# Patient Record
Sex: FEMALE | Race: WHITE | NOT HISPANIC OR LATINO | Employment: OTHER | URBAN - METROPOLITAN AREA
[De-identification: names, ages, dates, MRNs, and addresses within clinical notes are randomized per-mention and may not be internally consistent; named-entity substitution may affect disease eponyms.]

---

## 2018-06-12 ENCOUNTER — HOSPITAL ENCOUNTER (EMERGENCY)
Facility: HOSPITAL | Age: 78
Discharge: HOME | End: 2018-06-12
Attending: FAMILY MEDICINE
Payer: MEDICARE

## 2018-06-12 VITALS
DIASTOLIC BLOOD PRESSURE: 74 MMHG | OXYGEN SATURATION: 91 % | SYSTOLIC BLOOD PRESSURE: 138 MMHG | RESPIRATION RATE: 20 BRPM | WEIGHT: 123 LBS | HEIGHT: 62 IN | HEART RATE: 76 BPM | TEMPERATURE: 99 F | BODY MASS INDEX: 22.63 KG/M2

## 2018-06-12 DIAGNOSIS — J44.0 COPD (CHRONIC OBSTRUCTIVE PULMONARY DISEASE) WITH ACUTE BRONCHITIS: Primary | ICD-10-CM

## 2018-06-12 DIAGNOSIS — J20.9 COPD (CHRONIC OBSTRUCTIVE PULMONARY DISEASE) WITH ACUTE BRONCHITIS: Primary | ICD-10-CM

## 2018-06-12 DIAGNOSIS — R07.9 CHEST PAIN: ICD-10-CM

## 2018-06-12 PROCEDURE — 99284 EMERGENCY DEPT VISIT MOD MDM: CPT | Mod: 25

## 2018-06-12 PROCEDURE — 94640 AIRWAY INHALATION TREATMENT: CPT

## 2018-06-12 PROCEDURE — 71046 X-RAY EXAM CHEST 2 VIEWS: CPT | Mod: TC,FY

## 2018-06-12 PROCEDURE — 94760 N-INVAS EAR/PLS OXIMETRY 1: CPT

## 2018-06-12 PROCEDURE — 25000242 PHARM REV CODE 250 ALT 637 W/ HCPCS: Performed by: FAMILY MEDICINE

## 2018-06-12 PROCEDURE — 71046 X-RAY EXAM CHEST 2 VIEWS: CPT | Mod: 26,,, | Performed by: RADIOLOGY

## 2018-06-12 RX ORDER — TIOTROPIUM BROMIDE 18 UG/1
18 CAPSULE ORAL; RESPIRATORY (INHALATION) DAILY
Qty: 1 BOX | Refills: 0 | Status: SHIPPED | OUTPATIENT
Start: 2018-06-12 | End: 2018-12-08 | Stop reason: SDUPTHER

## 2018-06-12 RX ORDER — DOXYCYCLINE 100 MG/1
100 CAPSULE ORAL 2 TIMES DAILY
Qty: 20 CAPSULE | Refills: 0 | Status: SHIPPED | OUTPATIENT
Start: 2018-06-12 | End: 2018-06-22

## 2018-06-12 RX ORDER — METHYLPREDNISOLONE 4 MG/1
TABLET ORAL
Qty: 1 PACKAGE | Refills: 0 | Status: SHIPPED | OUTPATIENT
Start: 2018-06-12 | End: 2018-07-03

## 2018-06-12 RX ORDER — ALBUTEROL SULFATE 90 UG/1
1-2 AEROSOL, METERED RESPIRATORY (INHALATION) EVERY 6 HOURS PRN
Qty: 1 INHALER | Refills: 0 | Status: SHIPPED | OUTPATIENT
Start: 2018-06-12 | End: 2018-12-08 | Stop reason: SDUPTHER

## 2018-06-12 RX ORDER — IPRATROPIUM BROMIDE AND ALBUTEROL SULFATE 2.5; .5 MG/3ML; MG/3ML
3 SOLUTION RESPIRATORY (INHALATION)
Status: COMPLETED | OUTPATIENT
Start: 2018-06-12 | End: 2018-06-12

## 2018-06-12 RX ADMIN — IPRATROPIUM BROMIDE AND ALBUTEROL SULFATE 3 ML: .5; 3 SOLUTION RESPIRATORY (INHALATION) at 10:06

## 2018-06-12 NOTE — ED PROVIDER NOTES
Encounter Date: 6/12/2018       History     Chief Complaint   Patient presents with    Nasal Congestion     Patient is a 77-year-old lady with a history of hypertension and COPD.  She presents with a complaint of nasal discharge, rhinorrhea, cough and mild shortness of breath for the past 4 days.  She denies any fever or productive cough.           Review of patient's allergies indicates:   Allergen Reactions    Codeine      History reviewed. No pertinent past medical history.  Past Surgical History:   Procedure Laterality Date    HYSTERECTOMY       History reviewed. No pertinent family history.  Social History   Substance Use Topics    Smoking status: Current Every Day Smoker     Packs/day: 1.00    Smokeless tobacco: Never Used    Alcohol use Yes     Review of Systems   Constitutional: Negative for chills, fatigue and fever.   HENT: Positive for congestion. Negative for ear pain, rhinorrhea, sinus pain, sinus pressure and sore throat.    Eyes: Negative for photophobia and redness.   Respiratory: Positive for cough and wheezing. Negative for chest tightness and shortness of breath.    Cardiovascular: Negative for chest pain, palpitations and leg swelling.   Gastrointestinal: Negative for abdominal pain, constipation, diarrhea and nausea.   Endocrine: Negative for polydipsia, polyphagia and polyuria.   Genitourinary: Negative for difficulty urinating, dysuria, flank pain, hematuria and urgency.   Musculoskeletal: Negative for arthralgias, back pain and joint swelling.   Skin: Negative for color change.   Neurological: Negative for dizziness, seizures, syncope, weakness and headaches.   Hematological: Negative for adenopathy.   Psychiatric/Behavioral: Negative for sleep disturbance and suicidal ideas.   All other systems reviewed and are negative.      Physical Exam     Initial Vitals [06/12/18 0946]   BP Pulse Resp Temp SpO2   (!) 198/95 79 18 98.9 °F (37.2 °C) (!) 93 %      MAP       --         Physical  Exam    Nursing note and vitals reviewed.  Constitutional:   Uncomfortable but in no acute distress.   Pulmonary/Chest: She has wheezes.         ED Course   Procedures  Labs Reviewed - No data to display       X-Ray Chest PA And Lateral   Final Result      Hyperexpansion of the lungs consistent with COPD.      There is no x-ray evidence of active or acute disease.         Electronically signed by: Nikita Ledesma   Date:    06/12/2018   Time:    10:53        X-Rays:   Independently Interpreted Readings:   Other Readings:  COPD, no pneumonia.                         Clinical Impression:   The primary encounter diagnosis was COPD (chronic obstructive pulmonary disease) with acute bronchitis. A diagnosis of Chest pain was also pertinent to this visit.      Disposition:   Disposition: Discharged  Condition: Stable                        Marifer Anderson MD  06/12/18 1103       Marifer Anderson MD  07/06/18 3896

## 2018-06-12 NOTE — ED TRIAGE NOTES
Pt to ed with c/o cough and congestion x2-3 wks, reports seeing urgent care this am and had O2 sat of 90% and was instructed to come to ed

## 2018-12-08 ENCOUNTER — HOSPITAL ENCOUNTER (EMERGENCY)
Facility: HOSPITAL | Age: 78
Discharge: HOME | End: 2018-12-08
Attending: FAMILY MEDICINE
Payer: MEDICARE

## 2018-12-08 VITALS
OXYGEN SATURATION: 94 % | BODY MASS INDEX: 21.53 KG/M2 | SYSTOLIC BLOOD PRESSURE: 164 MMHG | DIASTOLIC BLOOD PRESSURE: 81 MMHG | TEMPERATURE: 98 F | HEIGHT: 66 IN | HEART RATE: 66 BPM | WEIGHT: 134 LBS | RESPIRATION RATE: 20 BRPM

## 2018-12-08 DIAGNOSIS — J20.9 COPD (CHRONIC OBSTRUCTIVE PULMONARY DISEASE) WITH ACUTE BRONCHITIS: ICD-10-CM

## 2018-12-08 DIAGNOSIS — J44.1 COPD WITH ACUTE EXACERBATION: Primary | ICD-10-CM

## 2018-12-08 DIAGNOSIS — J44.0 COPD (CHRONIC OBSTRUCTIVE PULMONARY DISEASE) WITH ACUTE BRONCHITIS: ICD-10-CM

## 2018-12-08 PROCEDURE — 99283 EMERGENCY DEPT VISIT LOW MDM: CPT

## 2018-12-08 RX ORDER — ALBUTEROL SULFATE 90 UG/1
1-2 AEROSOL, METERED RESPIRATORY (INHALATION) EVERY 6 HOURS PRN
Qty: 1 INHALER | Refills: 0 | Status: SHIPPED | OUTPATIENT
Start: 2018-12-08

## 2018-12-08 RX ORDER — CEFUROXIME AXETIL 500 MG/1
500 TABLET ORAL 2 TIMES DAILY
Qty: 14 TABLET | Refills: 0 | Status: SHIPPED | OUTPATIENT
Start: 2018-12-08 | End: 2019-07-02 | Stop reason: CLARIF

## 2018-12-08 RX ORDER — TIOTROPIUM BROMIDE 18 UG/1
18 CAPSULE ORAL; RESPIRATORY (INHALATION) DAILY
Qty: 1 BOX | Refills: 0 | Status: ON HOLD | OUTPATIENT
Start: 2018-12-08 | End: 2022-11-18 | Stop reason: HOSPADM

## 2018-12-08 NOTE — ED PROVIDER NOTES
CHIEF COMPLAINT  Chief Complaint   Patient presents with    Nasal Congestion    Cough       HPI  Geraldine Cookseyis a 78 y.o. female who presents to the ED with complaints of congestion.  Patient states she has a history of COPD and has been out of her inhalers for several weeks..  States her congestion has been much worse in the past few days.  She does not think she has had any fever.    CURRENT MEDICATIONS  No current facility-administered medications on file prior to encounter.      Current Outpatient Medications on File Prior to Encounter   Medication Sig Dispense Refill    [DISCONTINUED] albuterol 90 mcg/actuation inhaler Inhale 1-2 puffs into the lungs every 6 (six) hours as needed for Wheezing. Rescue 1 Inhaler 0    [DISCONTINUED] tiotropium (SPIRIVA) 18 mcg inhalation capsule Inhale 1 capsule (18 mcg total) into the lungs once daily. Controller 1 Box 0       ALLERGIES  Review of patient's allergies indicates:   Allergen Reactions    Codeine          There is no immunization history on file for this patient.    PAST MEDICAL HISTORY  History reviewed. No pertinent past medical history.    SURGICAL HISTORY  Past Surgical History:   Procedure Laterality Date    HYSTERECTOMY         SOCIAL HISTORY  Social History     Socioeconomic History    Marital status: Single     Spouse name: Not on file    Number of children: Not on file    Years of education: Not on file    Highest education level: Not on file   Social Needs    Financial resource strain: Not on file    Food insecurity - worry: Not on file    Food insecurity - inability: Not on file    Transportation needs - medical: Not on file    Transportation needs - non-medical: Not on file   Occupational History    Not on file   Tobacco Use    Smoking status: Current Every Day Smoker     Packs/day: 1.00    Smokeless tobacco: Never Used   Substance and Sexual Activity    Alcohol use: Yes    Drug use: No    Sexual activity: No   Other Topics  "Concern    Not on file   Social History Narrative    Not on file       FAMILY HISTORY  History reviewed. No pertinent family history.    REVIEW OF SYSTEMS  Constitutional: No fever, chills, or weakness.  Eyes: No redness, pain, or discharge  HENT: No ear pain, no headache, no rhinorrhea, no throat pain  Respiratory: +cough,+ wheezing, shortness of breath  Cardiovascular: No chest pain, palpitations or edema  GI: No abdominal pain, nausea, vomiting or diarrhea  Gu: No dysuria, no hematuria, or discharge  Musculoskeletal: No pain, full range of motion. Good sensation  Skin: No rash or abrasion  Neurologic: No focal weakness or sensory changes.  All systems otherwise negative except as noted in the Review of Systems and History of Present Illness      PHYSICAL EXAM  Reviewed Triage Note  VITAL SIGNS:   Patient Vitals for the past 24 hrs:   BP Temp Temp src Pulse Resp SpO2 Height Weight   12/08/18 1549 (!) 164/81 98.1 °F (36.7 °C) Oral 66 20 (!) 94 % 5' 6" (1.676 m) 60.8 kg (134 lb)     Constitutional: Well developed, well nourished, Alert and oriented x3, No acute distress, non-toxic appearance.  HENT: Normocephalic, Atraumatic, Bilateral external ears normal, external nose negative, oropharynx moist, No oral exudates.  Eyes: PERRL, EOMI, Conjunctiva normal, No discharge.  Neck: Normal range of motion, no tenderness, supple, no carotid bruits  Respiratory: + scattered wheezes, no rales, few rhonchi.  Cardiovascular: Normal heart rate, normal rhythm, no murmurs, no rubs, no gallops.  Gi: Bowel sounds normal, soft, no tenderness, non-distended, no masses, no pulsatile masses.  Musculoskeletal: No edema, no tenderness, no cyanosis, no clubbing. Good range of motion in all major joints. No tenderness to palpation or major deformities noted.   Integument: Warm, Dry, No erythema, no rash  Neurologic: Normal motor function, normal sensory function. No focal deficits noted. Intact distal pulses  Psychiatric: Affect normal, " judgment normal, mood normal      LABS  Pertinent labs reviewed. (see chart for details)  Labs Reviewed - No data to display    RADIOLOGY  No orders to display         PROCEDURE  Procedures      ED COURSE & MEDICAL DECISION MAKING     MDM       Physical exam findings discussed with patient. No acute emergent medical condition identified at this time to warrant further testing. Will dispo home with instructions to follow up with PCP, return to the ED for worsening condition. Pt agrees with plan of care.     DISPOSITION  Patient discharged in stable condition 12/8/2018  4:22 PM      CLINICAL IMPRESSION:  The primary encounter diagnosis was COPD with acute exacerbation. A diagnosis of COPD (chronic obstructive pulmonary disease) with acute bronchitis was also pertinent to this visit.    Patient advised to follow-up with your PCP within 3 days for BP re-check if Blood Pressure was >120/80 without history of hypertension.       DAVE Busby  12/08/18 4146

## 2019-07-02 ENCOUNTER — HOSPITAL ENCOUNTER (INPATIENT)
Facility: HOSPITAL | Age: 79
LOS: 6 days | Discharge: HOME HEALTH | DRG: 481 | End: 2019-07-08
Attending: EMERGENCY MEDICINE | Admitting: HOSPITALIST
Payer: MEDICARE

## 2019-07-02 DIAGNOSIS — J44.9 CHRONIC OBSTRUCTIVE PULMONARY DISEASE, UNSPECIFIED COPD TYPE: Primary | ICD-10-CM

## 2019-07-02 DIAGNOSIS — M25.559 HIP PAIN: ICD-10-CM

## 2019-07-02 DIAGNOSIS — Z01.818 PRE-OP EVALUATION: ICD-10-CM

## 2019-07-02 DIAGNOSIS — M17.12 PRIMARY OSTEOARTHRITIS OF LEFT KNEE: ICD-10-CM

## 2019-07-02 DIAGNOSIS — S72.002A CLOSED FRACTURE OF LEFT HIP, INITIAL ENCOUNTER: ICD-10-CM

## 2019-07-02 PROBLEM — R03.0 ELEVATED BLOOD PRESSURE READING: Status: ACTIVE | Noted: 2019-07-02

## 2019-07-02 PROBLEM — Z72.0 TOBACCO ABUSE: Status: ACTIVE | Noted: 2019-07-02

## 2019-07-02 LAB
ALBUMIN SERPL BCP-MCNC: 3.9 G/DL (ref 3.5–5.2)
ALP SERPL-CCNC: 67 U/L (ref 55–135)
ALT SERPL W/O P-5'-P-CCNC: 13 U/L (ref 10–44)
ANION GAP SERPL CALC-SCNC: 10 MMOL/L (ref 8–16)
AST SERPL-CCNC: 16 U/L (ref 10–40)
BASOPHILS # BLD AUTO: 0.14 K/UL (ref 0–0.2)
BASOPHILS NFR BLD: 1.5 % (ref 0–1.9)
BILIRUB SERPL-MCNC: 0.3 MG/DL (ref 0.1–1)
BUN SERPL-MCNC: 15 MG/DL (ref 8–23)
CALCIUM SERPL-MCNC: 9.4 MG/DL (ref 8.7–10.5)
CHLORIDE SERPL-SCNC: 105 MMOL/L (ref 95–110)
CO2 SERPL-SCNC: 26 MMOL/L (ref 23–29)
CREAT SERPL-MCNC: 0.7 MG/DL (ref 0.5–1.4)
DIFFERENTIAL METHOD: ABNORMAL
EOSINOPHIL # BLD AUTO: 0.2 K/UL (ref 0–0.5)
EOSINOPHIL NFR BLD: 1.9 % (ref 0–8)
ERYTHROCYTE [DISTWIDTH] IN BLOOD BY AUTOMATED COUNT: 13.9 % (ref 11.5–14.5)
EST. GFR  (AFRICAN AMERICAN): >60 ML/MIN/1.73 M^2
EST. GFR  (NON AFRICAN AMERICAN): >60 ML/MIN/1.73 M^2
GLUCOSE SERPL-MCNC: 84 MG/DL (ref 70–110)
HCT VFR BLD AUTO: 40.8 % (ref 37–48.5)
HGB BLD-MCNC: 13 G/DL (ref 12–16)
IMM GRANULOCYTES # BLD AUTO: 0.06 K/UL (ref 0–0.04)
LYMPHOCYTES # BLD AUTO: 2.3 K/UL (ref 1–4.8)
LYMPHOCYTES NFR BLD: 24.8 % (ref 18–48)
MCH RBC QN AUTO: 29.5 PG (ref 27–31)
MCHC RBC AUTO-ENTMCNC: 31.9 G/DL (ref 32–36)
MCV RBC AUTO: 93 FL (ref 82–98)
MONOCYTES # BLD AUTO: 1 K/UL (ref 0.3–1)
MONOCYTES NFR BLD: 11.1 % (ref 4–15)
NEUTROPHILS # BLD AUTO: 5.6 K/UL (ref 1.8–7.7)
NEUTROPHILS NFR BLD: 60.1 % (ref 38–73)
NRBC BLD-RTO: 0 /100 WBC
PLATELET # BLD AUTO: 245 K/UL (ref 150–350)
PMV BLD AUTO: 10.3 FL (ref 9.2–12.9)
POTASSIUM SERPL-SCNC: 4.2 MMOL/L (ref 3.5–5.1)
PROT SERPL-MCNC: 7 G/DL (ref 6–8.4)
RBC # BLD AUTO: 4.41 M/UL (ref 4–5.4)
SODIUM SERPL-SCNC: 141 MMOL/L (ref 136–145)
WBC # BLD AUTO: 9.3 K/UL (ref 3.9–12.7)

## 2019-07-02 PROCEDURE — 80053 COMPREHEN METABOLIC PANEL: CPT

## 2019-07-02 PROCEDURE — 36415 COLL VENOUS BLD VENIPUNCTURE: CPT

## 2019-07-02 PROCEDURE — 96372 THER/PROPH/DIAG INJ SC/IM: CPT

## 2019-07-02 PROCEDURE — 63600175 PHARM REV CODE 636 W HCPCS: Performed by: HOSPITALIST

## 2019-07-02 PROCEDURE — 93005 ELECTROCARDIOGRAM TRACING: CPT

## 2019-07-02 PROCEDURE — 12000002 HC ACUTE/MED SURGE SEMI-PRIVATE ROOM

## 2019-07-02 PROCEDURE — 99285 EMERGENCY DEPT VISIT HI MDM: CPT | Mod: 25

## 2019-07-02 PROCEDURE — 25000003 PHARM REV CODE 250: Performed by: HOSPITALIST

## 2019-07-02 PROCEDURE — 27000221 HC OXYGEN, UP TO 24 HOURS

## 2019-07-02 PROCEDURE — 85025 COMPLETE CBC W/AUTO DIFF WBC: CPT

## 2019-07-02 PROCEDURE — 96374 THER/PROPH/DIAG INJ IV PUSH: CPT

## 2019-07-02 PROCEDURE — 99900035 HC TECH TIME PER 15 MIN (STAT)

## 2019-07-02 PROCEDURE — 63600175 PHARM REV CODE 636 W HCPCS: Performed by: EMERGENCY MEDICINE

## 2019-07-02 PROCEDURE — 94761 N-INVAS EAR/PLS OXIMETRY MLT: CPT

## 2019-07-02 RX ORDER — ALBUTEROL SULFATE 90 UG/1
2 AEROSOL, METERED RESPIRATORY (INHALATION) EVERY 6 HOURS PRN
Status: DISCONTINUED | OUTPATIENT
Start: 2019-07-02 | End: 2019-07-02

## 2019-07-02 RX ORDER — ENOXAPARIN SODIUM 100 MG/ML
40 INJECTION SUBCUTANEOUS EVERY 24 HOURS
Status: DISCONTINUED | OUTPATIENT
Start: 2019-07-02 | End: 2019-07-03

## 2019-07-02 RX ORDER — LANOLIN ALCOHOL/MO/W.PET/CERES
800 CREAM (GRAM) TOPICAL
Status: DISCONTINUED | OUTPATIENT
Start: 2019-07-02 | End: 2019-07-03

## 2019-07-02 RX ORDER — POLYETHYLENE GLYCOL 3350 17 G/17G
17 POWDER, FOR SOLUTION ORAL DAILY
Status: DISCONTINUED | OUTPATIENT
Start: 2019-07-03 | End: 2019-07-03

## 2019-07-02 RX ORDER — SODIUM,POTASSIUM PHOSPHATES 280-250MG
2 POWDER IN PACKET (EA) ORAL
Status: DISCONTINUED | OUTPATIENT
Start: 2019-07-02 | End: 2019-07-03

## 2019-07-02 RX ORDER — MORPHINE SULFATE 2 MG/ML
4 INJECTION, SOLUTION INTRAMUSCULAR; INTRAVENOUS EVERY 4 HOURS PRN
Status: DISCONTINUED | OUTPATIENT
Start: 2019-07-02 | End: 2019-07-03

## 2019-07-02 RX ORDER — POTASSIUM CHLORIDE 20 MEQ/15ML
60 SOLUTION ORAL
Status: DISCONTINUED | OUTPATIENT
Start: 2019-07-02 | End: 2019-07-03

## 2019-07-02 RX ORDER — HYDRALAZINE HYDROCHLORIDE 20 MG/ML
10 INJECTION INTRAMUSCULAR; INTRAVENOUS EVERY 6 HOURS PRN
Status: DISCONTINUED | OUTPATIENT
Start: 2019-07-02 | End: 2019-07-03

## 2019-07-02 RX ORDER — CYCLOBENZAPRINE HCL 10 MG
10 TABLET ORAL 3 TIMES DAILY PRN
Status: DISCONTINUED | OUTPATIENT
Start: 2019-07-02 | End: 2019-07-03

## 2019-07-02 RX ORDER — ONDANSETRON 2 MG/ML
4 INJECTION INTRAMUSCULAR; INTRAVENOUS EVERY 8 HOURS PRN
Status: DISCONTINUED | OUTPATIENT
Start: 2019-07-02 | End: 2019-07-03

## 2019-07-02 RX ORDER — MORPHINE SULFATE 8 MG/ML
8 INJECTION INTRAMUSCULAR; INTRAVENOUS; SUBCUTANEOUS
Status: DISCONTINUED | OUTPATIENT
Start: 2019-07-02 | End: 2019-07-02

## 2019-07-02 RX ORDER — IBUPROFEN 200 MG
24 TABLET ORAL
Status: DISCONTINUED | OUTPATIENT
Start: 2019-07-02 | End: 2019-07-03

## 2019-07-02 RX ORDER — POTASSIUM CHLORIDE 20 MEQ/15ML
40 SOLUTION ORAL
Status: DISCONTINUED | OUTPATIENT
Start: 2019-07-02 | End: 2019-07-03

## 2019-07-02 RX ORDER — SODIUM CHLORIDE 0.9 % (FLUSH) 0.9 %
10 SYRINGE (ML) INJECTION
Status: DISCONTINUED | OUTPATIENT
Start: 2019-07-02 | End: 2019-07-03

## 2019-07-02 RX ORDER — ALBUTEROL SULFATE 2.5 MG/.5ML
2.5 SOLUTION RESPIRATORY (INHALATION) EVERY 6 HOURS PRN
Status: DISCONTINUED | OUTPATIENT
Start: 2019-07-02 | End: 2019-07-03

## 2019-07-02 RX ORDER — ORPHENADRINE CITRATE 30 MG/ML
30 INJECTION INTRAMUSCULAR; INTRAVENOUS
Status: COMPLETED | OUTPATIENT
Start: 2019-07-02 | End: 2019-07-02

## 2019-07-02 RX ORDER — DIPHENHYDRAMINE HCL 25 MG
25 CAPSULE ORAL EVERY 6 HOURS PRN
Status: DISCONTINUED | OUTPATIENT
Start: 2019-07-02 | End: 2019-07-03

## 2019-07-02 RX ORDER — ACETAMINOPHEN 325 MG/1
650 TABLET ORAL EVERY 6 HOURS PRN
Status: DISCONTINUED | OUTPATIENT
Start: 2019-07-02 | End: 2019-07-03

## 2019-07-02 RX ORDER — GLUCAGON 1 MG
1 KIT INJECTION
Status: DISCONTINUED | OUTPATIENT
Start: 2019-07-02 | End: 2019-07-03

## 2019-07-02 RX ORDER — IBUPROFEN 200 MG
16 TABLET ORAL
Status: DISCONTINUED | OUTPATIENT
Start: 2019-07-02 | End: 2019-07-03

## 2019-07-02 RX ADMIN — MORPHINE SULFATE 8 MG: 8 INJECTION INTRAVENOUS at 01:07

## 2019-07-02 RX ADMIN — CYCLOBENZAPRINE HYDROCHLORIDE 10 MG: 10 TABLET, FILM COATED ORAL at 05:07

## 2019-07-02 RX ADMIN — ORPHENADRINE CITRATE 30 MG: 60 INJECTION INTRAMUSCULAR; INTRAVENOUS at 03:07

## 2019-07-02 RX ADMIN — ENOXAPARIN SODIUM 40 MG: 100 INJECTION SUBCUTANEOUS at 05:07

## 2019-07-02 RX ADMIN — MORPHINE SULFATE 4 MG: 2 INJECTION, SOLUTION INTRAMUSCULAR; INTRAVENOUS at 07:07

## 2019-07-02 NOTE — ED PROVIDER NOTES
Encounter Date: 7/2/2019       History     Chief Complaint   Patient presents with    Fall     left hip pain      Chief complaint:  Hip pain    HPI:  78-year-old female presents from [place] via ambulance with complaints of left pain after she suffered a mechanical fall when she tripped over her dog.  She denies any other injury. She has no headache, neck pain, chest or back pain. Past medical history is significant only for COPD.        Review of patient's allergies indicates:   Allergen Reactions    Codeine      Past Medical History:   Diagnosis Date    COPD (chronic obstructive pulmonary disease)      Past Surgical History:   Procedure Laterality Date    HYSTERECTOMY       History reviewed. No pertinent family history.  Social History     Tobacco Use    Smoking status: Current Every Day Smoker     Packs/day: 1.00    Smokeless tobacco: Never Used   Substance Use Topics    Alcohol use: Yes    Drug use: No     Review of Systems   Constitutional: Negative for activity change, appetite change, chills, fatigue and fever.   Eyes: Negative for visual disturbance.   Respiratory: Negative for apnea and shortness of breath.    Cardiovascular: Negative for chest pain and palpitations.   Gastrointestinal: Negative for abdominal distention and abdominal pain.   Genitourinary: Negative for difficulty urinating.   Musculoskeletal: Positive for arthralgias. Negative for neck pain.   Skin: Negative for pallor and rash.   Neurological: Negative for headaches.   Hematological: Does not bruise/bleed easily.   Psychiatric/Behavioral: Negative for agitation.       Physical Exam     Initial Vitals [07/02/19 1316]   BP Pulse Resp Temp SpO2   (!) 216/95 69 20 97.8 °F (36.6 °C) (!) 94 %      MAP       --         Physical Exam    Nursing note and vitals reviewed.  Constitutional: She appears well-developed and well-nourished.   HENT:   Head: Normocephalic and atraumatic.   Eyes: Conjunctivae are normal.   Neck: Normal range of  motion. Neck supple.   Cardiovascular: Normal rate, regular rhythm and normal heart sounds. Exam reveals no gallop and no friction rub.    No murmur heard.  Normal dorsalis pedal pulses bilaterally   Pulmonary/Chest: Effort normal and breath sounds normal. No respiratory distress. She has no wheezes. She has no rhonchi. She has no rales.   Abdominal: Soft. She exhibits no distension. There is no tenderness.   Musculoskeletal: She exhibits tenderness (Left hip tenderness).   Diminished movement of the left hip   Neurological: She is alert and oriented to person, place, and time. She has normal strength. No sensory deficit.   Skin: Skin is warm and dry. No erythema.   Psychiatric: She has a normal mood and affect.         ED Course   Procedures  Labs Reviewed   CBC W/ AUTO DIFFERENTIAL - Abnormal; Notable for the following components:       Result Value    Mean Corpuscular Hemoglobin Conc 31.9 (*)     Immature Grans (Abs) 0.06 (*)     All other components within normal limits   COMPREHENSIVE METABOLIC PANEL     EKG Readings: (Independently Interpreted)   Rhythm: Normal Sinus Rhythm. Heart Rate: 70. Ectopy: No Ectopy. Conduction: Normal. ST Segments: Non-Specific ST Segment Depression. T Waves: Normal. Axis: Normal. Clinical Impression: Normal Sinus Rhythm       Imaging Results          X-Ray Chest AP Portable (Final result)  Result time 07/02/19 14:32:25    Final result by Remi Sung Jr., MD (07/02/19 14:32:25)                 Impression:      No acute abnormality.      Electronically signed by: Remi Sung MD  Date:    07/02/2019  Time:    14:32             Narrative:    EXAMINATION:  XR CHEST AP PORTABLE    CLINICAL HISTORY:  Encounter for other preprocedural examination    TECHNIQUE:  Single frontal view of the chest was performed.    COMPARISON:  None    FINDINGS:  The lungs are clear, with normal appearance of pulmonary vasculature and no pleural effusion or pneumothorax.    The cardiac silhouette is  normal in size. The hilar and mediastinal contours are unremarkable.    Bones are intact.                               X-Ray Hip 2 View Left (Final result)  Result time 07/02/19 14:33:49    Final result by Remi Sung Jr., MD (07/02/19 14:33:49)                 Impression:      Nondisplaced intertrochanteric fracture of the left femur.  Diffuse osteoporosis.  Moderate osteoarthritis right hip joint, mild osteoarthritis left hip joint.      Electronically signed by: Remi Sung MD  Date:    07/02/2019  Time:    14:33             Narrative:    EXAMINATION:  XR HIP 2 VIEW LEFT    CLINICAL HISTORY:  Pain in unspecified hip    TECHNIQUE:  AP view of the pelvis and frog leg lateral view of the left hip were performed.    COMPARISON:  None    FINDINGS:  There is an intertrochanteric fracture of the left femur without significant displacement.  The femoral head remains in the acetabulum.  Mild DJD is noted at the acetabular rim.  There is diffuse osteoporosis of the pelvis.  There is moderate osteoarthritis at the right hip joint.  The sacroiliac joints are within normal limits.                                 Medical Decision Making:   Independently Interpreted Test(s):   I have ordered and independently interpreted X-rays - see summary below.       <> Summary of X-Ray Reading(s): Left hip x-rays independently interpreted by me demonstrate a nondisplaced intertrochanteric fracture.  ED Management:  78-year-old female presents with left hip pain after a mechanical fall.  X-rays reveal a nondisplaced intertrochanteric hip fracture.  Dr. finger recommended transfer to [facility] for efficiency; however, the patient's family and the patient adamantly refuse transfer.  She will be admitted to this facility for surgical repair.  Other:   I have discussed this case with another health care provider.       <> Summary of the Discussion: Discussed with Dr. Almendarez who will admit the patient.  Discussed with   susan who will repair the hip fracture.                      Clinical Impression:       ICD-10-CM ICD-9-CM   1. Closed fracture of left hip, initial encounter S72.002A 820.8   2. Hip pain M25.559 719.45   3. Pre-op evaluation Z01.818 V72.84                                George De La Paz III, MD  07/02/19 1600

## 2019-07-02 NOTE — PLAN OF CARE
07/02/19 1632   Patient Assessment/Suction   Level of Consciousness (AVPU) alert   Respiratory Effort Normal;Unlabored   Expansion/Accessory Muscles/Retractions no retractions;no use of accessory muscles;expansion symmetric   All Lung Fields Breath Sounds clear   Rhythm/Pattern, Respiratory depth regular;pattern regular;unlabored   Cough Frequency no cough   PRE-TX-O2   O2 Device (Oxygen Therapy) nasal cannula   $ Is the patient on Low Flow Oxygen? Yes   Flow (L/min) 2   SpO2 98 %   Pulse Oximetry Type Intermittent   $ Pulse Oximetry - Multiple Charge Pulse Oximetry - Multiple   Pulse 75   Resp 18   Aerosol Therapy   $ Aerosol Therapy Charges PRN treatment not required   Respiratory Treatment Status (SVN) PRN treatment not required       Aerosol treatments PRN.

## 2019-07-02 NOTE — ASSESSMENT & PLAN NOTE
Orthopedics has been consulted.  Dr. Gunn planning to take to the OR tomorrow.  Pain control with IV pain medication for now.  NPO at midnight.  PT and OT consults after surgery.

## 2019-07-02 NOTE — ASSESSMENT & PLAN NOTE
Patient's COPD is controlled currently. Continue scheduled inhalers, Supplemental oxygen as needed and monitor respiratory status closely.

## 2019-07-02 NOTE — ASSESSMENT & PLAN NOTE
Assistance with smoking cessation was offered, including:  [x]  Medications  [x]  Counseling    Patient was counseled regarding smoking for >10 minutes.  Does not want nicotine patch at this time.

## 2019-07-02 NOTE — ASSESSMENT & PLAN NOTE
Likely secondary to pain. No history of hypertension.  Will continue to monitor blood pressure.  Will utilize p.r.n. antihypertensives for systolic greater than 160.

## 2019-07-02 NOTE — ED NOTES
Upon transfer to room 423, patient is AAOx4, no cardiac or respiratory complications. No needs or questions from patient or family.

## 2019-07-02 NOTE — ED NOTES
Presents to the ER with c/o fall that occurred just prior to arrival. Patient reports that she was carrying groceries when her dog knocked her over, resulting in a fall to her left hip. Patient denies hitting head. + left pedal pulse. Mucous membranes are pink and moist. Skin is warm, dry and intact. Lungs are clear bilaterally, respirations are regular and unlabored. Denies cough, congestion, rhinorrhea or SOB. BS active x4, no tenderness with palpation, abd is soft and not distended. Denies any appetite or activity change. S1S2, capillary refill is < 2 seconds. Denies dysuria, difficulty urinating, frequency, numbness, tingling or weakness. DANNIE BELL

## 2019-07-02 NOTE — PROGRESS NOTES
Admit to room 423 from ER. Dx. Left closed hip fx..  Awake alert. NPO after midnight for surgery tomorrow.

## 2019-07-02 NOTE — H&P
[facility] Hospital Medicine  History & Physical    Patient Name: Geraldine Cooksey  MRN: [id number]  Admission Date: 7/2/2019  Attending Physician: Margarette Almendarez MD  Primary Care Provider: Primary Doctor No         Patient information was obtained from patient, relative(s), past medical records and ER records.     Subjective:     Principal Problem:Closed fracture of left hip    Chief Complaint:   Chief Complaint   Patient presents with    Fall     left hip pain         HPI: Patient is a 70-year-old female with a history of COPD, tobacco abuse who currently smokes 1 pack per day who presents to the ED after mechanical fall.  She was in her usual state of health which is independent in all ADLs and was unloading groceries today when a [place] Doty ran into the room and knocked her over.  She did not experience any loss of consciousness.  Her only complaint prior to the fall was pain in her left hip.  At this time patient denies any fever, chills, nausea, vomiting, abdominal pain, urinary symptoms, bowel complaints.    In the ED, workup was significant for elevated blood pressure as well as hip x-ray showing a nondisplaced intratrochanteric fracture of the left femur.  ED physician discussed with Orthopedics Dr. Gunn who will perform surgery tomorrow.    Past Medical History:   Diagnosis Date    COPD (chronic obstructive pulmonary disease)        Past Surgical History:   Procedure Laterality Date    HYSTERECTOMY         Review of patient's allergies indicates:   Allergen Reactions    Codeine        No current facility-administered medications on file prior to encounter.      Current Outpatient Medications on File Prior to Encounter   Medication Sig    albuterol (PROVENTIL/VENTOLIN HFA) 90 mcg/actuation inhaler Inhale 1-2 puffs into the lungs every 6 (six) hours as needed for Wheezing. Rescue    tiotropium (SPIRIVA) 18 mcg inhalation capsule Inhale 1 capsule (18 mcg total) into the lungs  once daily. Controller    [DISCONTINUED] cefUROXime (CEFTIN) 500 MG tablet Take 1 tablet (500 mg total) by mouth 2 (two) times daily.     Family History     None        Tobacco Use    Smoking status: Current Every Day Smoker     Packs/day: 1.00    Smokeless tobacco: Never Used   Substance and Sexual Activity    Alcohol use: Yes    Drug use: No    Sexual activity: Never     Review of Systems   Constitutional: Negative for chills and fever.   HENT: Negative for congestion and rhinorrhea.    Eyes: Negative for photophobia and visual disturbance.   Respiratory: Negative for cough, shortness of breath and wheezing.    Cardiovascular: Negative for chest pain, palpitations and leg swelling.   Gastrointestinal: Negative for abdominal distention, abdominal pain, nausea and vomiting.   Endocrine: Negative for cold intolerance and heat intolerance.   Genitourinary: Negative for dysuria and urgency.   Musculoskeletal: Positive for arthralgias and gait problem. Negative for neck stiffness.   Skin: Negative for rash and wound.   Neurological: Negative for dizziness and weakness.   All other systems reviewed and are negative.    Objective:     Vital Signs (Most Recent):  Temp: 97.8 °F (36.6 °C) (07/02/19 1316)  Pulse: 68 (07/02/19 1539)  Resp: 18 (07/02/19 1539)  BP: (!) 188/78 (07/02/19 1539)  SpO2: 95 % (07/02/19 1539) Vital Signs (24h Range):  Temp:  [97.8 °F (36.6 °C)] 97.8 °F (36.6 °C)  Pulse:  [68-74] 68  Resp:  [18-20] 18  SpO2:  [88 %-97 %] 95 %  BP: (178-216)/(75-95) 188/78     Weight: 60.8 kg (134 lb)  Body mass index is 21.63 kg/m².    Physical Exam   Constitutional: She is oriented to person, place, and time. She appears well-developed and well-nourished. No distress.   HENT:   Head: Normocephalic and atraumatic.   Eyes: Pupils are equal, round, and reactive to light. EOM are normal.   Cardiovascular: Normal rate, regular rhythm and intact distal pulses.   No murmur heard.  Pulmonary/Chest: Effort normal and  breath sounds normal. No respiratory distress. She has no wheezes. She has no rales.   Abdominal: Soft. Bowel sounds are normal. She exhibits no distension. There is no tenderness.   Musculoskeletal: She exhibits tenderness (Left hip). She exhibits no edema.   Decreased range of motion left leg secondary to pain   Neurological: She is alert and oriented to person, place, and time. No cranial nerve deficit.   Skin: Skin is warm and dry. No rash noted.   Psychiatric: She has a normal mood and affect. Her behavior is normal.         CRANIAL NERVES     CN III, IV, VI   Pupils are equal, round, and reactive to light.  Extraocular motions are normal.        Significant Labs: All pertinent labs within the past 24 hours have been reviewed.    Significant Imaging: I have reviewed and interpreted all pertinent imaging results/findings within the past 24 hours.    Assessment/Plan:     * Closed fracture of left hip  Orthopedics has been consulted.  Dr. Gunn planning to take to the OR tomorrow.  Pain control with IV pain medication for now.  NPO at midnight.  PT and OT consults after surgery.      Tobacco abuse  Assistance with smoking cessation was offered, including:  [x]  Medications  [x]  Counseling    Patient was counseled regarding smoking for >10 minutes.  Does not want nicotine patch at this time.          Elevated blood pressure reading  Likely secondary to pain. No history of hypertension.  Will continue to monitor blood pressure.  Will utilize p.r.n. antihypertensives for systolic greater than 160.      COPD (chronic obstructive pulmonary disease)  Patient's COPD is controlled currently. Continue scheduled inhalers, Supplemental oxygen as needed and monitor respiratory status closely.          VTE prophylaxis with Lovenox 40 daily    Margarette Almendarez MD  Department of Hospital Medicine   [facility]

## 2019-07-02 NOTE — HPI
Patient is a 70-year-old female with a history of COPD, tobacco abuse who currently smokes 1 pack per day who presents to the ED after mechanical fall.  She was in her usual state of health which is independent in all ADLs and was unloading groceries today when a [place] Doty ran into the room and knocked her over.  She did not experience any loss of consciousness.  Her only complaint prior to the fall was pain in her left hip.  At this time patient denies any fever, chills, nausea, vomiting, abdominal pain, urinary symptoms, bowel complaints.    In the ED, workup was significant for elevated blood pressure as well as hip x-ray showing a nondisplaced intratrochanteric fracture of the left femur.  ED physician discussed with Orthopedics Dr. Gunn who will perform surgery tomorrow.

## 2019-07-02 NOTE — SUBJECTIVE & OBJECTIVE
Past Medical History:   Diagnosis Date    COPD (chronic obstructive pulmonary disease)        Past Surgical History:   Procedure Laterality Date    HYSTERECTOMY         Review of patient's allergies indicates:   Allergen Reactions    Codeine        No current facility-administered medications on file prior to encounter.      Current Outpatient Medications on File Prior to Encounter   Medication Sig    albuterol (PROVENTIL/VENTOLIN HFA) 90 mcg/actuation inhaler Inhale 1-2 puffs into the lungs every 6 (six) hours as needed for Wheezing. Rescue    tiotropium (SPIRIVA) 18 mcg inhalation capsule Inhale 1 capsule (18 mcg total) into the lungs once daily. Controller    [DISCONTINUED] cefUROXime (CEFTIN) 500 MG tablet Take 1 tablet (500 mg total) by mouth 2 (two) times daily.     Family History     None        Tobacco Use    Smoking status: Current Every Day Smoker     Packs/day: 1.00    Smokeless tobacco: Never Used   Substance and Sexual Activity    Alcohol use: Yes    Drug use: No    Sexual activity: Never     Review of Systems   Constitutional: Negative for chills and fever.   HENT: Negative for congestion and rhinorrhea.    Eyes: Negative for photophobia and visual disturbance.   Respiratory: Negative for cough, shortness of breath and wheezing.    Cardiovascular: Negative for chest pain, palpitations and leg swelling.   Gastrointestinal: Negative for abdominal distention, abdominal pain, nausea and vomiting.   Endocrine: Negative for cold intolerance and heat intolerance.   Genitourinary: Negative for dysuria and urgency.   Musculoskeletal: Positive for arthralgias and gait problem. Negative for neck stiffness.   Skin: Negative for rash and wound.   Neurological: Negative for dizziness and weakness.   All other systems reviewed and are negative.    Objective:     Vital Signs (Most Recent):  Temp: 97.8 °F (36.6 °C) (07/02/19 1316)  Pulse: 68 (07/02/19 1539)  Resp: 18 (07/02/19 1539)  BP: (!) 188/78 (07/02/19  1539)  SpO2: 95 % (07/02/19 1539) Vital Signs (24h Range):  Temp:  [97.8 °F (36.6 °C)] 97.8 °F (36.6 °C)  Pulse:  [68-74] 68  Resp:  [18-20] 18  SpO2:  [88 %-97 %] 95 %  BP: (178-216)/(75-95) 188/78     Weight: 60.8 kg (134 lb)  Body mass index is 21.63 kg/m².    Physical Exam   Constitutional: She is oriented to person, place, and time. She appears well-developed and well-nourished. No distress.   HENT:   Head: Normocephalic and atraumatic.   Eyes: Pupils are equal, round, and reactive to light. EOM are normal.   Cardiovascular: Normal rate, regular rhythm and intact distal pulses.   No murmur heard.  Pulmonary/Chest: Effort normal and breath sounds normal. No respiratory distress. She has no wheezes. She has no rales.   Abdominal: Soft. Bowel sounds are normal. She exhibits no distension. There is no tenderness.   Musculoskeletal: She exhibits tenderness (Left hip). She exhibits no edema.   Decreased range of motion left leg secondary to pain   Neurological: She is alert and oriented to person, place, and time. No cranial nerve deficit.   Skin: Skin is warm and dry. No rash noted.   Psychiatric: She has a normal mood and affect. Her behavior is normal.         CRANIAL NERVES     CN III, IV, VI   Pupils are equal, round, and reactive to light.  Extraocular motions are normal.        Significant Labs: All pertinent labs within the past 24 hours have been reviewed.    Significant Imaging: I have reviewed and interpreted all pertinent imaging results/findings within the past 24 hours.

## 2019-07-03 ENCOUNTER — ANESTHESIA (OUTPATIENT)
Dept: SURGERY | Facility: HOSPITAL | Age: 79
DRG: 481 | End: 2019-07-03
Payer: MEDICARE

## 2019-07-03 ENCOUNTER — ANESTHESIA EVENT (OUTPATIENT)
Dept: SURGERY | Facility: HOSPITAL | Age: 79
DRG: 481 | End: 2019-07-03
Payer: MEDICARE

## 2019-07-03 LAB
ANION GAP SERPL CALC-SCNC: 10 MMOL/L (ref 8–16)
BASOPHILS # BLD AUTO: 0.14 K/UL (ref 0–0.2)
BASOPHILS NFR BLD: 1.5 % (ref 0–1.9)
BUN SERPL-MCNC: 12 MG/DL (ref 8–23)
CALCIUM SERPL-MCNC: 9.1 MG/DL (ref 8.7–10.5)
CHLORIDE SERPL-SCNC: 105 MMOL/L (ref 95–110)
CO2 SERPL-SCNC: 24 MMOL/L (ref 23–29)
CREAT SERPL-MCNC: 0.6 MG/DL (ref 0.5–1.4)
DIFFERENTIAL METHOD: ABNORMAL
EOSINOPHIL # BLD AUTO: 0.1 K/UL (ref 0–0.5)
EOSINOPHIL NFR BLD: 1.2 % (ref 0–8)
ERYTHROCYTE [DISTWIDTH] IN BLOOD BY AUTOMATED COUNT: 13.9 % (ref 11.5–14.5)
EST. GFR  (AFRICAN AMERICAN): >60 ML/MIN/1.73 M^2
EST. GFR  (NON AFRICAN AMERICAN): >60 ML/MIN/1.73 M^2
GLUCOSE SERPL-MCNC: 83 MG/DL (ref 70–110)
HCT VFR BLD AUTO: 42.9 % (ref 37–48.5)
HGB BLD-MCNC: 13 G/DL (ref 12–16)
IMM GRANULOCYTES # BLD AUTO: 0.02 K/UL (ref 0–0.04)
LYMPHOCYTES # BLD AUTO: 2.7 K/UL (ref 1–4.8)
LYMPHOCYTES NFR BLD: 28.6 % (ref 18–48)
MAGNESIUM SERPL-MCNC: 1.8 MG/DL (ref 1.6–2.6)
MCH RBC QN AUTO: 29.6 PG (ref 27–31)
MCHC RBC AUTO-ENTMCNC: 30.3 G/DL (ref 32–36)
MCV RBC AUTO: 98 FL (ref 82–98)
MONOCYTES # BLD AUTO: 1.1 K/UL (ref 0.3–1)
MONOCYTES NFR BLD: 11.6 % (ref 4–15)
NEUTROPHILS # BLD AUTO: 5.3 K/UL (ref 1.8–7.7)
NEUTROPHILS NFR BLD: 56.9 % (ref 38–73)
NRBC BLD-RTO: 0 /100 WBC
PHOSPHATE SERPL-MCNC: 3.9 MG/DL (ref 2.7–4.5)
PLATELET # BLD AUTO: 159 K/UL (ref 150–350)
PLATELET BLD QL SMEAR: ABNORMAL
PMV BLD AUTO: 11 FL (ref 9.2–12.9)
POTASSIUM SERPL-SCNC: 4.6 MMOL/L (ref 3.5–5.1)
RBC # BLD AUTO: 4.39 M/UL (ref 4–5.4)
SODIUM SERPL-SCNC: 139 MMOL/L (ref 136–145)
WBC # BLD AUTO: 9.39 K/UL (ref 3.9–12.7)

## 2019-07-03 PROCEDURE — 63600175 PHARM REV CODE 636 W HCPCS: Performed by: EMERGENCY MEDICINE

## 2019-07-03 PROCEDURE — S5010 5% DEXTROSE AND 0.45% SALINE: HCPCS | Performed by: ORTHOPAEDIC SURGERY

## 2019-07-03 PROCEDURE — 25000003 PHARM REV CODE 250: Performed by: ANESTHESIOLOGY

## 2019-07-03 PROCEDURE — 36415 COLL VENOUS BLD VENIPUNCTURE: CPT

## 2019-07-03 PROCEDURE — D9220A PRA ANESTHESIA: ICD-10-PCS | Mod: ANES,,, | Performed by: ANESTHESIOLOGY

## 2019-07-03 PROCEDURE — 71000033 HC RECOVERY, INTIAL HOUR: Performed by: ORTHOPAEDIC SURGERY

## 2019-07-03 PROCEDURE — 27000221 HC OXYGEN, UP TO 24 HOURS

## 2019-07-03 PROCEDURE — D9220A PRA ANESTHESIA: Mod: ANES,,, | Performed by: ANESTHESIOLOGY

## 2019-07-03 PROCEDURE — 25000003 PHARM REV CODE 250: Performed by: ORTHOPAEDIC SURGERY

## 2019-07-03 PROCEDURE — 63600175 PHARM REV CODE 636 W HCPCS: Performed by: HOSPITALIST

## 2019-07-03 PROCEDURE — D9220A PRA ANESTHESIA: ICD-10-PCS | Mod: CRNA,,, | Performed by: NURSE ANESTHETIST, CERTIFIED REGISTERED

## 2019-07-03 PROCEDURE — C1713 ANCHOR/SCREW BN/BN,TIS/BN: HCPCS | Performed by: ORTHOPAEDIC SURGERY

## 2019-07-03 PROCEDURE — 80048 BASIC METABOLIC PNL TOTAL CA: CPT

## 2019-07-03 PROCEDURE — 25000003 PHARM REV CODE 250: Performed by: NURSE ANESTHETIST, CERTIFIED REGISTERED

## 2019-07-03 PROCEDURE — D9220A PRA ANESTHESIA: Mod: CRNA,,, | Performed by: NURSE ANESTHETIST, CERTIFIED REGISTERED

## 2019-07-03 PROCEDURE — 99900035 HC TECH TIME PER 15 MIN (STAT)

## 2019-07-03 PROCEDURE — 63600175 PHARM REV CODE 636 W HCPCS: Performed by: NURSE ANESTHETIST, CERTIFIED REGISTERED

## 2019-07-03 PROCEDURE — S0028 INJECTION, FAMOTIDINE, 20 MG: HCPCS | Performed by: ANESTHESIOLOGY

## 2019-07-03 PROCEDURE — 25000003 PHARM REV CODE 250: Performed by: HOSPITALIST

## 2019-07-03 PROCEDURE — 85025 COMPLETE CBC W/AUTO DIFF WBC: CPT

## 2019-07-03 PROCEDURE — 36000710: Performed by: ORTHOPAEDIC SURGERY

## 2019-07-03 PROCEDURE — 27201423 OPTIME MED/SURG SUP & DEVICES STERILE SUPPLY: Performed by: ORTHOPAEDIC SURGERY

## 2019-07-03 PROCEDURE — 12000002 HC ACUTE/MED SURGE SEMI-PRIVATE ROOM

## 2019-07-03 PROCEDURE — 37000008 HC ANESTHESIA 1ST 15 MINUTES: Performed by: ORTHOPAEDIC SURGERY

## 2019-07-03 PROCEDURE — 63600175 PHARM REV CODE 636 W HCPCS: Performed by: ANESTHESIOLOGY

## 2019-07-03 PROCEDURE — 71000039 HC RECOVERY, EACH ADD'L HOUR: Performed by: ORTHOPAEDIC SURGERY

## 2019-07-03 PROCEDURE — 25000003 PHARM REV CODE 250: Performed by: NURSE PRACTITIONER

## 2019-07-03 PROCEDURE — 99900104 DSU ONLY-NO CHARGE-EA ADD'L HR (STAT): Performed by: ORTHOPAEDIC SURGERY

## 2019-07-03 PROCEDURE — 36000711: Performed by: ORTHOPAEDIC SURGERY

## 2019-07-03 PROCEDURE — 37000009 HC ANESTHESIA EA ADD 15 MINS: Performed by: ORTHOPAEDIC SURGERY

## 2019-07-03 PROCEDURE — 83735 ASSAY OF MAGNESIUM: CPT

## 2019-07-03 PROCEDURE — C1769 GUIDE WIRE: HCPCS | Performed by: ORTHOPAEDIC SURGERY

## 2019-07-03 PROCEDURE — 94761 N-INVAS EAR/PLS OXIMETRY MLT: CPT

## 2019-07-03 PROCEDURE — 99900103 DSU ONLY-NO CHARGE-INITIAL HR (STAT): Performed by: ORTHOPAEDIC SURGERY

## 2019-07-03 PROCEDURE — 84100 ASSAY OF PHOSPHORUS: CPT

## 2019-07-03 DEVICE — SCREW CORTEX 4.5 38M: Type: IMPLANTABLE DEVICE | Site: HIP | Status: FUNCTIONAL

## 2019-07-03 DEVICE — SCREW CORTEX 4.5 40M: Type: IMPLANTABLE DEVICE | Site: HIP | Status: FUNCTIONAL

## 2019-07-03 DEVICE — SCREW CORTEX 4.5 42M: Type: IMPLANTABLE DEVICE | Site: HIP | Status: FUNCTIONAL

## 2019-07-03 DEVICE — PLATE 135 MM 4 HOLE: Type: IMPLANTABLE DEVICE | Site: HIP | Status: FUNCTIONAL

## 2019-07-03 DEVICE — SCREW CORTEX 4.5 34M: Type: IMPLANTABLE DEVICE | Site: HIP | Status: FUNCTIONAL

## 2019-07-03 DEVICE — IMPLANTABLE DEVICE: Type: IMPLANTABLE DEVICE | Site: HIP | Status: FUNCTIONAL

## 2019-07-03 RX ORDER — FAMOTIDINE 10 MG/ML
20 INJECTION INTRAVENOUS
Status: COMPLETED | OUTPATIENT
Start: 2019-07-03 | End: 2019-07-03

## 2019-07-03 RX ORDER — ONDANSETRON 4 MG/1
8 TABLET, ORALLY DISINTEGRATING ORAL EVERY 8 HOURS PRN
Status: DISCONTINUED | OUTPATIENT
Start: 2019-07-03 | End: 2019-07-06

## 2019-07-03 RX ORDER — OXYCODONE HYDROCHLORIDE 5 MG/1
5 TABLET ORAL
Status: DISCONTINUED | OUTPATIENT
Start: 2019-07-03 | End: 2019-07-03 | Stop reason: HOSPADM

## 2019-07-03 RX ORDER — CETIRIZINE HYDROCHLORIDE 10 MG/1
10 TABLET ORAL DAILY PRN
COMMUNITY

## 2019-07-03 RX ORDER — SCOLOPAMINE TRANSDERMAL SYSTEM 1 MG/1
1 PATCH, EXTENDED RELEASE TRANSDERMAL ONCE
Status: DISCONTINUED | OUTPATIENT
Start: 2019-07-03 | End: 2019-07-06

## 2019-07-03 RX ORDER — FAMOTIDINE 20 MG/1
20 TABLET, FILM COATED ORAL 2 TIMES DAILY
Status: DISCONTINUED | OUTPATIENT
Start: 2019-07-03 | End: 2019-07-08 | Stop reason: HOSPADM

## 2019-07-03 RX ORDER — DEXTROSE MONOHYDRATE AND SODIUM CHLORIDE 5; .45 G/100ML; G/100ML
INJECTION, SOLUTION INTRAVENOUS CONTINUOUS
Status: DISCONTINUED | OUTPATIENT
Start: 2019-07-03 | End: 2019-07-04

## 2019-07-03 RX ORDER — CEFAZOLIN SODIUM 2 G/50ML
SOLUTION INTRAVENOUS
Status: DISPENSED
Start: 2019-07-03 | End: 2019-07-03

## 2019-07-03 RX ORDER — HYDROMORPHONE HYDROCHLORIDE 2 MG/ML
0.2 INJECTION, SOLUTION INTRAMUSCULAR; INTRAVENOUS; SUBCUTANEOUS EVERY 5 MIN PRN
Status: DISCONTINUED | OUTPATIENT
Start: 2019-07-03 | End: 2019-07-03 | Stop reason: HOSPADM

## 2019-07-03 RX ORDER — PROPOFOL 10 MG/ML
VIAL (ML) INTRAVENOUS
Status: DISCONTINUED | OUTPATIENT
Start: 2019-07-03 | End: 2019-07-03

## 2019-07-03 RX ORDER — SODIUM CHLORIDE 0.9 % (FLUSH) 0.9 %
10 SYRINGE (ML) INJECTION
Status: DISCONTINUED | OUTPATIENT
Start: 2019-07-03 | End: 2019-07-03 | Stop reason: HOSPADM

## 2019-07-03 RX ORDER — BISACODYL 10 MG
10 SUPPOSITORY, RECTAL RECTAL DAILY
Status: DISCONTINUED | OUTPATIENT
Start: 2019-07-03 | End: 2019-07-07

## 2019-07-03 RX ORDER — MORPHINE SULFATE 2 MG/ML
2 INJECTION, SOLUTION INTRAMUSCULAR; INTRAVENOUS EVERY 4 HOURS PRN
Status: DISCONTINUED | OUTPATIENT
Start: 2019-07-03 | End: 2019-07-04

## 2019-07-03 RX ORDER — ACETAMINOPHEN 10 MG/ML
1000 INJECTION, SOLUTION INTRAVENOUS EVERY 8 HOURS
Status: DISCONTINUED | OUTPATIENT
Start: 2019-07-03 | End: 2019-07-03 | Stop reason: HOSPADM

## 2019-07-03 RX ORDER — POLYETHYLENE GLYCOL 3350 17 G/17G
17 POWDER, FOR SOLUTION ORAL DAILY
Status: DISCONTINUED | OUTPATIENT
Start: 2019-07-03 | End: 2019-07-07

## 2019-07-03 RX ORDER — DEXAMETHASONE SODIUM PHOSPHATE 4 MG/ML
INJECTION, SOLUTION INTRA-ARTICULAR; INTRALESIONAL; INTRAMUSCULAR; INTRAVENOUS; SOFT TISSUE
Status: DISCONTINUED | OUTPATIENT
Start: 2019-07-03 | End: 2019-07-03

## 2019-07-03 RX ORDER — FENTANYL CITRATE 50 UG/ML
25 INJECTION, SOLUTION INTRAMUSCULAR; INTRAVENOUS EVERY 5 MIN PRN
Status: DISCONTINUED | OUTPATIENT
Start: 2019-07-03 | End: 2019-07-03 | Stop reason: HOSPADM

## 2019-07-03 RX ORDER — FENTANYL CITRATE 50 UG/ML
INJECTION, SOLUTION INTRAMUSCULAR; INTRAVENOUS
Status: DISCONTINUED | OUTPATIENT
Start: 2019-07-03 | End: 2019-07-03

## 2019-07-03 RX ORDER — SODIUM CHLORIDE 0.9 % (FLUSH) 0.9 %
5 SYRINGE (ML) INJECTION
Status: DISCONTINUED | OUTPATIENT
Start: 2019-07-03 | End: 2019-07-08 | Stop reason: HOSPADM

## 2019-07-03 RX ORDER — SUCCINYLCHOLINE CHLORIDE 20 MG/ML
INJECTION INTRAMUSCULAR; INTRAVENOUS
Status: DISCONTINUED | OUTPATIENT
Start: 2019-07-03 | End: 2019-07-03

## 2019-07-03 RX ORDER — IBUPROFEN 800 MG/1
800 TABLET ORAL
Status: ON HOLD | COMMUNITY
End: 2019-07-08 | Stop reason: SDUPTHER

## 2019-07-03 RX ORDER — LIDOCAINE HCL/PF 100 MG/5ML
SYRINGE (ML) INTRAVENOUS
Status: DISCONTINUED | OUTPATIENT
Start: 2019-07-03 | End: 2019-07-03

## 2019-07-03 RX ORDER — SODIUM CHLORIDE, SODIUM LACTATE, POTASSIUM CHLORIDE, CALCIUM CHLORIDE 600; 310; 30; 20 MG/100ML; MG/100ML; MG/100ML; MG/100ML
INJECTION, SOLUTION INTRAVENOUS CONTINUOUS
Status: DISCONTINUED | OUTPATIENT
Start: 2019-07-03 | End: 2019-07-03

## 2019-07-03 RX ORDER — ASPIRIN 325 MG
325 TABLET ORAL 2 TIMES DAILY
Status: DISCONTINUED | OUTPATIENT
Start: 2019-07-03 | End: 2019-07-08 | Stop reason: HOSPADM

## 2019-07-03 RX ORDER — ZOLPIDEM TARTRATE 5 MG/1
5 TABLET ORAL NIGHTLY PRN
Status: DISCONTINUED | OUTPATIENT
Start: 2019-07-03 | End: 2019-07-08 | Stop reason: HOSPADM

## 2019-07-03 RX ADMIN — MORPHINE SULFATE 4 MG: 2 INJECTION, SOLUTION INTRAMUSCULAR; INTRAVENOUS at 04:07

## 2019-07-03 RX ADMIN — PROPOFOL 50 MG: 10 INJECTION, EMULSION INTRAVENOUS at 12:07

## 2019-07-03 RX ADMIN — HYDROMORPHONE HYDROCHLORIDE 0.2 MG: 2 INJECTION, SOLUTION INTRAMUSCULAR; INTRAVENOUS; SUBCUTANEOUS at 02:07

## 2019-07-03 RX ADMIN — DIPHENHYDRAMINE HYDROCHLORIDE 25 MG: 25 CAPSULE ORAL at 04:07

## 2019-07-03 RX ADMIN — FAMOTIDINE 20 MG: 20 TABLET, FILM COATED ORAL at 08:07

## 2019-07-03 RX ADMIN — MORPHINE SULFATE 4 MG: 2 INJECTION, SOLUTION INTRAMUSCULAR; INTRAVENOUS at 12:07

## 2019-07-03 RX ADMIN — FENTANYL CITRATE 25 MCG: 50 INJECTION, SOLUTION INTRAMUSCULAR; INTRAVENOUS at 12:07

## 2019-07-03 RX ADMIN — ACETAMINOPHEN 1000 MG: 10 INJECTION, SOLUTION INTRAVENOUS at 02:07

## 2019-07-03 RX ADMIN — CYCLOBENZAPRINE HYDROCHLORIDE 10 MG: 10 TABLET, FILM COATED ORAL at 08:07

## 2019-07-03 RX ADMIN — SODIUM CHLORIDE, SODIUM LACTATE, POTASSIUM CHLORIDE, AND CALCIUM CHLORIDE: .6; .31; .03; .02 INJECTION, SOLUTION INTRAVENOUS at 10:07

## 2019-07-03 RX ADMIN — FENTANYL CITRATE 50 MCG: 50 INJECTION, SOLUTION INTRAMUSCULAR; INTRAVENOUS at 11:07

## 2019-07-03 RX ADMIN — SODIUM CHLORIDE, SODIUM LACTATE, POTASSIUM CHLORIDE, AND CALCIUM CHLORIDE: .6; .31; .03; .02 INJECTION, SOLUTION INTRAVENOUS at 11:07

## 2019-07-03 RX ADMIN — ASPIRIN 325 MG ORAL TABLET 325 MG: 325 PILL ORAL at 08:07

## 2019-07-03 RX ADMIN — LIDOCAINE HYDROCHLORIDE 100 MG: 20 INJECTION, SOLUTION INTRAVENOUS at 11:07

## 2019-07-03 RX ADMIN — DEXTROSE AND SODIUM CHLORIDE: 5; .45 INJECTION, SOLUTION INTRAVENOUS at 02:07

## 2019-07-03 RX ADMIN — ONDANSETRON 4 MG: 2 INJECTION, SOLUTION INTRAMUSCULAR; INTRAVENOUS at 11:07

## 2019-07-03 RX ADMIN — POLYETHYLENE GLYCOL 3350 17 G: 17 POWDER, FOR SOLUTION ORAL at 03:07

## 2019-07-03 RX ADMIN — CYCLOBENZAPRINE HYDROCHLORIDE 10 MG: 10 TABLET, FILM COATED ORAL at 12:07

## 2019-07-03 RX ADMIN — DEXAMETHASONE SODIUM PHOSPHATE 8 MG: 4 INJECTION, SOLUTION INTRAMUSCULAR; INTRAVENOUS at 11:07

## 2019-07-03 RX ADMIN — MORPHINE SULFATE 4 MG: 2 INJECTION, SOLUTION INTRAMUSCULAR; INTRAVENOUS at 08:07

## 2019-07-03 RX ADMIN — MORPHINE SULFATE 2 MG: 2 INJECTION, SOLUTION INTRAMUSCULAR; INTRAVENOUS at 11:07

## 2019-07-03 RX ADMIN — SUCCINYLCHOLINE CHLORIDE 100 MG: 20 INJECTION, SOLUTION INTRAMUSCULAR; INTRAVENOUS at 12:07

## 2019-07-03 RX ADMIN — Medication 20 MG: at 10:07

## 2019-07-03 RX ADMIN — CEFAZOLIN 2 G: 1 INJECTION, POWDER, FOR SOLUTION INTRAVENOUS at 11:07

## 2019-07-03 RX ADMIN — SCOPALAMINE 1 PATCH: 1 PATCH, EXTENDED RELEASE TRANSDERMAL at 10:07

## 2019-07-03 RX ADMIN — PROPOFOL 100 MG: 10 INJECTION, EMULSION INTRAVENOUS at 11:07

## 2019-07-03 NOTE — BRIEF OP NOTE
[facility]  Brief Operative Note    SUMMARY     Surgery Date: 7/3/2019     Surgeon(s) and Role:     * Juan Jose Gunn MD - Primary    Assisting Surgeon: None    Pre-op Diagnosis:  Hip fracture [S72.009A]    Post-op Diagnosis:  Post-Op Diagnosis Codes:     * Hip fracture [S72.009A]    Procedure(s) (LRB):  ORIF, HIP, USING DYNAMIC HIP SCREW (Left)    Anesthesia: General    Description of Procedure: L hip orif     Description of the findings of the procedure: dictated [id number]    Estimated Blood Loss: * No values recorded between 7/3/2019 12:20 PM and 7/3/2019 12:44 PM *         Specimens:   Specimen (12h ago, onward)    None

## 2019-07-03 NOTE — ASSESSMENT & PLAN NOTE
Orthopedics has been consulted.  Dr. Gunn performed ORIF left hip 7/2.  Pain control.  PT and OT.

## 2019-07-03 NOTE — OR NURSING
Dr Gaviria released pt from pacu Dilaudid keeping pt comfortable pain 4/10 restful 02 at 2 l nc sat 97-98 % reminded pt she is not allowed to go out and smoke ice pk to L hip area + pedal pulse  Iv f  Of d5.45 at 75  No nausea no vomit shar sips and chips  scopolamine  patch  Behind l ear and had pepcid preop  dsg to l hip dry intact dr Renteria came to see pt in recovery room tolbert emptied 125 cc jamey urine  No abx f/u ordered at this time dr finger called to see if abx  Repeat was needed  scd and pedi pulse on as ordered

## 2019-07-03 NOTE — PLAN OF CARE
07/02/19 2140   Patient Assessment/Suction   Level of Consciousness (AVPU) alert   Respiratory Effort Unlabored   Expansion/Accessory Muscles/Retractions no use of accessory muscles   PRE-TX-O2   O2 Device (Oxygen Therapy) nasal cannula   $ Is the patient on Low Flow Oxygen? Yes   Flow (L/min) 2   SpO2 97 %   Pulse Oximetry Type Intermittent   $ Pulse Oximetry - Multiple Charge Pulse Oximetry - Multiple   Pulse 66   Resp 17   Aerosol Therapy   $ Aerosol Therapy Charges PRN treatment not required   Respiratory Treatment Status (SVN) PRN treatment not required

## 2019-07-03 NOTE — CARE UPDATE
07/03/19 0859   PRE-TX-O2   O2 Device (Oxygen Therapy) nasal cannula   $ Is the patient on Low Flow Oxygen? Yes   Flow (L/min) 2   SpO2 98 %   Pulse Oximetry Type Intermittent   $ Pulse Oximetry - Multiple Charge Pulse Oximetry - Multiple   Aerosol Therapy   $ Aerosol Therapy Charges PRN treatment not required   Respiratory Treatment Status (SVN) PRN treatment not required

## 2019-07-03 NOTE — PLAN OF CARE
1009  Patient already taken to preop.       07/03/19 1039   Discharge Assessment   Assessment Type Discharge Planning Assessment

## 2019-07-03 NOTE — ANESTHESIA POSTPROCEDURE EVALUATION
Anesthesia Post Evaluation    Patient: Geraldine Cooksey    Procedure(s) Performed: Procedure(s) (LRB):  ORIF, HIP, USING DYNAMIC HIP SCREW (Left)    Final Anesthesia Type: general  Patient location during evaluation: PACU  Patient participation: Yes- Able to Participate  Level of consciousness: awake  Post-procedure vital signs: reviewed and stable  Pain management: adequate  Airway patency: patent  PONV status at discharge: No PONV  Anesthetic complications: no      Cardiovascular status: hypertensive and blood pressure returned to baseline  Respiratory status: spontaneous ventilation  Hydration status: euvolemic  Follow-up not needed.          Vitals Value Taken Time   /64 7/3/2019  1:09 PM   Temp 36.7 °C (98.1 °F) 7/3/2019  9:37 AM   Pulse 58 7/3/2019  1:10 PM   Resp 14 7/3/2019  1:10 PM   SpO2 100 % 7/3/2019  1:10 PM   Vitals shown include unvalidated device data.      No case tracking events are documented in the log.      Pain/Manolo Score: Pain Rating Prior to Med Admin: 6 (7/3/2019 11:07 AM)  Pain Rating Post Med Admin: 4 (7/3/2019 11:11 AM)

## 2019-07-03 NOTE — PLAN OF CARE
Problem: Adult Inpatient Plan of Care  Goal: Plan of Care Review  Outcome: Ongoing (interventions implemented as appropriate)  Patient and family oriented to room, call light within reach, wheels locked, bed in low position, side rails x 3, instructed to call for assistance prn. POC reviewed with patient and family, all questions answered, verbalized understanding. AAO x 4. VSS. Patient remained free of fall/injury. Comfort level established, c/o pain mildly controlled with prn medication. Patient repositioned for comfort, no new breakdown noted.

## 2019-07-03 NOTE — ANESTHESIA PREPROCEDURE EVALUATION
07/03/2019  Geraldine Cooksey is a 78 y.o., female.    Anesthesia Evaluation    I have reviewed the Patient Summary Reports.    I have reviewed the Nursing Notes.   I have reviewed the Medications.     Review of Systems  Anesthesia Hx:  PONV - preop scopolamine and pepcid   Social:  Smoker    Pulmonary:   COPD, mild No recent URI's Chronic Obstructive Pulmonary Disease (COPD):        Physical Exam  General:  Cachexia    Airway/Jaw/Neck:  Airway Findings: Mouth Opening: Normal Tongue: Normal  General Airway Assessment: Adult  Mallampati: II  Improves to II with phonation.  Jaw/Neck Findings:  Neck ROM: Normal ROM      Dental:  Dental Findings: Upper Dentures, Lower Dentures        Mental Status:  Mental Status Findings:  Cooperative, Alert and Oriented         Anesthesia Plan  Type of Anesthesia, risks & benefits discussed:  Anesthesia Type:  general  Patient's Preference:   Intra-op Monitoring Plan: standard ASA monitors  Intra-op Monitoring Plan Comments:   Post Op Pain Control Plan: multimodal analgesia and epidural analgesia  Post Op Pain Control Plan Comments:   Induction:   IV and Inhalation  Beta Blocker:  Patient is not currently on a Beta-Blocker (No further documentation required).       Informed Consent: Patient understands risks and agrees with Anesthesia plan.  Questions answered. Anesthesia consent signed with patient.  ASA Score: 2     Day of Surgery Review of History & Physical:            Ready For Surgery From Anesthesia Perspective.

## 2019-07-03 NOTE — PLAN OF CARE
Dr hernandez updated on pt pain now a 4/10 since iv tylenol and dilaudid pt sats 94-96 on 2l nc pt restful at intervals

## 2019-07-03 NOTE — CARE UPDATE
07/03/19 0951   Tobacco Cessation Intervention   Do you use any type of tobacco product? Yes   Are you interested in quitting use of tobacco products? Thinking about quitting   Are you interested in Nicotine Replacement for symptom relief? No   patient educated on smoking cessation program patient not interested at this time information left with patient about program

## 2019-07-03 NOTE — TRANSFER OF CARE
"Anesthesia Transfer of Care Note    Patient: Geraldine Cooksey    Procedure(s) Performed: Procedure(s) (LRB):  ORIF, HIP, USING DYNAMIC HIP SCREW (Left)    Patient location: PACU    Anesthesia Type: general    Transport from OR: Transported from OR on 2-3 L/min O2 by NC with adequate spontaneous ventilation    Post pain: adequate analgesia    Post assessment: no apparent anesthetic complications and tolerated procedure well    Post vital signs: stable    Level of consciousness: sedated    Nausea/Vomiting: no nausea/vomiting    Complications: none    Transfer of care protocol was followed      Last vitals:   Visit Vitals  /60 (BP Location: Right arm)   Pulse 63   Temp 36.7 °C (98.1 °F) (Skin)   Resp 18   Ht 5' 6" (1.676 m)   Wt 65.8 kg (145 lb)   SpO2 99%   Breastfeeding? No   BMI 23.40 kg/m²     "

## 2019-07-03 NOTE — OP NOTE
DATE OF PROCEDURE:  07/03/2019.    ATTENDING PHYSICIAN:  Juan Jose Gunn M.D.    FIRST ASSISTANT:  Balwinder Perea.    PREOPERATIVE DIAGNOSIS:  Left intertrochanteric hip fracture.    POSTOPERATIVE DIAGNOSIS:  Left intertrochanteric hip fracture.    PROCEDURE PERFORMED:  Open reduction and internal fixation of left   intertrochanteric hip fracture.    ESTIMATED BLOOD LOSS:  Minimal.    IV FLUID:  Crystalloid.    ANESTHESIA:  General anesthesia.    PROCEDURE IN DETAIL:  The patient was placed on the operating table in the   supine position.  The left hip was prepped and draped in the usual sterile   manner for surgery.  The C-arm was utilized and it was confirmed and in fact it   was an intertrochanteric hip fracture, nondisplaced.  An incision was made from   the greater trochanter distally and was carried down sharply through the skin.    The deep fascia was incised in line with its fibers.  The vastus lateralis was   elevated up off the femur.  A guidewire was advanced from the lateral femur up   into the femoral head.  It was noted to be central on both AP and lateral   planes.  At this point, it was measured and then overreamed.  We now placed a   lag screw up into the femoral head.  An excellent bite was obtained.  A 135 mm   4-hole plate was now tapped into position.  It was secured to the femur using 4   screws.  Outstanding bites were obtained.  C-arm confirmed that we had a perfect   reduction and that our hardware was in ideal position.  We now closed the deep   fascia with 0 Vicryl.  The subQ was closed with 2-0 Vicryl and the skin was   closed with 4-0 Monocryl.  Sterile dressings were applied and the patient was   noted to be in stable condition.      SF/IN  dd: 07/03/2019 12:41:17 (CDT)  td: 07/03/2019 12:53:09 (CDT)  Doc ID   #[id number]  Job ID #[id number]    CC:

## 2019-07-03 NOTE — PLAN OF CARE
Problem: Adult Inpatient Plan of Care  Goal: Plan of Care Review  Outcome: Ongoing (interventions implemented as appropriate)  Plan of care reviewed with patient, patient verbalized understanding. Safety maintained throughout shift.   Pt remained free of fall/trauma. Vs stable. Iv fluids infusing per orders. Scd/plexi pulse on. Ice pack to left hip. Surgical dressing CDI with gauze and tegaderm. Bess draining clear yellow urine. No pain at this time, patient resting peacefully, family at bedside. Bed low, call light in reach. Will continue to monitor patient.

## 2019-07-03 NOTE — PROGRESS NOTES
[facility] Hospital Medicine  Progress Note    Patient Name: Geraldine Cooksey  MRN: [id number]  Patient Class: IP- Inpatient   Admission Date: 7/2/2019  Length of Stay: 1 days  Attending Physician: Margarette Almendarez MD  Primary Care Provider: Primary Doctor No        Subjective:     Principal Problem:Closed fracture of left hip      HPI:  Patient is a 70-year-old female with a history of COPD, tobacco abuse who currently smokes 1 pack per day who presents to the ED after mechanical fall.  She was in her usual state of health which is independent in all ADLs and was unloading groceries today when a Bengali Doty ran into the room and knocked her over.  She did not experience any loss of consciousness.  Her only complaint prior to the fall was pain in her left hip.  At this time patient denies any fever, chills, nausea, vomiting, abdominal pain, urinary symptoms, bowel complaints.    In the ED, workup was significant for elevated blood pressure as well as hip x-ray showing a nondisplaced intratrochanteric fracture of the left femur.  ED physician discussed with Orthopedics Dr. Gunn who will perform surgery tomorrow.    Overview/Hospital Course:  No notes on file    Interval History:  Patient seen and examined postoperatively in the PACU.  Rates pain is a 4/10 right now and has some mild nausea.  Dilaudid and scopolamine ordered.    Review of Systems   Constitutional: Negative for chills and fever.   Respiratory: Negative for cough and shortness of breath.    Gastrointestinal: Positive for nausea. Negative for abdominal pain.   Musculoskeletal: Positive for arthralgias.   Neurological: Negative for dizziness.     Objective:     Vital Signs (Most Recent):  Temp: 98.2 °F (36.8 °C) (07/03/19 1407)  Pulse: 63 (07/03/19 1112)  Resp: 18 (07/03/19 0937)  BP: 134/60 (07/03/19 1112)  SpO2: 99 % (07/03/19 1112) Vital Signs (24h Range):  Temp:  [97.9 °F (36.6 °C)-99 °F (37.2 °C)] 98.2 °F (36.8 °C)  Pulse:   [63-75] 63  Resp:  [16-18] 18  SpO2:  [91 %-99 %] 99 %  BP: (134-188)/(60-80) 134/60     Weight: 65.8 kg (145 lb)  Body mass index is 23.4 kg/m².    Intake/Output Summary (Last 24 hours) at 7/3/2019 1451  Last data filed at 7/3/2019 1303  Gross per 24 hour   Intake 800 ml   Output 740 ml   Net 60 ml      Physical Exam   Constitutional: She is oriented to person, place, and time. She appears well-developed and well-nourished. No distress.   HENT:   Head: Normocephalic and atraumatic.   Eyes: Pupils are equal, round, and reactive to light. EOM are normal.   Cardiovascular: Normal rate, regular rhythm and intact distal pulses.   No murmur heard.  Pulmonary/Chest: Effort normal and breath sounds normal. No respiratory distress. She has no wheezes. She has no rales.   Abdominal: Soft. Bowel sounds are normal. She exhibits no distension. There is no tenderness.   Musculoskeletal: She exhibits tenderness (Left hip). She exhibits no edema.   Left hip bandage clean/dry/intact   Neurological: She is alert and oriented to person, place, and time. No cranial nerve deficit.   Skin: Skin is warm and dry. No rash noted.   Psychiatric: She has a normal mood and affect. Her behavior is normal.       Significant Labs: All pertinent labs within the past 24 hours have been reviewed.    Significant Imaging: I have reviewed and interpreted all pertinent imaging results/findings within the past 24 hours.      Assessment/Plan:      * Closed fracture of left hip  Orthopedics has been consulted.  Dr. Gunn performed ORIF left hip 7/2.  Pain control.  PT and OT.      Tobacco abuse  Assistance with smoking cessation was offered, including:  [x]  Medications  [x]  Counseling    Patient was counseled regarding smoking for >10 minutes.  Does not want nicotine patch at this time.          Elevated blood pressure reading  Likely secondary to pain. No history of hypertension.  Will continue to monitor blood pressure.  Will utilize p.r.n.  antihypertensives for systolic greater than 160.      COPD (chronic obstructive pulmonary disease)  Patient's COPD is controlled currently. Continue scheduled inhalers, Supplemental oxygen as needed and monitor respiratory status closely.           VTE Risk Mitigation (From admission, onward)        Ordered     enoxaparin injection 40 mg  Daily      07/02/19 1602     Place sequential compression device  Until discontinued      07/02/19 1602     IP VTE HIGH RISK PATIENT  Once      07/02/19 1602                Margarette Almendarez MD  Department of Hospital Medicine   [facility]

## 2019-07-03 NOTE — NURSING
Situation-   Who are you? Who is the patient? What is going on with the patient currently?          I am Seema Hayes calling ARAM Ureña NP from [facility] in regards to Geraldine Cooksey who is a 78 y.o. year old female admitted with Closed fracture of left hip who is c/o itching.   Background- What is the patient's significant medical history (CAD, ESRD, HIV positive, history of recent surgery/chemo.transfusion) and recent labs Has a past medical history of   Past Medical History:   Diagnosis Date    COPD (chronic obstructive pulmonary disease)    . Most recent vitals include  Temp:  [97.8 °F (36.6 °C)-98.2 °F (36.8 °C)]   Pulse:  [66-75]   Resp:  [17-20]   BP: (158-216)/(68-95)   SpO2:  [88 %-98 %]  and labs          Assessment-   Has the following issues (abnormal labs, abnormal vitals, change in status, uncontrolled symptoms, patient request) Who is having c/o itching    Recommendation- What is the change in the plan of care requested? Do you think we could order benadryl?    Plan-  What did we decide to do? This issue is considered Non- Urgent I have notified physician via  Secure Chat @ 10:08 PM    Plan was discussed and is as follows benadryl 25 mg PO Q6hr prn ordered

## 2019-07-04 LAB
BASOPHILS # BLD AUTO: ABNORMAL K/UL (ref 0–0.2)
BASOPHILS NFR BLD: 1 % (ref 0–1.9)
DIFFERENTIAL METHOD: ABNORMAL
EOSINOPHIL # BLD AUTO: ABNORMAL K/UL (ref 0–0.5)
EOSINOPHIL NFR BLD: 0 % (ref 0–8)
ERYTHROCYTE [DISTWIDTH] IN BLOOD BY AUTOMATED COUNT: 13.5 % (ref 11.5–14.5)
HCT VFR BLD AUTO: 34.2 % (ref 37–48.5)
HGB BLD-MCNC: 10.9 G/DL (ref 12–16)
IMM GRANULOCYTES # BLD AUTO: ABNORMAL K/UL (ref 0–0.04)
LYMPHOCYTES # BLD AUTO: ABNORMAL K/UL (ref 1–4.8)
LYMPHOCYTES NFR BLD: 25 % (ref 18–48)
MCH RBC QN AUTO: 30 PG (ref 27–31)
MCHC RBC AUTO-ENTMCNC: 31.9 G/DL (ref 32–36)
MCV RBC AUTO: 94 FL (ref 82–98)
MONOCYTES # BLD AUTO: ABNORMAL K/UL (ref 0.3–1)
MONOCYTES NFR BLD: 8 % (ref 4–15)
NEUTROPHILS NFR BLD: 66 % (ref 38–73)
NRBC BLD-RTO: 0 /100 WBC
PLATELET # BLD AUTO: 167 K/UL (ref 150–350)
PLATELET BLD QL SMEAR: ABNORMAL
PMV BLD AUTO: 11 FL (ref 9.2–12.9)
RBC # BLD AUTO: 3.63 M/UL (ref 4–5.4)
WBC # BLD AUTO: 11.83 K/UL (ref 3.9–12.7)

## 2019-07-04 PROCEDURE — 99900035 HC TECH TIME PER 15 MIN (STAT)

## 2019-07-04 PROCEDURE — 94761 N-INVAS EAR/PLS OXIMETRY MLT: CPT

## 2019-07-04 PROCEDURE — 12000002 HC ACUTE/MED SURGE SEMI-PRIVATE ROOM

## 2019-07-04 PROCEDURE — 94640 AIRWAY INHALATION TREATMENT: CPT

## 2019-07-04 PROCEDURE — 85027 COMPLETE CBC AUTOMATED: CPT

## 2019-07-04 PROCEDURE — G8979 MOBILITY GOAL STATUS: HCPCS | Mod: CK | Performed by: PHYSICAL THERAPIST

## 2019-07-04 PROCEDURE — G8978 MOBILITY CURRENT STATUS: HCPCS | Mod: CL | Performed by: PHYSICAL THERAPIST

## 2019-07-04 PROCEDURE — 97116 GAIT TRAINING THERAPY: CPT | Performed by: PHYSICAL THERAPIST

## 2019-07-04 PROCEDURE — 63600175 PHARM REV CODE 636 W HCPCS: Performed by: ORTHOPAEDIC SURGERY

## 2019-07-04 PROCEDURE — 25000003 PHARM REV CODE 250: Performed by: PHYSICIAN ASSISTANT

## 2019-07-04 PROCEDURE — 25000003 PHARM REV CODE 250: Performed by: ORTHOPAEDIC SURGERY

## 2019-07-04 PROCEDURE — 63600175 PHARM REV CODE 636 W HCPCS: Performed by: PHYSICIAN ASSISTANT

## 2019-07-04 PROCEDURE — 36415 COLL VENOUS BLD VENIPUNCTURE: CPT

## 2019-07-04 PROCEDURE — 27000221 HC OXYGEN, UP TO 24 HOURS

## 2019-07-04 PROCEDURE — 85007 BL SMEAR W/DIFF WBC COUNT: CPT

## 2019-07-04 PROCEDURE — 97161 PT EVAL LOW COMPLEX 20 MIN: CPT | Performed by: PHYSICAL THERAPIST

## 2019-07-04 RX ORDER — MORPHINE SULFATE 2 MG/ML
2 INJECTION, SOLUTION INTRAMUSCULAR; INTRAVENOUS EVERY 4 HOURS PRN
Status: DISCONTINUED | OUTPATIENT
Start: 2019-07-04 | End: 2019-07-06

## 2019-07-04 RX ORDER — TIOTROPIUM BROMIDE 18 UG/1
1 CAPSULE ORAL; RESPIRATORY (INHALATION) DAILY
Status: DISCONTINUED | OUTPATIENT
Start: 2019-07-04 | End: 2019-07-08 | Stop reason: HOSPADM

## 2019-07-04 RX ORDER — OXYCODONE HYDROCHLORIDE 5 MG/1
5 TABLET ORAL EVERY 4 HOURS PRN
Status: DISCONTINUED | OUTPATIENT
Start: 2019-07-04 | End: 2019-07-06

## 2019-07-04 RX ORDER — ACETAMINOPHEN 10 MG/ML
1000 INJECTION, SOLUTION INTRAVENOUS EVERY 8 HOURS
Status: COMPLETED | OUTPATIENT
Start: 2019-07-04 | End: 2019-07-05

## 2019-07-04 RX ORDER — METHOCARBAMOL 500 MG/1
500 TABLET, FILM COATED ORAL 4 TIMES DAILY
Status: DISCONTINUED | OUTPATIENT
Start: 2019-07-04 | End: 2019-07-08 | Stop reason: HOSPADM

## 2019-07-04 RX ORDER — OXYCODONE HYDROCHLORIDE 10 MG/1
10 TABLET ORAL EVERY 4 HOURS PRN
Status: DISCONTINUED | OUTPATIENT
Start: 2019-07-04 | End: 2019-07-06

## 2019-07-04 RX ADMIN — ACETAMINOPHEN 1000 MG: 10 INJECTION, SOLUTION INTRAVENOUS at 11:07

## 2019-07-04 RX ADMIN — TIOTROPIUM BROMIDE 18 MCG: 18 CAPSULE ORAL; RESPIRATORY (INHALATION) at 11:07

## 2019-07-04 RX ADMIN — OXYCODONE HYDROCHLORIDE 10 MG: 10 TABLET ORAL at 10:07

## 2019-07-04 RX ADMIN — FAMOTIDINE 20 MG: 20 TABLET, FILM COATED ORAL at 08:07

## 2019-07-04 RX ADMIN — ACETAMINOPHEN 1000 MG: 10 INJECTION, SOLUTION INTRAVENOUS at 01:07

## 2019-07-04 RX ADMIN — OXYCODONE HYDROCHLORIDE 10 MG: 10 TABLET ORAL at 08:07

## 2019-07-04 RX ADMIN — MORPHINE SULFATE 2 MG: 2 INJECTION, SOLUTION INTRAMUSCULAR; INTRAVENOUS at 01:07

## 2019-07-04 RX ADMIN — METHOCARBAMOL 500 MG: 500 TABLET, FILM COATED ORAL at 08:07

## 2019-07-04 RX ADMIN — ASPIRIN 325 MG ORAL TABLET 325 MG: 325 PILL ORAL at 08:07

## 2019-07-04 RX ADMIN — METHOCARBAMOL 500 MG: 500 TABLET, FILM COATED ORAL at 12:07

## 2019-07-04 RX ADMIN — MORPHINE SULFATE 2 MG: 2 INJECTION, SOLUTION INTRAMUSCULAR; INTRAVENOUS at 11:07

## 2019-07-04 RX ADMIN — MORPHINE SULFATE 2 MG: 2 INJECTION, SOLUTION INTRAMUSCULAR; INTRAVENOUS at 06:07

## 2019-07-04 RX ADMIN — OXYCODONE HYDROCHLORIDE 10 MG: 10 TABLET ORAL at 03:07

## 2019-07-04 RX ADMIN — METHOCARBAMOL 500 MG: 500 TABLET, FILM COATED ORAL at 05:07

## 2019-07-04 NOTE — PLAN OF CARE
Patient lives with significant other, Juanjo Lopez. Next of kin Mely Henderson- daughter- [phone]. Patient drives and does not use any assist devices. Patient has a rolling walker that she does not use and will also be borrowing  a bedside commode to use at discharge as well. Patient denies POA or living will. PCP is Roddy Dykes NP. Family asking questions about snf. CM explained that once patient works with therapy, a recommendation will be made for discharge and if patient able to go home, HH will be set up and if patient not able to function safely at home , skilled nursing will be discussed. Patient is not opposed to going to skilled but her goal is to go home with HH . CM provided list of integrated HH providers in [place]. CM will follow for discharge planning needs.      07/04/19 0915   Discharge Assessment   Assessment Type Discharge Planning Assessment   Confirmed/corrected address and phone number on facesheet? Yes   Assessment information obtained from? Patient   Communicated expected length of stay with patient/caregiver yes   Prior to hospitilization cognitive status: Alert/Oriented   Prior to hospitalization functional status: Independent   Current cognitive status: Alert/Oriented   Current Functional Status: Assistive Equipment   Lives With alone   Able to Return to Prior Arrangements other (see comments)  (DC plan unsure at this time)   Is patient able to care for self after discharge? Unable to determine at this time (comments)   Patient's perception of discharge disposition home health   Readmission Within the Last 30 Days no previous admission in last 30 days   Patient currently being followed by outpatient case management? No   Patient currently receives any other outside agency services? No   Equipment Currently Used at Home none   Do you have any problems affording any of your prescribed medications? No   Is the patient taking medications as prescribed? yes   Does the patient have transportation  home? Yes   Transportation Anticipated family or friend will provide   Does the patient receive services at the [facility]mad[facility]? No   Discharge Plan A Home Health   Discharge Plan B Home Health   DME Needed Upon Discharge  none   Patient/Family in Agreement with Plan yes

## 2019-07-04 NOTE — PLAN OF CARE
Problem: Adult Inpatient Plan of Care  Goal: Plan of Care Review  Outcome: Ongoing (interventions implemented as appropriate)  Pt has remained free from injury this shift, she has been up with PT this shift but only took steps and got back in the bed.  She is eating and drinking well.  She has been medicated for pain several times this shift.  No elevated temps this shift and lab is assessed for changes.  Dressing intact with small area of dry blood noted.  Bess to gravity pulled per MD order this shift.  She was educated on mobility and post op complications.  Encouraged to call for assistance or needs with return demonstration on use of call light.

## 2019-07-04 NOTE — PLAN OF CARE
07/04/19 1147   Patient Assessment/Suction   Level of Consciousness (AVPU) alert   All Lung Fields Breath Sounds clear   PRE-TX-O2   O2 Device (Oxygen Therapy) nasal cannula   $ Is the patient on Low Flow Oxygen? Yes   Flow (L/min) 2   Oxygen Concentration (%) 28   SpO2 95 %   Pulse Oximetry Type Intermittent   $ Pulse Oximetry - Multiple Charge Pulse Oximetry - Multiple   Pulse 69   Resp 18   Aerosol Therapy   $ Aerosol Therapy Charges PRN treatment not required   Inhaler   $ Inhaler Charges MDI (Metered Dose Inahler) Treatment;Mouth rinsed post treatment  ( pt's home meds)   Respiratory Treatment Status (Inhaler) given   Treatment Route (Inhaler) mouthpiece   Patient Position (Inhaler) HOB elevated   Post Treatment Assessment (Inhaler) breath sounds unchanged   Signs of Intolerance (Inhaler) none   Ready to Wean/Extubation Screen   FIO2<=50 (chart decimal) 0.28

## 2019-07-04 NOTE — PLAN OF CARE
Problem: Physical Therapy Goal  Goal: Physical Therapy Goal  Goals to be met by: 2019     Patient will increase functional independence with mobility by performin. Supine to sit with Contact Guard Assistance  2. Sit to stand transfer with Contact Guard Assistance  3. Gait  x 50 feet with Contact Guard Assistance using Rolling Walker.     Comments: Patient participating in PT. Patient transferred supine to sit with mod A, sit to stand with mod A and ambulated 5 feet with FWW and mod A for walker and foot placement

## 2019-07-04 NOTE — PROGRESS NOTES
"[facility]  Orthopedics  Progress Note    Patient Name: Geraldine Cooksey  MRN: [id number]  Admission Date: 7/2/2019  Hospital Length of Stay: 2 days  Attending Provider: Margarette Almendarez MD  Primary Care Provider: Primary Doctor No  Follow-up For: Procedure(s) (LRB):  ORIF, HIP, USING DYNAMIC HIP SCREW (Left)    Post-Operative Day: 1 Day Post-Op  Subjective:     Principal Problem:Closed fracture of left hip    Principal Orthopedic Problem: S/P L Hip ORIF/ [facility]    Interval History: none    Review of patient's allergies indicates:   Allergen Reactions    Codeine Nausea Only     Happened 50 years ago        Current Facility-Administered Medications   Medication    aspirin tablet 325 mg    bisacodyl suppository 10 mg    dextrose 5 % and 0.45 % NaCl infusion    famotidine tablet 20 mg    methocarbamol tablet 500 mg    morphine injection 2 mg    ondansetron disintegrating tablet 8 mg    oxyCODONE immediate release tablet 10 mg    oxyCODONE immediate release tablet 5 mg    polyethylene glycol packet 17 g    sodium chloride 0.9% flush 5 mL    tiotropium inhalation capsule 18 mcg    zolpidem tablet 5 mg     Objective:     Vital Signs (Most Recent):  Temp: 99.3 °F (37.4 °C) (07/04/19 0726)  Pulse: 63 (07/04/19 0726)  Resp: 16 (07/04/19 0726)  BP: (!) 122/58 (07/04/19 0726)  SpO2: 98 % (07/04/19 0726) Vital Signs (24h Range):  Temp:  [97.7 °F (36.5 °C)-99.3 °F (37.4 °C)] 99.3 °F (37.4 °C)  Pulse:  [57-67] 63  Resp:  [16] 16  SpO2:  [94 %-99 %] 98 %  BP: (103-153)/(55-67) 122/58     Weight: 65.8 kg (145 lb)  Height: 5' 6" (167.6 cm)  Body mass index is 23.4 kg/m².      Intake/Output Summary (Last 24 hours) at 7/4/2019 0938  Last data filed at 7/4/2019 0645  Gross per 24 hour   Intake 3382.5 ml   Output 1790 ml   Net 1592.5 ml       General    Nursing note and vitals reviewed.  Constitutional: She is oriented to person, place, and time. She appears well-developed and well-nourished. "   Pulmonary/Chest: Effort normal.   Neurological: She is alert and oriented to person, place, and time.   Psychiatric: She has a normal mood and affect. Her behavior is normal.         Left Hip Exam     Comments:  LLE DNVI. Dressing C/D/I.            Significant Labs:   CBC:   Recent Labs   Lab 07/02/19  1341 07/03/19  0424 07/04/19  0608   WBC 9.30 9.39 11.83   HGB 13.0 13.0 10.9*   HCT 40.8 42.9 34.2*    159 167     CMP:   Recent Labs   Lab 07/02/19  1341 07/03/19  0424    139   K 4.2 4.6    105   CO2 26 24   GLU 84 83   BUN 15 12   CREATININE 0.7 0.6   CALCIUM 9.4 9.1   PROT 7.0  --    ALBUMIN 3.9  --    BILITOT 0.3  --    ALKPHOS 67  --    AST 16  --    ALT 13  --    ANIONGAP 10 10   EGFRNONAA >60 >60     All pertinent labs within the past 24 hours have been reviewed.    Significant Imaging: None    Assessment/Plan:     * Closed fracture of left hip  OOB with PT and nursing LLE PWB (50%)  DME for home  Anticipate DC home 24-48h          STEVE TRIANA  Orthopedics  [facility]

## 2019-07-04 NOTE — NURSING
Pt is fearful of movement she is purposefully not moving in bed related to pain.  She was encouraged to and reassured of the benefits of movement and that PT would teach her the proper tech and staff would be there with all activity.  She has already stated she did not want to eat because she was afraid she would have to use the restroom and she did not want to use a bedpan, again I encouraged her to eat and drink like normal and we would take care of her needs as they come.  She was encouraged and educated on all complications related to surgery and her and her family verbalized understanding.

## 2019-07-04 NOTE — HPI
POD #5 S/P L hip ORIF ([facility])  - Doing well.   - Minimal L hip pain  - L knee pain resolved; still some effusion  - Mobility improved but not at an independent home level; lives at home alone.  - No confusion

## 2019-07-04 NOTE — PROGRESS NOTES
[facility] Medicine  Progress Note    Patient Name: Geraldine Cooksey  MRN: [id number]  Patient Class: IP- Inpatient   Admission Date: 7/2/2019  Length of Stay: 2 days  Attending Physician: Margarette Almendarez MD  Primary Care Provider: Primary Doctor No        Subjective:     Principal Problem:Closed fracture of left hip      HPI:  Patient is a 70-year-old female with a history of COPD, tobacco abuse who currently smokes 1 pack per day who presents to the ED after mechanical fall.  She was in her usual state of health which is independent in all ADLs and was unloading groceries today when a [place] ran into the room and knocked her over.  She did not experience any loss of consciousness.  Her only complaint prior to the fall was pain in her left hip.  At this time patient denies any fever, chills, nausea, vomiting, abdominal pain, urinary symptoms, bowel complaints.    In the ED, workup was significant for elevated blood pressure as well as hip x-ray showing a nondisplaced intratrochanteric fracture of the left femur.  ED physician discussed with Orthopedics Dr. Gunn who will perform surgery tomorrow.    Overview/Hospital Course:  No notes on file    Interval History:  Patient seen and examined.  Reports pain is decently controlled.  Will work with PT and OT today.  Per Ortho plan for discharge home in 24-48 hours.    Review of Systems   Constitutional: Negative for chills and fever.   Respiratory: Negative for cough and shortness of breath.    Gastrointestinal: Negative for abdominal pain and nausea.   Neurological: Negative for dizziness.   Psychiatric/Behavioral: Negative for agitation and confusion.     Objective:     Vital Signs (Most Recent):  Temp: 99.3 °F (37.4 °C) (07/04/19 0726)  Pulse: 63 (07/04/19 0726)  Resp: 16 (07/04/19 0726)  BP: (!) 122/58 (07/04/19 0726)  SpO2: 98 % (07/04/19 0726) Vital Signs (24h Range):  Temp:  [97.7 °F (36.5 °C)-99.3 °F (37.4 °C)] 99.3 °F  (37.4 °C)  Pulse:  [57-67] 63  Resp:  [16] 16  SpO2:  [94 %-98 %] 98 %  BP: (103-139)/(55-63) 122/58     Weight: 65.8 kg (145 lb)  Body mass index is 23.4 kg/m².    Intake/Output Summary (Last 24 hours) at 7/4/2019 1132  Last data filed at 7/4/2019 0645  Gross per 24 hour   Intake 3382.5 ml   Output 1790 ml   Net 1592.5 ml      Physical Exam   Constitutional: She is oriented to person, place, and time. She appears well-developed and well-nourished. No distress.   HENT:   Head: Normocephalic and atraumatic.   Eyes: Pupils are equal, round, and reactive to light. EOM are normal.   Cardiovascular: Normal rate, regular rhythm and intact distal pulses.   No murmur heard.  Pulmonary/Chest: Effort normal and breath sounds normal. No respiratory distress. She has no wheezes. She has no rales.   Abdominal: Soft. Bowel sounds are normal. She exhibits no distension. There is no tenderness.   Musculoskeletal: She exhibits tenderness (Left hip). She exhibits no edema.   Left hip bandage clean/dry/intact   Neurological: She is alert and oriented to person, place, and time. No cranial nerve deficit.   Skin: Skin is warm and dry. No rash noted.   Psychiatric: She has a normal mood and affect. Her behavior is normal.       Significant Labs: All pertinent labs within the past 24 hours have been reviewed.    Significant Imaging: I have reviewed and interpreted all pertinent imaging results/findings within the past 24 hours.      Assessment/Plan:      * Closed fracture of left hip  Orthopedics has been consulted.  Dr. Gunn performed ORIF left hip 7/2.  Pain control.  PT and OT.  Partial weight-bearing (50%) left lower extremity.      Tobacco abuse  Assistance with smoking cessation was offered, including:  [x]  Medications  [x]  Counseling    Patient was counseled regarding smoking for >10 minutes.  Does not want nicotine patch at this time.          Elevated blood pressure reading  Likely secondary to pain. No history of  hypertension.  Will continue to monitor blood pressure.  Will utilize p.r.n. antihypertensives for systolic greater than 160.      COPD (chronic obstructive pulmonary disease)  Patient's COPD is controlled currently. Continue scheduled inhalers, Supplemental oxygen as needed and monitor respiratory status closely.           VTE Risk Mitigation (From admission, onward)    None                Margarette Almendarez MD  Department of Hospital Medicine   [facility]

## 2019-07-04 NOTE — ASSESSMENT & PLAN NOTE
Orthopedics has been consulted.  Dr. Gunn performed ORIF left hip 7/2.  Pain control.  PT and OT.  Partial weight-bearing (50%) left lower extremity.

## 2019-07-04 NOTE — PLAN OF CARE
Problem: Adult Inpatient Plan of Care  Goal: Plan of Care Review  Outcome: Ongoing (interventions implemented as appropriate)  Plan of care reviewed with pt at beginning of shift.  Pt/family verbalized understanding. Hourly/ Q2 hourly rounds completed on this pt throughout shift.  Pain monitored and covered as needed.  Neurovascular checks wdl.  Pt able to move  L heel up and down bed this am.  Bess cath in place draining clear yellow urine without issue, repositioned as tolerated.  Safety maintained.  Patient has remained free from fall/injury, no skin breakdown noted.  Side rails up x2, bed in locked and lowest position, call light kept within reach.  Needs attended to, will continue to monitor/ update as indicated

## 2019-07-04 NOTE — PT/OT/SLP EVAL
Physical Therapy Evaluation    Patient Name:  Geraldine Cooksey   MRN:  [id number]    Recommendations:     Discharge Recommendations:  rehabilitation facility   Discharge Equipment Recommendations:     Barriers to discharge: Decreased caregiver support    Assessment:     Geraldine Cooksey is a 78 y.o. female admitted with a medical diagnosis of Closed fracture of left hip.  She presents with the following impairments/functional limitations:  weakness, impaired endurance, gait instability, impaired functional mobilty, pain ..    Rehab Prognosis: Fair; patient would benefit from acute skilled PT services to address these deficits and reach maximum level of function.    Recent Surgery: Procedure(s) (LRB):  ORIF, HIP, USING DYNAMIC HIP SCREW (Left) 1 Day Post-Op    Plan:     During this hospitalization, patient to be seen BID to address the identified rehab impairments via gait training, therapeutic activities, therapeutic exercises and progress toward the following goals:    · Plan of Care Expires:  07/29/19    Subjective     Chief Complaint: hip pain  Patient/Family Comments/goals:   Pain/Comfort:  · Pain Rating 1: 8/10  · Location - Side 1: Left  · Location - Orientation 1: generalized  · Location 1: hip  · Pain Addressed 1: Pre-medicate for activity  · Pain Rating Post-Intervention 1: 7/10    Patients cultural, spiritual, Gnosticism conflicts given the current situation:      Living Environment:    Prior to admission, patients level of function was independent.  Equipment used at home: walker, rolling, shower chair, bedside commode.  DME owned (not currently used): rolling walker, bedside commode and shower chair.  Upon discharge, patient will have assistance from family.    Objective:     Communicated with nursing prior to session.  Patient found supine with oxygen, tolbert catheter  upon PT entry to room.    General Precautions: Standard, fall   Orthopedic Precautions:LLE partial weight bearing   Braces:        Exams:  · RLE ROM: WFL  · RLE Strength: WFL  · LLE ROM: WFL except left hip  · LLE Strength: WFL except left hip    Functional Mobility:  · Bed Mobility:     · Supine to Sit: maximal assistance  · Transfers:     · Sit to Stand:  moderate assistance with rolling walker  · Gait: Pateint ambulated 5 feet with FWW      Therapeutic Activities and Exercises:    Quad sets x 10, ankle pumps x 10  bridging x 10    AM-PAC 6 CLICK MOBILITY  Total Score:12     Patient left supine with call button in reach.    GOALS:   Multidisciplinary Problems     Physical Therapy Goals        Problem: Physical Therapy Goal    Goal Priority Disciplines Outcome Goal Variances Interventions   Physical Therapy Goal     PT, PT/OT                      History:     Past Medical History:   Diagnosis Date    Bronchitis     COPD (chronic obstructive pulmonary disease)     PONV (postoperative nausea and vomiting)        Past Surgical History:   Procedure Laterality Date    COSMETIC SURGERY  2012    bilateral eye lift    HYSTERECTOMY         Time Tracking:     PT Received On: 07/04/19  PT Start Time: 0930     PT Stop Time: 1000  PT Total Time (min): 30 min     Billable Minutes: Evaluation 15 and Gait Training 15      Kyle Power, PT  07/04/2019

## 2019-07-04 NOTE — SUBJECTIVE & OBJECTIVE
Interval History:  Patient seen and examined.  Reports pain is decently controlled.  Will work with PT and OT today.  Per Ortho plan for discharge home in 24-48 hours.    Review of Systems   Constitutional: Negative for chills and fever.   Respiratory: Negative for cough and shortness of breath.    Gastrointestinal: Negative for abdominal pain and nausea.   Neurological: Negative for dizziness.   Psychiatric/Behavioral: Negative for agitation and confusion.     Objective:     Vital Signs (Most Recent):  Temp: 99.3 °F (37.4 °C) (07/04/19 0726)  Pulse: 63 (07/04/19 0726)  Resp: 16 (07/04/19 0726)  BP: (!) 122/58 (07/04/19 0726)  SpO2: 98 % (07/04/19 0726) Vital Signs (24h Range):  Temp:  [97.7 °F (36.5 °C)-99.3 °F (37.4 °C)] 99.3 °F (37.4 °C)  Pulse:  [57-67] 63  Resp:  [16] 16  SpO2:  [94 %-98 %] 98 %  BP: (103-139)/(55-63) 122/58     Weight: 65.8 kg (145 lb)  Body mass index is 23.4 kg/m².    Intake/Output Summary (Last 24 hours) at 7/4/2019 1132  Last data filed at 7/4/2019 0645  Gross per 24 hour   Intake 3382.5 ml   Output 1790 ml   Net 1592.5 ml      Physical Exam   Constitutional: She is oriented to person, place, and time. She appears well-developed and well-nourished. No distress.   HENT:   Head: Normocephalic and atraumatic.   Eyes: Pupils are equal, round, and reactive to light. EOM are normal.   Cardiovascular: Normal rate, regular rhythm and intact distal pulses.   No murmur heard.  Pulmonary/Chest: Effort normal and breath sounds normal. No respiratory distress. She has no wheezes. She has no rales.   Abdominal: Soft. Bowel sounds are normal. She exhibits no distension. There is no tenderness.   Musculoskeletal: She exhibits tenderness (Left hip). She exhibits no edema.   Left hip bandage clean/dry/intact   Neurological: She is alert and oriented to person, place, and time. No cranial nerve deficit.   Skin: Skin is warm and dry. No rash noted.   Psychiatric: She has a normal mood and affect. Her behavior  is normal.       Significant Labs: All pertinent labs within the past 24 hours have been reviewed.    Significant Imaging: I have reviewed and interpreted all pertinent imaging results/findings within the past 24 hours.

## 2019-07-04 NOTE — SUBJECTIVE & OBJECTIVE
"Principal Problem:Closed fracture of left hip    Principal Orthopedic Problem: S/P L Hip ORIF/ [facility]    Interval History: none    Review of patient's allergies indicates:   Allergen Reactions    Codeine Nausea Only     Happened 50 years ago        Current Facility-Administered Medications   Medication    aspirin tablet 325 mg    bisacodyl suppository 10 mg    dextrose 5 % and 0.45 % NaCl infusion    famotidine tablet 20 mg    methocarbamol tablet 500 mg    morphine injection 2 mg    ondansetron disintegrating tablet 8 mg    oxyCODONE immediate release tablet 10 mg    oxyCODONE immediate release tablet 5 mg    polyethylene glycol packet 17 g    sodium chloride 0.9% flush 5 mL    tiotropium inhalation capsule 18 mcg    zolpidem tablet 5 mg     Objective:     Vital Signs (Most Recent):  Temp: 99.3 °F (37.4 °C) (07/04/19 0726)  Pulse: 63 (07/04/19 0726)  Resp: 16 (07/04/19 0726)  BP: (!) 122/58 (07/04/19 0726)  SpO2: 98 % (07/04/19 0726) Vital Signs (24h Range):  Temp:  [97.7 °F (36.5 °C)-99.3 °F (37.4 °C)] 99.3 °F (37.4 °C)  Pulse:  [57-67] 63  Resp:  [16] 16  SpO2:  [94 %-99 %] 98 %  BP: (103-153)/(55-67) 122/58     Weight: 65.8 kg (145 lb)  Height: 5' 6" (167.6 cm)  Body mass index is 23.4 kg/m².      Intake/Output Summary (Last 24 hours) at 7/4/2019 0938  Last data filed at 7/4/2019 0645  Gross per 24 hour   Intake 3382.5 ml   Output 1790 ml   Net 1592.5 ml       General    Nursing note and vitals reviewed.  Constitutional: She is oriented to person, place, and time. She appears well-developed and well-nourished.   Pulmonary/Chest: Effort normal.   Neurological: She is alert and oriented to person, place, and time.   Psychiatric: She has a normal mood and affect. Her behavior is normal.         Left Hip Exam     Comments:  LLE DNVI. Dressing C/D/I.            Significant Labs:   CBC:   Recent Labs   Lab 07/02/19  1341 07/03/19  0424 07/04/19  0608   WBC 9.30 9.39 11.83   HGB 13.0 13.0 10.9*   HCT 40.8 " 42.9 34.2*    159 167     CMP:   Recent Labs   Lab 07/02/19  1341 07/03/19  0424    139   K 4.2 4.6    105   CO2 26 24   GLU 84 83   BUN 15 12   CREATININE 0.7 0.6   CALCIUM 9.4 9.1   PROT 7.0  --    ALBUMIN 3.9  --    BILITOT 0.3  --    ALKPHOS 67  --    AST 16  --    ALT 13  --    ANIONGAP 10 10   EGFRNONAA >60 >60     All pertinent labs within the past 24 hours have been reviewed.    Significant Imaging: None

## 2019-07-04 NOTE — NURSING
Pt did get up and take steps with PT and got back in to bed r/t nausea.  She was still encouraged to eat and drink like normal and to do exercises that was taught to her by PT, pt and family verbalized understanding.

## 2019-07-05 LAB
ANION GAP SERPL CALC-SCNC: 6 MMOL/L (ref 8–16)
BASOPHILS # BLD AUTO: 0.05 K/UL (ref 0–0.2)
BASOPHILS NFR BLD: 0.5 % (ref 0–1.9)
BUN SERPL-MCNC: 12 MG/DL (ref 8–23)
CALCIUM SERPL-MCNC: 8.8 MG/DL (ref 8.7–10.5)
CHLORIDE SERPL-SCNC: 106 MMOL/L (ref 95–110)
CO2 SERPL-SCNC: 24 MMOL/L (ref 23–29)
CREAT SERPL-MCNC: 0.7 MG/DL (ref 0.5–1.4)
DIFFERENTIAL METHOD: ABNORMAL
EOSINOPHIL # BLD AUTO: 0 K/UL (ref 0–0.5)
EOSINOPHIL NFR BLD: 0.2 % (ref 0–8)
ERYTHROCYTE [DISTWIDTH] IN BLOOD BY AUTOMATED COUNT: 13.7 % (ref 11.5–14.5)
EST. GFR  (AFRICAN AMERICAN): >60 ML/MIN/1.73 M^2
EST. GFR  (NON AFRICAN AMERICAN): >60 ML/MIN/1.73 M^2
GLUCOSE SERPL-MCNC: 117 MG/DL (ref 70–110)
HCT VFR BLD AUTO: 32.4 % (ref 37–48.5)
HGB BLD-MCNC: 10.2 G/DL (ref 12–16)
IMM GRANULOCYTES # BLD AUTO: 0.04 K/UL (ref 0–0.04)
LYMPHOCYTES # BLD AUTO: 2.2 K/UL (ref 1–4.8)
LYMPHOCYTES NFR BLD: 19.6 % (ref 18–48)
MCH RBC QN AUTO: 30.1 PG (ref 27–31)
MCHC RBC AUTO-ENTMCNC: 31.5 G/DL (ref 32–36)
MCV RBC AUTO: 96 FL (ref 82–98)
MONOCYTES # BLD AUTO: 1.3 K/UL (ref 0.3–1)
MONOCYTES NFR BLD: 11.9 % (ref 4–15)
NEUTROPHILS # BLD AUTO: 7.5 K/UL (ref 1.8–7.7)
NEUTROPHILS NFR BLD: 67.4 % (ref 38–73)
NRBC BLD-RTO: 0 /100 WBC
PLATELET # BLD AUTO: 181 K/UL (ref 150–350)
PMV BLD AUTO: 10.9 FL (ref 9.2–12.9)
POTASSIUM SERPL-SCNC: 5.1 MMOL/L (ref 3.5–5.1)
RBC # BLD AUTO: 3.39 M/UL (ref 4–5.4)
SODIUM SERPL-SCNC: 136 MMOL/L (ref 136–145)
WBC # BLD AUTO: 11.09 K/UL (ref 3.9–12.7)

## 2019-07-05 PROCEDURE — G8987 SELF CARE CURRENT STATUS: HCPCS | Mod: CK

## 2019-07-05 PROCEDURE — 94640 AIRWAY INHALATION TREATMENT: CPT

## 2019-07-05 PROCEDURE — 12000002 HC ACUTE/MED SURGE SEMI-PRIVATE ROOM

## 2019-07-05 PROCEDURE — 25000003 PHARM REV CODE 250: Performed by: PHYSICIAN ASSISTANT

## 2019-07-05 PROCEDURE — 97166 OT EVAL MOD COMPLEX 45 MIN: CPT

## 2019-07-05 PROCEDURE — 80048 BASIC METABOLIC PNL TOTAL CA: CPT

## 2019-07-05 PROCEDURE — 97530 THERAPEUTIC ACTIVITIES: CPT

## 2019-07-05 PROCEDURE — 97110 THERAPEUTIC EXERCISES: CPT

## 2019-07-05 PROCEDURE — 36415 COLL VENOUS BLD VENIPUNCTURE: CPT

## 2019-07-05 PROCEDURE — 97535 SELF CARE MNGMENT TRAINING: CPT

## 2019-07-05 PROCEDURE — 94761 N-INVAS EAR/PLS OXIMETRY MLT: CPT

## 2019-07-05 PROCEDURE — 85025 COMPLETE CBC W/AUTO DIFF WBC: CPT

## 2019-07-05 PROCEDURE — 63600175 PHARM REV CODE 636 W HCPCS: Performed by: PHYSICIAN ASSISTANT

## 2019-07-05 PROCEDURE — 86580 TB INTRADERMAL TEST: CPT | Performed by: HOSPITALIST

## 2019-07-05 PROCEDURE — 25000003 PHARM REV CODE 250: Performed by: ORTHOPAEDIC SURGERY

## 2019-07-05 PROCEDURE — 30200315 PPD INTRADERMAL TEST REV CODE 302: Performed by: HOSPITALIST

## 2019-07-05 PROCEDURE — G8988 SELF CARE GOAL STATUS: HCPCS | Mod: CK

## 2019-07-05 RX ADMIN — ASPIRIN 325 MG ORAL TABLET 325 MG: 325 PILL ORAL at 08:07

## 2019-07-05 RX ADMIN — METHOCARBAMOL 500 MG: 500 TABLET, FILM COATED ORAL at 08:07

## 2019-07-05 RX ADMIN — OXYCODONE HYDROCHLORIDE 10 MG: 10 TABLET ORAL at 08:07

## 2019-07-05 RX ADMIN — POLYETHYLENE GLYCOL 3350 17 G: 17 POWDER, FOR SOLUTION ORAL at 08:07

## 2019-07-05 RX ADMIN — METHOCARBAMOL 500 MG: 500 TABLET, FILM COATED ORAL at 09:07

## 2019-07-05 RX ADMIN — FAMOTIDINE 20 MG: 20 TABLET, FILM COATED ORAL at 08:07

## 2019-07-05 RX ADMIN — Medication 5 UNITS: at 03:07

## 2019-07-05 RX ADMIN — METHOCARBAMOL 500 MG: 500 TABLET, FILM COATED ORAL at 06:07

## 2019-07-05 RX ADMIN — METHOCARBAMOL 500 MG: 500 TABLET, FILM COATED ORAL at 01:07

## 2019-07-05 RX ADMIN — TIOTROPIUM BROMIDE 18 MCG: 18 CAPSULE ORAL; RESPIRATORY (INHALATION) at 07:07

## 2019-07-05 RX ADMIN — OXYCODONE HYDROCHLORIDE 10 MG: 10 TABLET ORAL at 03:07

## 2019-07-05 RX ADMIN — ACETAMINOPHEN 1000 MG: 10 INJECTION, SOLUTION INTRAVENOUS at 06:07

## 2019-07-05 NOTE — ASSESSMENT & PLAN NOTE
Orthopedics has been consulted.  Dr. Gunn performed ORIF left hip 7/2.  Pain control.  PT and OT.  Partial weight-bearing (50%) left lower extremity.  SNF consult placed.

## 2019-07-05 NOTE — PLAN OF CARE
Problem: Adult Inpatient Plan of Care  Goal: Plan of Care Review  Outcome: Ongoing (interventions implemented as appropriate)  TB test placed to right FA for possible SNF placement. Did sit in chair most of day. Intermittent complaints of pain. Well controlled with PO pain medication. Good urine output, still no BM. REfusing suppositiory. Enouraged to increase fluid intake.

## 2019-07-05 NOTE — ASSESSMENT & PLAN NOTE
WB status changed to WBAT  DME for home  May DC home with HH once she is able to get OOB with Min assist and ambulate 40ft

## 2019-07-05 NOTE — PROGRESS NOTES
"[facility]  Orthopedics  Progress Note    Patient Name: Geraldine Cooksey  MRN: [id number]  Admission Date: 7/2/2019  Hospital Length of Stay: 3 days  Attending Provider: Margarette Almendarez MD  Primary Care Provider: Primary Doctor No  Follow-up For: Procedure(s) (LRB):  ORIF, HIP, USING DYNAMIC HIP SCREW (Left)    Post-Operative Day: 2 Days Post-Op  Subjective:     Principal Problem:Closed fracture of left hip    Principal Orthopedic Problem: S/P L hip ORIF    Interval History: limited mobility    Review of patient's allergies indicates:   Allergen Reactions    Codeine Nausea Only     Happened 50 years ago        Current Facility-Administered Medications   Medication    aspirin tablet 325 mg    bisacodyl suppository 10 mg    famotidine tablet 20 mg    methocarbamol tablet 500 mg    morphine injection 2 mg    ondansetron disintegrating tablet 8 mg    oxyCODONE immediate release tablet 5 mg    oxyCODONE immediate release tablet Tab 10 mg    polyethylene glycol packet 17 g    sodium chloride 0.9% flush 5 mL    tiotropium inhalation capsule 18 mcg    zolpidem tablet 5 mg     Objective:     Vital Signs (Most Recent):  Temp: 97.6 °F (36.4 °C) (07/05/19 0401)  Pulse: 60 (07/05/19 0732)  Resp: 16 (07/05/19 0732)  BP: (!) 109/53 (07/05/19 0401)  SpO2: 96 % (07/05/19 0732) Vital Signs (24h Range):  Temp:  [96.9 °F (36.1 °C)-98.6 °F (37 °C)] 97.6 °F (36.4 °C)  Pulse:  [60-69] 60  Resp:  [16-18] 16  SpO2:  [92 %-100 %] 96 %  BP: (109-137)/(53-60) 109/53     Weight: 65.8 kg (145 lb)  Height: 5' 6" (167.6 cm)  Body mass index is 23.4 kg/m².      Intake/Output Summary (Last 24 hours) at 7/5/2019 0811  Last data filed at 7/4/2019 1800  Gross per 24 hour   Intake 580 ml   Output 1625 ml   Net -1045 ml       General    Nursing note and vitals reviewed.  Constitutional: She is oriented to person, place, and time. She appears well-developed and well-nourished.   Pulmonary/Chest: Effort normal. "   Neurological: She is alert and oriented to person, place, and time.   Psychiatric: She has a normal mood and affect. Her behavior is normal.         Left Hip Exam     Comments:  LLE DNVI. Incision looks great.            Significant Labs:   CBC:   Recent Labs   Lab 07/04/19  0608   WBC 11.83   HGB 10.9*   HCT 34.2*        CMP: No results for input(s): NA, K, CL, CO2, GLU, BUN, CREATININE, CALCIUM, PROT, ALBUMIN, BILITOT, ALKPHOS, AST, ALT, ANIONGAP, EGFRNONAA in the last 48 hours.    Invalid input(s): ESTGF[place]  All pertinent labs within the past 24 hours have been reviewed.    Significant Imaging: None    Assessment/Plan:     * Closed fracture of left hip  WB status changed to WBAT  DME for home  May DC home with HH once she is able to get OOB with Min assist and ambulate 40ft          STEVE TRIANA  Orthopedics  [facility]

## 2019-07-05 NOTE — CARE UPDATE
07/05/19 0732   Patient Assessment/Suction   Level of Consciousness (AVPU) alert   PRE-TX-O2   O2 Device (Oxygen Therapy) room air   SpO2 96 %   Pulse Oximetry Type Intermittent   $ Pulse Oximetry - Multiple Charge Pulse Oximetry - Multiple   Pulse 60   Resp 16   Inhaler   $ Inhaler Charges MDI (Metered Dose Inahler) Treatment   Respiratory Treatment Status (Inhaler) given   Treatment Route (Inhaler) mouthpiece   Patient Position (Inhaler) HOB elevated   Signs of Intolerance (Inhaler) none

## 2019-07-05 NOTE — PT/OT/SLP PROGRESS
Physical Therapy Treatment    Patient Name:  Geraldine Cooksey   MRN:  [id number]    Recommendations:     Discharge Recommendations:  rehabilitation facility   Discharge Equipment Recommendations:     Barriers to discharge: None    Assessment:     Geraldine Cooksey is a 78 y.o. female admitted with a medical diagnosis of Closed fracture of left hip.  She presents with the following impairments/functional limitations:  weakness, impaired endurance, impaired self care skills, impaired balance, gait instability, impaired functional mobilty, decreased lower extremity function, decreased ROM, orthopedic precautions, decreased safety awareness, pain .    Rehab Prognosis: Fair; patient would benefit from acute skilled PT services to address these deficits and reach maximum level of function.    Recent Surgery: Procedure(s) (LRB):  ORIF, HIP, USING DYNAMIC HIP SCREW (Left) 2 Days Post-Op    Plan:     During this hospitalization, patient to be seen BID to address the identified rehab impairments via gait training, therapeutic activities, therapeutic exercises and progress toward the following goals:    · Plan of Care Expires:  07/29/19    Subjective     Chief Complaint: Pt states she is very stiff from sitting too long in chair  Patient/Family Comments/goals: Pt would like to try and walk if she can and then to BTB  Pain/Comfort:  · Pain Rating 1: 0/10  · Pain Addressed 1: Pre-medicate for activity, Reposition, Distraction  · Pain Rating Post-Intervention 1: 0/10      Objective:     Communicated with MIKE De Luna prior to session.  Patient found up in chair with peripheral IV upon PT entry to room.     General Precautions: Standard, fall   Orthopedic Precautions:LLE partial weight bearing   Braces: N/A     Functional Mobility:  · Transfers:     · Sit to Stand:  maximal assistance and of 2 persons with rolling walker. Pt required 3 attempts.  · Chair to Bed: moderate assistance x 2 with  rolling walker  using  Stand Pivot  · Gait:  25' step to pattern very slow      AM-PAC 6 CLICK MOBILITY          Therapeutic Activities and Exercises:   patient performed sit to stand to loosen her BLE and BUE for standing with less assistance. Pt scooted along EOB for positioning closer to HOB and was able to perform SLB with RLE for R/L mid section positioning in bed.    Patient left HOB elevated with all lines intact, call button in reach and daughter present..    GOALS:   Multidisciplinary Problems     Physical Therapy Goals        Problem: Physical Therapy Goal    Goal Priority Disciplines Outcome Goal Variances Interventions   Physical Therapy Goal     PT, PT/OT Ongoing (interventions implemented as appropriate)                     Time Tracking:     PT Received On: 07/05/19  PT Start Time: 1440     PT Stop Time: 1500  PT Total Time (min): 20 min     Billable Minutes: Therapeutic Activity 20    Treatment Type: Treatment  PT/PTA: PTA     PTA Visit Number: 1     Valencia Chand, BLAIRE  07/05/2019

## 2019-07-05 NOTE — PT/OT/SLP EVAL
Occupational Therapy   Evaluation    Name: Geraldine Cooksey  MRN: [id number]  Admitting Diagnosis:  Closed fracture of left hip 2 Days Post-Op    Recommendations:     Discharge Recommendations: rehabilitation facility  Discharge Equipment Recommendations:  none  Barriers to discharge:  Decreased caregiver support    Assessment:     Geraldine Cooksey is a 78 y.o. female with a medical diagnosis of Closed fracture of left hip.  Performance deficits affecting function: weakness, impaired endurance, impaired self care skills, impaired functional mobilty, gait instability, decreased lower extremity function, orthopedic precautions, pain, impaired balance, decreased coordination, decreased ROM, decreased safety awareness. Pt required min A using RW for transfers, mod > max with LB dressing, and Supervision with feliz-hygiene after voiding on toilet. Pt required verbal cues to correct sequencing of steps with RW. Noted minimal LOB when negotiating turns with RW. Pt was able to perform hygiene tasks while standing at sink with CGA. Pt would benefit from IPR as pt is home alone while significant other is at work.      Rehab Prognosis: Good; patient would benefit from acute skilled OT services to address these deficits and reach maximum level of function.       Plan:     Patient to be seen 3 x/week to address the above listed problems via self-care/home management, therapeutic activities, therapeutic exercises  · Plan of Care Expires: 07/19/19  · Plan of Care Reviewed with: patient, daughter, family    Subjective     Chief Complaint: pain in L hip  Patient/Family Comments/goals: To be independent    Occupational Profile:  Living Environment: Pt lives at home with her significant other.   Previous level of function: Pt was independent with ADL and mobility.   Equipment Used at Home:  walker, rolling, shower chair, bedside commode  Assistance upon Discharge: Pt will have limited assistance at home. Pt is home alone during the day  until significant other comes home late in the evening.     Pain/Comfort:  · Pain Rating 1: 2/10  · Location - Side 1: Left  · Location - Orientation 1: generalized  · Location 1: hip  · Pain Addressed 1: Distraction, Reposition  · Pain Rating Post-Intervention 1: 5/10    Patients cultural, spiritual, Confucianism conflicts given the current situation:      Objective:     Communicated with: nurse Abril/Calixto prior to session.  Patient found HOB elevated with peripheral IV upon OT entry to room.    General Precautions: Standard, fall   Orthopedic Precautions:LLE partial weight bearing   Braces: N/A     Occupational Performance:    Bed Mobility:    · Patient completed Scooting/Bridging with contact guard assistance  · Patient completed Supine to Sit with minimum assistance      Functional Mobility/Transfers:  · Patient completed Sit <> Stand Transfer with minimum assistance  with  rolling walker   · Patient completed Toilet Transfer Stand Pivot technique with minimum assistance with  rolling walker  · Functional Mobility: Pt ambulated in room with CGA and RW from bed>sink>bathroom>chair. Minimal LOB when negotiating turns with RW but no significant LOB.    Activities of Daily Living:  · Grooming: contact guard assistance with oral hygiene while standing at sink.  · Lower Body Dressing: Max assist to don sock while sitting EOB and Mod A to don shorts.   · Toileting: Mod A with pulling up/down shorts; Supervision with feliz-hygiene after voiding while seated on toilet.      Cognitive/Visual Perceptual:  Cognitive/Psychosocial Skills:     -       Oriented to: x4   -       Follows Commands/attention:Follows multistep  commands  -       Communication: clear/fluent  -       Safety awareness/insight to disability: intact   -       Mood/Affect/Coping skills/emotional control: Appropriate to situation  Visual/Perceptual:      -Intact     Physical Exam:  Postural examination/scapula alignment:    -       Rounded shoulders  -        Forward head  Upper Extremity Range of Motion:     -       Right Upper Extremity: WFL  -       Left Upper Extremity: WFL  Upper Extremity Strength:    -       Right Upper Extremity: WFL  -       Left Upper Extremity: WFL   Strength:    -       Right Upper Extremity: WFL  -       Left Upper Extremity: WFL  Fine Motor Coordination:    -       Intact  Gross motor coordination:   WFL in B UE    AMPAC 6 Click ADL:  AMPAC Total Score: 16    Treatment & Education:  OT ed patient on safety with walker use for functional mobility with cues for hand placement & sequencing.   OT ed pt on use of adaptive equipment for LB dressing & safe item retrieval with reacher with demonstration provided.    Education:    Patient left up in chair with all lines intact, call button in reach, nurse notified and daughter and family present    GOALS:   Multidisciplinary Problems     Occupational Therapy Goals        Problem: Occupational Therapy Goal    Goal Priority Disciplines Outcome Interventions   Occupational Therapy Goal     OT, PT/OT Ongoing (interventions implemented as appropriate)    Description:  Goals to be met by: 7/19/2019     Patient will increase functional independence with ADLs by performing:    LE Dressing with Supervision and Assistive Devices as needed.  Grooming while standing at sink with Supervision.  Toileting from toilet with Supervision for hygiene and clothing management.   Supine to sit with Supervision.  Toilet transfer to toilet with Supervision and maintaining weight-bearing precaution(s).                      History:     Past Medical History:   Diagnosis Date    Bronchitis     COPD (chronic obstructive pulmonary disease)     PONV (postoperative nausea and vomiting)        Past Surgical History:   Procedure Laterality Date    COSMETIC SURGERY  2012    bilateral eye lift    HYSTERECTOMY         Time Tracking:     OT Date of Treatment: 07/05/19  OT Start Time: 1011  OT Stop Time: 1057  OT Total Time  (min): 46 min    Billable Minutes:Evaluation 10  Self Care/Home Management 24  Therapeutic Activity 12    Don Jerome, OT  7/5/2019

## 2019-07-05 NOTE — SUBJECTIVE & OBJECTIVE
Interval History:  Patient with limited mobility post surgery.  Very scared to move secondary to pain. PT recommending discharge to a rehab facility.  Discussed possibility skilled nursing facility placement with patient and family and they are agreeable.  Case management working on SNF placement    Review of Systems   Constitutional: Negative for chills and fever.   Respiratory: Negative for cough and shortness of breath.    Gastrointestinal: Negative for abdominal pain and nausea.   Neurological: Negative for dizziness.   Psychiatric/Behavioral: Negative for agitation and confusion.     Objective:     Vital Signs (Most Recent):  Temp: 98.6 °F (37 °C) (07/05/19 1201)  Pulse: 65 (07/05/19 1201)  Resp: 18 (07/05/19 1201)  BP: (!) 111/55 (07/05/19 1201)  SpO2: (!) 91 % (07/05/19 1201) Vital Signs (24h Range):  Temp:  [96.9 °F (36.1 °C)-98.6 °F (37 °C)] 98.6 °F (37 °C)  Pulse:  [60-71] 65  Resp:  [16-18] 18  SpO2:  [91 %-100 %] 91 %  BP: (109-124)/(53-58) 111/55     Weight: 65.8 kg (145 lb)  Body mass index is 23.4 kg/m².    Intake/Output Summary (Last 24 hours) at 7/5/2019 1240  Last data filed at 7/4/2019 1800  Gross per 24 hour   Intake 340 ml   Output 1625 ml   Net -1285 ml      Physical Exam   Constitutional: She is oriented to person, place, and time. She appears well-developed and well-nourished. No distress.   HENT:   Head: Normocephalic and atraumatic.   Cardiovascular: Normal rate, regular rhythm and intact distal pulses.   No murmur heard.  Pulmonary/Chest: Effort normal and breath sounds normal. No respiratory distress. She has no wheezes. She has no rales.   Abdominal: Soft. Bowel sounds are normal. She exhibits no distension. There is no tenderness.   Musculoskeletal: She exhibits tenderness (Left hip). She exhibits no edema.   Left hip bandage clean/dry/intact   Neurological: She is alert and oriented to person, place, and time. No cranial nerve deficit.   Skin: Skin is warm and dry. No rash noted.    Psychiatric: She has a normal mood and affect. Her behavior is normal.       Significant Labs: All pertinent labs within the past 24 hours have been reviewed.    Significant Imaging: I have reviewed and interpreted all pertinent imaging results/findings within the past 24 hours.

## 2019-07-05 NOTE — HOSPITAL COURSE
Patient was admitted to the hospital medicine service for left hip fracture after mechanical fall.  Orthopedics was consulted.  Dr. Gunn performed ORIF left hip on 07/03.  Placed on aspirin 325 mg b.i.d..  PT and OT evaluated patient and recommended placement in a rehab facility. SNF consult placed.  Patient initially had elevated blood pressures but this resolved with pain control.  Patient had issues with her left knee, and Orthopedics did bedside steroid injection in the knee which improved the patient's functional status markedly.   Patient was seen by Physical therapy the following day and deemed stable for discharge home with home health.  She was able to walk approximately 250 ft prior to discharge.

## 2019-07-05 NOTE — PLAN OF CARE
Pt's SNF referrals also sent to [facility] through [facility].  Daughter states pt's first choice is still [facility].     07/05/19 1242   Post-Acute Status   Post-Acute Authorization Placement   Post-Acute Placement Status Referrals Sent

## 2019-07-05 NOTE — PLAN OF CARE
Problem: Occupational Therapy Goal  Goal: Occupational Therapy Goal  Goals to be met by: 7/19/2019     Patient will increase functional independence with ADLs by performing:    LE Dressing with Supervision and Assistive Devices as needed.  Grooming while standing at sink with Supervision.  Toileting from toilet with Supervision for hygiene and clothing management.   Supine to sit with Supervision.  Toilet transfer to toilet with Supervision and maintaining weight-bearing precaution(s).    Outcome: Ongoing (interventions implemented as appropriate)  OT evaluation completed today. Goals & care plan established.    Don Jerome, OT  7/5/2019

## 2019-07-05 NOTE — PLAN OF CARE
Pt's daughter reports that pt's first choice for SNF is [facility] however she is also interested in [facility] in [place].      07/05/19 7844   Discharge Reassessment   Assessment Type Discharge Planning Reassessment   Discharge Plan A Skilled Nursing Facility

## 2019-07-05 NOTE — PLAN OF CARE
Problem: Adult Inpatient Plan of Care  Goal: Plan of Care Review  Outcome: Ongoing (interventions implemented as appropriate)  POC discussed with patient, verbalized understanding. Family at bedside. Patient with uneventful night, slept well between care. Patient refusing to be repositioned with pillows. Able to turn from side to side with assistance, bedpan used X 1 to void. Surgical dressing left hip dry and intact, some swelling noted. HL noted. SCD/Plexi in use. NV wnl. Call light at bedside. Medicated every 4 hours for complaints of surgical pain.

## 2019-07-05 NOTE — PLAN OF CARE
Met with pt and her daughter to discuss option of SNF placement.  Pt states that she is home alone when her  works.  Provided pt and daughter with a Senior Resource Guide with list of nursing facilities in it.  Daughter states that they would be interested in [place] and will make some calls and give me two other facilities as well.  Pt signed the disclosure form.      07/05/19 3278   Discharge Reassessment   Discharge Plan A Skilled Nursing Facility

## 2019-07-05 NOTE — PLAN OF CARE
Problem: Physical Therapy Goal  Goal: Physical Therapy Goal  Outcome: Ongoing (interventions implemented as appropriate)  Patient participated in therapeutic exercise and activities to improve functional mobility, improve ADL's and promote safe ambulation to decrease fall risk.

## 2019-07-05 NOTE — PROGRESS NOTES
[facility] Hospital Medicine  Progress Note    Patient Name: Geraldine Cooksey  MRN: [id number]  Patient Class: IP- Inpatient   Admission Date: 7/2/2019  Length of Stay: 3 days  Attending Physician: Margarette Almendarez MD  Primary Care Provider: Primary Doctor No        Subjective:     Principal Problem:Closed fracture of left hip      HPI:  Patient is a 70-year-old female with a history of COPD, tobacco abuse who currently smokes 1 pack per day who presents to the ED after mechanical fall.  She was in her usual state of health which is independent in all ADLs and was unloading groceries today when a Tajik Doty ran into the room and knocked her over.  She did not experience any loss of consciousness.  Her only complaint prior to the fall was pain in her left hip.  At this time patient denies any fever, chills, nausea, vomiting, abdominal pain, urinary symptoms, bowel complaints.    In the ED, workup was significant for elevated blood pressure as well as hip x-ray showing a nondisplaced intratrochanteric fracture of the left femur.  ED physician discussed with Orthopedics Dr. Gunn who will perform surgery tomorrow.    Overview/Hospital Course:  No notes on file    Interval History:  Patient with limited mobility post surgery.  Very scared to move secondary to pain. PT recommending discharge to a rehab facility.  Discussed possibility skilled nursing facility placement with patient and family and they are agreeable.  Case management working on SNF placement    Review of Systems   Constitutional: Negative for chills and fever.   Respiratory: Negative for cough and shortness of breath.    Gastrointestinal: Negative for abdominal pain and nausea.   Neurological: Negative for dizziness.   Psychiatric/Behavioral: Negative for agitation and confusion.     Objective:     Vital Signs (Most Recent):  Temp: 98.6 °F (37 °C) (07/05/19 1201)  Pulse: 65 (07/05/19 1201)  Resp: 18 (07/05/19 1201)  BP: (!)  111/55 (07/05/19 1201)  SpO2: (!) 91 % (07/05/19 1201) Vital Signs (24h Range):  Temp:  [96.9 °F (36.1 °C)-98.6 °F (37 °C)] 98.6 °F (37 °C)  Pulse:  [60-71] 65  Resp:  [16-18] 18  SpO2:  [91 %-100 %] 91 %  BP: (109-124)/(53-58) 111/55     Weight: 65.8 kg (145 lb)  Body mass index is 23.4 kg/m².    Intake/Output Summary (Last 24 hours) at 7/5/2019 1240  Last data filed at 7/4/2019 1800  Gross per 24 hour   Intake 340 ml   Output 1625 ml   Net -1285 ml      Physical Exam   Constitutional: She is oriented to person, place, and time. She appears well-developed and well-nourished. No distress.   HENT:   Head: Normocephalic and atraumatic.   Cardiovascular: Normal rate, regular rhythm and intact distal pulses.   No murmur heard.  Pulmonary/Chest: Effort normal and breath sounds normal. No respiratory distress. She has no wheezes. She has no rales.   Abdominal: Soft. Bowel sounds are normal. She exhibits no distension. There is no tenderness.   Musculoskeletal: She exhibits tenderness (Left hip). She exhibits no edema.   Left hip bandage clean/dry/intact   Neurological: She is alert and oriented to person, place, and time. No cranial nerve deficit.   Skin: Skin is warm and dry. No rash noted.   Psychiatric: She has a normal mood and affect. Her behavior is normal.       Significant Labs: All pertinent labs within the past 24 hours have been reviewed.    Significant Imaging: I have reviewed and interpreted all pertinent imaging results/findings within the past 24 hours.      Assessment/Plan:      * Closed fracture of left hip  Orthopedics has been consulted.  Dr. Gunn performed ORIF left hip 7/2.  Pain control.  PT and OT.  Partial weight-bearing (50%) left lower extremity.  SNF consult placed.      Tobacco abuse  Assistance with smoking cessation was offered, including:  [x]  Medications  [x]  Counseling    Patient was counseled regarding smoking for >10 minutes.  Does not want nicotine patch at this  time.          Elevated blood pressure reading  Resolved.  Was likely secondary to pain.      COPD (chronic obstructive pulmonary disease)  Patient's COPD is controlled currently. Continue scheduled inhalers, Supplemental oxygen as needed and monitor respiratory status closely.           VTE Risk Mitigation (From admission, onward)    None       On aspirin 325 mg b.i.d. per Ortho          Margarette Almendarez MD  Department of Hospital Medicine   [facility]

## 2019-07-05 NOTE — PLAN OF CARE
Faxed pt's SNF referral through [facility] to [facility].       07/05/19 1143   Post-Acute Status   Post-Acute Authorization Placement   Post-Acute Placement Status Referrals Sent

## 2019-07-05 NOTE — PT/OT/SLP PROGRESS
Physical Therapy Treatment    Patient Name:  Geraldine Cooksey   MRN:  [id number]    Recommendations:     Discharge Recommendations:  rehabilitation facility   Discharge Equipment Recommendations:     Barriers to discharge: None    Assessment:     Geraldine Cooksey is a 78 y.o. female admitted with a medical diagnosis of Closed fracture of left hip.  She presents with the following impairments/functional limitations:  weakness, impaired endurance, gait instability, impaired functional mobilty, impaired self care skills, impaired balance, decreased lower extremity function, decreased ROM, decreased safety awareness, pain, orthopedic precautions. Patient having trouble ambulating due to weak UE and generalized weakness.    Rehab Prognosis: Fair; patient would benefit from acute skilled PT services to address these deficits and reach maximum level of function.    Recent Surgery: Procedure(s) (LRB):  ORIF, HIP, USING DYNAMIC HIP SCREW (Left) 2 Days Post-Op    Plan:     During this hospitalization, patient to be seen BID to address the identified rehab impairments via gait training, therapeutic activities, therapeutic exercises and progress toward the following goals:    · Plan of Care Expires:  07/29/19    Subjective     Chief Complaint: would like to be able to walk farther  Patient/Family Comments/goals: to get stronger so I can go home  Pain/Comfort:  · Pain Rating 1: 0/10  · Pain Addressed 1: Pre-medicate for activity  · Pain Rating Post-Intervention 1: 0/10      Objective:     Communicated with MIKE De Luna prior to session.  Patient found up in chair with peripheral IV upon PT entry to room.     General Precautions: Standard, fall   Orthopedic Precautions:LLE partial weight bearing   Braces: N/A     Functional Mobility:  · Transfers:     · Sit to Stand:  moderate assistance and of 2 persons with rolling walker  · Gait: 15' PWB LLE      AM-PAC 6 CLICK MOBILITY          Therapeutic Activities and Exercises:  Sitting  exercises as follows to strengthen BLE for safe and functional mobility: 1 x 10 ea., HS, marching, LAQ, GS, QS, HS sets, partial sit to stand from chair, AP.    Patient left up in chair with all lines intact, call button in reach and RN Calixto notified..    GOALS:   Multidisciplinary Problems     Physical Therapy Goals        Problem: Physical Therapy Goal    Goal Priority Disciplines Outcome Goal Variances Interventions   Physical Therapy Goal     PT, PT/OT Ongoing (interventions implemented as appropriate)                     Time Tracking:     PT Received On: 07/05/19  PT Start Time: 1104     PT Stop Time: 1124  PT Total Time (min): 20 min     Billable Minutes: Therapeutic Exercise 20       PT/PTA: PTA     PTA Visit Number: 1     Valencia Chand, BLAIRE  07/05/2019

## 2019-07-06 LAB
ANION GAP SERPL CALC-SCNC: 8 MMOL/L (ref 8–16)
BASOPHILS # BLD AUTO: 0.08 K/UL (ref 0–0.2)
BASOPHILS NFR BLD: 0.9 % (ref 0–1.9)
BUN SERPL-MCNC: 12 MG/DL (ref 8–23)
CALCIUM SERPL-MCNC: 8.5 MG/DL (ref 8.7–10.5)
CHLORIDE SERPL-SCNC: 106 MMOL/L (ref 95–110)
CO2 SERPL-SCNC: 27 MMOL/L (ref 23–29)
CREAT SERPL-MCNC: 0.5 MG/DL (ref 0.5–1.4)
DIFFERENTIAL METHOD: ABNORMAL
EOSINOPHIL # BLD AUTO: 0.1 K/UL (ref 0–0.5)
EOSINOPHIL NFR BLD: 1.5 % (ref 0–8)
ERYTHROCYTE [DISTWIDTH] IN BLOOD BY AUTOMATED COUNT: 13.7 % (ref 11.5–14.5)
EST. GFR  (AFRICAN AMERICAN): >60 ML/MIN/1.73 M^2
EST. GFR  (NON AFRICAN AMERICAN): >60 ML/MIN/1.73 M^2
GLUCOSE SERPL-MCNC: 87 MG/DL (ref 70–110)
HCT VFR BLD AUTO: 29 % (ref 37–48.5)
HGB BLD-MCNC: 9 G/DL (ref 12–16)
IMM GRANULOCYTES # BLD AUTO: 0.03 K/UL (ref 0–0.04)
LYMPHOCYTES # BLD AUTO: 2.2 K/UL (ref 1–4.8)
LYMPHOCYTES NFR BLD: 23.8 % (ref 18–48)
MCH RBC QN AUTO: 29.3 PG (ref 27–31)
MCHC RBC AUTO-ENTMCNC: 31 G/DL (ref 32–36)
MCV RBC AUTO: 95 FL (ref 82–98)
MONOCYTES # BLD AUTO: 1.6 K/UL (ref 0.3–1)
MONOCYTES NFR BLD: 17.6 % (ref 4–15)
NEUTROPHILS # BLD AUTO: 5.1 K/UL (ref 1.8–7.7)
NEUTROPHILS NFR BLD: 55.9 % (ref 38–73)
NRBC BLD-RTO: 0 /100 WBC
PLATELET # BLD AUTO: 205 K/UL (ref 150–350)
PMV BLD AUTO: 10.7 FL (ref 9.2–12.9)
POTASSIUM SERPL-SCNC: 3.6 MMOL/L (ref 3.5–5.1)
RBC # BLD AUTO: 3.07 M/UL (ref 4–5.4)
SODIUM SERPL-SCNC: 141 MMOL/L (ref 136–145)
WBC # BLD AUTO: 9.04 K/UL (ref 3.9–12.7)

## 2019-07-06 PROCEDURE — 97530 THERAPEUTIC ACTIVITIES: CPT

## 2019-07-06 PROCEDURE — 97116 GAIT TRAINING THERAPY: CPT

## 2019-07-06 PROCEDURE — 25000003 PHARM REV CODE 250: Performed by: ORTHOPAEDIC SURGERY

## 2019-07-06 PROCEDURE — 94640 AIRWAY INHALATION TREATMENT: CPT

## 2019-07-06 PROCEDURE — 25000003 PHARM REV CODE 250: Performed by: PHYSICIAN ASSISTANT

## 2019-07-06 PROCEDURE — 12000002 HC ACUTE/MED SURGE SEMI-PRIVATE ROOM

## 2019-07-06 PROCEDURE — 36415 COLL VENOUS BLD VENIPUNCTURE: CPT

## 2019-07-06 PROCEDURE — 85025 COMPLETE CBC W/AUTO DIFF WBC: CPT

## 2019-07-06 PROCEDURE — 80048 BASIC METABOLIC PNL TOTAL CA: CPT

## 2019-07-06 PROCEDURE — 94761 N-INVAS EAR/PLS OXIMETRY MLT: CPT

## 2019-07-06 PROCEDURE — 25000003 PHARM REV CODE 250: Performed by: HOSPITALIST

## 2019-07-06 RX ORDER — OXYCODONE HYDROCHLORIDE 5 MG/1
5 TABLET ORAL EVERY 6 HOURS PRN
Status: DISCONTINUED | OUTPATIENT
Start: 2019-07-06 | End: 2019-07-06

## 2019-07-06 RX ORDER — ONDANSETRON 4 MG/1
8 TABLET, ORALLY DISINTEGRATING ORAL EVERY 6 HOURS PRN
Status: DISCONTINUED | OUTPATIENT
Start: 2019-07-06 | End: 2019-07-08 | Stop reason: HOSPADM

## 2019-07-06 RX ORDER — OXYCODONE HYDROCHLORIDE 5 MG/1
5 TABLET ORAL EVERY 8 HOURS PRN
Status: DISCONTINUED | OUTPATIENT
Start: 2019-07-06 | End: 2019-07-08 | Stop reason: HOSPADM

## 2019-07-06 RX ORDER — ACETAMINOPHEN 500 MG
1000 TABLET ORAL EVERY 6 HOURS PRN
Status: DISCONTINUED | OUTPATIENT
Start: 2019-07-06 | End: 2019-07-08 | Stop reason: HOSPADM

## 2019-07-06 RX ORDER — SYRING-NEEDL,DISP,INSUL,0.3 ML 29 G X1/2"
296 SYRINGE, EMPTY DISPOSABLE MISCELLANEOUS ONCE
Status: COMPLETED | OUTPATIENT
Start: 2019-07-06 | End: 2019-07-06

## 2019-07-06 RX ORDER — IBUPROFEN 600 MG/1
600 TABLET ORAL 4 TIMES DAILY
Status: DISCONTINUED | OUTPATIENT
Start: 2019-07-06 | End: 2019-07-08 | Stop reason: HOSPADM

## 2019-07-06 RX ADMIN — OXYCODONE HYDROCHLORIDE 5 MG: 5 TABLET ORAL at 10:07

## 2019-07-06 RX ADMIN — METHOCARBAMOL 500 MG: 500 TABLET, FILM COATED ORAL at 04:07

## 2019-07-06 RX ADMIN — OXYCODONE HYDROCHLORIDE 10 MG: 10 TABLET ORAL at 10:07

## 2019-07-06 RX ADMIN — OXYCODONE HYDROCHLORIDE 10 MG: 10 TABLET ORAL at 05:07

## 2019-07-06 RX ADMIN — METHOCARBAMOL 500 MG: 500 TABLET, FILM COATED ORAL at 09:07

## 2019-07-06 RX ADMIN — MAGNESIUM CITRATE 296 ML: 1.75 LIQUID ORAL at 10:07

## 2019-07-06 RX ADMIN — FAMOTIDINE 20 MG: 20 TABLET, FILM COATED ORAL at 09:07

## 2019-07-06 RX ADMIN — ASPIRIN 325 MG ORAL TABLET 325 MG: 325 PILL ORAL at 09:07

## 2019-07-06 RX ADMIN — IBUPROFEN 600 MG: 600 TABLET ORAL at 04:07

## 2019-07-06 RX ADMIN — TIOTROPIUM BROMIDE 18 MCG: 18 CAPSULE ORAL; RESPIRATORY (INHALATION) at 07:07

## 2019-07-06 RX ADMIN — ACETAMINOPHEN 1000 MG: 500 TABLET ORAL at 07:07

## 2019-07-06 RX ADMIN — POLYETHYLENE GLYCOL 3350 17 G: 17 POWDER, FOR SOLUTION ORAL at 09:07

## 2019-07-06 RX ADMIN — OXYCODONE HYDROCHLORIDE 10 MG: 10 TABLET ORAL at 01:07

## 2019-07-06 RX ADMIN — IBUPROFEN 600 MG: 600 TABLET ORAL at 09:07

## 2019-07-06 RX ADMIN — BISACODYL 10 MG: 10 SUPPOSITORY RECTAL at 09:07

## 2019-07-06 RX ADMIN — METHOCARBAMOL 500 MG: 500 TABLET, FILM COATED ORAL at 01:07

## 2019-07-06 NOTE — CARE UPDATE
07/06/19 0737   Patient Assessment/Suction   Level of Consciousness (AVPU) alert   PRE-TX-O2   O2 Device (Oxygen Therapy) room air   SpO2 (!) 93 %   Pulse Oximetry Type Intermittent   $ Pulse Oximetry - Multiple Charge Pulse Oximetry - Multiple   Pulse 75   Resp 16   Inhaler   $ Inhaler Charges MDI (Metered Dose Inahler) Treatment   Respiratory Treatment Status (Inhaler) given   Treatment Route (Inhaler) mouthpiece   Patient Position (Inhaler) HOB elevated   Signs of Intolerance (Inhaler) none

## 2019-07-06 NOTE — ASSESSMENT & PLAN NOTE
DME for home  May DC home with HH once she is able to get OOB with Min assist and ambulate 40ft  Tx for nausea and constipation  Monitor MS  Consider SNF referral if mobility does not improve

## 2019-07-06 NOTE — PROGRESS NOTES
"[facility]  Orthopedics  Progress Note    Patient Name: Geraldine Cooksey  MRN: [id number]  Admission Date: 7/2/2019  Hospital Length of Stay: 4 days  Attending Provider: Bimal Mcneil MD  Primary Care Provider: Primary Doctor No  Follow-up For: Procedure(s) (LRB):  ORIF, HIP, USING DYNAMIC HIP SCREW (Left)    Post-Operative Day: 3 Days Post-Op  Subjective:     Principal Problem:Closed fracture of left hip    Principal Orthopedic Problem: S/P L hip ORIF    Interval History: nausea, constipation, limited mobility, (?) confusion    Review of patient's allergies indicates:   Allergen Reactions    Codeine Nausea Only     Happened 50 years ago        Current Facility-Administered Medications   Medication    aspirin tablet 325 mg    bisacodyl suppository 10 mg    famotidine tablet 20 mg    methocarbamol tablet 500 mg    morphine injection 2 mg    ondansetron disintegrating tablet 8 mg    oxyCODONE immediate release tablet 5 mg    oxyCODONE immediate release tablet Tab 10 mg    polyethylene glycol packet 17 g    sodium chloride 0.9% flush 5 mL    tiotropium inhalation capsule 18 mcg    zolpidem tablet 5 mg     Objective:     Vital Signs (Most Recent):  Temp: 98.3 °F (36.8 °C) (07/06/19 0739)  Pulse: 66 (07/06/19 0739)  Resp: 16 (07/06/19 0739)  BP: 136/61 (07/06/19 0739)  SpO2: (!) 93 % (07/06/19 0739) Vital Signs (24h Range):  Temp:  [97.4 °F (36.3 °C)-98.7 °F (37.1 °C)] 98.3 °F (36.8 °C)  Pulse:  [65-78] 66  Resp:  [16-18] 16  SpO2:  [91 %-97 %] 93 %  BP: (111-141)/(55-67) 136/61     Weight: 65.8 kg (145 lb)  Height: 5' 6" (167.6 cm)  Body mass index is 23.4 kg/m².      Intake/Output Summary (Last 24 hours) at 7/6/2019 0858  Last data filed at 7/5/2019 1900  Gross per 24 hour   Intake 870 ml   Output --   Net 870 ml       General    Nursing note and vitals reviewed.  Constitutional: She is oriented to person, place, and time. She appears well-developed and well-nourished.   Neurological: " She is alert and oriented to person, place, and time.   Despite the fact that her family member reported that she was confused this am, in my limited interaction with her she seemed A&Ox3. Sitting on the commode with PT c/o nausea and constipation.   Psychiatric: She has a normal mood and affect. Her behavior is normal.         Left Hip Exam     Comments:  LLE DNVI. Dressing C/D/I            Significant Labs:   CBC:   Recent Labs   Lab 07/05/19  0822 07/06/19  0600   WBC 11.09 9.04   HGB 10.2* 9.0*   HCT 32.4* 29.0*    205     CMP:   Recent Labs   Lab 07/05/19  0822 07/06/19  0600    141   K 5.1 3.6    106   CO2 24 27   * 87   BUN 12 12   CREATININE 0.7 0.5   CALCIUM 8.8 8.5*   ANIONGAP 6* 8   EGFRNONAA >60 >60     All pertinent labs within the past 24 hours have been reviewed.    Significant Imaging: None    Assessment/Plan:     * Closed fracture of left hip  DME for home  May DC home with HH once she is able to get OOB with Min assist and ambulate 40ft  Tx for nausea and constipation  Monitor STEVE COBOS  Orthopedics  [facility]

## 2019-07-06 NOTE — SUBJECTIVE & OBJECTIVE
"Principal Problem:Closed fracture of left hip    Principal Orthopedic Problem: S/P L hip ORIF    Interval History: nausea, constipation, limited mobility, (?) confusion    Review of patient's allergies indicates:   Allergen Reactions    Codeine Nausea Only     Happened 50 years ago        Current Facility-Administered Medications   Medication    aspirin tablet 325 mg    bisacodyl suppository 10 mg    famotidine tablet 20 mg    methocarbamol tablet 500 mg    morphine injection 2 mg    ondansetron disintegrating tablet 8 mg    oxyCODONE immediate release tablet 5 mg    oxyCODONE immediate release tablet Tab 10 mg    polyethylene glycol packet 17 g    sodium chloride 0.9% flush 5 mL    tiotropium inhalation capsule 18 mcg    zolpidem tablet 5 mg     Objective:     Vital Signs (Most Recent):  Temp: 98.3 °F (36.8 °C) (07/06/19 0739)  Pulse: 66 (07/06/19 0739)  Resp: 16 (07/06/19 0739)  BP: 136/61 (07/06/19 0739)  SpO2: (!) 93 % (07/06/19 0739) Vital Signs (24h Range):  Temp:  [97.4 °F (36.3 °C)-98.7 °F (37.1 °C)] 98.3 °F (36.8 °C)  Pulse:  [65-78] 66  Resp:  [16-18] 16  SpO2:  [91 %-97 %] 93 %  BP: (111-141)/(55-67) 136/61     Weight: 65.8 kg (145 lb)  Height: 5' 6" (167.6 cm)  Body mass index is 23.4 kg/m².      Intake/Output Summary (Last 24 hours) at 7/6/2019 0858  Last data filed at 7/5/2019 1900  Gross per 24 hour   Intake 870 ml   Output --   Net 870 ml       General    Nursing note and vitals reviewed.  Constitutional: She is oriented to person, place, and time. She appears well-developed and well-nourished.   Neurological: She is alert and oriented to person, place, and time.   Despite the fact that her family member reported that she was confused this am, in my limited interaction with her she seemed A&Ox3. Sitting on the commode with PT c/o nausea and constipation.   Psychiatric: She has a normal mood and affect. Her behavior is normal.         Left Hip Exam     Comments:  LLE DNVI. Dressing " C/D/I            Significant Labs:   CBC:   Recent Labs   Lab 07/05/19  0822 07/06/19  0600   WBC 11.09 9.04   HGB 10.2* 9.0*   HCT 32.4* 29.0*    205     CMP:   Recent Labs   Lab 07/05/19  0822 07/06/19  0600    141   K 5.1 3.6    106   CO2 24 27   * 87   BUN 12 12   CREATININE 0.7 0.5   CALCIUM 8.8 8.5*   ANIONGAP 6* 8   EGFRNONAA >60 >60     All pertinent labs within the past 24 hours have been reviewed.    Significant Imaging: None

## 2019-07-06 NOTE — PT/OT/SLP PROGRESS
Physical Therapy Treatment    Patient Name:  Geraldine Cooksey   MRN:  [id number]    Recommendations:     Discharge Recommendations:  rehabilitation facility   Discharge Equipment Recommendations:     Barriers to discharge: None    Assessment:     Geraldine Cooksey is a 78 y.o. female admitted with a medical diagnosis of Closed fracture of left hip.  She presents with the following impairments/functional limitations:  weakness, impaired endurance, impaired self care skills, impaired functional mobilty, gait instability, impaired balance, decreased lower extremity function, pain, decreased ROM, orthopedic precautions, impaired joint extensibility. MD removed NWB status and patient has FWB status now. She had a slow start with additional pain but gait was more stable and she had more tolerance with FWB.    Rehab Prognosis: Good; patient would benefit from acute skilled PT services to address these deficits and reach maximum level of function.    Recent Surgery: Procedure(s) (LRB):  ORIF, HIP, USING DYNAMIC HIP SCREW (Left) 3 Days Post-Op    Plan:     During this hospitalization, patient to be seen BID to address the identified rehab impairments via gait training, therapeutic activities, therapeutic exercises and progress toward the following goals:    · Plan of Care Expires:  07/29/19    Subjective     Chief Complaint: pain  Patient/Family Comments/goals: to get back to normal  Pain/Comfort:  · Pain Rating 1: 8/10  · Location - Side 1: Left  · Location 1: hip  · Pain Addressed 1: Pre-medicate for activity, Reposition, Distraction(MD present (orthorpedists))  · Pain Rating Post-Intervention 1: 5/10      Objective:     Communicated with MIKE José prior to session and MD during session.  Patient found HOB elevated with peripheral IV upon PT entry to room. MD evaluated pt's left knee that has been swollen and pt had been c/o pain the last several treatments. He stated he will inject it tomorrow and it should make it easier  for her to ambulate will new WB status.    General Precautions: Standard, fall   Orthopedic Precautions:(Patient now has FWB status LLE)   Braces: N/A     Functional Mobility:  · Bed Mobility:     · Rolling Right: moderate assistance  · Scooting: moderate assistance  · Supine to Sit: moderate assistance  · Transfers:     · Sit to Stand:  moderate assistance, maximal assistance and of 2 persons with rolling walker  · Bed to Chair: moderate assistance, maximal assistance and of 2 persons with  rolling walker  using  Stand Pivot  · Toilet Transfer: moderate assistance, maximal assistance and of 2 persons with  rolling walker and grab bars  using  Stand Pivot  · Gait: 12' x 2 with RW and max-mod A for NWB on first 12' and mod A for FWB for 2nd 12'. Patient has forward flexed posture and c/o pain and inability to lift LLE up off floor for gt with RW in NWB phase but was able to slide and have minimal lift with FWB status although she still c/o pain in left hip and knee.      AM-PAC 6 CLICK MOBILITY          Therapeutic Activities and Exercises:  Patient sat on EOB for spinal orientation and proprioception before gt.  AP, LAQs x 10. Patient put up in chair after gt and toilet transfer and positioned for comfort with pillows.    Patient left up in chair with all lines intact, call button in reach and RN Joann notified..    GOALS:   Multidisciplinary Problems     Physical Therapy Goals        Problem: Physical Therapy Goal    Goal Priority Disciplines Outcome Goal Variances Interventions   Physical Therapy Goal     PT, PT/OT Ongoing (interventions implemented as appropriate)                     Time Tracking:     PT Received On: 07/06/19  PT Start Time: 0835     PT Stop Time: 0910  PT Total Time (min): 35 min     Billable Minutes: Gait Training 15 and Therapeutic Activity 20    Treatment Type: Treatment  PT/PTA: PTA     PTA Visit Number: 2     Valencia Chand, PTA  07/06/2019

## 2019-07-06 NOTE — PLAN OF CARE
Problem: Physical Therapy Goal  Goal: Physical Therapy Goal  Outcome: Ongoing (interventions implemented as appropriate)  Patient participated in gait training and activities to improve functional mobility, improve ADL's and promote safe ambulation to decrease fall risk.

## 2019-07-07 LAB — TB INDURATION 48 - 72 HR READ: 0 MM

## 2019-07-07 PROCEDURE — 25000003 PHARM REV CODE 250: Performed by: HOSPITALIST

## 2019-07-07 PROCEDURE — 97116 GAIT TRAINING THERAPY: CPT

## 2019-07-07 PROCEDURE — 25000003 PHARM REV CODE 250: Performed by: ORTHOPAEDIC SURGERY

## 2019-07-07 PROCEDURE — 12000002 HC ACUTE/MED SURGE SEMI-PRIVATE ROOM

## 2019-07-07 PROCEDURE — 94640 AIRWAY INHALATION TREATMENT: CPT

## 2019-07-07 PROCEDURE — 25000003 PHARM REV CODE 250: Performed by: PHYSICIAN ASSISTANT

## 2019-07-07 PROCEDURE — 63600175 PHARM REV CODE 636 W HCPCS: Performed by: PHYSICIAN ASSISTANT

## 2019-07-07 RX ORDER — LIDOCAINE HYDROCHLORIDE 10 MG/ML
1 INJECTION, SOLUTION EPIDURAL; INFILTRATION; INTRACAUDAL; PERINEURAL ONCE
Status: COMPLETED | OUTPATIENT
Start: 2019-07-07 | End: 2019-07-07

## 2019-07-07 RX ORDER — METHYLPREDNISOLONE ACETATE 80 MG/ML
80 INJECTION, SUSPENSION INTRA-ARTICULAR; INTRALESIONAL; INTRAMUSCULAR; SOFT TISSUE ONCE
Status: COMPLETED | OUTPATIENT
Start: 2019-07-07 | End: 2019-07-07

## 2019-07-07 RX ADMIN — TIOTROPIUM BROMIDE 18 MCG: 18 CAPSULE ORAL; RESPIRATORY (INHALATION) at 09:07

## 2019-07-07 RX ADMIN — FAMOTIDINE 20 MG: 20 TABLET, FILM COATED ORAL at 08:07

## 2019-07-07 RX ADMIN — IBUPROFEN 600 MG: 600 TABLET ORAL at 10:07

## 2019-07-07 RX ADMIN — LIDOCAINE HYDROCHLORIDE 10 MG: 10 INJECTION, SOLUTION EPIDURAL; INFILTRATION; INTRACAUDAL; PERINEURAL at 09:07

## 2019-07-07 RX ADMIN — IBUPROFEN 600 MG: 600 TABLET ORAL at 08:07

## 2019-07-07 RX ADMIN — METHYLPREDNISOLONE ACETATE 80 MG: 80 INJECTION, SUSPENSION INTRA-ARTICULAR; INTRALESIONAL; INTRAMUSCULAR; SOFT TISSUE at 09:07

## 2019-07-07 RX ADMIN — METHOCARBAMOL 500 MG: 500 TABLET, FILM COATED ORAL at 08:07

## 2019-07-07 RX ADMIN — ASPIRIN 325 MG ORAL TABLET 325 MG: 325 PILL ORAL at 08:07

## 2019-07-07 RX ADMIN — ONDANSETRON 8 MG: 4 TABLET, ORALLY DISINTEGRATING ORAL at 01:07

## 2019-07-07 RX ADMIN — ASPIRIN 325 MG ORAL TABLET 325 MG: 325 PILL ORAL at 10:07

## 2019-07-07 RX ADMIN — FAMOTIDINE 20 MG: 20 TABLET, FILM COATED ORAL at 10:07

## 2019-07-07 RX ADMIN — METHOCARBAMOL 500 MG: 500 TABLET, FILM COATED ORAL at 10:07

## 2019-07-07 NOTE — ASSESSMENT & PLAN NOTE
Orthopedics has been consulted.  Dr. Gunn performed ORIF left hip 7/2.  Pain control.  PT and OT.  Partial weight-bearing (50%) left lower extremity.  SNF consult placed.   will attempt to control pain with nonnarcotic modalities if possible.

## 2019-07-07 NOTE — ASSESSMENT & PLAN NOTE
Awaiting SNF transfer  Today she was given L knee IA injection 80mg methylprednisolone and 2cc 1% lidocaine (sterile technique).  L knee Xrays  Stable from an ortho perspective

## 2019-07-07 NOTE — SUBJECTIVE & OBJECTIVE
Interval History:  Patient seen and examined.  No acute events overnight.  Pain controlled on oral narcotics.    Review of Systems   Constitutional: Negative for fatigue.   Respiratory: Negative for cough and shortness of breath.    Cardiovascular: Negative for chest pain and leg swelling.   Gastrointestinal: Negative for abdominal pain and nausea.   Musculoskeletal: Positive for gait problem and myalgias.   Neurological: Positive for weakness.   Psychiatric/Behavioral: Negative for confusion.   All other systems reviewed and are negative.    Objective:     Vital Signs (Most Recent):  Temp: 98.8 °F (37.1 °C) (07/07/19 1514)  Pulse: 64 (07/07/19 1514)  Resp: 18 (07/07/19 1514)  BP: (!) 162/69 (07/07/19 1514)  SpO2: 100 % (07/07/19 1514) Vital Signs (24h Range):  Temp:  [97.5 °F (36.4 °C)-98.8 °F (37.1 °C)] 98.8 °F (37.1 °C)  Pulse:  [63-77] 64  Resp:  [16-20] 18  SpO2:  [94 %-100 %] 100 %  BP: (135-162)/(61-74) 162/69     Weight: 65.8 kg (145 lb)  Body mass index is 23.4 kg/m².    Intake/Output Summary (Last 24 hours) at 7/7/2019 1833  Last data filed at 7/7/2019 0600  Gross per 24 hour   Intake 240 ml   Output --   Net 240 ml      Physical Exam   Constitutional: She is oriented to person, place, and time. She appears well-developed and well-nourished.   Eyes: Pupils are equal, round, and reactive to light. EOM are normal.   Cardiovascular: Normal rate, regular rhythm and intact distal pulses.   No murmur heard.  Pulmonary/Chest: Effort normal and breath sounds normal.   Abdominal: Soft. She exhibits no distension. There is no tenderness.   Musculoskeletal: She exhibits edema.   Surgical site appears clean dry and intact   Neurological: She is alert and oriented to person, place, and time.   Skin: No rash noted. No pallor.   Nursing note and vitals reviewed.      Significant Labs: All pertinent labs within the past 24 hours have been reviewed.    Significant Imaging: I have reviewed all pertinent imaging  results/findings within the past 24 hours.

## 2019-07-07 NOTE — PROGRESS NOTES
[facility] Hospital Medicine  Progress Note    Patient Name: Geraldine Cooksey  MRN: [id number]  Patient Class: IP- Inpatient   Admission Date: 7/2/2019  Length of Stay: 5 days  Attending Physician: Bimal Mcneil MD  Primary Care Provider: Primary Doctor No        Subjective:     Principal Problem:Closed fracture of left hip      HPI:  Patient is a 70-year-old female with a history of COPD, tobacco abuse who currently smokes 1 pack per day who presents to the ED after mechanical fall.  She was in her usual state of health which is independent in all ADLs and was unloading groceries today when a [place] Doty ran into the room and knocked her over.  She did not experience any loss of consciousness.  Her only complaint prior to the fall was pain in her left hip.  At this time patient denies any fever, chills, nausea, vomiting, abdominal pain, urinary symptoms, bowel complaints.    In the ED, workup was significant for elevated blood pressure as well as hip x-ray showing a nondisplaced intratrochanteric fracture of the left femur.  ED physician discussed with Orthopedics Dr. Gunn who will perform surgery tomorrow.    Overview/Hospital Course:  Patient was admitted to the hospital medicine service for left hip fracture after mechanical fall.  Orthopedics was consulted.  Dr. Gunn performed ORIF left hip on 07/03.  Placed on aspirin 325 mg b.i.d..  PT and OT evaluated patient and recommended placement in a rehab facility..  SNF consult placed.  Patient initially had elevated blood pressures but this resolved with pain control.    Interval History:  Patient seen and examined.  No acute events overnight.  Pain controlled on oral narcotics.    Review of Systems   Constitutional: Negative for fatigue.   Respiratory: Negative for cough and shortness of breath.    Cardiovascular: Negative for chest pain and leg swelling.   Gastrointestinal: Negative for abdominal pain and nausea.   Musculoskeletal:  Positive for gait problem and myalgias.   Neurological: Positive for weakness.   Psychiatric/Behavioral: Negative for confusion.   All other systems reviewed and are negative.    Objective:     Vital Signs (Most Recent):  Temp: 98.8 °F (37.1 °C) (07/07/19 1514)  Pulse: 64 (07/07/19 1514)  Resp: 18 (07/07/19 1514)  BP: (!) 162/69 (07/07/19 1514)  SpO2: 100 % (07/07/19 1514) Vital Signs (24h Range):  Temp:  [97.5 °F (36.4 °C)-98.8 °F (37.1 °C)] 98.8 °F (37.1 °C)  Pulse:  [63-77] 64  Resp:  [16-20] 18  SpO2:  [94 %-100 %] 100 %  BP: (135-162)/(61-74) 162/69     Weight: 65.8 kg (145 lb)  Body mass index is 23.4 kg/m².    Intake/Output Summary (Last 24 hours) at 7/7/2019 1833  Last data filed at 7/7/2019 0600  Gross per 24 hour   Intake 240 ml   Output --   Net 240 ml      Physical Exam   Constitutional: She is oriented to person, place, and time. She appears well-developed and well-nourished.   Eyes: Pupils are equal, round, and reactive to light. EOM are normal.   Cardiovascular: Normal rate, regular rhythm and intact distal pulses.   No murmur heard.  Pulmonary/Chest: Effort normal and breath sounds normal.   Abdominal: Soft. She exhibits no distension. There is no tenderness.   Musculoskeletal: She exhibits edema.   Surgical site appears clean dry and intact   Neurological: She is alert and oriented to person, place, and time.   Skin: No rash noted. No pallor.   Nursing note and vitals reviewed.      Significant Labs: All pertinent labs within the past 24 hours have been reviewed.    Significant Imaging: I have reviewed all pertinent imaging results/findings within the past 24 hours.      Assessment/Plan:      * Closed fracture of left hip   Orthopedics has been consulted.  Dr. Gunn performed ORIF left hip 7/2.  Pain control.  PT and OT.  Partial weight-bearing (50%) left lower extremity.  SNF consult placed.   will attempt to control pain with nonnarcotic modalities if possible.      Primary osteoarthritis of left  knee  S/p injection of knee with Dr. Gunn 7/7. Doing better.      Tobacco abuse  Assistance with smoking cessation was offered, including:  [x]  Medications  [x]  Counseling    Patient was counseled regarding smoking for >10 minutes.  Does not want nicotine patch at this time.          Elevated blood pressure reading  Resolved.  Was likely secondary to pain. No need for acute treatment of this point in time.      COPD (chronic obstructive pulmonary disease)  Patient's COPD is controlled currently. Continue scheduled inhalers, Supplemental oxygen as needed and monitor respiratory status closely.           VTE Risk Mitigation (From admission, onward)    None        VTE- Aspirin 325 mg bid        Bimal Mcneil MD  Department of Hospital Medicine   [facility]

## 2019-07-07 NOTE — PLAN OF CARE
Problem: Physical Therapy Goal  Goal: Physical Therapy Goal  Outcome: Ongoing (interventions implemented as appropriate)  Bed mobility min a LLE.  Pt ambulated 5', sat, then 10' w/rw, cga.

## 2019-07-07 NOTE — PLAN OF CARE
07/06/19 2021   Patient Assessment/Suction   Level of Consciousness (AVPU) alert   PRE-TX-O2   O2 Device (Oxygen Therapy) room air   SpO2 96 %

## 2019-07-07 NOTE — SUBJECTIVE & OBJECTIVE
"Principal Problem:Closed fracture of left hip    Principal Orthopedic Problem: S/P L hip ORIF    Interval History: L knee pain    Review of patient's allergies indicates:   Allergen Reactions    Codeine Nausea Only     Happened 50 years ago        Current Facility-Administered Medications   Medication    acetaminophen tablet 1,000 mg    aspirin tablet 325 mg    bisacodyl suppository 10 mg    famotidine tablet 20 mg    ibuprofen tablet 600 mg    lidocaine (PF) 10 mg/ml (1%) injection 10 mg    methocarbamol tablet 500 mg    methylPREDNISolone acetate injection 80 mg    ondansetron disintegrating tablet 8 mg    oxyCODONE immediate release tablet 5 mg    polyethylene glycol packet 17 g    sodium chloride 0.9% flush 5 mL    tiotropium inhalation capsule 18 mcg    zolpidem tablet 5 mg     Objective:     Vital Signs (Most Recent):  Temp: 98.1 °F (36.7 °C) (07/07/19 0840)  Pulse: 77 (07/07/19 0840)  Resp: 20 (07/07/19 0840)  BP: (!) 149/67 (07/07/19 0840)  SpO2: 95 % (07/07/19 0840) Vital Signs (24h Range):  Temp:  [97.5 °F (36.4 °C)-98.5 °F (36.9 °C)] 98.1 °F (36.7 °C)  Pulse:  [63-79] 77  Resp:  [18-20] 20  SpO2:  [94 %-98 %] 95 %  BP: (133-162)/(61-70) 149/67     Weight: 65.8 kg (145 lb)  Height: 5' 6" (167.6 cm)  Body mass index is 23.4 kg/m².      Intake/Output Summary (Last 24 hours) at 7/7/2019 0929  Last data filed at 7/7/2019 0600  Gross per 24 hour   Intake 1080 ml   Output --   Net 1080 ml       General    Vitals reviewed.  Constitutional: She is oriented to person, place, and time. She appears well-developed and well-nourished.   Neurological: She is alert and oriented to person, place, and time.   Psychiatric: She has a normal mood and affect. Her behavior is normal.             Left Knee Exam     Comments:  Medial and Lateral JL TTP. Moderate effusion. FAROM and nml strength.Left Hip Exam     Comments:  Incision C/D/I. LLE DNVI.            Significant Labs:   CBC:   Recent Labs   Lab " 07/06/19  0600   WBC 9.04   HGB 9.0*   HCT 29.0*        CMP:   Recent Labs   Lab 07/06/19  0600      K 3.6      CO2 27   GLU 87   BUN 12   CREATININE 0.5   CALCIUM 8.5*   ANIONGAP 8   EGFRNONAA >60     All pertinent labs within the past 24 hours have been reviewed.    Significant Imaging: None

## 2019-07-07 NOTE — PT/OT/SLP PROGRESS
"Physical Therapy Treatment    Patient Name:  Geraldine Cooksey   MRN:  [id number]    Recommendations:     Discharge Recommendations:  rehabilitation facility     Assessment:     Geraldine Cooksey is a 78 y.o. female admitted with a medical diagnosis of Closed fracture of left hip.  She presents with the following impairments/functional limitations:  weakness, impaired self care skills, impaired balance, impaired joint extensibility, impaired endurance, impaired functional mobilty, pain, gait instability, orthopedic precautions .  Slow brynn with ambulation.  VC to increase knee extension with wb during ambulation.  Decreased assistance with bed mobility today.    Rehab Prognosis: Good; patient would benefit from acute skilled PT services to address these deficits and reach maximum level of function.    Recent Surgery: Procedure(s) (LRB):  ORIF, HIP, USING DYNAMIC HIP SCREW (Left) 4 Days Post-Op    Plan:     During this hospitalization, patient to be seen BID to address the identified rehab impairments via gait training, therapeutic activities, therapeutic exercises and progress toward the following goals:    · Plan of Care Expires:  07/29/19    Subjective     Chief Complaint:   Patient/Family Comments/goals: "I am about to get a cortizone injection in my knee"  Pain/Comfort:  · Pain Rating 1: (0/10 pain with supine, increased with ambulation)  · Location 1: (L hip)  · Pain Addressed 2: Nurse notified(nurse in room prior to completion of therapy, instructed pt to inform nurse of pain when she returned with meds)      Objective:     Communicated with nurse Min prior to session.  Patient found supine with   upon PT entry to room.     General Precautions: Standard, fall   Orthopedic Precautions:LLE weight bearing as tolerated   Braces:       Functional Mobility:  · Bed Mobility:     · Rolling Right: minimum assistance and w/LLE  · Transfers:     · Sit to Stand:  contact guard assistance with rolling walker  · Gait: " 5', sat to receive knee injection, than ambulated 10' w/rw, cga      AM-PAC 6 CLICK MOBILITY          Therapeutic Activities and Exercises:      Patient left up in chair with all lines intact, call button in reach and daughters present..    GOALS:   Multidisciplinary Problems     Physical Therapy Goals        Problem: Physical Therapy Goal    Goal Priority Disciplines Outcome Goal Variances Interventions   Physical Therapy Goal     PT, PT/OT Ongoing (interventions implemented as appropriate)                     Time Tracking:     PT Received On: 07/07/19  PT Start Time: 0932     PT Stop Time: 0955  PT Total Time (min): 23 min     Billable Minutes: Gait Training 19    Treatment Type: Treatment  PT/PTA: PTA     PTA Visit Number: 3     Karyn Craven, PTA  07/07/2019

## 2019-07-07 NOTE — PLAN OF CARE
Problem: Adult Inpatient Plan of Care  Goal: Plan of Care Review  Plan of care reviewed with pt, pt verbalized understanding. IV site clean, dry, intact, no redness or swelling noted. Dressing to left hip c/d/i. Neuro checks done this shift, foot pump and SCD in place. Pt ambulates with walker, 2 person assist, remains free of fall/trauma. Pain is controlled with current regimen. VSS, pt remained afebrile this shift. Safety maintained throughout shift, call light in reach, bed locked and in lowest position, side rails up x2. Will continue to monitor, observe and report any changes.

## 2019-07-07 NOTE — SUBJECTIVE & OBJECTIVE
Interval History:  Patient seen and examined.  No acute events overnight.  Pain controlled on oral narcotics.    Review of Systems   Constitutional: Negative for fatigue.   Respiratory: Negative for cough and shortness of breath.    Cardiovascular: Negative for chest pain and leg swelling.   Gastrointestinal: Negative for abdominal pain and nausea.   Musculoskeletal: Positive for gait problem and myalgias.   Neurological: Positive for weakness.   Psychiatric/Behavioral: Negative for confusion.   All other systems reviewed and are negative.    Objective:     Vital Signs (Most Recent):  Temp: 98.5 °F (36.9 °C) (07/06/19 1632)  Pulse: 79 (07/06/19 1632)  Resp: 20 (07/06/19 1632)  BP: (!) 151/65 (07/06/19 1632)  SpO2: 96 % (07/06/19 1632) Vital Signs (24h Range):  Temp:  [98 °F (36.7 °C)-98.7 °F (37.1 °C)] 98.5 °F (36.9 °C)  Pulse:  [63-79] 79  Resp:  [16-20] 20  SpO2:  [92 %-97 %] 96 %  BP: (119-151)/(59-67) 151/65     Weight: 65.8 kg (145 lb)  Body mass index is 23.4 kg/m².    Intake/Output Summary (Last 24 hours) at 7/6/2019 2031  Last data filed at 7/6/2019 1732  Gross per 24 hour   Intake 1200 ml   Output --   Net 1200 ml      Physical Exam   Constitutional: She is oriented to person, place, and time. She appears well-developed and well-nourished.   Eyes: Pupils are equal, round, and reactive to light. EOM are normal.   Cardiovascular: Normal rate, regular rhythm and intact distal pulses.   No murmur heard.  Pulmonary/Chest: Effort normal and breath sounds normal.   Abdominal: Soft. She exhibits no distension. There is no tenderness.   Musculoskeletal: She exhibits edema.   Surgical site appears clean dry and intact   Neurological: She is alert and oriented to person, place, and time.   Skin: No rash noted. No pallor.   Nursing note and vitals reviewed.      Significant Labs: All pertinent labs within the past 24 hours have been reviewed.    Significant Imaging: I have reviewed all pertinent imaging  results/findings within the past 24 hours.

## 2019-07-07 NOTE — NURSING
1950- Pt ambulated to bathroom with walker, 2 person assist. Pt was having difficulty ambulating but successfully made it to and from bathroom but appeared to be exhausted after ambulating. Offered bedside commode to decrease tiredness while ambulating. Pt agreed. Will continue to monitor.

## 2019-07-07 NOTE — PLAN OF CARE
Problem: Adult Inpatient Plan of Care  Goal: Plan of Care Review  Outcome: Ongoing (interventions implemented as appropriate)   Pt remains free form injury. Injection site CDI, no redness no swelling. Ambulated with walker. PT today. Nausea controlled with prn zofran x1 dose. Denied pain. Steroid injection to L knee today. Daughters at bedside. AAOx4. Bed locked and low. Siderails raised x2. Non-skid socks on. Safety precautions maintained.

## 2019-07-07 NOTE — PROCEDURES
"Geraldine Cooksey is a 78 y.o. female patient.    Temp: 98.1 °F (36.7 °C) (07/07/19 0840)  Pulse: 77 (07/07/19 0840)  Resp: 20 (07/07/19 0840)  BP: (!) 149/67 (07/07/19 0840)  SpO2: 95 % (07/07/19 0840)  Weight: 65.8 kg (145 lb) (07/02/19 1700)  Height: 5' 6" (167.6 cm) (07/02/19 1316)       Shital THAPA  7/7/2019  "

## 2019-07-07 NOTE — PROGRESS NOTES
"[facility]  Orthopedics  Progress Note    Patient Name: Geraldine Cooksey  MRN: [id number]  Admission Date: 7/2/2019  Hospital Length of Stay: 5 days  Attending Provider: Bimal Mcneil MD  Primary Care Provider: Primary Doctor No  Follow-up For: Procedure(s) (LRB):  ORIF, HIP, USING DYNAMIC HIP SCREW (Left)    Post-Operative Day: 4 Days Post-Op  Subjective:     Principal Problem:Closed fracture of left hip    Principal Orthopedic Problem: S/P L hip ORIF    Interval History: L knee pain    Review of patient's allergies indicates:   Allergen Reactions    Codeine Nausea Only     Happened 50 years ago        Current Facility-Administered Medications   Medication    acetaminophen tablet 1,000 mg    aspirin tablet 325 mg    bisacodyl suppository 10 mg    famotidine tablet 20 mg    ibuprofen tablet 600 mg    lidocaine (PF) 10 mg/ml (1%) injection 10 mg    methocarbamol tablet 500 mg    methylPREDNISolone acetate injection 80 mg    ondansetron disintegrating tablet 8 mg    oxyCODONE immediate release tablet 5 mg    polyethylene glycol packet 17 g    sodium chloride 0.9% flush 5 mL    tiotropium inhalation capsule 18 mcg    zolpidem tablet 5 mg     Objective:     Vital Signs (Most Recent):  Temp: 98.1 °F (36.7 °C) (07/07/19 0840)  Pulse: 77 (07/07/19 0840)  Resp: 20 (07/07/19 0840)  BP: (!) 149/67 (07/07/19 0840)  SpO2: 95 % (07/07/19 0840) Vital Signs (24h Range):  Temp:  [97.5 °F (36.4 °C)-98.5 °F (36.9 °C)] 98.1 °F (36.7 °C)  Pulse:  [63-79] 77  Resp:  [18-20] 20  SpO2:  [94 %-98 %] 95 %  BP: (133-162)/(61-70) 149/67     Weight: 65.8 kg (145 lb)  Height: 5' 6" (167.6 cm)  Body mass index is 23.4 kg/m².      Intake/Output Summary (Last 24 hours) at 7/7/2019 0929  Last data filed at 7/7/2019 0600  Gross per 24 hour   Intake 1080 ml   Output --   Net 1080 ml       General    Vitals reviewed.  Constitutional: She is oriented to person, place, and time. She appears well-developed and " well-nourished.   Neurological: She is alert and oriented to person, place, and time.   Psychiatric: She has a normal mood and affect. Her behavior is normal.             Left Knee Exam     Comments:  Medial and Lateral JL TTP. Moderate effusion. FAROM and nml strength.Left Hip Exam     Comments:  Incision C/D/I. LLE DNVI.            Significant Labs:   CBC:   Recent Labs   Lab 07/06/19  0600   WBC 9.04   HGB 9.0*   HCT 29.0*        CMP:   Recent Labs   Lab 07/06/19  0600      K 3.6      CO2 27   GLU 87   BUN 12   CREATININE 0.5   CALCIUM 8.5*   ANIONGAP 8   EGFRNONAA >60     All pertinent labs within the past 24 hours have been reviewed.    Significant Imaging: None    Assessment/Plan:     * Closed fracture of left hip  Awaiting SNF transfer  Today she was given L knee IA injection 80mg methylprednisolone and 2cc 1% lidocaine (sterile technique).  L knee Xrays  Stable from an ortho perspective          STEVE TRIANA  Orthopedics  [facility]

## 2019-07-07 NOTE — CARE UPDATE
07/07/19 0908   Patient Assessment/Suction   Level of Consciousness (AVPU) alert   PRE-TX-O2   O2 Device (Oxygen Therapy) room air   SpO2 (!) 94 %  (pt just back from ambulating)   Pulse Oximetry Type Intermittent   Pulse 71   Resp 16   Inhaler   $ Inhaler Charges MDI (Metered Dose Inahler) Treatment   Respiratory Treatment Status (Inhaler) given   Treatment Route (Inhaler) mouthpiece   Patient Position (Inhaler) sitting in chair   Signs of Intolerance (Inhaler) none

## 2019-07-07 NOTE — PROGRESS NOTES
[facility] Hospital Medicine  Progress Note    Patient Name: Geraldine Cooksey  MRN: [id number]  Patient Class: IP- Inpatient   Admission Date: 7/2/2019  Length of Stay: 4 days  Attending Physician: Bimal Mcneil MD  Primary Care Provider: Primary Doctor No        Subjective:     Principal Problem:Closed fracture of left hip      HPI:  Patient is a 70-year-old female with a history of COPD, tobacco abuse who currently smokes 1 pack per day who presents to the ED after mechanical fall.  She was in her usual state of health which is independent in all ADLs and was unloading groceries today when a Armenian Doty ran into the room and knocked her over.  She did not experience any loss of consciousness.  Her only complaint prior to the fall was pain in her left hip.  At this time patient denies any fever, chills, nausea, vomiting, abdominal pain, urinary symptoms, bowel complaints.    In the ED, workup was significant for elevated blood pressure as well as hip x-ray showing a nondisplaced intratrochanteric fracture of the left femur.  ED physician discussed with Orthopedics Dr. Gunn who will perform surgery tomorrow.    Overview/Hospital Course:  Patient was admitted to the hospital medicine service for left hip fracture after mechanical fall.  Orthopedics was consulted.  Dr. Gunn performed ORIF left hip on 07/03.  Placed on aspirin 325 mg b.i.d..  PT and OT evaluated patient and recommended placement in a rehab facility..  SNF consult placed.  Patient initially had elevated blood pressures but this resolved with pain control.    Interval History:  Patient seen and examined.  No acute events overnight.  Pain controlled on oral narcotics.    Review of Systems   Constitutional: Negative for fatigue.   Respiratory: Negative for cough and shortness of breath.    Cardiovascular: Negative for chest pain and leg swelling.   Gastrointestinal: Negative for abdominal pain and nausea.   Musculoskeletal:  Positive for gait problem and myalgias.   Neurological: Positive for weakness.   Psychiatric/Behavioral: Negative for confusion.   All other systems reviewed and are negative.    Objective:     Vital Signs (Most Recent):  Temp: 98.5 °F (36.9 °C) (07/06/19 1632)  Pulse: 79 (07/06/19 1632)  Resp: 20 (07/06/19 1632)  BP: (!) 151/65 (07/06/19 1632)  SpO2: 96 % (07/06/19 1632) Vital Signs (24h Range):  Temp:  [98 °F (36.7 °C)-98.7 °F (37.1 °C)] 98.5 °F (36.9 °C)  Pulse:  [63-79] 79  Resp:  [16-20] 20  SpO2:  [92 %-97 %] 96 %  BP: (119-151)/(59-67) 151/65     Weight: 65.8 kg (145 lb)  Body mass index is 23.4 kg/m².    Intake/Output Summary (Last 24 hours) at 7/6/2019 2031  Last data filed at 7/6/2019 1732  Gross per 24 hour   Intake 1200 ml   Output --   Net 1200 ml      Physical Exam   Constitutional: She is oriented to person, place, and time. She appears well-developed and well-nourished.   Eyes: Pupils are equal, round, and reactive to light. EOM are normal.   Cardiovascular: Normal rate, regular rhythm and intact distal pulses.   No murmur heard.  Pulmonary/Chest: Effort normal and breath sounds normal.   Abdominal: Soft. She exhibits no distension. There is no tenderness.   Musculoskeletal: She exhibits edema.   Surgical site appears clean dry and intact   Neurological: She is alert and oriented to person, place, and time.   Skin: No rash noted. No pallor.   Nursing note and vitals reviewed.      Significant Labs: All pertinent labs within the past 24 hours have been reviewed.    Significant Imaging: I have reviewed all pertinent imaging results/findings within the past 24 hours.      Assessment/Plan:      * Closed fracture of left hip   Orthopedics has been consulted.  Dr. Gunn performed ORIF left hip 7/2.  Pain control.  PT and OT.  Partial weight-bearing (50%) left lower extremity.  SNF consult placed.   will attempt to control pain with nonnarcotic modalities if possible.      Tobacco abuse  Assistance with  smoking cessation was offered, including:  [x]  Medications  [x]  Counseling    Patient was counseled regarding smoking for >10 minutes.  Does not want nicotine patch at this time.          Elevated blood pressure reading  Resolved.  Was likely secondary to pain. No need for acute treatment of this point in time.      COPD (chronic obstructive pulmonary disease)  Patient's COPD is controlled currently. Continue scheduled inhalers, Supplemental oxygen as needed and monitor respiratory status closely.           VTE Risk Mitigation (From admission, onward)    None                Bimal Mcneil MD  Department of Hospital Medicine   [facility]

## 2019-07-08 ENCOUNTER — TELEPHONE (OUTPATIENT)
Dept: ORTHOPEDICS | Facility: CLINIC | Age: 79
End: 2019-07-08

## 2019-07-08 VITALS
HEART RATE: 64 BPM | SYSTOLIC BLOOD PRESSURE: 151 MMHG | BODY MASS INDEX: 23.3 KG/M2 | OXYGEN SATURATION: 97 % | TEMPERATURE: 98 F | HEIGHT: 66 IN | WEIGHT: 145 LBS | RESPIRATION RATE: 20 BRPM | DIASTOLIC BLOOD PRESSURE: 65 MMHG

## 2019-07-08 LAB
ANION GAP SERPL CALC-SCNC: 7 MMOL/L (ref 8–16)
BASOPHILS # BLD AUTO: 0.01 K/UL (ref 0–0.2)
BASOPHILS NFR BLD: 0.1 % (ref 0–1.9)
BUN SERPL-MCNC: 11 MG/DL (ref 8–23)
CALCIUM SERPL-MCNC: 9.2 MG/DL (ref 8.7–10.5)
CHLORIDE SERPL-SCNC: 106 MMOL/L (ref 95–110)
CO2 SERPL-SCNC: 29 MMOL/L (ref 23–29)
CREAT SERPL-MCNC: 0.6 MG/DL (ref 0.5–1.4)
DIFFERENTIAL METHOD: ABNORMAL
EOSINOPHIL # BLD AUTO: 0 K/UL (ref 0–0.5)
EOSINOPHIL NFR BLD: 0 % (ref 0–8)
ERYTHROCYTE [DISTWIDTH] IN BLOOD BY AUTOMATED COUNT: 13.3 % (ref 11.5–14.5)
EST. GFR  (AFRICAN AMERICAN): >60 ML/MIN/1.73 M^2
EST. GFR  (NON AFRICAN AMERICAN): >60 ML/MIN/1.73 M^2
GLUCOSE SERPL-MCNC: 110 MG/DL (ref 70–110)
HCT VFR BLD AUTO: 31.3 % (ref 37–48.5)
HGB BLD-MCNC: 10 G/DL (ref 12–16)
IMM GRANULOCYTES # BLD AUTO: 0.04 K/UL (ref 0–0.04)
LYMPHOCYTES # BLD AUTO: 0.9 K/UL (ref 1–4.8)
LYMPHOCYTES NFR BLD: 12.9 % (ref 18–48)
MCH RBC QN AUTO: 29.7 PG (ref 27–31)
MCHC RBC AUTO-ENTMCNC: 31.9 G/DL (ref 32–36)
MCV RBC AUTO: 93 FL (ref 82–98)
MONOCYTES # BLD AUTO: 0.5 K/UL (ref 0.3–1)
MONOCYTES NFR BLD: 6.8 % (ref 4–15)
NEUTROPHILS # BLD AUTO: 5.6 K/UL (ref 1.8–7.7)
NEUTROPHILS NFR BLD: 79.6 % (ref 38–73)
NRBC BLD-RTO: 0 /100 WBC
PLATELET # BLD AUTO: 248 K/UL (ref 150–350)
PMV BLD AUTO: 10.7 FL (ref 9.2–12.9)
POTASSIUM SERPL-SCNC: 4.5 MMOL/L (ref 3.5–5.1)
RBC # BLD AUTO: 3.37 M/UL (ref 4–5.4)
SODIUM SERPL-SCNC: 142 MMOL/L (ref 136–145)
WBC # BLD AUTO: 7.05 K/UL (ref 3.9–12.7)

## 2019-07-08 PROCEDURE — 94640 AIRWAY INHALATION TREATMENT: CPT

## 2019-07-08 PROCEDURE — 80048 BASIC METABOLIC PNL TOTAL CA: CPT

## 2019-07-08 PROCEDURE — 97116 GAIT TRAINING THERAPY: CPT

## 2019-07-08 PROCEDURE — 25000003 PHARM REV CODE 250: Performed by: PHYSICIAN ASSISTANT

## 2019-07-08 PROCEDURE — 25000003 PHARM REV CODE 250: Performed by: ORTHOPAEDIC SURGERY

## 2019-07-08 PROCEDURE — 97530 THERAPEUTIC ACTIVITIES: CPT

## 2019-07-08 PROCEDURE — 85025 COMPLETE CBC W/AUTO DIFF WBC: CPT

## 2019-07-08 PROCEDURE — 25000003 PHARM REV CODE 250: Performed by: HOSPITALIST

## 2019-07-08 PROCEDURE — 36415 COLL VENOUS BLD VENIPUNCTURE: CPT

## 2019-07-08 RX ORDER — ASPIRIN 325 MG
325 TABLET ORAL 2 TIMES DAILY
Refills: 0 | Status: ON HOLD | COMMUNITY
Start: 2019-07-08 | End: 2022-11-18 | Stop reason: HOSPADM

## 2019-07-08 RX ORDER — IBUPROFEN 800 MG/1
800 TABLET ORAL EVERY 6 HOURS PRN
Qty: 30 TABLET | Refills: 0 | Status: ON HOLD | OUTPATIENT
Start: 2019-07-08 | End: 2022-11-18 | Stop reason: HOSPADM

## 2019-07-08 RX ORDER — METHOCARBAMOL 500 MG/1
500 TABLET, FILM COATED ORAL 4 TIMES DAILY
Qty: 40 TABLET | Refills: 0 | Status: SHIPPED | OUTPATIENT
Start: 2019-07-08 | End: 2019-07-18

## 2019-07-08 RX ORDER — OXYCODONE HYDROCHLORIDE 5 MG/1
5 TABLET ORAL EVERY 8 HOURS PRN
Qty: 20 TABLET | Refills: 0 | Status: SHIPPED | OUTPATIENT
Start: 2019-07-08 | End: 2019-07-15

## 2019-07-08 RX ADMIN — IBUPROFEN 600 MG: 600 TABLET ORAL at 02:07

## 2019-07-08 RX ADMIN — METHOCARBAMOL 500 MG: 500 TABLET, FILM COATED ORAL at 02:07

## 2019-07-08 RX ADMIN — FAMOTIDINE 20 MG: 20 TABLET, FILM COATED ORAL at 09:07

## 2019-07-08 RX ADMIN — ASPIRIN 325 MG ORAL TABLET 325 MG: 325 PILL ORAL at 09:07

## 2019-07-08 RX ADMIN — IBUPROFEN 600 MG: 600 TABLET ORAL at 09:07

## 2019-07-08 RX ADMIN — TIOTROPIUM BROMIDE 18 MCG: 18 CAPSULE ORAL; RESPIRATORY (INHALATION) at 08:07

## 2019-07-08 RX ADMIN — METHOCARBAMOL 500 MG: 500 TABLET, FILM COATED ORAL at 09:07

## 2019-07-08 NOTE — PLAN OF CARE
07/08/19 0857   Patient Assessment/Suction   Level of Consciousness (AVPU) alert   Respiratory Effort Normal;Unlabored   All Lung Fields Breath Sounds clear   PRE-TX-O2   O2 Device (Oxygen Therapy) room air   SpO2 96 %   Pulse 68   Resp 16   Inhaler   $ Inhaler Charges MDI (Metered Dose Inahler) Treatment;Mouth rinsed post treatment   Respiratory Treatment Status (Inhaler) given   Treatment Route (Inhaler) mouthpiece   Patient Position (Inhaler) HOB elevated   Signs of Intolerance (Inhaler) none   Breath Sounds Post-Respiratory Treatment   Throughout All Fields Post-Treatment All Fields   Throughout All Fields Post-Treatment no change   Post-treatment Heart Rate (beats/min) 68   Post-treatment Resp Rate (breaths/min) 16   Pt rec home med Spiriva MDI

## 2019-07-08 NOTE — PLAN OF CARE
Laura at [facility], [phone] verified that they were providers for [employer].  Faxed her pt's order for rolling walker and 3 in 1 commode to be delivered to pt's hospital room today.       07/08/19 1408   Discharge Reassessment   Assessment Type Discharge Planning Reassessment

## 2019-07-08 NOTE — PLAN OF CARE
07/08/19 1600   Final Note   Assessment Type Final Discharge Note   Anticipated Discharge Disposition Home-Health  ([facility])   Hospital Follow Up  Appt(s) scheduled? No

## 2019-07-08 NOTE — PLAN OF CARE
Met with pt and her two daughters to discuss pt's discharge and update them on referrals that have been sent to [facility] and [facility].  They are requesting I consult [facility] at [place].  If declined by [facility] pt would like [facility].      Returned a phone call from CC at [facility], [phone] to update her that pt wants eval from inpatient rehab.      07/08/19 0957   Discharge Reassessment   Assessment Type Discharge Planning Reassessment

## 2019-07-08 NOTE — PLAN OF CARE
Attempted to schedule pt's hospital follow up appointments.  Dr. Gunn's nurse will call pt with the appointment.  I could not find the phone number for pt's PCP Pina Dykes NP.  Pt stated that she would make the appointment herself.  Updated pt's nurse and the AVS.     2:50 p.m. - received call from Dr. Ward office.   Pt's appointment is for 7-18-19 @ 1:30 p.m.  Updated the AVS.      [facility] denied receiving pt's home health referral.  I resent it through [facility] and they still did not receive it.  Hard faxed the referral to [phone].      3:05 p.m. - Joanne at [facility], [phone] verified receiving the order.  Will call back on status of he admission.     3:30 p.m. - [facility] delivered pt's rolling walker and 3 in 1 commode to her room.        07/08/19 8913   Discharge Reassessment   Assessment Type Discharge Planning Reassessment   Discharge Plan A Home Health

## 2019-07-08 NOTE — PLAN OF CARE
Problem: Adult Inpatient Plan of Care  Goal: Plan of Care Review  Plan of care reviewed with pt, pt verbalized understanding. IV site clean, dry, intact, no redness or swelling noted. Dressing to left hip c/d/i. Neuro checks done this shift, foot pump and SCD in place. Pt ambulates to bathroom with walker, stand by assist, remains free of fall/trauma. Pt reports decreased pain in left hip and ROM improvement. VSS, remains afebrile this shift. Safety maintained throughout shift, call light in reach, bed locked and in lowest position, side rails up x2. Will continue to monitor, observe and report any changes.

## 2019-07-08 NOTE — DISCHARGE INSTRUCTIONS
Thank you for choosing [facility] for your medical care. The primary doctor who is taking care of you at the time of your discharge is Bimal Mcneil MD.     You were admitted to the hospital with Closed fracture of left hip.     Please note your discharge instructions, including diet/activity restrictions, follow-up appointments, and medication changes.  If you have any questions about your medical issues, prescriptions, or any other questions, please feel free to contact the [facility] Medicine Dept at [phone] and we will help.    If you are previously with Home health, outpatient PT/OT or under a therapy program, you are cleared to return to those programs.    Please direct all long term medication refills and follow up to your primary care provider, Primary Doctor No. Thank you again for letting us take care of your health care needs.

## 2019-07-08 NOTE — PLAN OF CARE
Pt's referral faxed to [facility] in [place] through [facility]. Updated Ileana with admissions, [phone] who stated that she would have Teja come evaluate pt.  Updated pt and family.      07/08/19 1003   Post-Acute Status   Post-Acute Authorization Placement   Post-Acute Placement Status Referrals Sent

## 2019-07-08 NOTE — TELEPHONE ENCOUNTER
----- Message from Manisha Moore sent at 7/8/2019  2:20 PM CDT -----  Contact: [facility]   Ms. Nam the  at [facility] called to schedule an appt for the above patient and I gave her July 30, Ms. Nam said that it was too far out and that typically after a surgery they want them to be seen 2 weeks after the procedure. Patient is being discharged today. Ms Nam asked if you could call the patient to make her the appt. The patient will be with her daughter.    Pt Daughter Jodi Alvarez # [phone]    PTS # [phone]  Viv # [phone]

## 2019-07-08 NOTE — PROGRESS NOTES
"[facility]  Orthopedics  Progress Note    Patient Name: Geraldine Cooksey  MRN: [id number]  Admission Date: 7/2/2019  Hospital Length of Stay: 6 days  Attending Provider: Bimal Mcneil MD  Primary Care Provider: Primary Doctor No  Follow-up For: Procedure(s) (LRB):  ORIF, HIP, USING DYNAMIC HIP SCREW (Left)    Post-Operative Day: 5 Days Post-Op  Subjective:     Principal Problem:Closed fracture of left hip    Principal Orthopedic Problem: S/P L hip ORIF    Interval History: improving    Review of patient's allergies indicates:   Allergen Reactions    Codeine Nausea Only     Happened 50 years ago        Current Facility-Administered Medications   Medication    acetaminophen tablet 1,000 mg    aspirin tablet 325 mg    famotidine tablet 20 mg    ibuprofen tablet 600 mg    methocarbamol tablet 500 mg    ondansetron disintegrating tablet 8 mg    oxyCODONE immediate release tablet 5 mg    sodium chloride 0.9% flush 5 mL    tiotropium inhalation capsule 18 mcg    zolpidem tablet 5 mg     Objective:     Vital Signs (Most Recent):  Temp: 97.7 °F (36.5 °C) (07/08/19 0259)  Pulse: 64 (07/08/19 0259)  Resp: 17 (07/08/19 0259)  BP: (!) 162/72 (07/08/19 0259)  SpO2: 95 % (07/08/19 0259) Vital Signs (24h Range):  Temp:  [97.7 °F (36.5 °C)-98.8 °F (37.1 °C)] 97.7 °F (36.5 °C)  Pulse:  [60-77] 64  Resp:  [16-20] 17  SpO2:  [94 %-100 %] 95 %  BP: (149-179)/(67-74) 162/72     Weight: 65.8 kg (145 lb)  Height: 5' 6" (167.6 cm)  Body mass index is 23.4 kg/m².      Intake/Output Summary (Last 24 hours) at 7/8/2019 0724  Last data filed at 7/8/2019 0529  Gross per 24 hour   Intake 740 ml   Output --   Net 740 ml       General    Nursing note and vitals reviewed.  Constitutional: She is oriented to person, place, and time. She appears well-developed and well-nourished.   Pulmonary/Chest: Effort normal.   Neurological: She is alert and oriented to person, place, and time.   Psychiatric: She has a normal mood " and affect. Her behavior is normal.         Left Hip Exam     Comments:  LLE DNVI. Incision looks good - C/D/I            Significant Labs:   CBC:   Recent Labs   Lab 07/08/19  0455   WBC 7.05   HGB 10.0*   HCT 31.3*        CMP:   Recent Labs   Lab 07/08/19  0455      K 4.5      CO2 29      BUN 11   CREATININE 0.6   CALCIUM 9.2   ANIONGAP 7*   EGFRNONAA >60     All pertinent labs within the past 24 hours have been reviewed.    Significant Imaging: None    Assessment/Plan:     * Closed fracture of left hip  Awaiting SNF transfer    Stable from an ortho perspective          STEVE TRIANA  Orthopedics  [facility]

## 2019-07-08 NOTE — PT/OT/SLP PROGRESS
Physical Therapy Treatment    Patient Name:  Geraldine Cooksey   MRN:  [id number]    Recommendations:     Discharge Recommendations:  home, home with home health   Discharge Equipment Recommendations: other (see comments)(rollator)   Barriers to discharge: None    Assessment:     Geraldine Cooksey is a 78 y.o. female admitted with a medical diagnosis of Closed fracture of left hip.  She presents with the following impairments/functional limitations:  weakness, impaired endurance, orthopedic precautions, decreased lower extremity function, pain. Patient doing very well since she had her left knee injected. She was able to tolerate getting up from chair with SBA and increased her tolerance to ambulating to 250' with one rest break. She required some VC for gt training due to NBOS and a step to gt but during the 250' distance pt was able to self correct with VCs.    Rehab Prognosis: Good; patient would benefit from acute skilled PT services to address these deficits and reach maximum level of function.    Recent Surgery: Procedure(s) (LRB):  ORIF, HIP, USING DYNAMIC HIP SCREW (Left) 5 Days Post-Op    Plan:     During this hospitalization, patient to be seen BID to address the identified rehab impairments via gait training, therapeutic activities, therapeutic exercises and progress toward the following goals:    · Plan of Care Expires:  07/29/19    Subjective     Chief Complaint: none  Patient/Family Comments/goals: Ready to go home  Pain/Comfort:  · Pain Rating 1: 0/10  · Pain Rating Post-Intervention 1: 0/10      Objective:     Communicated with MIKE David prior to session.  Patient found up in chair with peripheral IV upon PT entry to room.     General Precautions: Standard, fall   Orthopedic Precautions:(LLE FWB)   Braces: N/A     Functional Mobility:  · Transfers:     · Sit to Stand:  stand by assistance and contact guard assistance with rolling walker  · Gait: 250' with RW and CGS. Required VC's to widen CRIS and  lengthen steps with a recipical pattern.      AM-PAC 6 CLICK MOBILITY          Therapeutic Activities and Exercises:  partial sit to stand from arm chair, LAQ, AP, GS x 10    Patient left up in chair with call button in reach and RN Joann notified..    GOALS:   Multidisciplinary Problems     Physical Therapy Goals        Problem: Physical Therapy Goal    Goal Priority Disciplines Outcome Goal Variances Interventions   Physical Therapy Goal     PT, PT/OT Ongoing (interventions implemented as appropriate)                     Time Tracking:     PT Received On: 07/08/19  PT Start Time: 1125     PT Stop Time: 1148  PT Total Time (min): 23 min     Billable Minutes: Gait Training 15 and Therapeutic Activity 8    Treatment Type: Treatment  PT/PTA: PTA     PTA Visit Number: 4     Valencia Chand, PTA  07/08/2019

## 2019-07-08 NOTE — PLAN OF CARE
Met with pt and her daughter who verified someone came from [facility] however they denied her admission as pt was too high functioning.  Pt is wanting to discharge to her daughter Mely's home at [address] [place] [address]/phone # [phone].  Pt is refusing SNF placement. Pt denies that she has a rolling walker, BSC and SC at home as her sister, who lives in [place], was suppose to bring it to her however had not.  Pt's daughter is requesting [facility] in [place] and a rollator, BSC and SC for pt.  Pt signed the disclosure form for home health.  Informed her the SC was not covered by insurance and normally a rolling walker not rollator was what the surgeons requested.  Pt verified her PCP as Kiara Dykes NP in [facility].  Updated Dr. Mcneil on pt's request to discharge home.  Added pt's daughter as an emergency contact on the face sheet.      Spoke to Sorin at [facility], [phone] who verified that pt was too high functioning for Rehab. Updated CC at [facility], Crystal at [facility] that pt is requesting to go home with home health. Left message for Bethany at [facility] to update her.       07/08/19 1240   Discharge Reassessment   Assessment Type Discharge Planning Reassessment

## 2019-07-08 NOTE — PLAN OF CARE
Pt's home health referral faxed through [facility] to [facility] in [place], [place].  Updated Dr. Mcneil that pt is requesting a rolling walker and 3 in 1 commode.      07/08/19 9315   Post-Acute Status   Post-Acute Authorization Home Health/Hospice   Post-Acute Placement Status Referrals Sent

## 2019-07-08 NOTE — SUBJECTIVE & OBJECTIVE
"Principal Problem:Closed fracture of left hip    Principal Orthopedic Problem: S/P L hip ORIF    Interval History: improving    Review of patient's allergies indicates:   Allergen Reactions    Codeine Nausea Only     Happened 50 years ago        Current Facility-Administered Medications   Medication    acetaminophen tablet 1,000 mg    aspirin tablet 325 mg    famotidine tablet 20 mg    ibuprofen tablet 600 mg    methocarbamol tablet 500 mg    ondansetron disintegrating tablet 8 mg    oxyCODONE immediate release tablet 5 mg    sodium chloride 0.9% flush 5 mL    tiotropium inhalation capsule 18 mcg    zolpidem tablet 5 mg     Objective:     Vital Signs (Most Recent):  Temp: 97.7 °F (36.5 °C) (07/08/19 0259)  Pulse: 64 (07/08/19 0259)  Resp: 17 (07/08/19 0259)  BP: (!) 162/72 (07/08/19 0259)  SpO2: 95 % (07/08/19 0259) Vital Signs (24h Range):  Temp:  [97.7 °F (36.5 °C)-98.8 °F (37.1 °C)] 97.7 °F (36.5 °C)  Pulse:  [60-77] 64  Resp:  [16-20] 17  SpO2:  [94 %-100 %] 95 %  BP: (149-179)/(67-74) 162/72     Weight: 65.8 kg (145 lb)  Height: 5' 6" (167.6 cm)  Body mass index is 23.4 kg/m².      Intake/Output Summary (Last 24 hours) at 7/8/2019 0724  Last data filed at 7/8/2019 0529  Gross per 24 hour   Intake 740 ml   Output --   Net 740 ml       General    Nursing note and vitals reviewed.  Constitutional: She is oriented to person, place, and time. She appears well-developed and well-nourished.   Pulmonary/Chest: Effort normal.   Neurological: She is alert and oriented to person, place, and time.   Psychiatric: She has a normal mood and affect. Her behavior is normal.         Left Hip Exam     Comments:  LLE DNVI. Incision looks good - C/D/I            Significant Labs:   CBC:   Recent Labs   Lab 07/08/19  0455   WBC 7.05   HGB 10.0*   HCT 31.3*        CMP:   Recent Labs   Lab 07/08/19  0455      K 4.5      CO2 29      BUN 11   CREATININE 0.6   CALCIUM 9.2   ANIONGAP 7*   EGFRNONAA >60 "     All pertinent labs within the past 24 hours have been reviewed.    Significant Imaging: None

## 2019-07-08 NOTE — PLAN OF CARE
[place] [facility] verified that they can admit pt for services.       07/08/19 1557   Post-Acute Status   Post-Acute Authorization Placement   Post-Acute Placement Status Set-up Complete

## 2019-07-09 ENCOUNTER — PATIENT OUTREACH (OUTPATIENT)
Dept: ADMINISTRATIVE | Facility: CLINIC | Age: 79
End: 2019-07-09

## 2019-07-09 NOTE — PHYSICIAN QUERY
"PT Name: Geraldine Cooksey  MR #: [id number]    Physician Query Form - Hematology Clarification      CDS/: Yasmine Penn RN               Contact information:[email or web address]    This form is a permanent document in the medical record.      Query Date: July 9, 2019    By submitting this query, we are merely seeking further clarification of documentation. Please utilize your independent clinical judgment when addressing the question(s) below.    The Medical record contains the following:   Indicators  Supporting Clinical Findings Location in Medical Record    "Anemia" documented     x H & H = H&H= 13/40.8  H&H= 13/42.9  H&H= 10.9/34.2  H&H=10.2/32.4  H&H= 9/29  H&H=10/31.3 Labs 7/2  Labs 7/3  Labs 7/4  Labs 7/5  Labs 7/6  Labs 7/8   x BP =                     HR= /69  HR 64  /55  HR 65  /58  HR 63  /78  HR 68 Vital signs 7/7@1514  Vital signs 7/5@1201  Vital signs 7/4@0726  Vital signs 7/2@1539    "GI bleeding" documented      Acute bleeding (Non GI site)      Transfusion(s)      Treatment:     x Other:  Cachexia, BMI 23.4     Left intertrochanteric hip fracture.  PROCEDURE PERFORMED:  Open reduction and internal fixation of left intertrochanteric hip fracture.  EBL: minimal    COPD, tobacco use Anesthesia record    OP note 7/3          Discharge summary per Hospital Medicine     Provider, please specify diagnosis or diagnoses associated with above clinical findings.    [x  ] Acute blood loss anemia expected post-operatively   [  ] Acute blood loss anemia   [  ] Precipitous drop in Hematocrit     [  ] Anemia of chronic disease ( Specify chronic disease)       [  ] Other (Specify):   [  ] Clinically Undetermined       [  ] Other Hematological Diagnosis (please specify):     [  ] Clinically Undetermined       Please document in your progress notes daily for the duration of treatment, until resolved, and include in your discharge summary.                                            "

## 2019-07-09 NOTE — DISCHARGE SUMMARY
[facility] Hospital Medicine  Discharge Summary      Patient Name: Geraldine Cooksey  MRN: [id number]  Admission Date: 7/2/2019  Hospital Length of Stay: 6 days  Discharge Date and Time: 7/8/2019  5:30 PM  Attending Physician: Tiara att. providers found   Discharging Provider: Bimal Mcneil MD  Primary Care Provider: Primary Doctor Tiara      HPI:   Patient is a 70-year-old female with a history of COPD, tobacco abuse who currently smokes 1 pack per day who presents to the ED after mechanical fall.  She was in her usual state of health which is independent in all ADLs and was unloading groceries today when a [place] Doty ran into the room and knocked her over.  She did not experience any loss of consciousness.  Her only complaint prior to the fall was pain in her left hip.  At this time patient denies any fever, chills, nausea, vomiting, abdominal pain, urinary symptoms, bowel complaints.    In the ED, workup was significant for elevated blood pressure as well as hip x-ray showing a nondisplaced intratrochanteric fracture of the left femur.  ED physician discussed with Orthopedics Dr. Gunn who will perform surgery tomorrow.    Procedure(s) (LRB):  ORIF, HIP, USING DYNAMIC HIP SCREW (Left)      Hospital Course:   Patient was admitted to the hospital medicine service for left hip fracture after mechanical fall.  Orthopedics was consulted.  Dr. Gunn performed ORIF left hip on 07/03.  Placed on aspirin 325 mg b.i.d..  PT and OT evaluated patient and recommended placement in a rehab facility. SNF consult placed.  Patient initially had elevated blood pressures but this resolved with pain control.  Patient had issues with her left knee, and Orthopedics did bedside steroid injection in the knee which improved the patient's functional status markedly.   Patient was seen by Physical therapy the following day and deemed stable for discharge home with home health.  She was able to walk approximately 250 ft  "prior to discharge.     Consults:   Consults (From admission, onward)        Status Ordering Provider     Inpatient consult to Social Work  Once     Provider:  (Not yet assigned)    Completed ADRIANNE MAHMOOD     Inpatient consult to Social Work/Case Management  Once     Provider:  (Not yet assigned)    Completed KAVITHA THAPA     Inpatient consult to Social Work/Case Management  Once     Provider:  (Not yet assigned)    Completed MAX SWIFT          No new Assessment & Plan notes have been filed under this hospital service since the last note was generated.  Service: Hospital Medicine    Final Active Diagnoses:    Diagnosis Date Noted POA    PRINCIPAL PROBLEM:  Closed fracture of left hip [S72.002A] 07/02/2019 Yes    Primary osteoarthritis of left knee [M17.12]  Yes    COPD (chronic obstructive pulmonary disease) [J44.9] 07/02/2019 Yes    Elevated blood pressure reading [R03.0] 07/02/2019 Yes    Tobacco abuse [Z72.0] 07/02/2019 Yes      Problems Resolved During this Admission:       Discharged Condition: stable    Disposition: Home-Health Care [facility]    Follow Up:  Follow-up Information     [facility].    Specialty:  Home Health Services  Contact information:  [address] [address] [address]  [phone]             Juan Jose Gunn MD On 7/18/2019.    Specialties:  Orthopedic Surgery, Surgery, Sports Medicine  Why:  7-18-19 @ 1:30 P.M.   Contact information:  [address] [address]  [phone]             Pina Dykes NP  In 1 week.    Why:  Pt will call and schedule her own appointment.                Patient Instructions:      WALKER FOR HOME USE     Order Specific Question Answer Comments   Type of Walker: Adult (5'4"-6'6")    With wheels? Yes    Height: 5' 6" (1.676 m)    Weight: 65.8 kg (145 lb)    Length of need (1-99 months): 3    Does patient have medical equipment at home? walker, rolling    Does patient have medical equipment at home? " "shower chair    Does patient have medical equipment at home? bedside commode    Please check all that apply: Patient's condition impairs ambulation.    Please check all that apply: Patient is unable to safely ambulate without equipment.      3 IN 1 COMMODE FOR HOME USE     Order Specific Question Answer Comments   Type: Standard    Height: 5' 6" (1.676 m)    Weight: 65.8 kg (145 lb)    Does patient have medical equipment at home? walker, rolling    Does patient have medical equipment at home? shower chair    Does patient have medical equipment at home? bedside commode    Length of need (1-99 months): 3      Referral to Home health   Referral Priority: Routine Referral Type: Home Health Care   Referral Reason: Specialty Services Required   Requested Specialty: Home Health Services   Number of Visits Requested: 1     Diet Adult Regular     Notify your health care provider if you experience any of the following:  temperature >100.4     Notify your health care provider if you experience any of the following:  severe uncontrolled pain     Notify your health care provider if you experience any of the following:  redness, tenderness, or signs of infection (pain, swelling, redness, odor or green/yellow discharge around incision site)     No dressing needed     Activity as tolerated     Weight bearing restrictions (specify):   Order Comments: WBAT L side       Significant Diagnostic Studies:     Pending Diagnostic Studies:     None         Medications:  Reconciled Home Medications:      Medication List      START taking these medications    aspirin 325 MG tablet  Take 1 tablet (325 mg total) by mouth 2 (two) times daily. for 10 days     methocarbamol 500 MG Tab  Commonly known as:  ROBAXIN  Take 1 tablet (500 mg total) by mouth 4 (four) times daily. for 10 days     oxyCODONE 5 MG immediate release tablet  Commonly known as:  ROXICODONE  Take 1 tablet (5 mg total) by mouth every 8 (eight) hours as needed for Pain.      "   CHANGE how you take these medications    ibuprofen 800 MG tablet  Commonly known as:  ADVIL,MOTRIN  Take 1 tablet (800 mg total) by mouth every 6 (six) hours as needed for Pain.  What changed:  when to take this        CONTINUE taking these medications    albuterol 90 mcg/actuation inhaler  Commonly known as:  PROVENTIL/VENTOLIN HFA  Inhale 1-2 puffs into the lungs every 6 (six) hours as needed for Wheezing. Rescue     cetirizine 10 MG tablet  Commonly known as:  ZYRTEC  Take 10 mg by mouth daily as needed for Allergies.     tiotropium 18 mcg inhalation capsule  Commonly known as:  SPIRIVA  Inhale 1 capsule (18 mcg total) into the lungs once daily. Controller            Indwelling Lines/Drains at time of discharge:   Lines/Drains/Airways          None          Time spent on the discharge of patient: 32 minutes  Patient was seen and examined on the date of discharge and determined to be suitable for discharge.         Bimal Mcneil MD  Department of Hospital Medicine  [facility]

## 2019-07-09 NOTE — PLAN OF CARE
"Problem: Adult Inpatient Plan of Care  Goal: Plan of Care Review  Outcome: Outcome(s) achieved Date Met: 07/08/19  Pt states "understanding," to d/c instructions, follow up visits and new medication administration, IV removed, pt tolerated well, pt packed personal belongings per self, denies joint pain/discomfort prior to d/c, escorted to main lobby by staff, to home per private vehicle, safety maintain        "

## 2019-07-09 NOTE — PATIENT INSTRUCTIONS
Recognizing and Treating Wound Infection  Wounds can become infected with harmful bacteria. This prevents healing and increases the risk of scars. In some cases, the infection may spread to other parts of the body. And infection with the bacteria that cause tetanus can be fatal. Know what to watch for and get prompt treatment for infection.     Risk Factors  A wound is more likely to become infected if it:  Results from a puncture, such as from a nail or piece of glass.  Results from a human or animal bite.  Isn't cleaned or treated within 8 hours.  Occurs in your hand, foot, leg, armpit, or groin.  Contains dirt or saliva.  Heals very slowly.  Occurs in a person with diabetes, alcoholism, or a compromised immune system.    Symptoms of Infection  Call your healthcare provider at the first sign of infection, such as:  Yellow, yellow-green, or foul-smelling drainage from a wound  Increased pain, swelling, or redness in or near a wound  A change in the color or size of a wound  Red streaks in the skin around the wound  Fever    Treatment  Treatment is likely to depend on the type and extent of your infection. Your healthcare provider may prescribe oral antibiotics to help fight bacteria. He or she may also flush the wound with an antibiotic solution or apply an antibiotic ointment. Sometimes an abscess (a pocket of pus) may form. In that case, the abscess will be opened and the fluid drained. You may need hospital care if the infection is very severe.    Preventing Wound Infection  Follow these steps to help keep wounds from developing infection:  Wash the wound right away with soap and water.  Apply a small amount of antibiotic ointment. You can buy this at the drugstore without a prescription.  Cover wounds with a bandage or gauze dressing.  Keep the wound clean and dry for the first 24hrs  Change the dressing daily using sterile gloves  © 8951-1702 [employer], [address] [place] [address], [place] [address]. All  rights reserved. This information is not intended as a substitute for professional medical care. Always follow your healthcare professional's instructions.

## 2019-07-18 ENCOUNTER — OFFICE VISIT (OUTPATIENT)
Dept: ORTHOPEDICS | Facility: CLINIC | Age: 79
End: 2019-07-18
Payer: MEDICARE

## 2019-07-18 VITALS
WEIGHT: 145 LBS | BODY MASS INDEX: 23.3 KG/M2 | HEART RATE: 68 BPM | HEIGHT: 66 IN | DIASTOLIC BLOOD PRESSURE: 70 MMHG | SYSTOLIC BLOOD PRESSURE: 138 MMHG

## 2019-07-18 DIAGNOSIS — Z87.81 S/P LEFT HIP FRACTURE: Primary | ICD-10-CM

## 2019-07-18 PROCEDURE — 99024 PR POST-OP FOLLOW-UP VISIT: ICD-10-PCS | Mod: ,,, | Performed by: ORTHOPAEDIC SURGERY

## 2019-07-18 PROCEDURE — 73501 X-RAY EXAM HIP UNI 1 VIEW: CPT | Mod: ,,, | Performed by: ORTHOPAEDIC SURGERY

## 2019-07-18 PROCEDURE — 99024 POSTOP FOLLOW-UP VISIT: CPT | Mod: ,,, | Performed by: ORTHOPAEDIC SURGERY

## 2019-07-18 PROCEDURE — 73501 PR X-RAY EXAM HIP UNI 1 VIEW: ICD-10-PCS | Mod: ,,, | Performed by: ORTHOPAEDIC SURGERY

## 2019-07-18 NOTE — PROGRESS NOTES
[facility] ORTHOPEDICS POST-OP NOTE    Subjective:           Chief Complaint:   Chief Complaint   Patient presents with    Left Hip - Post-op Evaluation     Left hip ORIF 7.3.19. Suture removal. States that her hip is bothering her some today but not bad.        Past Medical History:   Diagnosis Date    Bronchitis     COPD (chronic obstructive pulmonary disease)     PONV (postoperative nausea and vomiting)        Past Surgical History:   Procedure Laterality Date    COSMETIC SURGERY  2012    bilateral eye lift    HYSTERECTOMY      ORIF, HIP, USING DYNAMIC HIP SCREW Left 7/3/2019    Performed by Juan Jose Gunn MD at [facility] OR       Current Outpatient Medications   Medication Sig    albuterol (PROVENTIL/VENTOLIN HFA) 90 mcg/actuation inhaler Inhale 1-2 puffs into the lungs every 6 (six) hours as needed for Wheezing. Rescue    aspirin 325 MG tablet Take 1 tablet (325 mg total) by mouth 2 (two) times daily. for 10 days    cetirizine (ZYRTEC) 10 MG tablet Take 10 mg by mouth daily as needed for Allergies.    ibuprofen (ADVIL,MOTRIN) 800 MG tablet Take 1 tablet (800 mg total) by mouth every 6 (six) hours as needed for Pain.    tiotropium (SPIRIVA) 18 mcg inhalation capsule Inhale 1 capsule (18 mcg total) into the lungs once daily. Controller    methocarbamol (ROBAXIN) 500 MG Tab Take 1 tablet (500 mg total) by mouth 4 (four) times daily. for 10 days     No current facility-administered medications for this visit.        Review of patient's allergies indicates:   Allergen Reactions    Codeine Nausea Only     Happened 50 years ago        History reviewed. No pertinent family history.    Social History     Socioeconomic History    Marital status: Single     Spouse name: Not on file    Number of children: Not on file    Years of education: Not on file    Highest education level: Not on file   Occupational History    Not on file   Social Needs    Financial resource strain: Not on file    Food insecurity:      Worry: Not on file     Inability: Not on file    Transportation needs:     Medical: Not on file     Non-medical: Not on file   Tobacco Use    Smoking status: Current Every Day Smoker     Packs/day: 1.00    Smokeless tobacco: Never Used   Substance and Sexual Activity    Alcohol use: Yes    Drug use: No    Sexual activity: Never   Lifestyle    Physical activity:     Days per week: Not on file     Minutes per session: Not on file    Stress: Not on file   Relationships    Social connections:     Talks on phone: Not on file     Gets together: Not on file     Attends Voodoo service: Not on file     Active member of club or organization: Not on file     Attends meetings of clubs or organizations: Not on file     Relationship status: Not on file   Other Topics Concern    Not on file   Social History Narrative    Not on file       History of present illness: Patient comes in today for the left hip. She is doing very well. Her pain is well-controlled.      Review of Systems:    Musculoskeletal:  See HPI      Objective:        Physical Examination:    Vital Signs:    Vitals:    07/18/19 1420   BP: 138/70   Pulse: 68       Body mass index is 23.4 kg/m².    This a well-developed, well nourished patient in no acute distress.  They are alert and oriented and cooperative to examination.        Wounds are clean dry and intact. Calf is soft. Neurovascularly intact at the foot.  Pertinent New Results:    XRAY Report / Interpretation:   AP of the left hip demonstrates her left trochanteric fracture to be well aligned. All hardware is in perfect position.    Assessment/Plan:      Stable following ORIF of the left hip. Continue physical therapy. Follow-up in 6 weeks. Continue aspirin.    This note was created using Dragon voice recognition software that occasionally misinterpreted phrases or words.

## 2019-11-14 ENCOUNTER — TELEPHONE (OUTPATIENT)
Dept: ORTHOPEDICS | Facility: CLINIC | Age: 79
End: 2019-11-14

## 2019-11-14 NOTE — TELEPHONE ENCOUNTER
----- Message from Lavonne Thomas sent at 11/14/2019  9:33 AM CST -----  Contact: [facility]  Dr finger pt-called looking for a pcp for the patient so she wants to know if the patient has a pcp any where in her chart pts# [phone]

## 2022-10-07 ENCOUNTER — HOSPITAL ENCOUNTER (EMERGENCY)
Facility: HOSPITAL | Age: 82
Discharge: HOME | End: 2022-10-07
Attending: EMERGENCY MEDICINE
Payer: MEDICARE

## 2022-10-07 VITALS
TEMPERATURE: 98 F | HEIGHT: 62 IN | WEIGHT: 130 LBS | RESPIRATION RATE: 20 BRPM | BODY MASS INDEX: 23.92 KG/M2 | HEART RATE: 74 BPM | DIASTOLIC BLOOD PRESSURE: 97 MMHG | SYSTOLIC BLOOD PRESSURE: 202 MMHG | OXYGEN SATURATION: 98 %

## 2022-10-07 DIAGNOSIS — J44.9 CHRONIC OBSTRUCTIVE PULMONARY DISEASE, UNSPECIFIED COPD TYPE: ICD-10-CM

## 2022-10-07 DIAGNOSIS — Z76.0 MEDICATION REFILL: ICD-10-CM

## 2022-10-07 DIAGNOSIS — M75.32 CALCIFIC TENDINITIS OF LEFT SHOULDER REGION: Primary | ICD-10-CM

## 2022-10-07 DIAGNOSIS — R06.02 SOB (SHORTNESS OF BREATH): ICD-10-CM

## 2022-10-07 LAB
ALBUMIN SERPL BCP-MCNC: 3.8 G/DL (ref 3.5–5.2)
ALP SERPL-CCNC: 84 U/L (ref 55–135)
ALT SERPL W/O P-5'-P-CCNC: 10 U/L (ref 10–44)
ANION GAP SERPL CALC-SCNC: 13 MMOL/L (ref 8–16)
AST SERPL-CCNC: 14 U/L (ref 10–40)
BASOPHILS # BLD AUTO: 0.14 K/UL (ref 0–0.2)
BASOPHILS NFR BLD: 0.9 % (ref 0–1.9)
BILIRUB SERPL-MCNC: 0.9 MG/DL (ref 0.1–1)
BNP SERPL-MCNC: 53 PG/ML (ref 0–99)
BUN SERPL-MCNC: 14 MG/DL (ref 8–23)
CALCIUM SERPL-MCNC: 9.6 MG/DL (ref 8.7–10.5)
CHLORIDE SERPL-SCNC: 102 MMOL/L (ref 95–110)
CO2 SERPL-SCNC: 22 MMOL/L (ref 23–29)
CREAT SERPL-MCNC: 0.7 MG/DL (ref 0.5–1.4)
D DIMER PPP IA.FEU-MCNC: 1.05 MG/L FEU
DIFFERENTIAL METHOD: ABNORMAL
EOSINOPHIL # BLD AUTO: 0 K/UL (ref 0–0.5)
EOSINOPHIL NFR BLD: 0.2 % (ref 0–8)
ERYTHROCYTE [DISTWIDTH] IN BLOOD BY AUTOMATED COUNT: 14.6 % (ref 11.5–14.5)
EST. GFR  (NO RACE VARIABLE): >60 ML/MIN/1.73 M^2
GLUCOSE SERPL-MCNC: 90 MG/DL (ref 70–110)
HCT VFR BLD AUTO: 44.3 % (ref 37–48.5)
HGB BLD-MCNC: 14.6 G/DL (ref 12–16)
IMM GRANULOCYTES # BLD AUTO: 0.05 K/UL (ref 0–0.04)
IMM GRANULOCYTES NFR BLD AUTO: 0.3 % (ref 0–0.5)
INR PPP: 1.1 (ref 0.8–1.2)
LYMPHOCYTES # BLD AUTO: 2 K/UL (ref 1–4.8)
LYMPHOCYTES NFR BLD: 13.4 % (ref 18–48)
MCH RBC QN AUTO: 29.5 PG (ref 27–31)
MCHC RBC AUTO-ENTMCNC: 33 G/DL (ref 32–36)
MCV RBC AUTO: 90 FL (ref 82–98)
MONOCYTES # BLD AUTO: 1.6 K/UL (ref 0.3–1)
MONOCYTES NFR BLD: 10.9 % (ref 4–15)
NEUTROPHILS # BLD AUTO: 11.1 K/UL (ref 1.8–7.7)
NEUTROPHILS NFR BLD: 74.3 % (ref 38–73)
NRBC BLD-RTO: 0 /100 WBC
PLATELET # BLD AUTO: 398 K/UL (ref 150–450)
PMV BLD AUTO: 10.4 FL (ref 9.2–12.9)
POTASSIUM SERPL-SCNC: 4 MMOL/L (ref 3.5–5.1)
PROT SERPL-MCNC: 7.6 G/DL (ref 6–8.4)
PROTHROMBIN TIME: 11 SEC (ref 9–12.5)
RBC # BLD AUTO: 4.95 M/UL (ref 4–5.4)
SODIUM SERPL-SCNC: 137 MMOL/L (ref 136–145)
TROPONIN I SERPL DL<=0.01 NG/ML-MCNC: <0.006 NG/ML (ref 0–0.03)
WBC # BLD AUTO: 15 K/UL (ref 3.9–12.7)

## 2022-10-07 PROCEDURE — 85610 PROTHROMBIN TIME: CPT | Performed by: EMERGENCY MEDICINE

## 2022-10-07 PROCEDURE — 73030 XR SHOULDER COMPLETE 2 OR MORE VIEWS LEFT: ICD-10-PCS | Mod: 26,LT,, | Performed by: RADIOLOGY

## 2022-10-07 PROCEDURE — 73030 X-RAY EXAM OF SHOULDER: CPT | Mod: TC,LT

## 2022-10-07 PROCEDURE — 71045 X-RAY EXAM CHEST 1 VIEW: CPT | Mod: TC

## 2022-10-07 PROCEDURE — 73030 X-RAY EXAM OF SHOULDER: CPT | Mod: 26,LT,, | Performed by: RADIOLOGY

## 2022-10-07 PROCEDURE — 93971 EXTREMITY STUDY: CPT | Mod: 26,LT,, | Performed by: RADIOLOGY

## 2022-10-07 PROCEDURE — 80053 COMPREHEN METABOLIC PANEL: CPT | Performed by: EMERGENCY MEDICINE

## 2022-10-07 PROCEDURE — 63600175 PHARM REV CODE 636 W HCPCS: Performed by: EMERGENCY MEDICINE

## 2022-10-07 PROCEDURE — 99285 EMERGENCY DEPT VISIT HI MDM: CPT | Mod: 25

## 2022-10-07 PROCEDURE — 96375 TX/PRO/DX INJ NEW DRUG ADDON: CPT

## 2022-10-07 PROCEDURE — 85025 COMPLETE CBC W/AUTO DIFF WBC: CPT | Performed by: EMERGENCY MEDICINE

## 2022-10-07 PROCEDURE — 93010 EKG 12-LEAD: ICD-10-PCS | Mod: ,,, | Performed by: INTERNAL MEDICINE

## 2022-10-07 PROCEDURE — 93971 EXTREMITY STUDY: CPT | Mod: TC,LT

## 2022-10-07 PROCEDURE — 93010 ELECTROCARDIOGRAM REPORT: CPT | Mod: ,,, | Performed by: INTERNAL MEDICINE

## 2022-10-07 PROCEDURE — 71045 X-RAY EXAM CHEST 1 VIEW: CPT | Mod: 26,,, | Performed by: RADIOLOGY

## 2022-10-07 PROCEDURE — 83880 ASSAY OF NATRIURETIC PEPTIDE: CPT | Performed by: EMERGENCY MEDICINE

## 2022-10-07 PROCEDURE — 93005 ELECTROCARDIOGRAM TRACING: CPT

## 2022-10-07 PROCEDURE — 84484 ASSAY OF TROPONIN QUANT: CPT | Performed by: EMERGENCY MEDICINE

## 2022-10-07 PROCEDURE — 93971 US UPPER EXTREMITY VEINS LEFT: ICD-10-PCS | Mod: 26,LT,, | Performed by: RADIOLOGY

## 2022-10-07 PROCEDURE — 71045 XR CHEST AP PORTABLE: ICD-10-PCS | Mod: 26,,, | Performed by: RADIOLOGY

## 2022-10-07 PROCEDURE — 85379 FIBRIN DEGRADATION QUANT: CPT | Performed by: EMERGENCY MEDICINE

## 2022-10-07 PROCEDURE — 96374 THER/PROPH/DIAG INJ IV PUSH: CPT

## 2022-10-07 RX ORDER — ONDANSETRON 4 MG/1
4 TABLET, ORALLY DISINTEGRATING ORAL EVERY 6 HOURS PRN
Qty: 12 TABLET | Refills: 0 | Status: SHIPPED | OUTPATIENT
Start: 2022-10-07 | End: 2023-04-11

## 2022-10-07 RX ORDER — FLUTICASONE FUROATE, UMECLIDINIUM BROMIDE AND VILANTEROL TRIFENATATE 100; 62.5; 25 UG/1; UG/1; UG/1
1 POWDER RESPIRATORY (INHALATION) DAILY
Qty: 1 EACH | Refills: 0 | Status: SHIPPED | OUTPATIENT
Start: 2022-10-07

## 2022-10-07 RX ORDER — HYDROCODONE BITARTRATE AND ACETAMINOPHEN 5; 325 MG/1; MG/1
1 TABLET ORAL EVERY 6 HOURS PRN
Qty: 8 TABLET | Refills: 0 | Status: ON HOLD | OUTPATIENT
Start: 2022-10-07 | End: 2022-11-18 | Stop reason: HOSPADM

## 2022-10-07 RX ORDER — MELOXICAM 7.5 MG/1
7.5 TABLET ORAL DAILY
Qty: 14 TABLET | Refills: 0 | Status: ON HOLD | OUTPATIENT
Start: 2022-10-07 | End: 2022-11-18 | Stop reason: HOSPADM

## 2022-10-07 RX ORDER — HYDROMORPHONE HYDROCHLORIDE 2 MG/ML
1 INJECTION, SOLUTION INTRAMUSCULAR; INTRAVENOUS; SUBCUTANEOUS
Status: COMPLETED | OUTPATIENT
Start: 2022-10-07 | End: 2022-10-07

## 2022-10-07 RX ORDER — ONDANSETRON 2 MG/ML
4 INJECTION INTRAMUSCULAR; INTRAVENOUS
Status: COMPLETED | OUTPATIENT
Start: 2022-10-07 | End: 2022-10-07

## 2022-10-07 RX ORDER — MORPHINE SULFATE 4 MG/ML
4 INJECTION, SOLUTION INTRAMUSCULAR; INTRAVENOUS
Status: COMPLETED | OUTPATIENT
Start: 2022-10-07 | End: 2022-10-07

## 2022-10-07 RX ORDER — FLUTICASONE FUROATE, UMECLIDINIUM BROMIDE AND VILANTEROL TRIFENATATE 100; 62.5; 25 UG/1; UG/1; UG/1
1 POWDER RESPIRATORY (INHALATION) DAILY
COMMUNITY
Start: 2022-06-15 | End: 2022-10-07 | Stop reason: SDUPTHER

## 2022-10-07 RX ADMIN — MORPHINE SULFATE 4 MG: 4 INJECTION, SOLUTION INTRAMUSCULAR; INTRAVENOUS at 09:10

## 2022-10-07 RX ADMIN — ONDANSETRON 4 MG: 2 INJECTION INTRAMUSCULAR; INTRAVENOUS at 09:10

## 2022-10-07 RX ADMIN — HYDROMORPHONE HYDROCHLORIDE 1 MG: 2 INJECTION INTRAMUSCULAR; INTRAVENOUS; SUBCUTANEOUS at 12:10

## 2022-10-07 NOTE — DISCHARGE INSTRUCTIONS
Take medications as prescribed.  If the Mobic starts causing stomach upset or dark or bloody stools, stop taking it.  Follow up with Dr. Cronin.  You should receive a phone call within the next several days regarding an appointment.  Return here for any worsening signs or symptoms.

## 2022-10-07 NOTE — ED PROVIDER NOTES
Encounter Date: 10/7/2022       History     Chief Complaint   Patient presents with    Shoulder Pain     L shoulder pain x2 weeks. States that her daughter tried popping her back with pt laying on floor. When the back popped, she also felt something anteriorly pop. Now she is experiencing swelling and pain from the left shoulder down into her hands.    Shortness of Breath     For several days. Hx of copd.     Patient is an 82-year-old female brought from home by family for evaluation and treatment of left shoulder pain, with swelling of the left arm.  Patient also complains of mild shortness of breath.  She has a history of COPD.  She denies any history arthritis or previous shoulder pain.  She states her symptoms shoulder pain started about 2 weeks ago with pain near the left scapula.  She states that since then the pain has spread to the entire left shoulder joint, and extends down through the left arm into the hand.  The hand and wrist her mildly edematous.  She denies fever or chills.  She has been taking a variety of over-the-counter medications without relief.    Review of patient's allergies indicates:   Allergen Reactions    Codeine Nausea Only     Happened 50 years ago      Past Medical History:   Diagnosis Date    Bronchitis     COPD (chronic obstructive pulmonary disease)     PONV (postoperative nausea and vomiting)      Past Surgical History:   Procedure Laterality Date    COSMETIC SURGERY  2012    bilateral eye lift    HYSTERECTOMY      OPEN REDUCTION AND INTERNAL FIXATION (ORIF) OF FRACTURE OF FEMUR USING DYNAMIC HIP SCREW Left 7/3/2019    Procedure: ORIF, HIP, USING DYNAMIC HIP SCREW;  Surgeon: Juan Jose Gunn MD;  Location: [facility];  Service: Orthopedics;  Laterality: Left;     History reviewed. No pertinent family history.  Social History     Tobacco Use    Smoking status: Every Day     Packs/day: 1.00     Types: Cigarettes    Smokeless tobacco: Never   Substance Use Topics    Alcohol use: Yes    Drug  use: No     Review of Systems   Constitutional: Negative.    HENT: Negative.     Eyes: Negative.    Respiratory:  Positive for shortness of breath. Negative for cough.    Cardiovascular: Negative.    Gastrointestinal: Negative.    Endocrine: Negative.    Genitourinary: Negative.    Musculoskeletal:  Positive for arthralgias (Left shoulder and left arm pain).   Neurological: Negative.    Psychiatric/Behavioral: Negative.       Physical Exam     Initial Vitals [10/07/22 0857]   BP Pulse Resp Temp SpO2   (!) 202/96 82 18 98.3 °F (36.8 °C) 95 %      MAP       --         Physical Exam    Nursing note and vitals reviewed.  Constitutional: She appears well-developed and well-nourished. She is not diaphoretic. No distress.   HENT:   Head: Normocephalic and atraumatic.   Nose: Nose normal.   Mouth/Throat: Oropharynx is clear and moist. No oropharyngeal exudate.   Eyes: Conjunctivae and EOM are normal. Pupils are equal, round, and reactive to light. No scleral icterus.   Neck: Neck supple. No JVD present.   Normal range of motion.  Cardiovascular:  Normal rate, regular rhythm, normal heart sounds and intact distal pulses.     Exam reveals no gallop.       No murmur heard.  Pulmonary/Chest: Breath sounds normal. No stridor. No respiratory distress. She has no wheezes. She has no rhonchi. She has no rales. She exhibits no tenderness.   Abdominal: Abdomen is soft. Bowel sounds are normal. She exhibits no distension. There is no abdominal tenderness.   Musculoskeletal:         General: No tenderness or edema. Normal range of motion.      Cervical back: Normal range of motion and neck supple.     Neurological: She is alert and oriented to person, place, and time. She has normal strength and normal reflexes. No cranial nerve deficit or sensory deficit. GCS score is 15. GCS eye subscore is 4. GCS verbal subscore is 5. GCS motor subscore is 6.   Skin: Skin is warm and dry. Capillary refill takes less than 2 seconds. No rash noted. No  erythema.   Psychiatric: She has a normal mood and affect. Her behavior is normal.       ED Course   Procedures  Labs Reviewed   CBC W/ AUTO DIFFERENTIAL - Abnormal; Notable for the following components:       Result Value    WBC 15.00 (*)     RDW 14.6 (*)     Gran # (ANC) 11.1 (*)     Immature Grans (Abs) 0.05 (*)     Mono # 1.6 (*)     Gran % 74.3 (*)     Lymph % 13.4 (*)     All other components within normal limits   COMPREHENSIVE METABOLIC PANEL - Abnormal; Notable for the following components:    CO2 22 (*)     All other components within normal limits   D DIMER, QUANTITATIVE - Abnormal; Notable for the following components:    D-Dimer 1.05 (*)     All other components within normal limits   TROPONIN I   B-TYPE NATRIURETIC PEPTIDE   PROTIME-INR   D DIMER, QUANTITATIVE     EKG Readings: (Independently Interpreted)   EKG personally reviewed by me shows sinus rhythm with occasional PVCs, 78 beats per minute.  Nonspecific ST changes, TN interval 112, .  No obvious ST depressions or elevations, no arrhythmia.   ECG Results              EKG 12-lead (Final result)  Result time 10/07/22 11:57:15      Final result by Interface, Lab In [facility] (10/07/22 11:57:15)                   Narrative:    Test Reason : R06.02,    Vent. Rate : 078 BPM     Atrial Rate : 078 BPM     P-R Int : 112 ms          QRS Dur : 088 ms      QT Int : 358 ms       P-R-T Axes : -25 022 004 degrees     QTc Int : 408 ms    Sinus rhythm with occasional Premature ventricular complexes  Nonspecific ST abnormality  Abnormal ECG  When compared with ECG of 02-JUL-2019 13:52,  PVC no longer noted.  T wave inversion now evident in Inferior leads  Nonspecific T wave abnormality no longer evident in Anterior leads  Confirmed by Kvng Pelletier MD ([id number]) on 10/7/2022 11:57:02 AM    Referred By:             Confirmed By:Kvng Pelletier MD                                  Imaging Results              US Upper Extremity Veins Left (Final result)  Result time 10/07/22  12:59:17      Final result by Yousuf Edmondson MD (10/07/22 12:59:17)                   Impression:      No thrombus in central veins of the left upper extremity.    Complicated left shoulder effusion.      Electronically signed by: Yousuf Edmondson  Date:    10/07/2022  Time:    12:59               Narrative:    EXAMINATION:  US UPPER EXTREMITY VEINS LEFT    CLINICAL HISTORY:  Left arm edema;    TECHNIQUE:  Duplex and color flow Doppler evaluation and dynamic compression was performed of the left upper extremity veins.    COMPARISON:  10/07/2022.    FINDINGS:  Central veins: The internal jugular, subclavian, and axillary veins are patent and free of thrombus.    Arm veins: The brachial, basilic, and cephalic veins are patent and compressible.    Contralateral subclavian/internal jugular veins: The right subclavian and internal jugular veins are patent and free of thrombus.    Other findings: Moderate complicated effusion of the left shoulder.                                       X-Ray Shoulder 2 or More Views Left (Final result)  Result time 10/07/22 09:50:14      Final result by Yanni Pringle MD (10/07/22 09:50:14)                   Impression:      No acute bony abnormality.  Degenerative changes at the AC joint with overlying soft tissue swelling.  Findings suggestive of calcific tendinitis of the rotator cuff tendons.  Follow-up MRI recommended on a nonemergent basis.      Electronically signed by: Yanni Pringle  Date:    10/07/2022  Time:    09:50               Narrative:    EXAMINATION:  XR SHOULDER COMPLETE 2 OR MORE VIEWS LEFT    CLINICAL HISTORY:  Left shoulder pain;    TECHNIQUE:  Two or three views of the left shoulder were performed.    COMPARISON:  None    FINDINGS:  Three views of the left shoulder demonstrate no fracture or dislocation at the acromioclavicular or glenohumeral joint.  There are moderate osteoarthritic changes at the AC joint, and overlying soft tissue swelling is noted.   There is amorphous calcification superior to the humeral head, suggesting calcific tendinitis of the rotator cuff tendons.                                       X-Ray Chest AP Portable (Final result)  Result time 10/07/22 09:07:14      Final result by Yanni Pringle MD (10/07/22 09:07:14)                   Impression:      Pleural thickening versus trace pleural effusions.  Otherwise, unchanged.      Electronically signed by: Yanni Pringle  Date:    10/07/2022  Time:    09:07               Narrative:    EXAMINATION:  XR CHEST AP PORTABLE    CLINICAL HISTORY:  sob;    TECHNIQUE:  Single frontal view of the chest was performed.    COMPARISON:  07/02/2019    FINDINGS:  Heart size normal.  Aorta calcified and ectatic.  Lungs clear.  Extensive artifacts from calcified costochondral cartilage.  Mild biapical pleural thickening.  Slight blunting of the lateral costophrenic sulci is more pronounced compared to the previous study, pleural thickening versus trace pleural effusions.                                    X-Rays:   Independently Interpreted Readings:   Other Readings:  X-ray left shoulder personally reviewed by me shows no evidence of fracture or dislocation.  There does appear to be calcification of the tendons.    Ultrasound of the left upper extremity, personally reviewed by me, reveals no evidence of DVT.  Medications   morphine injection 4 mg (4 mg Intravenous Given 10/7/22 0916)   ondansetron injection 4 mg (4 mg Intravenous Given 10/7/22 0916)   HYDROmorphone (PF) injection 1 mg (1 mg Intravenous Given 10/7/22 1251)     Medical Decision Making:   Differential Diagnosis:   Bursitis, gout, osteoarthritis, tendinitis, rotator cuff injury, DVT, etc.  ED Management:  White blood cell count was elevated at [address], the patient has no fever, and there is no excess warmth surrounding the joint.  Unlikely to be a septic joint.  I called Dr. Cronin and talked to him about this and he agreed that this was  very unlikely to be a septic joint.  He recommended treatment with anti-inflammatories and follow up with him in the office.  Patient has been referred to him.  We will discharge patient with prescription for Mobic, Zofran, Norco, and a refill for her Trelegy.  She states that she has run out of Trelegy and with her shoulder issue, she has not been able to see her primary care provider.                         Clinical Impression:   Final diagnoses:  [R06.02] SOB (shortness of breath)  [M75.32] Calcific tendinitis of left shoulder region (Primary)  [J44.9] Chronic obstructive pulmonary disease, unspecified COPD type  [Z76.0] Medication refill        ED Disposition Condition    Discharge Stable          ED Prescriptions       Medication Sig Dispense Start Date End Date Auth. Provider    TRELEGY ELLIPTA 100-62.5-25 mcg DsDv Take 1 puff by mouth once daily. 1 each 10/7/2022 -- Mars Vela MD    meloxicam (MOBIC) 7.5 MG tablet Take 1 tablet (7.5 mg total) by mouth once daily. 14 tablet 10/7/2022 -- Mars Vela MD    HYDROcodone-acetaminophen (NORCO) 5-325 mg per tablet Take 1 tablet by mouth every 6 (six) hours as needed for Pain. 8 tablet 10/7/2022 -- Mars Vela MD    ondansetron (ZOFRAN-ODT) 4 MG TbDL Take 1 tablet (4 mg total) by mouth every 6 (six) hours as needed (nausea). 12 tablet 10/7/2022 -- Mars Vela MD          Follow-up Information    None          Mars Vela MD  10/07/22 6441

## 2022-11-14 ENCOUNTER — HOSPITAL ENCOUNTER (INPATIENT)
Facility: HOSPITAL | Age: 82
LOS: 3 days | Discharge: HOME HEALTH | DRG: 501 | End: 2022-11-18
Attending: ORTHOPAEDIC SURGERY | Admitting: STUDENT IN AN ORGANIZED HEALTH CARE EDUCATION/TRAINING PROGRAM
Payer: MEDICARE

## 2022-11-14 ENCOUNTER — ANESTHESIA (OUTPATIENT)
Dept: SURGERY | Facility: HOSPITAL | Age: 82
DRG: 501 | End: 2022-11-14
Payer: MEDICARE

## 2022-11-14 ENCOUNTER — ANESTHESIA EVENT (OUTPATIENT)
Dept: SURGERY | Facility: HOSPITAL | Age: 82
DRG: 501 | End: 2022-11-14
Payer: MEDICARE
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
## 2022-11-14 LAB
ALBUMIN SERPL BCP-MCNC: 3.8 G/DL (ref 3.5–5.2)
ALP SERPL-CCNC: 69 U/L (ref 55–135)
ALT SERPL W/O P-5'-P-CCNC: 13 U/L (ref 10–44)
ANION GAP SERPL CALC-SCNC: 12 MMOL/L (ref 8–16)
APTT BLDCRRT: 25.7 SEC (ref 21–32)
AST SERPL-CCNC: 28 U/L (ref 10–40)
BASOPHILS # BLD AUTO: 0.16 K/UL (ref 0–0.2)
BASOPHILS NFR BLD: 0.8 % (ref 0–1.9)
BILIRUB SERPL-MCNC: 0.4 MG/DL (ref 0.1–1)
BUN SERPL-MCNC: 20 MG/DL (ref 8–23)
CALCIUM SERPL-MCNC: 9.5 MG/DL (ref 8.7–10.5)
CHLORIDE SERPL-SCNC: 104 MMOL/L (ref 95–110)
CO2 SERPL-SCNC: 24 MMOL/L (ref 23–29)
CREAT SERPL-MCNC: 0.7 MG/DL (ref 0.5–1.4)
DIFFERENTIAL METHOD: ABNORMAL
EOSINOPHIL # BLD AUTO: 0 K/UL (ref 0–0.5)
EOSINOPHIL NFR BLD: 0 % (ref 0–8)
ERYTHROCYTE [DISTWIDTH] IN BLOOD BY AUTOMATED COUNT: 14.6 % (ref 11.5–14.5)
EST. GFR  (NO RACE VARIABLE): >60 ML/MIN/1.73 M^2
GLUCOSE SERPL-MCNC: 111 MG/DL (ref 70–110)
HCT VFR BLD AUTO: 41.1 % (ref 37–48.5)
HGB BLD-MCNC: 13.4 G/DL (ref 12–16)
IMM GRANULOCYTES # BLD AUTO: 0.09 K/UL (ref 0–0.04)
IMM GRANULOCYTES NFR BLD AUTO: 0.4 % (ref 0–0.5)
INR PPP: 1 (ref 0.8–1.2)
LYMPHOCYTES # BLD AUTO: 1.4 K/UL (ref 1–4.8)
LYMPHOCYTES NFR BLD: 6.8 % (ref 18–48)
MCH RBC QN AUTO: 29.5 PG (ref 27–31)
MCHC RBC AUTO-ENTMCNC: 32.6 G/DL (ref 32–36)
MCV RBC AUTO: 90 FL (ref 82–98)
MONOCYTES # BLD AUTO: 1.9 K/UL (ref 0.3–1)
MONOCYTES NFR BLD: 9.4 % (ref 4–15)
NEUTROPHILS # BLD AUTO: 16.6 K/UL (ref 1.8–7.7)
NEUTROPHILS NFR BLD: 82.6 % (ref 38–73)
NRBC BLD-RTO: 0 /100 WBC
PLATELET # BLD AUTO: 347 K/UL (ref 150–450)
PMV BLD AUTO: 10.8 FL (ref 9.2–12.9)
POTASSIUM SERPL-SCNC: 3.8 MMOL/L (ref 3.5–5.1)
PROT SERPL-MCNC: 6.6 G/DL (ref 6–8.4)
PROTHROMBIN TIME: 10.9 SEC (ref 9–12.5)
RBC # BLD AUTO: 4.55 M/UL (ref 4–5.4)
SODIUM SERPL-SCNC: 140 MMOL/L (ref 136–145)
WBC # BLD AUTO: 20.05 K/UL (ref 3.9–12.7)

## 2022-11-14 PROCEDURE — 90714 TD VACC NO PRESV 7 YRS+ IM: CPT | Performed by: NURSE PRACTITIONER

## 2022-11-14 PROCEDURE — 73090 XR FOREARM RIGHT: ICD-10-PCS | Mod: 26,RT,, | Performed by: RADIOLOGY

## 2022-11-14 PROCEDURE — 73090 X-RAY EXAM OF FOREARM: CPT | Mod: 26,RT,, | Performed by: RADIOLOGY

## 2022-11-14 PROCEDURE — 85025 COMPLETE CBC W/AUTO DIFF WBC: CPT | Performed by: NURSE PRACTITIONER

## 2022-11-14 PROCEDURE — 90471 IMMUNIZATION ADMIN: CPT | Performed by: NURSE PRACTITIONER

## 2022-11-14 PROCEDURE — 73090 X-RAY EXAM OF FOREARM: CPT | Mod: 26,LT,, | Performed by: RADIOLOGY

## 2022-11-14 PROCEDURE — 63600175 PHARM REV CODE 636 W HCPCS: Mod: JG | Performed by: NURSE PRACTITIONER

## 2022-11-14 PROCEDURE — 73090 X-RAY EXAM OF FOREARM: CPT | Mod: TC,RT

## 2022-11-14 PROCEDURE — 96365 THER/PROPH/DIAG IV INF INIT: CPT

## 2022-11-14 PROCEDURE — 96366 THER/PROPH/DIAG IV INF ADDON: CPT

## 2022-11-14 PROCEDURE — G0378 HOSPITAL OBSERVATION PER HR: HCPCS

## 2022-11-14 PROCEDURE — 90472 IMMUNIZATION ADMIN EACH ADD: CPT | Performed by: NURSE PRACTITIONER

## 2022-11-14 PROCEDURE — 63600175 PHARM REV CODE 636 W HCPCS: Performed by: ORTHOPAEDIC SURGERY

## 2022-11-14 PROCEDURE — 80053 COMPREHEN METABOLIC PANEL: CPT | Performed by: NURSE PRACTITIONER

## 2022-11-14 PROCEDURE — 90675 RABIES VACCINE IM: CPT | Mod: JG | Performed by: NURSE PRACTITIONER

## 2022-11-14 PROCEDURE — 85730 THROMBOPLASTIN TIME PARTIAL: CPT | Performed by: NURSE PRACTITIONER

## 2022-11-14 PROCEDURE — 85610 PROTHROMBIN TIME: CPT | Performed by: NURSE PRACTITIONER

## 2022-11-14 PROCEDURE — 73090 X-RAY EXAM OF FOREARM: CPT | Mod: TC,LT

## 2022-11-14 PROCEDURE — 99285 EMERGENCY DEPT VISIT HI MDM: CPT | Mod: 25

## 2022-11-14 PROCEDURE — 63600175 PHARM REV CODE 636 W HCPCS: Performed by: NURSE PRACTITIONER

## 2022-11-14 PROCEDURE — 96375 TX/PRO/DX INJ NEW DRUG ADDON: CPT

## 2022-11-14 RX ORDER — CEFAZOLIN SODIUM 1 G/3ML
1 INJECTION, POWDER, FOR SOLUTION INTRAMUSCULAR; INTRAVENOUS
Status: DISCONTINUED | OUTPATIENT
Start: 2022-11-14 | End: 2022-11-14

## 2022-11-14 RX ORDER — MEPERIDINE HYDROCHLORIDE 50 MG/ML
25 INJECTION INTRAMUSCULAR; INTRAVENOUS; SUBCUTANEOUS
Status: COMPLETED | OUTPATIENT
Start: 2022-11-14 | End: 2022-11-14

## 2022-11-14 RX ORDER — CEFAZOLIN SODIUM 1 G/50ML
1 SOLUTION INTRAVENOUS
Status: COMPLETED | OUTPATIENT
Start: 2022-11-14 | End: 2022-11-14

## 2022-11-14 RX ORDER — SODIUM CHLORIDE, SODIUM LACTATE, POTASSIUM CHLORIDE, CALCIUM CHLORIDE 600; 310; 30; 20 MG/100ML; MG/100ML; MG/100ML; MG/100ML
INJECTION, SOLUTION INTRAVENOUS CONTINUOUS
Status: CANCELLED | OUTPATIENT
Start: 2022-11-14

## 2022-11-14 RX ORDER — FAMOTIDINE 10 MG/ML
20 INJECTION INTRAVENOUS ONCE
Status: CANCELLED | OUTPATIENT
Start: 2022-11-14 | End: 2022-11-14

## 2022-11-14 RX ORDER — LIDOCAINE HYDROCHLORIDE 10 MG/ML
1 INJECTION, SOLUTION EPIDURAL; INFILTRATION; INTRACAUDAL; PERINEURAL ONCE
Status: CANCELLED | OUTPATIENT
Start: 2022-11-14 | End: 2022-11-14

## 2022-11-14 RX ADMIN — RABIES VACCINE 2.5 UNITS: KIT at 08:11

## 2022-11-14 RX ADMIN — TETANUS AND DIPHTHERIA TOXOIDS ADSORBED 0.5 ML: 2; 2 INJECTION INTRAMUSCULAR at 06:11

## 2022-11-14 RX ADMIN — CEFAZOLIN SODIUM 1 G: 1 SOLUTION INTRAVENOUS at 08:11

## 2022-11-14 RX ADMIN — MEPERIDINE HYDROCHLORIDE 25 MG: 50 INJECTION INTRAMUSCULAR; INTRAVENOUS; SUBCUTANEOUS at 05:11
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
## 2022-11-15 LAB
ALBUMIN SERPL BCP-MCNC: 3.3 G/DL (ref 3.5–5.2)
ALP SERPL-CCNC: 70 U/L (ref 55–135)
ALT SERPL W/O P-5'-P-CCNC: 12 U/L (ref 10–44)
ANION GAP SERPL CALC-SCNC: 10 MMOL/L (ref 8–16)
AST SERPL-CCNC: 29 U/L (ref 10–40)
BASOPHILS # BLD AUTO: 0.16 K/UL (ref 0–0.2)
BASOPHILS NFR BLD: 1.1 % (ref 0–1.9)
BILIRUB SERPL-MCNC: 0.7 MG/DL (ref 0.1–1)
BUN SERPL-MCNC: 13 MG/DL (ref 8–23)
CALCIUM SERPL-MCNC: 8.6 MG/DL (ref 8.7–10.5)
CHLORIDE SERPL-SCNC: 105 MMOL/L (ref 95–110)
CO2 SERPL-SCNC: 24 MMOL/L (ref 23–29)
CREAT SERPL-MCNC: 0.6 MG/DL (ref 0.5–1.4)
DIFFERENTIAL METHOD: ABNORMAL
EOSINOPHIL # BLD AUTO: 0 K/UL (ref 0–0.5)
EOSINOPHIL NFR BLD: 0.2 % (ref 0–8)
ERYTHROCYTE [DISTWIDTH] IN BLOOD BY AUTOMATED COUNT: 14.9 % (ref 11.5–14.5)
EST. GFR  (NO RACE VARIABLE): >60 ML/MIN/1.73 M^2
GLUCOSE SERPL-MCNC: 103 MG/DL (ref 70–110)
HCT VFR BLD AUTO: 37.7 % (ref 37–48.5)
HGB BLD-MCNC: 12.2 G/DL (ref 12–16)
IMM GRANULOCYTES # BLD AUTO: 0.06 K/UL (ref 0–0.04)
IMM GRANULOCYTES NFR BLD AUTO: 0.4 % (ref 0–0.5)
LYMPHOCYTES # BLD AUTO: 2.9 K/UL (ref 1–4.8)
LYMPHOCYTES NFR BLD: 19.4 % (ref 18–48)
MAGNESIUM SERPL-MCNC: 1.7 MG/DL (ref 1.6–2.6)
MCH RBC QN AUTO: 29.5 PG (ref 27–31)
MCHC RBC AUTO-ENTMCNC: 32.4 G/DL (ref 32–36)
MCV RBC AUTO: 91 FL (ref 82–98)
MONOCYTES # BLD AUTO: 1.6 K/UL (ref 0.3–1)
MONOCYTES NFR BLD: 10.6 % (ref 4–15)
NEUTROPHILS # BLD AUTO: 10.1 K/UL (ref 1.8–7.7)
NEUTROPHILS NFR BLD: 68.3 % (ref 38–73)
NRBC BLD-RTO: 0 /100 WBC
PHOSPHATE SERPL-MCNC: 4 MG/DL (ref 2.7–4.5)
PLATELET # BLD AUTO: 306 K/UL (ref 150–450)
PMV BLD AUTO: 11.3 FL (ref 9.2–12.9)
POTASSIUM SERPL-SCNC: 3.8 MMOL/L (ref 3.5–5.1)
PROT SERPL-MCNC: 6.1 G/DL (ref 6–8.4)
RBC # BLD AUTO: 4.13 M/UL (ref 4–5.4)
SODIUM SERPL-SCNC: 139 MMOL/L (ref 136–145)
WBC # BLD AUTO: 14.85 K/UL (ref 3.9–12.7)

## 2022-11-15 PROCEDURE — 63600175 PHARM REV CODE 636 W HCPCS: Performed by: STUDENT IN AN ORGANIZED HEALTH CARE EDUCATION/TRAINING PROGRAM

## 2022-11-15 PROCEDURE — 80053 COMPREHEN METABOLIC PANEL: CPT | Performed by: HOSPITALIST

## 2022-11-15 PROCEDURE — D9220A PRA ANESTHESIA: ICD-10-PCS | Mod: ANES,,, | Performed by: ANESTHESIOLOGY

## 2022-11-15 PROCEDURE — 63600175 PHARM REV CODE 636 W HCPCS: Performed by: HOSPITALIST

## 2022-11-15 PROCEDURE — 63600175 PHARM REV CODE 636 W HCPCS: Mod: JG | Performed by: NURSE PRACTITIONER

## 2022-11-15 PROCEDURE — 27000221 HC OXYGEN, UP TO 24 HOURS

## 2022-11-15 PROCEDURE — 99223 PR INITIAL HOSPITAL CARE,LEVL III: ICD-10-PCS | Mod: 57,,, | Performed by: ORTHOPAEDIC SURGERY

## 2022-11-15 PROCEDURE — 25000003 PHARM REV CODE 250: Performed by: STUDENT IN AN ORGANIZED HEALTH CARE EDUCATION/TRAINING PROGRAM

## 2022-11-15 PROCEDURE — 25000003 PHARM REV CODE 250: Performed by: NURSE ANESTHETIST, CERTIFIED REGISTERED

## 2022-11-15 PROCEDURE — 71000033 HC RECOVERY, INTIAL HOUR: Performed by: ORTHOPAEDIC SURGERY

## 2022-11-15 PROCEDURE — 94640 AIRWAY INHALATION TREATMENT: CPT

## 2022-11-15 PROCEDURE — 12042 INTMD RPR N-HF/GENIT2.6-7.5: CPT | Mod: 59,51,, | Performed by: ORTHOPAEDIC SURGERY

## 2022-11-15 PROCEDURE — 85025 COMPLETE CBC W/AUTO DIFF WBC: CPT | Performed by: HOSPITALIST

## 2022-11-15 PROCEDURE — 25000242 PHARM REV CODE 250 ALT 637 W/ HCPCS: Performed by: HOSPITALIST

## 2022-11-15 PROCEDURE — 25000003 PHARM REV CODE 250: Performed by: HOSPITALIST

## 2022-11-15 PROCEDURE — 36000706: Performed by: ORTHOPAEDIC SURGERY

## 2022-11-15 PROCEDURE — 37000009 HC ANESTHESIA EA ADD 15 MINS: Performed by: ORTHOPAEDIC SURGERY

## 2022-11-15 PROCEDURE — 99223 1ST HOSP IP/OBS HIGH 75: CPT | Mod: 57,,, | Performed by: ORTHOPAEDIC SURGERY

## 2022-11-15 PROCEDURE — 36415 COLL VENOUS BLD VENIPUNCTURE: CPT | Performed by: HOSPITALIST

## 2022-11-15 PROCEDURE — 37000008 HC ANESTHESIA 1ST 15 MINUTES: Performed by: ORTHOPAEDIC SURGERY

## 2022-11-15 PROCEDURE — C1729 CATH, DRAINAGE: HCPCS | Performed by: ORTHOPAEDIC SURGERY

## 2022-11-15 PROCEDURE — 83735 ASSAY OF MAGNESIUM: CPT | Performed by: HOSPITALIST

## 2022-11-15 PROCEDURE — 36000707: Performed by: ORTHOPAEDIC SURGERY

## 2022-11-15 PROCEDURE — 90375 RABIES IG IM/SC: CPT | Mod: JG | Performed by: NURSE PRACTITIONER

## 2022-11-15 PROCEDURE — 63600175 PHARM REV CODE 636 W HCPCS: Performed by: NURSE ANESTHETIST, CERTIFIED REGISTERED

## 2022-11-15 PROCEDURE — 25000003 PHARM REV CODE 250: Performed by: ORTHOPAEDIC SURGERY

## 2022-11-15 PROCEDURE — 25270 PR REFOREARM TEND/MUSC,EXTEN,PRIM,EA: ICD-10-PCS | Mod: RT,,, | Performed by: ORTHOPAEDIC SURGERY

## 2022-11-15 PROCEDURE — D9220A PRA ANESTHESIA: Mod: CRNA,,, | Performed by: NURSE ANESTHETIST, CERTIFIED REGISTERED

## 2022-11-15 PROCEDURE — 25000242 PHARM REV CODE 250 ALT 637 W/ HCPCS: Performed by: STUDENT IN AN ORGANIZED HEALTH CARE EDUCATION/TRAINING PROGRAM

## 2022-11-15 PROCEDURE — 99223 1ST HOSP IP/OBS HIGH 75: CPT | Mod: AI,,, | Performed by: STUDENT IN AN ORGANIZED HEALTH CARE EDUCATION/TRAINING PROGRAM

## 2022-11-15 PROCEDURE — D9220A PRA ANESTHESIA: Mod: ANES,,, | Performed by: ANESTHESIOLOGY

## 2022-11-15 PROCEDURE — 25270 REPAIR FOREARM TENDON/MUSCLE: CPT | Mod: RT,,, | Performed by: ORTHOPAEDIC SURGERY

## 2022-11-15 PROCEDURE — D9220A PRA ANESTHESIA: ICD-10-PCS | Mod: CRNA,,, | Performed by: NURSE ANESTHETIST, CERTIFIED REGISTERED

## 2022-11-15 PROCEDURE — 11000001 HC ACUTE MED/SURG PRIVATE ROOM

## 2022-11-15 PROCEDURE — 12042 PR LAYR CLOS WND REST BODY 2.6-7.5 CM: ICD-10-PCS | Mod: 59,51,, | Performed by: ORTHOPAEDIC SURGERY

## 2022-11-15 PROCEDURE — 84100 ASSAY OF PHOSPHORUS: CPT | Performed by: HOSPITALIST

## 2022-11-15 PROCEDURE — 99223 PR INITIAL HOSPITAL CARE,LEVL III: ICD-10-PCS | Mod: AI,,, | Performed by: STUDENT IN AN ORGANIZED HEALTH CARE EDUCATION/TRAINING PROGRAM

## 2022-11-15 RX ORDER — MUPIROCIN 20 MG/G
OINTMENT TOPICAL 2 TIMES DAILY
Status: DISCONTINUED | OUTPATIENT
Start: 2022-11-15 | End: 2022-11-18 | Stop reason: HOSPADM

## 2022-11-15 RX ORDER — GLUCAGON 1 MG
1 KIT INJECTION
Status: DISCONTINUED | OUTPATIENT
Start: 2022-11-15 | End: 2022-11-18 | Stop reason: HOSPADM

## 2022-11-15 RX ORDER — DIPHENHYDRAMINE HYDROCHLORIDE 50 MG/ML
12.5 INJECTION INTRAMUSCULAR; INTRAVENOUS
Status: DISCONTINUED | OUTPATIENT
Start: 2022-11-15 | End: 2022-11-15

## 2022-11-15 RX ORDER — SODIUM CHLORIDE 0.9 % (FLUSH) 0.9 %
10 SYRINGE (ML) INJECTION
Status: DISCONTINUED | OUTPATIENT
Start: 2022-11-15 | End: 2022-11-18 | Stop reason: HOSPADM

## 2022-11-15 RX ORDER — IBUPROFEN 200 MG
16 TABLET ORAL
Status: DISCONTINUED | OUTPATIENT
Start: 2022-11-15 | End: 2022-11-18 | Stop reason: HOSPADM

## 2022-11-15 RX ORDER — CLINDAMYCIN PHOSPHATE 600 MG/50ML
600 INJECTION, SOLUTION INTRAVENOUS
Status: DISCONTINUED | OUTPATIENT
Start: 2022-11-15 | End: 2022-11-15

## 2022-11-15 RX ORDER — MORPHINE SULFATE 4 MG/ML
6 INJECTION, SOLUTION INTRAMUSCULAR; INTRAVENOUS
Status: DISCONTINUED | OUTPATIENT
Start: 2022-11-15 | End: 2022-11-15

## 2022-11-15 RX ORDER — IPRATROPIUM BROMIDE 0.5 MG/2.5ML
0.5 SOLUTION RESPIRATORY (INHALATION) EVERY 6 HOURS
Status: DISCONTINUED | OUTPATIENT
Start: 2022-11-15 | End: 2022-11-15

## 2022-11-15 RX ORDER — SODIUM CHLORIDE, SODIUM LACTATE, POTASSIUM CHLORIDE, CALCIUM CHLORIDE 600; 310; 30; 20 MG/100ML; MG/100ML; MG/100ML; MG/100ML
125 INJECTION, SOLUTION INTRAVENOUS CONTINUOUS
Status: DISCONTINUED | OUTPATIENT
Start: 2022-11-15 | End: 2022-11-15

## 2022-11-15 RX ORDER — MORPHINE SULFATE 10 MG/ML
INJECTION INTRAMUSCULAR; INTRAVENOUS; SUBCUTANEOUS
Status: DISCONTINUED | OUTPATIENT
Start: 2022-11-15 | End: 2022-11-15

## 2022-11-15 RX ORDER — ENOXAPARIN SODIUM 100 MG/ML
40 INJECTION SUBCUTANEOUS EVERY 24 HOURS
Status: DISCONTINUED | OUTPATIENT
Start: 2022-11-15 | End: 2022-11-18 | Stop reason: HOSPADM

## 2022-11-15 RX ORDER — NALOXONE HCL 0.4 MG/ML
0.02 VIAL (ML) INJECTION
Status: DISCONTINUED | OUTPATIENT
Start: 2022-11-15 | End: 2022-11-18 | Stop reason: HOSPADM

## 2022-11-15 RX ORDER — IPRATROPIUM BROMIDE 0.5 MG/2.5ML
SOLUTION RESPIRATORY (INHALATION)
Status: COMPLETED
Start: 2022-11-15 | End: 2022-11-15

## 2022-11-15 RX ORDER — IPRATROPIUM BROMIDE AND ALBUTEROL SULFATE 2.5; .5 MG/3ML; MG/3ML
3 SOLUTION RESPIRATORY (INHALATION) EVERY 4 HOURS PRN
Status: DISCONTINUED | OUTPATIENT
Start: 2022-11-15 | End: 2022-11-18 | Stop reason: HOSPADM

## 2022-11-15 RX ORDER — PROPOFOL 10 MG/ML
VIAL (ML) INTRAVENOUS
Status: DISCONTINUED | OUTPATIENT
Start: 2022-11-15 | End: 2022-11-15

## 2022-11-15 RX ORDER — HYDROCODONE BITARTRATE AND ACETAMINOPHEN 10; 325 MG/1; MG/1
1 TABLET ORAL EVERY 6 HOURS PRN
Status: DISCONTINUED | OUTPATIENT
Start: 2022-11-15 | End: 2022-11-18 | Stop reason: HOSPADM

## 2022-11-15 RX ORDER — ACETAMINOPHEN 325 MG/1
650 TABLET ORAL EVERY 4 HOURS PRN
Status: DISCONTINUED | OUTPATIENT
Start: 2022-11-15 | End: 2022-11-18 | Stop reason: HOSPADM

## 2022-11-15 RX ORDER — ALBUTEROL SULFATE 90 UG/1
2 AEROSOL, METERED RESPIRATORY (INHALATION) EVERY 6 HOURS PRN
Status: DISCONTINUED | OUTPATIENT
Start: 2022-11-15 | End: 2022-11-18 | Stop reason: HOSPADM

## 2022-11-15 RX ORDER — ONDANSETRON 2 MG/ML
4 INJECTION INTRAMUSCULAR; INTRAVENOUS DAILY PRN
Status: DISCONTINUED | OUTPATIENT
Start: 2022-11-15 | End: 2022-11-15

## 2022-11-15 RX ORDER — ONDANSETRON 2 MG/ML
4 INJECTION INTRAMUSCULAR; INTRAVENOUS EVERY 8 HOURS PRN
Status: DISCONTINUED | OUTPATIENT
Start: 2022-11-15 | End: 2022-11-18 | Stop reason: HOSPADM

## 2022-11-15 RX ORDER — MIDAZOLAM HYDROCHLORIDE 1 MG/ML
INJECTION, SOLUTION INTRAMUSCULAR; INTRAVENOUS
Status: DISCONTINUED | OUTPATIENT
Start: 2022-11-15 | End: 2022-11-15

## 2022-11-15 RX ORDER — ONDANSETRON 2 MG/ML
INJECTION INTRAMUSCULAR; INTRAVENOUS
Status: DISCONTINUED | OUTPATIENT
Start: 2022-11-15 | End: 2022-11-15

## 2022-11-15 RX ORDER — MORPHINE SULFATE 4 MG/ML
2 INJECTION, SOLUTION INTRAMUSCULAR; INTRAVENOUS
Status: DISCONTINUED | OUTPATIENT
Start: 2022-11-15 | End: 2022-11-15

## 2022-11-15 RX ORDER — LIDOCAINE HYDROCHLORIDE 20 MG/ML
INJECTION, SOLUTION EPIDURAL; INFILTRATION; INTRACAUDAL; PERINEURAL
Status: DISCONTINUED | OUTPATIENT
Start: 2022-11-15 | End: 2022-11-15

## 2022-11-15 RX ORDER — SUCCINYLCHOLINE CHLORIDE 20 MG/ML
INJECTION INTRAMUSCULAR; INTRAVENOUS
Status: DISCONTINUED | OUTPATIENT
Start: 2022-11-15 | End: 2022-11-15

## 2022-11-15 RX ORDER — ASPIRIN 325 MG
325 TABLET ORAL 2 TIMES DAILY
Status: DISCONTINUED | OUTPATIENT
Start: 2022-11-15 | End: 2022-11-15

## 2022-11-15 RX ORDER — MORPHINE SULFATE 4 MG/ML
1 INJECTION, SOLUTION INTRAMUSCULAR; INTRAVENOUS EVERY 4 HOURS PRN
Status: DISCONTINUED | OUTPATIENT
Start: 2022-11-15 | End: 2022-11-15

## 2022-11-15 RX ORDER — IBUPROFEN 200 MG
24 TABLET ORAL
Status: DISCONTINUED | OUTPATIENT
Start: 2022-11-15 | End: 2022-11-18 | Stop reason: HOSPADM

## 2022-11-15 RX ORDER — MORPHINE SULFATE 4 MG/ML
2 INJECTION, SOLUTION INTRAMUSCULAR; INTRAVENOUS EVERY 4 HOURS PRN
Status: DISCONTINUED | OUTPATIENT
Start: 2022-11-15 | End: 2022-11-18 | Stop reason: HOSPADM

## 2022-11-15 RX ORDER — IPRATROPIUM BROMIDE 0.5 MG/2.5ML
0.5 SOLUTION RESPIRATORY (INHALATION) EVERY 6 HOURS
Status: DISCONTINUED | OUTPATIENT
Start: 2022-11-15 | End: 2022-11-17

## 2022-11-15 RX ORDER — EPHEDRINE SULFATE 50 MG/ML
INJECTION, SOLUTION INTRAVENOUS
Status: DISCONTINUED | OUTPATIENT
Start: 2022-11-15 | End: 2022-11-15

## 2022-11-15 RX ORDER — MORPHINE SULFATE 4 MG/ML
2 INJECTION, SOLUTION INTRAMUSCULAR; INTRAVENOUS EVERY 5 MIN PRN
Status: DISCONTINUED | OUTPATIENT
Start: 2022-11-15 | End: 2022-11-15

## 2022-11-15 RX ORDER — AMOXICILLIN AND CLAVULANATE POTASSIUM 875; 125 MG/1; MG/1
1 TABLET, FILM COATED ORAL EVERY 12 HOURS
Status: DISCONTINUED | OUTPATIENT
Start: 2022-11-15 | End: 2022-11-18 | Stop reason: HOSPADM

## 2022-11-15 RX ORDER — FLUTICASONE FUROATE AND VILANTEROL 100; 25 UG/1; UG/1
1 POWDER RESPIRATORY (INHALATION) DAILY
Status: DISCONTINUED | OUTPATIENT
Start: 2022-11-15 | End: 2022-11-18 | Stop reason: HOSPADM

## 2022-11-15 RX ADMIN — MORPHINE SULFATE 1 MG: 4 INJECTION INTRAVENOUS at 10:11

## 2022-11-15 RX ADMIN — SODIUM CHLORIDE, POTASSIUM CHLORIDE, SODIUM LACTATE AND CALCIUM CHLORIDE: 600; 310; 30; 20 INJECTION, SOLUTION INTRAVENOUS at 01:11

## 2022-11-15 RX ADMIN — ONDANSETRON 4 MG: 2 INJECTION INTRAMUSCULAR; INTRAVENOUS at 06:11

## 2022-11-15 RX ADMIN — EPHEDRINE SULFATE 10 MG: 50 INJECTION INTRAVENOUS at 01:11

## 2022-11-15 RX ADMIN — HYDROCODONE BITARTRATE AND ACETAMINOPHEN 1 TABLET: 10; 325 TABLET ORAL at 04:11

## 2022-11-15 RX ADMIN — LIDOCAINE HYDROCHLORIDE 20 MG: 20 INJECTION, SOLUTION EPIDURAL; INFILTRATION; INTRACAUDAL; PERINEURAL at 01:11

## 2022-11-15 RX ADMIN — HYDROCODONE BITARTRATE AND ACETAMINOPHEN 1 TABLET: 10; 325 TABLET ORAL at 10:11

## 2022-11-15 RX ADMIN — RABIES IMMUNE GLOBULIN (HUMAN) 1200 UNITS: 300 INJECTION, SOLUTION INFILTRATION; INTRAMUSCULAR at 02:11

## 2022-11-15 RX ADMIN — MUPIROCIN: 20 OINTMENT TOPICAL at 09:11

## 2022-11-15 RX ADMIN — CLINDAMYCIN IN 5 PERCENT DEXTROSE 600 MG: 12 INJECTION, SOLUTION INTRAVENOUS at 04:11

## 2022-11-15 RX ADMIN — IPRATROPIUM BROMIDE AND ALBUTEROL SULFATE 3 ML: .5; 2.5 SOLUTION RESPIRATORY (INHALATION) at 06:11

## 2022-11-15 RX ADMIN — ENOXAPARIN SODIUM 40 MG: 40 INJECTION SUBCUTANEOUS at 04:11

## 2022-11-15 RX ADMIN — HYDROCODONE BITARTRATE AND ACETAMINOPHEN 1 TABLET: 10; 325 TABLET ORAL at 06:11

## 2022-11-15 RX ADMIN — AMOXICILLIN AND CLAVULANATE POTASSIUM 1 TABLET: 875; 125 TABLET, FILM COATED ORAL at 09:11

## 2022-11-15 RX ADMIN — PROPOFOL 200 MG: 10 INJECTION, EMULSION INTRAVENOUS at 01:11

## 2022-11-15 RX ADMIN — AMOXICILLIN AND CLAVULANATE POTASSIUM 1 TABLET: 875; 125 TABLET, FILM COATED ORAL at 10:11

## 2022-11-15 RX ADMIN — MORPHINE SULFATE 1 MG: 4 INJECTION INTRAVENOUS at 08:11

## 2022-11-15 RX ADMIN — MIDAZOLAM HYDROCHLORIDE 2 MG: 1 INJECTION, SOLUTION INTRAMUSCULAR; INTRAVENOUS at 01:11

## 2022-11-15 RX ADMIN — FLUTICASONE FUROATE AND VILANTEROL TRIFENATATE 1 PUFF: 100; 25 POWDER RESPIRATORY (INHALATION) at 01:11

## 2022-11-15 RX ADMIN — ONDANSETRON 4 MG: 2 INJECTION INTRAMUSCULAR; INTRAVENOUS at 01:11

## 2022-11-15 RX ADMIN — MORPHINE SULFATE 4 MG: 10 INJECTION INTRAVENOUS at 01:11

## 2022-11-15 RX ADMIN — SUCCINYLCHOLINE CHLORIDE 100 MG: 20 INJECTION, SOLUTION INTRAMUSCULAR; INTRAVENOUS at 01:11

## 2022-11-15 RX ADMIN — IPRATROPIUM BROMIDE 0.5 MG: 0.5 SOLUTION RESPIRATORY (INHALATION) at 06:11

## 2022-11-15 RX ADMIN — IPRATROPIUM BROMIDE 0.5 MG: 0.5 SOLUTION RESPIRATORY (INHALATION) at 10:11

## 2022-11-15 NOTE — ED NOTES
Pt repositioned in bed. Pt in upright position, son in law in the room at this time. Pt is attempting to feed herself. Offered to assist pt with feeding pt denies assistance at this time. Pt is instructed to pull call light button if assistance needed.

## 2022-11-15 NOTE — ED NOTES
Pt returned back from PACU.  Received report from MIKE Ray.  Pt resting comfortably in bed at this time. NAD noted. Assessment preformed at this time.  Will continue to monitor.

## 2022-11-15 NOTE — ANESTHESIA PREPROCEDURE EVALUATION
11/14/2022  Geraldine Cooksey is a 82 y.o., female.      Pre-op Assessment    I have reviewed the Patient Summary Reports.     I have reviewed the Nursing Notes. I have reviewed the NPO Status.   I have reviewed the Medications.     Review of Systems  Social:  Smoker    Hematology/Oncology:  Hematology Normal   Oncology Normal     EENT/Dental:EENT/Dental Normal   Cardiovascular:  Cardiovascular Normal     Pulmonary:   COPD    Renal/:  Renal/ Normal     Hepatic/GI:  Hepatic/GI Normal    Musculoskeletal:   Arthritis         Physical Exam    Airway:  Mallampati: II   Mouth Opening: Normal  TM Distance: Normal  Neck ROM: Normal ROM    Dental:  Dentures    Chest/Lungs:  Clear to auscultation  Increased A-P diameter  Heart:  Rate: Normal  Rhythm: Regular Rhythm        Anesthesia Plan  Type of Anesthesia, risks & benefits discussed:    Anesthesia Type: Gen ETT  Intra-op Monitoring Plan: Standard ASA Monitors  Post Op Pain Control Plan: multimodal analgesia  Induction:  IV  Airway Plan: Direct  Informed Consent: Informed consent signed with the Patient and all parties understand the risks and agree with anesthesia plan.  All questions answered.   ASA Score: 3 Emergent  Day of Surgery Review of History & Physical: H&P Update referred to the surgeon/provider.    Ready For Surgery From Anesthesia Perspective.     .       Dr. Monroe

## 2022-11-15 NOTE — HPI
83 yo w/ hx of COPD admitted following trauma for dog bite that required extensive orthopaedic intervention to bilateral upper extremities. Patient doing well postoperatively only complaining of mild pain.

## 2022-11-15 NOTE — ED PROVIDER NOTES
Encounter Date: 11/14/2022       History     Chief Complaint   Patient presents with    animal bites     Family pit bull attacked patient .he has multiple skin tears and bites to bilateral arms.      Patient is a 82-year-old female who presents to emergency room by EMS with bilateral forearm and hand bites secondary to a dog attack.  Bleeding is controlled prior to arrival by EMS.  Blood loss is unknown.  Unknown dog has had rabies vaccinations.  Unknown last Tdap.  Patient denies any fevers, chills, nausea, vomiting, diarrhea.  She denies any head neck or back trauma.  No other traumas have been identified.  Pictures have been placed on the chart.    Review of patient's allergies indicates:   Allergen Reactions    Codeine Nausea Only     Happened 50 years ago      Past Medical History:   Diagnosis Date    Bronchitis     COPD (chronic obstructive pulmonary disease)     PONV (postoperative nausea and vomiting)      Past Surgical History:   Procedure Laterality Date    COSMETIC SURGERY  2012    bilateral eye lift    HYSTERECTOMY      OPEN REDUCTION AND INTERNAL FIXATION (ORIF) OF FRACTURE OF FEMUR USING DYNAMIC HIP SCREW Left 7/3/2019    Procedure: ORIF, HIP, USING DYNAMIC HIP SCREW;  Surgeon: Juan Jose Gunn MD;  Location: [facility];  Service: Orthopedics;  Laterality: Left;     No family history on file.  Social History     Tobacco Use    Smoking status: Every Day     Packs/day: 1.00     Types: Cigarettes    Smokeless tobacco: Never   Substance Use Topics    Alcohol use: Yes    Drug use: No     Review of Systems   Constitutional: Negative.    HENT: Negative.     Eyes: Negative.    Respiratory: Negative.     Cardiovascular: Negative.    Gastrointestinal: Negative.    Endocrine: Negative.    Genitourinary: Negative.    Musculoskeletal:         Possible muscle damage secondary to lacerations to the forearms.   Skin:  Positive for wound (Multiple wounds to bilateral forearms, wrist and hands.).   Allergic/Immunologic:  Negative for food allergies.   Neurological: Negative.    Hematological: Negative.    Psychiatric/Behavioral: Negative.     All other systems reviewed and are negative.    Physical Exam     Initial Vitals [11/14/22 1625]   BP Pulse Resp Temp SpO2   (!) 205/88 80 18 98 °F (36.7 °C) 98 %      MAP       --         Physical Exam    Nursing note and vitals reviewed.  Constitutional: She appears well-developed and well-nourished. She is not diaphoretic. No distress.   HENT:   Head: Normocephalic and atraumatic.   Mouth/Throat: Oropharynx is clear and moist.   Eyes: Conjunctivae and EOM are normal. Pupils are equal, round, and reactive to light. No scleral icterus.   Neck: No thyromegaly present.   Normal range of motion.  Cardiovascular:  Normal rate, regular rhythm, normal heart sounds and intact distal pulses.           No murmur heard.  Pulmonary/Chest: Breath sounds normal. No respiratory distress.   Abdominal: Abdomen is soft. Bowel sounds are normal.   Musculoskeletal:         General: Tenderness (Bilateral forearms and hands.) present.      Cervical back: Normal range of motion.      Comments: Weakness noted right wrist extension.     Neurological: She is alert and oriented to person, place, and time. She has normal strength. GCS score is 15. GCS eye subscore is 4. GCS verbal subscore is 5. GCS motor subscore is 6.   Skin: Skin is warm and dry. Capillary refill takes 2 to 3 seconds.   Multiple lacerations and puncture wounds to both forearms, significant laceration and forearms with reduced motion to the right hand secondary to laceration to the top forearm.  Pictures have been applied to the chart.   Psychiatric: She has a normal mood and affect.       ED Course   Procedures  Labs Reviewed   COMPREHENSIVE METABOLIC PANEL   CBC W/ AUTO DIFFERENTIAL   PROTIME-INR   APTT          Imaging Results              X-Ray Forearm Left (In process)                      X-Ray Forearm Right (In process)                    X-Rays:   Independently Interpreted Readings:   Other Readings:  X-ray left and right forearms.      No obvious acute fracture dislocation noted.  Medications   rabies vaccine, PCEC injection 2.5 Units (has no administration in time range)   rabies immune globulin (PF) (HYPERRAB) injection 1,200 Units (has no administration in time range)   meperidine injection 25 mg (25 mg Intravenous Given 11/14/22 1716)   diptheria-tetanus toxoids 2-2 Lf unit/0.5 mL injection 0.5 mL (0.5 mLs Intramuscular Given 11/14/22 4656)     Medical Decision Making:   Initial Assessment:   Patient seen and examined at [facility].  Wounds have been cleaned and dressed.  X-rays have been ordered.  Detailed assessment noted above, pictures on the chart.  Differential Diagnosis:   Dog bite, laceration, puncture wounds, fractures, dislocations.  Clinical Tests:   Lab Tests: Ordered  Radiological Study: Ordered and Reviewed  ED Management:  Patient seen and examined emergency room.  Wounds were cleaned and dressed, x-rays were ordered.  Patient was given a tetanus.  Pain has been controlled with Demerol.  Orthopedic surgery has been contacted for possible debridement in the OR.    X-rays reviewed, labs ordered.    Dr. Cronin has seen the patient, agrees take patient to the OR for washout and suturing.                            Clinical Impression:   Final diagnoses:  [W54.0XXA] Dog bite        ED Disposition Condition    Observation                 Eyad Larson NP  11/14/22 7395

## 2022-11-15 NOTE — ED NOTES
Pt repositioned in bed at this time pt sitting in upright position pt eating lunch. Pt denies any needs at this time.

## 2022-11-15 NOTE — H&P
[facility] Medicine  History & Physical    Patient Name: Geraldine Cooksey  MRN: [id number]  Patient Class: IP- Inpatient  Admission Date: 11/14/2022  Attending Physician: Job Smallwood MD   Primary Care Provider: DAVE Israel         Patient information was obtained from patient and past medical records.     Subjective:     Principal Problem:Dog bite    Chief Complaint:   Chief Complaint   Patient presents with    animal bites     Family pit bull attacked patient .he has multiple skin tears and bites to bilateral arms.         HPI: 81 yo w/ hx of COPD admitted following trauma for dog bite that required extensive orthopaedic intervention to bilateral upper extremities. Patient doing well postoperatively only complaining of mild pain.       Past Medical History:   Diagnosis Date    Bronchitis     COPD (chronic obstructive pulmonary disease)     PONV (postoperative nausea and vomiting)        Past Surgical History:   Procedure Laterality Date    COSMETIC SURGERY  2012    bilateral eye lift    HYSTERECTOMY      OPEN REDUCTION AND INTERNAL FIXATION (ORIF) OF FRACTURE OF FEMUR USING DYNAMIC HIP SCREW Left 7/3/2019    Procedure: ORIF, HIP, USING DYNAMIC HIP SCREW;  Surgeon: Juan Jose Gunn MD;  Location: [facility];  Service: Orthopedics;  Laterality: Left;       Review of patient's allergies indicates:   Allergen Reactions    Codeine Nausea Only     Happened 50 years ago        No current facility-administered medications on file prior to encounter.     Current Outpatient Medications on File Prior to Encounter   Medication Sig    albuterol (PROVENTIL/VENTOLIN HFA) 90 mcg/actuation inhaler Inhale 1-2 puffs into the lungs every 6 (six) hours as needed for Wheezing. Rescue    TRELEGY ELLIPTA 100-62.5-25 mcg DsDv Take 1 puff by mouth once daily.    aspirin 325 MG tablet Take 1 tablet (325 mg total) by mouth 2 (two) times daily. for 10 days    cetirizine (ZYRTEC) 10 MG tablet Take 10  mg by mouth daily as needed for Allergies.    HYDROcodone-acetaminophen (NORCO) 5-325 mg per tablet Take 1 tablet by mouth every 6 (six) hours as needed for Pain.    ibuprofen (ADVIL,MOTRIN) 800 MG tablet Take 1 tablet (800 mg total) by mouth every 6 (six) hours as needed for Pain.    meloxicam (MOBIC) 7.5 MG tablet Take 1 tablet (7.5 mg total) by mouth once daily.    ondansetron (ZOFRAN-ODT) 4 MG TbDL Take 1 tablet (4 mg total) by mouth every 6 (six) hours as needed (nausea).    tiotropium (SPIRIVA) 18 mcg inhalation capsule Inhale 1 capsule (18 mcg total) into the lungs once daily. Controller     Family History    None       Tobacco Use    Smoking status: Every Day     Packs/day: 1.00     Types: Cigarettes    Smokeless tobacco: Never   Substance and Sexual Activity    Alcohol use: Yes    Drug use: No    Sexual activity: Never     Review of Systems   All other systems reviewed and are negative.  Objective:     Vital Signs (Most Recent):  Temp: 98.2 °F (36.8 °C) (11/15/22 0345)  Pulse: 71 (11/15/22 1010)  Resp: 18 (11/15/22 1010)  BP: (!) 109/44 (11/15/22 1102)  SpO2: (!) 92 % (11/15/22 1106)   Vital Signs (24h Range):  Temp:  [97.7 °F (36.5 °C)-98.2 °F (36.8 °C)] 98.2 °F (36.8 °C)  Pulse:  [58-80] 71  Resp:  [14-22] 18  SpO2:  [91 %-99 %] 92 %  BP: ()/(32-88) 109/44     Weight: 65 kg (143 lb 4.8 oz)  Body mass index is 23.13 kg/m².    Physical Exam  Vitals reviewed.   Constitutional:       Appearance: Normal appearance.   HENT:      Head: Normocephalic and atraumatic.   Eyes:      Extraocular Movements: Extraocular movements intact.      Pupils: Pupils are equal, round, and reactive to light.   Cardiovascular:      Rate and Rhythm: Normal rate.   Pulmonary:      Effort: Pulmonary effort is normal. No respiratory distress.   Abdominal:      Palpations: Abdomen is soft.      Tenderness: There is no abdominal tenderness.   Musculoskeletal:      Right lower leg: No edema.      Left lower leg: No edema.       Comments: Bilateral UE wrapped with surgical dressing. Clean and dry. Good capillary refill to all fingers. Good ROM. Sensation intact   Skin:     General: Skin is warm and dry.   Neurological:      General: No focal deficit present.      Mental Status: She is alert and oriented to person, place, and time.   Psychiatric:         Mood and Affect: Mood normal.         Behavior: Behavior normal.         CRANIAL NERVES     CN III, IV, VI   Pupils are equal, round, and reactive to light.     Significant Labs: All pertinent labs within the past 24 hours have been reviewed.    Significant Imaging: I have reviewed all pertinent imaging results/findings within the past 24 hours.    Assessment/Plan:     * Dog bite  S/P surgical intervention with ortho  Possible second intervention this hospital stay  Continue Dog bite prophylaxis with augmentin  PT/OT when cleared by ortho  PRN pain medications      COPD (chronic obstructive pulmonary disease)  Continue current inhalers  PRN duonebs        VTE Risk Mitigation (From admission, onward)         Ordered     enoxaparin injection 40 mg  Daily         11/15/22 0450     Place sequential compression device  Until discontinued         11/15/22 1947                   Job Smallwood MD  Department of Hospital Medicine   [facility] Emergency Dept

## 2022-11-15 NOTE — SUBJECTIVE & OBJECTIVE
Past Medical History:   Diagnosis Date    Bronchitis     COPD (chronic obstructive pulmonary disease)     PONV (postoperative nausea and vomiting)        Past Surgical History:   Procedure Laterality Date    COSMETIC SURGERY  2012    bilateral eye lift    HYSTERECTOMY      OPEN REDUCTION AND INTERNAL FIXATION (ORIF) OF FRACTURE OF FEMUR USING DYNAMIC HIP SCREW Left 7/3/2019    Procedure: ORIF, HIP, USING DYNAMIC HIP SCREW;  Surgeon: Juan Jose Gunn MD;  Location: [facility];  Service: Orthopedics;  Laterality: Left;       Review of patient's allergies indicates:   Allergen Reactions    Codeine Nausea Only     Happened 50 years ago        No current facility-administered medications on file prior to encounter.     Current Outpatient Medications on File Prior to Encounter   Medication Sig    albuterol (PROVENTIL/VENTOLIN HFA) 90 mcg/actuation inhaler Inhale 1-2 puffs into the lungs every 6 (six) hours as needed for Wheezing. Rescue    TRELEGY ELLIPTA 100-62.5-25 mcg DsDv Take 1 puff by mouth once daily.    aspirin 325 MG tablet Take 1 tablet (325 mg total) by mouth 2 (two) times daily. for 10 days    cetirizine (ZYRTEC) 10 MG tablet Take 10 mg by mouth daily as needed for Allergies.    HYDROcodone-acetaminophen (NORCO) 5-325 mg per tablet Take 1 tablet by mouth every 6 (six) hours as needed for Pain.    ibuprofen (ADVIL,MOTRIN) 800 MG tablet Take 1 tablet (800 mg total) by mouth every 6 (six) hours as needed for Pain.    meloxicam (MOBIC) 7.5 MG tablet Take 1 tablet (7.5 mg total) by mouth once daily.    ondansetron (ZOFRAN-ODT) 4 MG TbDL Take 1 tablet (4 mg total) by mouth every 6 (six) hours as needed (nausea).    tiotropium (SPIRIVA) 18 mcg inhalation capsule Inhale 1 capsule (18 mcg total) into the lungs once daily. Controller     Family History    None       Tobacco Use    Smoking status: Every Day     Packs/day: 1.00     Types: Cigarettes    Smokeless tobacco: Never   Substance and Sexual Activity    Alcohol use: Yes     Drug use: No    Sexual activity: Never     Review of Systems   All other systems reviewed and are negative.  Objective:     Vital Signs (Most Recent):  Temp: 98.2 °F (36.8 °C) (11/15/22 0345)  Pulse: 71 (11/15/22 1010)  Resp: 18 (11/15/22 1010)  BP: (!) 109/44 (11/15/22 1102)  SpO2: (!) 92 % (11/15/22 1106)   Vital Signs (24h Range):  Temp:  [97.7 °F (36.5 °C)-98.2 °F (36.8 °C)] 98.2 °F (36.8 °C)  Pulse:  [58-80] 71  Resp:  [14-22] 18  SpO2:  [91 %-99 %] 92 %  BP: ()/(32-88) 109/44     Weight: 65 kg (143 lb 4.8 oz)  Body mass index is 23.13 kg/m².    Physical Exam  Vitals reviewed.   Constitutional:       Appearance: Normal appearance.   HENT:      Head: Normocephalic and atraumatic.   Eyes:      Extraocular Movements: Extraocular movements intact.      Pupils: Pupils are equal, round, and reactive to light.   Cardiovascular:      Rate and Rhythm: Normal rate.   Pulmonary:      Effort: Pulmonary effort is normal. No respiratory distress.   Abdominal:      Palpations: Abdomen is soft.      Tenderness: There is no abdominal tenderness.   Musculoskeletal:      Right lower leg: No edema.      Left lower leg: No edema.      Comments: Bilateral UE wrapped with surgical dressing. Clean and dry. Good capillary refill to all fingers. Good ROM. Sensation intact   Skin:     General: Skin is warm and dry.   Neurological:      General: No focal deficit present.      Mental Status: She is alert and oriented to person, place, and time.   Psychiatric:         Mood and Affect: Mood normal.         Behavior: Behavior normal.         CRANIAL NERVES     CN III, IV, VI   Pupils are equal, round, and reactive to light.     Significant Labs: All pertinent labs within the past 24 hours have been reviewed.    Significant Imaging: I have reviewed all pertinent imaging results/findings within the past 24 hours.

## 2022-11-15 NOTE — PROGRESS NOTES
S:  Ms. Cooksey was seen examined.  She was resting comfortably in bed.  He stated her pain was well controlled.  She underwent an irrigation debridement with extensor repairs of both of her forearms this morning.  She was involved in a pit bull dog attack yesterday afternoon.  She denied numbness or tingling in her fingers.      O:  Vital signs stable, reviewed.  Neurovascularly intact.    Dressing is intact mild blood tinging.    Good finger motion with relatively no pain.    Good elbow motion without pain.  Laboratory:  WBC 14.85; hemoglobin 12.2; hematocrit 37.7; platelets 306.    A:  Irrigation debridement with extensor repair, both forearms.POD #1    P:  1. Elevate both upper extremities.  2. Continue with K-pad to both arms.  3. Continue with antibiotic therapy.    4. Discussed with the patient that she does have open wounds with skin loss on both of her arms and she will need to return to the operative theater for a 2nd look irrigation and debridement on Thursday 11/17/2022.

## 2022-11-15 NOTE — ED NOTES
Pt lying in bed with eyes closed at this time. Pt has bilateral arm dressings noted at this time pt appears to be in no pain resp e/u

## 2022-11-15 NOTE — ED NOTES
Pt to ed after her grandsons ines attacked her. Pt reports trying to get the dog off of her daughter when the dog began to attack her. Pt has extensive wounds to the left and right upper extremity. Exposed muscle noted to right bailee with multiple punctures throughout bilateral extremities. Multiple lacerations noted. Bleeding controlled with gauze. Wounds cleaned by tech with betadine and normal saline. Pt has equal hand  with not decreased sensations noted. Pt provided warm blankets and family at bedside. Amr reports pd was on scene.

## 2022-11-15 NOTE — ED NOTES
Nicholas (Son in law) call with any updates [phone]   Plan of care discussed. Patient is calm and cooperative. No new c/o.

## 2022-11-15 NOTE — ED NOTES
"Pt refusing rabies vaccines at this time stating," The dog didn't have rabies." Pt reports not knowing the vaccine status of animal. Instructed importance of prevention. Pt now only agrees to have vaccines if she can  be put to sleep.   "

## 2022-11-15 NOTE — CONSULTS
Subjective:      Patient ID: Geraldine Cooksey is a 82 y.o. female.    Chief Complaint: animal bites (Family pit bull attacked patient .he has multiple skin tears and bites to bilateral arms. )    Referring Provider: Aaareferral Self  No address on file    HPI:  Ms. Cooksey is an 82-year-old right-hand-dominant lady who presented to the emergency room by EMS with bilateral forearm injuries after she was attacked by a pit bull dog.  Per the patient she was trying to rescue another individual who the dog attacked and then the dog turned on her while she was doing the rescue.  She stated the dog attacked after she was picking up her relatives at a bus stop and 1 relative when into her house and when she returned outside the dog was in yd and attacked her.  She denied numbness and tingling in her fingers.  Her date of injury 2022    Past Medical History:   Diagnosis Date    Bronchitis     COPD (chronic obstructive pulmonary disease)      *  *  *  * PONV (postoperative nausea and vomiting)  Seasonal allergies  Depression  IBS  Gout      Past Surgical History:   Procedure Laterality Date    COSMETIC SURGERY BILATERAL BLEPHAROPLASTY     * APPENDECTOMY    HYSTERECTOMY      OPEN REDUCTION AND INTERNAL FIXATION (ORIF) OF FRACTURE OF FEMUR USING DYNAMIC HIP SCREW Left 7/3/2019    Procedure: ORIF, HIP, USING DYNAMIC HIP SCREW;  Surgeon: Juan Jose Gunn MD;  Location: [facility];  Service: Orthopedics;  Laterality: Left;       Review of patient's allergies indicates:   Allergen Reactions    Codeine Nausea Only     Happened 50 years ago        Social History     Occupational History    Retired    Tobacco Use    Smoking status: Every Day     Packs/day: 1.00     Types: Cigarettes    Smokeless tobacco: Never   Substance and Sexual Activity    Alcohol use: Yes    Drug use: No    Sexual activity: Never      Family history:   Father:  , unsure.    Mother: , cancer.    Brother:  3, 2 ,  unsure.    Sister:  3, 2 , unsure.    Son, 1, alive, denied medical problems.    Daughter, 3, alive, fibromyalgia, interstitial cystitis, MVP.    Previous Hospitalizations:  Childbirth, hysterectomy    ROS:   Review of Systems   Constitutional: Negative for chills and fever.   HENT:  Negative for congestion.    Eyes:  Negative for blurred vision and double vision.   Cardiovascular:  Negative for chest pain and cyanosis.   Respiratory:  Negative for cough and shortness of breath.    Endocrine: Negative for polydipsia.   Hematologic/Lymphatic: Negative for adenopathy.   Skin:  Negative for flushing, itching and skin cancer.   Musculoskeletal:  Positive for gout. Negative for muscle cramps.   Gastrointestinal:  Negative for constipation, diarrhea and heartburn.   Genitourinary:  Negative for nocturia.   Neurological:  Negative for headaches and seizures.   Psychiatric/Behavioral:  Positive for depression. Negative for substance abuse. The patient is not nervous/anxious.    Allergic/Immunologic: Positive for environmental allergies.         Objective:      Physical Exam:   General: AAOx3.  No acute distress  HEENT: Normocephalic, PEARLA EOMI.  Fair Dentition  Neck: Supple, No JVD  Chest: Symetric, equal excursion on inspiration  Abdomen: Soft NTND  Vascular:  Pulses intact and equal bilaterally.  Capillary refill less than 3 seconds and equal bilaterally  Neurologic:  Pinprick and soft touch intact and equal bilaterally.  Two point sensation intact and equal bilaterally.  Integment:  Multiple skin avulsions and lacerations bilateral forearms and wrists.  Extremity:  Forearm:  Patient/supination decreased but equal bilaterally.  Dorsiflexion/volar flexion of wrist decreased due to tenderness but equal bilaterally.  Elbow extension/flexion equal bilaterally.  Tender with motion both forearms.  Mild swelling both forearms.  Tender with palpation both forearms.  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  Laboratory:  WBC 20.05; hemoglobin 13.4; hematocrit 41.1; platelets 347; sodium 140; potassium 3.8; chloride 104; glucose 111; CO2 24; BUN 20; creatinine 0.7; PT 10.9; INR 1.0.      Assessment:       Impression:    1. Complex lacerations both forearms.    2. Dog bite both forearms.        Plan:       1.  Discussed physical examination and radiographic findings with the patient. Emmy understands that she has dog bites with complex lacerations to both of her forearms with possible tendon lacerations.  Treatment alternatives and outcomes were discussed with the patient she understands she could be treated conservatively with observation, activity modification, NSAIDs, antibiotics, splinting, or she could consider surgical intervention with irrigation debridement and exploration she stated she would like to proceed with surgery.  2. Possible complications of surgery to include bleeding, infection, scarring, nerve/blood vessel/tendon damage, need for further surgery, failed surgery, failure to improve, possible persistent laceration, possible future amputation were discussed.  The patient was permitted to ask questions and all concerns were addressed to her satisfaction.  3. Consent for exploration with irrigation debridement both forearms.    4. Sterile dressing both forearms  5. Antibiotics per hospitalist/ER doc.    6. To OR when room is available

## 2022-11-15 NOTE — H&P
Chief Complaint: animal bites (Family pit bull attacked patient .he has multiple skin tears and bites to bilateral arms. )    HPI:  Ms. Cooksey is an 82-year-old right-hand-dominant lady who presented to the emergency room by EMS with bilateral forearm injuries after she was attacked by a pit bull dog.  Per the patient she was trying to rescue another individual who the dog attacked and then the dog turned on her while she was doing the rescue.  She stated the dog attacked after she was picking up her relatives at a bus stop and 1 relative when into her house and when she returned outside the dog was in yd and attacked her.  She denied numbness and tingling in her fingers.  Her date of injury 2022    Past Medical History:   Diagnosis Date    Bronchitis     COPD (chronic obstructive pulmonary disease)      *  *  *  * PONV (postoperative nausea and vomiting)  Seasonal allergies  Depression  IBS  Gout      Past Surgical History:   Procedure Laterality Date    COSMETIC SURGERY BILATERAL BLEPHAROPLASTY     * APPENDECTOMY    HYSTERECTOMY      OPEN REDUCTION AND INTERNAL FIXATION (ORIF) OF FRACTURE OF FEMUR USING DYNAMIC HIP SCREW Left 7/3/2019    Procedure: ORIF, HIP, USING DYNAMIC HIP SCREW;  Surgeon: Juan Jose Gunn MD;  Location: [facility];  Service: Orthopedics;  Laterality: Left;       Review of patient's allergies indicates:   Allergen Reactions    Codeine Nausea Only     Happened 50 years ago        Social History     Occupational History    Retired    Tobacco Use    Smoking status: Every Day     Packs/day: 1.00     Types: Cigarettes    Smokeless tobacco: Never   Substance and Sexual Activity    Alcohol use: Yes    Drug use: No    Sexual activity: Never      Family history:   Father:  , unsure.    Mother: , cancer.    Brother:  3, 2 , unsure.    Sister:  3, 2 , unsure.    Son, 1, alive, denied medical problems.    Daughter, 3, alive, fibromyalgia, interstitial  cystitis, MVP.    Previous Hospitalizations:  Childbirth, hysterectomy    ROS:   Review of Systems   Constitutional: Negative for chills and fever.   HENT:  Negative for congestion.    Eyes:  Negative for blurred vision and double vision.   Cardiovascular:  Negative for chest pain and cyanosis.   Respiratory:  Negative for cough and shortness of breath.    Endocrine: Negative for polydipsia.   Hematologic/Lymphatic: Negative for adenopathy.   Skin:  Negative for flushing, itching and skin cancer.   Musculoskeletal:  Positive for gout. Negative for muscle cramps.   Gastrointestinal:  Negative for constipation, diarrhea and heartburn.   Genitourinary:  Negative for nocturia.   Neurological:  Negative for headaches and seizures.   Psychiatric/Behavioral:  Positive for depression. Negative for substance abuse. The patient is not nervous/anxious.    Allergic/Immunologic: Positive for environmental allergies.         Objective:      Physical Exam:   General: AAOx3.  No acute distress  HEENT: Normocephalic, PEARLA EOMI.  Fair Dentition  Neck: Supple, No JVD  Chest: Symetric, equal excursion on inspiration  Abdomen: Soft NTND  Vascular:  Pulses intact and equal bilaterally.  Capillary refill less than 3 seconds and equal bilaterally  Neurologic:  Pinprick and soft touch intact and equal bilaterally.  Two point sensation intact and equal bilaterally.  Integment:  Multiple skin avulsions and lacerations bilateral forearms and wrists.  Extremity:  Forearm:  Patient/supination decreased but equal bilaterally.  Dorsiflexion/volar flexion of wrist decreased due to tenderness but equal bilaterally.  Elbow extension/flexion equal bilaterally.  Tender with motion both forearms.  Mild swelling both forearms.  Tender with palpation both forearms.  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  Laboratory:  WBC 20.05; hemoglobin 13.4; hematocrit 41.1; platelets 347; sodium 140; potassium 3.8; chloride 104; glucose 111; CO2 24; BUN 20; creatinine 0.7; PT 10.9; INR 1.0.      Assessment:       Impression:    1. Complex lacerations both forearms.    2. Dog bite both forearms.        Plan:       1.  Discussed physical examination and radiographic findings with the patient. Emmy understands that she has dog bites with complex lacerations to both of her forearms with possible tendon lacerations.  Treatment alternatives and outcomes were discussed with the patient she understands she could be treated conservatively with observation, activity modification, NSAIDs, antibiotics, splinting, or she could consider surgical intervention with irrigation debridement and exploration she stated she would like to proceed with surgery.  2. Possible complications of surgery to include bleeding, infection, scarring, nerve/blood vessel/tendon damage, need for further surgery, failed surgery, failure to improve, possible persistent laceration, possible future amputation were discussed.  The patient was permitted to ask questions and all concerns were addressed to her satisfaction.  3. Consent for exploration with irrigation debridement both forearms.    4. Sterile dressing both forearms  5. Antibiotics per hospitalist/ER doc.    6. To OR when room is available

## 2022-11-15 NOTE — ED NOTES
Pt is awake and alert at this time pt states that her pain is an 7/10. Pt is requesting pain medication at this time. Pt states that she is having pain in her bilateral arms at this time pt denies any other symptoms at this time. Pt has 2+ cap refill noted to bilateral nail beds. Pt states that she is able to move all fingers. Pt states that she does have a cough and has been coughing up some mucous. Pt denies any SOB or other symptoms at this time.

## 2022-11-15 NOTE — PLAN OF CARE
11/15/22 1223   Discharge Assessment   Assessment Type Discharge Planning Assessment   Confirmed/corrected address, phone number and insurance Yes   Confirmed Demographics Correct on Facesheet   Source of Information patient   When was your last doctors appointment?   (Approx. 3 weeks ago)   Does patient/caregiver understand observation status Yes   Communicated PEDRO with patient/caregiver Date not available/Unable to determine   Reason For Admission S/P dog attack by Pit Bull   Lives With grandchild(cortes)   Do you expect to return to your current living situation? Yes   Do you have help at home or someone to help you manage your care at home? Yes   Who are your caregiver(s) and their phone number(s)? Adult Grandmihir Shukla and his wife Olga   Prior to hospitilization cognitive status: Alert/Oriented   Current cognitive status: Alert/Oriented   Walking or Climbing Stairs Difficulty ambulation difficulty, requires equipment   Mobility Management Has quad cane for use when needed   Dressing/Bathing Difficulty none   Home Accessibility not wheelchair accessible;stairs to enter home   Number of Stairs, Main Entrance three   Home Layout Able to live on 1st floor   Equipment Currently Used at Home none   Readmission within 30 days? No   Patient currently being followed by outpatient case management? No   Do you currently have service(s) that help you manage your care at home? No   Do you take prescription medications? Yes   Do you have prescription coverage? Yes   Coverage [employer]   Do you have any problems affording any of your prescribed medications? TBD   Is the patient taking medications as prescribed? yes   Who is going to help you get home at discharge? Daughter  Mely Henderson [phone] or isi Shukla   How do you get to doctors appointments? car, drives self   Are you on dialysis? No   Do you take coumadin? No   Discharge Plan A Home   Discharge Plan B Home with family   DME Needed Upon Discharge  none    Discharge Plan discussed with: Patient   Discharge Barriers Identified None   Physical Activity   On average, how many days per week do you engage in moderate to strenuous exercise (like a brisk walk)? 3 days   On average, how many minutes do you engage in exercise at this level? 20 min   Financial Resource Strain   How hard is it for you to pay for the very basics like food, housing, medical care, and heating? Somewhat   Housing Stability   In the last 12 months, was there a time when you were not able to pay the mortgage or rent on time? N   In the last 12 months, how many places have you lived? 1   In the last 12 months, was there a time when you did not have a steady place to sleep or slept in a shelter (including now)? N   Transportation Needs   In the past 12 months, has lack of transportation kept you from medical appointments or from getting medications? no   In the past 12 months, has lack of transportation kept you from meetings, work, or from getting things needed for daily living? No   Food Insecurity   Within the past 12 months, you worried that your food would run out before you got the money to buy more. Never true   Within the past 12 months, the food you bought just didn't last and you didn't have money to get more. Never true   Stress   Do you feel stress - tense, restless, nervous, or anxious, or unable to sleep at night because your mind is troubled all the time - these days? To some exte   Social Connections   In a typical week, how many times do you talk on the phone with family, friends, or neighbors? More than 3   How often do you get together with friends or relatives? Once   How often do you attend Hinduism or Taoist services? Never   Do you belong to any clubs or organizations such as Hinduism groups, unions, fraternal or athletic groups, or school groups? No   How often do you attend meetings of the clubs or organizations you belong to? Never   Are you , , ,  , never , or living with a partner?    Alcohol Use   Q1: How often do you have a drink containing alcohol? Monthly or l   Q2: How many drinks containing alcohol do you have on a typical day when you are drinking? 1 or 2   Q3: How often do you have six or more drinks on one occasion? Never   Relationship/Environment   Name(s) of Who Lives With Patient Grandson Vazquez Martin,and Spouse Olga but has other family close as well for assistance and support     Assessment completed with very pleasant patient. Patient lives at home with her adult grandson Vazquez Martin and his family. Patient denies having use of home health services. She states she has a quad cane, BSCC, nebulizer and rolling walker at home for use when needed. No other needs voiced at this time. Case Management will continue to follow for further needs.

## 2022-11-15 NOTE — ASSESSMENT & PLAN NOTE
S/P surgical intervention with ortho  Possible second intervention this hospital stay  Continue Dog bite prophylaxis with augmentin  PT/OT when cleared by ortho  PRN pain medications

## 2022-11-15 NOTE — PLAN OF CARE
11/15/22 1223   Medicare Message   Important Message from Medicare regarding Discharge Appeal Rights Given to patient/caregiver;Explained to patient/caregiver;Signed/date by patient/caregiver   Date IMM was signed 11/15/22   Time IMM was signed 1220   FLORES Message   Medicare Outpatient and Observation Notification regarding financial responsibility Given to patient/caregiver;Explained to patient/caregiver;Signed/date by patient/caregiver   Date FLORES was signed 11/15/22   Time FLORES was signed 1220

## 2022-11-15 NOTE — ANESTHESIA POSTPROCEDURE EVALUATION
Anesthesia Post Evaluation    Patient: Geraldine Cooksey    Procedure(s) Performed: Procedure(s) (LRB):  IRRIGATION AND DEBRIDEMENT, UPPER EXTREMITY (Bilateral)    Final Anesthesia Type: general      Patient location during evaluation: PACU  Patient participation: Yes- Able to Participate  Level of consciousness: awake and alert  Post-procedure vital signs: reviewed and stable  Pain management: adequate  Airway patency: patent    PONV status at discharge: No PONV  Anesthetic complications: no      Cardiovascular status: blood pressure returned to baseline  Respiratory status: unassisted  Hydration status: euvolemic  Follow-up not needed.          Vitals Value Taken Time   BP 85/32 11/15/22 1203   Temp 36.8 °C (98.2 °F) 11/15/22 0345   Pulse 71 11/15/22 1010   Resp 18 11/15/22 1010   SpO2 91 % 11/15/22 1250   Vitals shown include unvalidated device data.      Event Time   Out of Recovery 11/15/2022 03:33:54         Pain/Manolo Score: Pain Rating Prior to Med Admin: 4 (11/15/2022 10:04 AM)  Pain Rating Post Med Admin: 2 (11/15/2022  8:10 AM)  Manolo Score: 10 (11/15/2022  3:37 AM)

## 2022-11-15 NOTE — OP NOTE
[facility]  Orthopedic Surgery    11/15/2022    Geraldine Cooksey  [id number]      PREOPERATIVE DIAGNOSIS: Dog bite [W54.0XXA]    POSTOPERATIVE DIAGNOSIS:    1. Extensor carpi radialis longus musculotendinous laceration, right forearm.  2. Extensor carpi radialis brevis musculotendinous laceration, right forearm.  3. First interspace lumbrical muscle laceration, right hand.  4. Complex 5 cm long x 3cm wide x 1cm deep stellate laceration dorsal 1st interspace, right hand.    5. Complex 6 cm long x 3 cm wide x 2cm de stellate laceration dorsum right forearm with skin loss (Will require wound VAC dressing for closure at 2nd look irrigation debridement on 11/17/2022).    6. Extensor carpi radialis longus musculotendinous laceration, left forearm.  7. Complex stellate 7 cm long x 4 cm wide x 2cm  deep laceration dorsum left forearm with extensive skin loss (will require wound VAC dressing at 2nd look irrigation debridement for closure on 11/17/2022).  8. Complex 4 cm long x 2cm wide x 1 cm deep laceration volar, left forearm (possibly requiring wound VAC closure at 2nd look irrigation debridement on 11/17/2022).  9. Dog bite right forearm  10. Dog bite left forearm    PROCEDURE:  (Please bill arm procedures with a 59 modifier as the procedures were performed on 2 separate arms)  1. Repair extensor carpi radialis longus, right forearm.  2. Repair extensor carpi radialis brevis right forearm.    3. Irrigation and debridement into muscle and tendon right forearm.  4. Irrigation debridement into muscle and tendon right hand.    5. Closure complex laceration, right hand.  6. Partial closure complex laceration right forearm.  7. Repair extensor carpi radialis longus, left forearm.    8. Irrigation debridement into muscle and fascia, left forearm.    9. Partial closure forearm lacerations, left forearm  10. Volar short-arm plaster splint, both forearms     SURGEON: Leighton Cronin D.O.    ASSISTANT: Orton Grinnell,  CFA.    ANESTHESIA:  General.    BLOOD LOSS:  Less than 50 cc    TOURNIQUET:  1. Right arm 44 minutes  2. Left arm 35 minutes     DRAINS:   1. Penrose right hand  2. Penrose right forearm  3. Penrose left forearm     PATHOLOGY:  Debrided necrotic skin and muscle    COMPLICATION:  None.    INDICATIONS FOR PROCEDURE:   Ms. Cooksey is an 82-year-old right-hand-dominant lady who presented to the emergency room by EMS with bilateral forearm injuries after she was attacked by a pit bull dog.  Per the patient she was trying to rescue another individual who the dog attacked and then the dog turned on her while she was doing the rescue.  She stated the dog attacked after she was picking up her relatives at a bus stop and 1 relative when into her house and when she returned outside the dog was in yd and attacked her.  She denied numbness and tingling in her fingers.  Her date of injury 11/14/2022   She elected to proceed with surgery after complications to include bleeding, infection, scarring, nerve/blood vessel/tendon damage, need for further surgery, failed surgery, failure to improve, stiffness, skin slough, and possible amputation were discussed.  She signed a consent.    PROCEDURE IN DETAIL:   The patient was brought to the operating room and transferred the operating bed where all bony prominences were well padded.  General anesthesia was then administered by the Anesthesiology Department.  After general anesthesia was administered a tourniquet was applied to the upper part of both the patient's upper extremities.  Both arms were then prepped with Betadine solution and draped in the normal sterile fashion.       After prepping draping attention was placed on the patient's right forearm/hand.  Her arm was elevated, exsanguinated, tourniquet was inflated.  The laceration sites were inspected and necrotic skin was removed sharply with #15 blade.  The lacerations were then opened sharply with a #15 blade.  Forearm incision  was inspected and there was lacerations of the musculotendinous junction of the extensor carpi radialis longus and brevis tendons.  There was also necrotic muscle the necrotic muscle was debrided with tenotomy scissors and rongeur to clean bleeding muscle.  There was further inspection of the laceration no other muscle or tendon involvement was noted.  The right hand was inspected and there was laceration into the 1st interspace lumbrical tendons these were irreparable.  The other tendons were inspected and no laceration a tendons were noted.  The hand and forearm were then extensively irrigated.  After an extensive irrigation the extensor carpi radialis and longus tendons were repaired.  Penrose drains were then contoured and placed in the forearm and hand lacerations.  The tourniquet was released and full hemostasis was ensured.  The skin was reapproximated to itself as closely as possible loosely with nylon suture there was skin loss areas which were packed with moistened gauze.  The laceration sites were then dressed with Adaptic, sterile gauze, sterile cast padding followed by a volar short-arm plaster splint and an Ace wrap.        Attention was then placed on the left upper extremity where necrotic skin was removed sharply with #15 blade the lacerations were then opened with a #15 blade and dissection was taken into the forearm musculature the extensor carpi radialis longus was found to be lacerated.  Necrotic muscle was debrided along with necrotic tendon with tenotomy scissors and rongeur.  Once clean viable tissue was obtained the laceration site was extensively irrigated.  After extensive irrigation the laceration to the extensor carpi radialis was repaired with Vicryl suture.  A Penrose drain was then contoured and placed in the laceration site, the tourniquet was released and full hemostasis was ensured remnant skin was then reapproximated to itself there was areas of skin loss.  A volar laceration site  was also inspected and then closed loosely with nylon suture.  The left forearm laceration sites were then dressed with Adaptic, sterile gauze, sterile cast padding followed by a volar plaster splint and Ace wraps.        The patient was then awakened by anesthesia and transferred from the operating room to the recovery room in stable condition.  She tolerated the procedures well without complication.

## 2022-11-15 NOTE — ANESTHESIA PROCEDURE NOTES
Intubation    Date/Time: 11/15/2022 1:14 AM  Performed by: Hamilton Fowler CRNA  Authorized by: Kvng Alexis MD     Intubation:     Induction:  Rapid sequence induction    Intubated:  Postinduction    Mask Ventilation:  Easy mask    Attempts:  1    Attempted By:  CRNA    Method of Intubation:  Direct    Blade:  Chloé 3    Laryngeal View Grade: Grade I - full view of cords      Difficult Airway Encountered?: No      Complications:  None    Airway Device:  Oral endotracheal tube    Airway Device Size:  7.0    Style/Cuff Inflation:  Cuffed    Tube secured:  22    Secured at:  The teeth    Placement Verified By:  Capnometry    Complicating Factors:  None    Findings Post-Intubation:  BS equal bilateral

## 2022-11-16 DIAGNOSIS — T81.33XA DISRUPTION OF TRAUMATIC INJURY WOUND REPAIR, INITIAL ENCOUNTER: ICD-10-CM
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
## 2022-11-16 LAB
ANION GAP SERPL CALC-SCNC: 9 MMOL/L (ref 8–16)
BASOPHILS # BLD AUTO: 0.14 K/UL (ref 0–0.2)
BASOPHILS NFR BLD: 1.2 % (ref 0–1.9)
BUN SERPL-MCNC: 9 MG/DL (ref 8–23)
CALCIUM SERPL-MCNC: 8.5 MG/DL (ref 8.7–10.5)
CHLORIDE SERPL-SCNC: 102 MMOL/L (ref 95–110)
CO2 SERPL-SCNC: 24 MMOL/L (ref 23–29)
CREAT SERPL-MCNC: 0.6 MG/DL (ref 0.5–1.4)
DIFFERENTIAL METHOD: ABNORMAL
EOSINOPHIL # BLD AUTO: 0 K/UL (ref 0–0.5)
EOSINOPHIL NFR BLD: 0.3 % (ref 0–8)
ERYTHROCYTE [DISTWIDTH] IN BLOOD BY AUTOMATED COUNT: 15 % (ref 11.5–14.5)
EST. GFR  (NO RACE VARIABLE): >60 ML/MIN/1.73 M^2
GLUCOSE SERPL-MCNC: 87 MG/DL (ref 70–110)
HCT VFR BLD AUTO: 36.8 % (ref 37–48.5)
HGB BLD-MCNC: 11.9 G/DL (ref 12–16)
IMM GRANULOCYTES # BLD AUTO: 0.04 K/UL (ref 0–0.04)
IMM GRANULOCYTES NFR BLD AUTO: 0.3 % (ref 0–0.5)
LYMPHOCYTES # BLD AUTO: 1.8 K/UL (ref 1–4.8)
LYMPHOCYTES NFR BLD: 15.1 % (ref 18–48)
MAGNESIUM SERPL-MCNC: 1.8 MG/DL (ref 1.6–2.6)
MCH RBC QN AUTO: 29.8 PG (ref 27–31)
MCHC RBC AUTO-ENTMCNC: 32.3 G/DL (ref 32–36)
MCV RBC AUTO: 92 FL (ref 82–98)
MONOCYTES # BLD AUTO: 2.2 K/UL (ref 0.3–1)
MONOCYTES NFR BLD: 18.3 % (ref 4–15)
NEUTROPHILS # BLD AUTO: 7.7 K/UL (ref 1.8–7.7)
NEUTROPHILS NFR BLD: 64.8 % (ref 38–73)
NRBC BLD-RTO: 0 /100 WBC
PLATELET # BLD AUTO: 283 K/UL (ref 150–450)
PMV BLD AUTO: 11.6 FL (ref 9.2–12.9)
POTASSIUM SERPL-SCNC: 3.9 MMOL/L (ref 3.5–5.1)
RBC # BLD AUTO: 3.99 M/UL (ref 4–5.4)
SODIUM SERPL-SCNC: 135 MMOL/L (ref 136–145)
WBC # BLD AUTO: 11.95 K/UL (ref 3.9–12.7)

## 2022-11-16 PROCEDURE — 25000003 PHARM REV CODE 250: Performed by: STUDENT IN AN ORGANIZED HEALTH CARE EDUCATION/TRAINING PROGRAM

## 2022-11-16 PROCEDURE — 36415 COLL VENOUS BLD VENIPUNCTURE: CPT | Performed by: STUDENT IN AN ORGANIZED HEALTH CARE EDUCATION/TRAINING PROGRAM

## 2022-11-16 PROCEDURE — 11000001 HC ACUTE MED/SURG PRIVATE ROOM

## 2022-11-16 PROCEDURE — 25000242 PHARM REV CODE 250 ALT 637 W/ HCPCS: Performed by: STUDENT IN AN ORGANIZED HEALTH CARE EDUCATION/TRAINING PROGRAM

## 2022-11-16 PROCEDURE — 85025 COMPLETE CBC W/AUTO DIFF WBC: CPT | Performed by: STUDENT IN AN ORGANIZED HEALTH CARE EDUCATION/TRAINING PROGRAM

## 2022-11-16 PROCEDURE — 63600175 PHARM REV CODE 636 W HCPCS: Performed by: HOSPITALIST

## 2022-11-16 PROCEDURE — 80048 BASIC METABOLIC PNL TOTAL CA: CPT | Performed by: STUDENT IN AN ORGANIZED HEALTH CARE EDUCATION/TRAINING PROGRAM

## 2022-11-16 PROCEDURE — 25000003 PHARM REV CODE 250: Performed by: HOSPITALIST

## 2022-11-16 PROCEDURE — 99233 SBSQ HOSP IP/OBS HIGH 50: CPT | Mod: ,,, | Performed by: FAMILY MEDICINE

## 2022-11-16 PROCEDURE — 83735 ASSAY OF MAGNESIUM: CPT | Performed by: STUDENT IN AN ORGANIZED HEALTH CARE EDUCATION/TRAINING PROGRAM

## 2022-11-16 PROCEDURE — 94640 AIRWAY INHALATION TREATMENT: CPT

## 2022-11-16 PROCEDURE — 99900035 HC TECH TIME PER 15 MIN (STAT)

## 2022-11-16 PROCEDURE — 99233 PR SUBSEQUENT HOSPITAL CARE,LEVL III: ICD-10-PCS | Mod: ,,, | Performed by: FAMILY MEDICINE

## 2022-11-16 RX ADMIN — IPRATROPIUM BROMIDE 0.5 MG: 0.5 SOLUTION RESPIRATORY (INHALATION) at 08:11

## 2022-11-16 RX ADMIN — IPRATROPIUM BROMIDE 0.5 MG: 0.5 SOLUTION RESPIRATORY (INHALATION) at 02:11

## 2022-11-16 RX ADMIN — ENOXAPARIN SODIUM 40 MG: 40 INJECTION SUBCUTANEOUS at 04:11

## 2022-11-16 RX ADMIN — MUPIROCIN: 20 OINTMENT TOPICAL at 08:11

## 2022-11-16 RX ADMIN — HYDROCODONE BITARTRATE AND ACETAMINOPHEN 1 TABLET: 10; 325 TABLET ORAL at 11:11

## 2022-11-16 RX ADMIN — MORPHINE SULFATE 2 MG: 4 INJECTION INTRAVENOUS at 08:11

## 2022-11-16 RX ADMIN — MORPHINE SULFATE 2 MG: 4 INJECTION INTRAVENOUS at 03:11

## 2022-11-16 RX ADMIN — FLUTICASONE FUROATE AND VILANTEROL TRIFENATATE 1 PUFF: 100; 25 POWDER RESPIRATORY (INHALATION) at 08:11

## 2022-11-16 RX ADMIN — MORPHINE SULFATE 2 MG: 4 INJECTION INTRAVENOUS at 12:11

## 2022-11-16 RX ADMIN — AMOXICILLIN AND CLAVULANATE POTASSIUM 1 TABLET: 875; 125 TABLET, FILM COATED ORAL at 08:11

## 2022-11-16 NOTE — ASSESSMENT & PLAN NOTE
S/P surgical intervention with ortho  Return to OR on 11/17  Will require wound vac due to extensive nature of injury and skin loss  nursing/case management working to acquire wound vac  Patient educated on high protein to promote wound healing. Arginade added BID.  Continue treatment with augmentin  PT/OT when cleared by ortho  PRN pain medications

## 2022-11-16 NOTE — PROGRESS NOTES
S:  Ms. Cooksey was seen examined.  She was resting comfortably in bed.  Her pain is well controlled.  She underwent an irrigation debridement with extensor repairs of both of her forearms this ulnar around 11/14/2022   She was involved in a pit bull dog attack on 11/14/2022.  She denied numbness or tingling in her fingers.      O:  Vital signs stable, reviewed.  Neurovascularly intact.    Dressing is intact mild blood tinging.    Good finger motion with relatively no pain.    Good elbow motion without pain.  Laboratory:  WBC 11.95; hemoglobin 11.9; hematocrit 36.8; platelets 283.    A:  Irrigation debridement with extensor repair, both forearms.POD #2    P:  1. Elevate both upper extremities.  2. Continue with K-pad to both arms set at 101° F.  3. Continue with antibiotic therapy.    4. Discussed with the patient that she needs to return to the operative theater for a 2nd look irrigation debridement and possible wound VAC placement tomorrow.    5. Consent for 2nd look irrigation debridement with possible wound VAC placement, both upper extremities.    6. NPO after midnight tonight.  7. Possible complications of surgery to include bleeding, infection, scarring, nerve/blood vessel/tendon damage, need for further surgery, failed surgery, failure to improve, possible skin slough, and possible future amputation were discussed with the patient the patient was permitted to ask questions and all concerns were addressed to her satisfaction.

## 2022-11-16 NOTE — ASSESSMENT & PLAN NOTE
S/P surgical intervention with ortho  Return to OR scheduled for 11/17  Will require wound vac due to extensive nature of injury and skin loss  nursing/case management working to acquire wound vac   Continue treatment with augmentin  PT/OT when cleared by ortho  PRN pain medications

## 2022-11-16 NOTE — SUBJECTIVE & OBJECTIVE
Interval History: no acute events, still in pain    Review of Systems   Constitutional:  Positive for fatigue. Negative for fever.   HENT: Negative.     Eyes: Negative.    Respiratory: Negative.     Cardiovascular: Negative.    Gastrointestinal: Negative.    Endocrine: Negative.    Genitourinary: Negative.    Musculoskeletal:         Pain bilateral upper extremities and face   Skin:  Positive for wound.   Allergic/Immunologic: Negative.    Neurological: Negative.  Negative for numbness.   Hematological: Negative.    Psychiatric/Behavioral: Negative.     Objective:     Vital Signs (Most Recent):  Temp: 97 °F (36.1 °C) (11/16/22 0729)  Pulse: 83 (11/16/22 0815)  Resp: 17 (11/16/22 1150)  BP: 135/61 (11/16/22 0729)  SpO2: (!) 94 % (11/16/22 0815)   Vital Signs (24h Range):  Temp:  [96.8 °F (36 °C)-98.2 °F (36.8 °C)] 97 °F (36.1 °C)  Pulse:  [60-83] 83  Resp:  [16-22] 17  SpO2:  [92 %-96 %] 94 %  BP: (106-135)/(44-63) 135/61     Weight: 65.3 kg (143 lb 15.4 oz)  Body mass index is 23.24 kg/m².    Intake/Output Summary (Last 24 hours) at 11/16/2022 1313  Last data filed at 11/16/2022 0449  Gross per 24 hour   Intake 480 ml   Output --   Net 480 ml      Physical Exam  Vitals reviewed.   Constitutional:       General: She is not in acute distress.     Appearance: She is not toxic-appearing or diaphoretic.   HENT:      Head: Normocephalic.      Comments: Injuries/wounds to the face including left cheek with sutures     Right Ear: External ear normal.      Left Ear: External ear normal.      Nose: Nose normal.      Mouth/Throat:      Mouth: Mucous membranes are moist.   Eyes:      Conjunctiva/sclera: Conjunctivae normal.   Cardiovascular:      Rate and Rhythm: Normal rate and regular rhythm.      Pulses: Normal pulses.      Heart sounds: No murmur heard.    No gallop.   Pulmonary:      Effort: Pulmonary effort is normal.   Abdominal:      General: There is no distension.   Musculoskeletal:      Comments: Bilateral upper  extremities wrapped in surgical dressing, left upper extremity with drainage on the bandages    Skin:     Findings: Bruising present.   Neurological:      General: No focal deficit present.      Mental Status: She is alert.   Psychiatric:         Mood and Affect: Mood normal.       Significant Labs: All pertinent labs within the past 24 hours have been reviewed.  CBC:   Recent Labs   Lab 11/14/22  2023 11/15/22  0559 11/16/22  0613   WBC 20.05* 14.85* 11.95   HGB 13.4 12.2 11.9*   HCT 41.1 37.7 36.8*    306 283     CMP:   Recent Labs   Lab 11/14/22  2023 11/15/22  0559 11/16/22  0613    139 135*   K 3.8 3.8 3.9    105 102   CO2 24 24 24   * 103 87   BUN 20 13 9   CREATININE 0.7 0.6 0.6   CALCIUM 9.5 8.6* 8.5*   PROT 6.6 6.1  --    ALBUMIN 3.8 3.3*  --    BILITOT 0.4 0.7  --    ALKPHOS 69 70  --    AST 28 29  --    ALT 13 12  --    ANIONGAP 12 10 9       Significant Imaging: I have reviewed all pertinent imaging results/findings within the past 24 hours.  X-Ray Forearm Left   Final Result   Abnormal      1. Extensive soft tissue laceration soft tissue swelling of the bilateral forearm secondary to reported dog bite.   2. Questionable small punctate radiopaque foreign bodies of the left forearm.   This report was flagged in Epic as abnormal.         Electronically signed by: Yousuf Edmondson   Date:    11/15/2022   Time:    07:47      X-Ray Forearm Right   Final Result   Abnormal      1. Extensive soft tissue laceration soft tissue swelling of the bilateral forearm secondary to reported dog bite.   2. Questionable small punctate radiopaque foreign bodies of the left forearm.   This report was flagged in Epic as abnormal.         Electronically signed by: Yousuf Edmondson   Date:    11/15/2022   Time:    07:47

## 2022-11-16 NOTE — PLAN OF CARE
11/16/22 1415   Post-Acute Status   Post-Acute Authorization HME   E Status (!) Pending Delivery   Discharge Delays None known at this time   Discharge Plan   Discharge Plan A Home Health   Discharge Plan B Home Health   Wound vac ordered from [facility]. Per Dai with [facility] unsure what time wound vac will be delivered tomorrow. Surgery is scheduled for afternoon. Will need home health if wound vac is placed. Will continue to follow.

## 2022-11-16 NOTE — PROGRESS NOTES
[facility] Medicine  Progress Note    Patient Name: Geraldine Cooksey  MRN: [id number]  Patient Class: IP- Inpatient   Admission Date: 11/14/2022  Length of Stay: 1 days  Attending Physician: Job Smallwood MD  Primary Care Provider: DAVE Israel        Subjective:     Principal Problem:Dog bite        HPI:  81 yo w/ hx of COPD admitted following trauma for dog bite that required extensive orthopaedic intervention to bilateral upper extremities. Patient doing well postoperatively only complaining of mild pain.       Overview/Hospital Course:  No notes on file    Interval History: no acute events, still in pain    Review of Systems   Constitutional:  Positive for fatigue. Negative for fever.   HENT: Negative.     Eyes: Negative.    Respiratory: Negative.     Cardiovascular: Negative.    Gastrointestinal: Negative.    Endocrine: Negative.    Genitourinary: Negative.    Musculoskeletal:         Pain bilateral upper extremities and face   Skin:  Positive for wound.   Allergic/Immunologic: Negative.    Neurological: Negative.  Negative for numbness.   Hematological: Negative.    Psychiatric/Behavioral: Negative.     Objective:     Vital Signs (Most Recent):  Temp: 97 °F (36.1 °C) (11/16/22 0729)  Pulse: 83 (11/16/22 0815)  Resp: 17 (11/16/22 1150)  BP: 135/61 (11/16/22 0729)  SpO2: (!) 94 % (11/16/22 0815)   Vital Signs (24h Range):  Temp:  [96.8 °F (36 °C)-98.2 °F (36.8 °C)] 97 °F (36.1 °C)  Pulse:  [60-83] 83  Resp:  [16-22] 17  SpO2:  [92 %-96 %] 94 %  BP: (106-135)/(44-63) 135/61     Weight: 65.3 kg (143 lb 15.4 oz)  Body mass index is 23.24 kg/m².    Intake/Output Summary (Last 24 hours) at 11/16/2022 1313  Last data filed at 11/16/2022 0449  Gross per 24 hour   Intake 480 ml   Output --   Net 480 ml      Physical Exam  Vitals reviewed.   Constitutional:       General: She is not in acute distress.     Appearance: She is not toxic-appearing or diaphoretic.   HENT:      Head:  Normocephalic.      Comments: Injuries/wounds      Right Ear: External ear normal.      Left Ear: External ear normal.      Nose: Nose normal.      Mouth/Throat:      Mouth: Mucous membranes are moist.   Eyes:      Conjunctiva/sclera: Conjunctivae normal.   Cardiovascular:      Rate and Rhythm: Normal rate and regular rhythm.      Pulses: Normal pulses.      Heart sounds: No murmur heard.    No gallop.   Pulmonary:      Effort: Pulmonary effort is normal.   Abdominal:      General: There is no distension.   Musculoskeletal:      Comments: Bilateral upper extremities wrapped in surgical dressing, c/d/i  Skin:     Findings: Bruising present.   Neurological:      General: No focal deficit present.      Mental Status: She is alert.   Psychiatric:         Mood and Affect: Mood normal.       Significant Labs: All pertinent labs within the past 24 hours have been reviewed.  CBC:   Recent Labs   Lab 11/14/22  2023 11/15/22  0559 11/16/22  0613   WBC 20.05* 14.85* 11.95   HGB 13.4 12.2 11.9*   HCT 41.1 37.7 36.8*    306 283     CMP:   Recent Labs   Lab 11/14/22  2023 11/15/22  0559 11/16/22  0613    139 135*   K 3.8 3.8 3.9    105 102   CO2 24 24 24   * 103 87   BUN 20 13 9   CREATININE 0.7 0.6 0.6   CALCIUM 9.5 8.6* 8.5*   PROT 6.6 6.1  --    ALBUMIN 3.8 3.3*  --    BILITOT 0.4 0.7  --    ALKPHOS 69 70  --    AST 28 29  --    ALT 13 12  --    ANIONGAP 12 10 9       Significant Imaging: I have reviewed all pertinent imaging results/findings within the past 24 hours.  X-Ray Forearm Left   Final Result   Abnormal      1. Extensive soft tissue laceration soft tissue swelling of the bilateral forearm secondary to reported dog bite.   2. Questionable small punctate radiopaque foreign bodies of the left forearm.   This report was flagged in Epic as abnormal.         Electronically signed by: Yousuf Edmondson   Date:    11/15/2022   Time:    07:47      X-Ray Forearm Right   Final Result   Abnormal      1.  Extensive soft tissue laceration soft tissue swelling of the bilateral forearm secondary to reported dog bite.   2. Questionable small punctate radiopaque foreign bodies of the left forearm.   This report was flagged in Epic as abnormal.         Electronically signed by: Yousuf Edmondson   Date:    11/15/2022   Time:    07:47              Assessment/Plan:      * Dog bite  S/P surgical intervention with ortho  Return to OR scheduled for 11/17  Will require wound vac due to extensive nature of injury and skin loss  nursing/case management working to acquire wound vac  Patient educated on high protein to promote wound healing. Arginaid added BID.  Continue treatment with augmentin  PT/OT when cleared by ortho  PRN pain medications      COPD (chronic obstructive pulmonary disease)  Continue current inhalers  PRN duonebs      VTE Risk Mitigation (From admission, onward)           Ordered     enoxaparin injection 40 mg  Daily         11/15/22 0450     Place sequential compression device  Until discontinued         11/15/22 0245                    Discharge Planning   PEDRO:      Code Status: Full Code   Is the patient medically ready for discharge?:     Reason for patient still in hospital (select all that apply): Treatment  Discharge Plan A: Home                  Flor Aguayo MD  Department of Hospital Medicine   [facility]

## 2022-11-16 NOTE — PLAN OF CARE
11/16/22 1401   Medicare Message   Important Message from Medicare regarding Discharge Appeal Rights Given to patient/caregiver;Explained to patient/caregiver;Signed/date by patient/caregiver   Date IMM was signed 11/16/22   Time IMM was signed 1353

## 2022-11-17 ENCOUNTER — ANESTHESIA EVENT (OUTPATIENT)
Dept: SURGERY | Facility: HOSPITAL | Age: 82
DRG: 501 | End: 2022-11-17
Payer: MEDICARE

## 2022-11-17 ENCOUNTER — ANESTHESIA (OUTPATIENT)
Dept: SURGERY | Facility: HOSPITAL | Age: 82
DRG: 501 | End: 2022-11-17
Payer: MEDICARE

## 2022-11-17 LAB
ANION GAP SERPL CALC-SCNC: 13 MMOL/L (ref 8–16)
BASOPHILS # BLD AUTO: 0.13 K/UL (ref 0–0.2)
BASOPHILS NFR BLD: 0.9 % (ref 0–1.9)
BUN SERPL-MCNC: 7 MG/DL (ref 8–23)
CALCIUM SERPL-MCNC: 8.9 MG/DL (ref 8.7–10.5)
CHLORIDE SERPL-SCNC: 99 MMOL/L (ref 95–110)
CO2 SERPL-SCNC: 22 MMOL/L (ref 23–29)
CREAT SERPL-MCNC: 0.5 MG/DL (ref 0.5–1.4)
DIFFERENTIAL METHOD: ABNORMAL
EOSINOPHIL # BLD AUTO: 0 K/UL (ref 0–0.5)
EOSINOPHIL NFR BLD: 0.1 % (ref 0–8)
ERYTHROCYTE [DISTWIDTH] IN BLOOD BY AUTOMATED COUNT: 14.6 % (ref 11.5–14.5)
EST. GFR  (NO RACE VARIABLE): >60 ML/MIN/1.73 M^2
GLUCOSE SERPL-MCNC: 79 MG/DL (ref 70–110)
HCT VFR BLD AUTO: 37.3 % (ref 37–48.5)
HGB BLD-MCNC: 12.1 G/DL (ref 12–16)
IMM GRANULOCYTES # BLD AUTO: 0.09 K/UL (ref 0–0.04)
IMM GRANULOCYTES NFR BLD AUTO: 0.6 % (ref 0–0.5)
LYMPHOCYTES # BLD AUTO: 1.3 K/UL (ref 1–4.8)
LYMPHOCYTES NFR BLD: 9.2 % (ref 18–48)
MAGNESIUM SERPL-MCNC: 1.7 MG/DL (ref 1.6–2.6)
MCH RBC QN AUTO: 29.7 PG (ref 27–31)
MCHC RBC AUTO-ENTMCNC: 32.4 G/DL (ref 32–36)
MCV RBC AUTO: 92 FL (ref 82–98)
MONOCYTES # BLD AUTO: 2.2 K/UL (ref 0.3–1)
MONOCYTES NFR BLD: 15.3 % (ref 4–15)
NEUTROPHILS # BLD AUTO: 10.4 K/UL (ref 1.8–7.7)
NEUTROPHILS NFR BLD: 73.9 % (ref 38–73)
NRBC BLD-RTO: 0 /100 WBC
PLATELET # BLD AUTO: 279 K/UL (ref 150–450)
PMV BLD AUTO: 11.6 FL (ref 9.2–12.9)
POTASSIUM SERPL-SCNC: 3.6 MMOL/L (ref 3.5–5.1)
RBC # BLD AUTO: 4.07 M/UL (ref 4–5.4)
SODIUM SERPL-SCNC: 134 MMOL/L (ref 136–145)
WBC # BLD AUTO: 14.09 K/UL (ref 3.9–12.7)

## 2022-11-17 PROCEDURE — 94761 N-INVAS EAR/PLS OXIMETRY MLT: CPT

## 2022-11-17 PROCEDURE — D9220A PRA ANESTHESIA: Mod: CRNA,,, | Performed by: NURSE ANESTHETIST, CERTIFIED REGISTERED

## 2022-11-17 PROCEDURE — 63600175 PHARM REV CODE 636 W HCPCS: Performed by: HOSPITALIST

## 2022-11-17 PROCEDURE — 25000003 PHARM REV CODE 250: Performed by: HOSPITALIST

## 2022-11-17 PROCEDURE — 83735 ASSAY OF MAGNESIUM: CPT | Performed by: STUDENT IN AN ORGANIZED HEALTH CARE EDUCATION/TRAINING PROGRAM

## 2022-11-17 PROCEDURE — 25000242 PHARM REV CODE 250 ALT 637 W/ HCPCS: Performed by: STUDENT IN AN ORGANIZED HEALTH CARE EDUCATION/TRAINING PROGRAM

## 2022-11-17 PROCEDURE — 80048 BASIC METABOLIC PNL TOTAL CA: CPT | Performed by: STUDENT IN AN ORGANIZED HEALTH CARE EDUCATION/TRAINING PROGRAM

## 2022-11-17 PROCEDURE — 63600175 PHARM REV CODE 636 W HCPCS: Performed by: NURSE ANESTHETIST, CERTIFIED REGISTERED

## 2022-11-17 PROCEDURE — 13121 CMPLX RPR S/A/L 2.6-7.5 CM: CPT | Mod: 58,,, | Performed by: ORTHOPAEDIC SURGERY

## 2022-11-17 PROCEDURE — D9220A PRA ANESTHESIA: ICD-10-PCS | Mod: CRNA,,, | Performed by: NURSE ANESTHETIST, CERTIFIED REGISTERED

## 2022-11-17 PROCEDURE — 71000033 HC RECOVERY, INTIAL HOUR: Performed by: ORTHOPAEDIC SURGERY

## 2022-11-17 PROCEDURE — 94640 AIRWAY INHALATION TREATMENT: CPT

## 2022-11-17 PROCEDURE — 36000706: Performed by: ORTHOPAEDIC SURGERY

## 2022-11-17 PROCEDURE — D9220A PRA ANESTHESIA: Mod: ANES,,, | Performed by: ANESTHESIOLOGY

## 2022-11-17 PROCEDURE — 25000003 PHARM REV CODE 250: Performed by: STUDENT IN AN ORGANIZED HEALTH CARE EDUCATION/TRAINING PROGRAM

## 2022-11-17 PROCEDURE — 85025 COMPLETE CBC W/AUTO DIFF WBC: CPT | Performed by: STUDENT IN AN ORGANIZED HEALTH CARE EDUCATION/TRAINING PROGRAM

## 2022-11-17 PROCEDURE — 37000009 HC ANESTHESIA EA ADD 15 MINS: Performed by: ORTHOPAEDIC SURGERY

## 2022-11-17 PROCEDURE — 36000707: Performed by: ORTHOPAEDIC SURGERY

## 2022-11-17 PROCEDURE — 25000003 PHARM REV CODE 250: Performed by: NURSE ANESTHETIST, CERTIFIED REGISTERED

## 2022-11-17 PROCEDURE — 99233 SBSQ HOSP IP/OBS HIGH 50: CPT | Mod: ,,, | Performed by: FAMILY MEDICINE

## 2022-11-17 PROCEDURE — 36415 COLL VENOUS BLD VENIPUNCTURE: CPT | Performed by: STUDENT IN AN ORGANIZED HEALTH CARE EDUCATION/TRAINING PROGRAM

## 2022-11-17 PROCEDURE — 37000008 HC ANESTHESIA 1ST 15 MINUTES: Performed by: ORTHOPAEDIC SURGERY

## 2022-11-17 PROCEDURE — 11000001 HC ACUTE MED/SURG PRIVATE ROOM

## 2022-11-17 PROCEDURE — 99233 PR SUBSEQUENT HOSPITAL CARE,LEVL III: ICD-10-PCS | Mod: ,,, | Performed by: FAMILY MEDICINE

## 2022-11-17 PROCEDURE — 13121 PR RECMPL WND SCALP,EXTR 2.6-7.5 CM: ICD-10-PCS | Mod: 58,,, | Performed by: ORTHOPAEDIC SURGERY

## 2022-11-17 PROCEDURE — D9220A PRA ANESTHESIA: ICD-10-PCS | Mod: ANES,,, | Performed by: ANESTHESIOLOGY

## 2022-11-17 RX ORDER — LIDOCAINE HYDROCHLORIDE 20 MG/ML
INJECTION, SOLUTION EPIDURAL; INFILTRATION; INTRACAUDAL; PERINEURAL
Status: DISCONTINUED | OUTPATIENT
Start: 2022-11-17 | End: 2022-11-17

## 2022-11-17 RX ORDER — LIDOCAINE HYDROCHLORIDE 10 MG/ML
1 INJECTION, SOLUTION EPIDURAL; INFILTRATION; INTRACAUDAL; PERINEURAL ONCE
Status: CANCELLED | OUTPATIENT
Start: 2022-11-17 | End: 2022-11-17

## 2022-11-17 RX ORDER — MORPHINE SULFATE 4 MG/ML
2 INJECTION, SOLUTION INTRAMUSCULAR; INTRAVENOUS EVERY 5 MIN PRN
Status: DISCONTINUED | OUTPATIENT
Start: 2022-11-17 | End: 2022-11-18 | Stop reason: HOSPADM

## 2022-11-17 RX ORDER — FAMOTIDINE 10 MG/ML
20 INJECTION INTRAVENOUS ONCE
Status: CANCELLED | OUTPATIENT
Start: 2022-11-17 | End: 2022-11-17

## 2022-11-17 RX ORDER — PHENYLEPHRINE HYDROCHLORIDE 10 MG/ML
INJECTION INTRAVENOUS
Status: DISCONTINUED | OUTPATIENT
Start: 2022-11-17 | End: 2022-11-17

## 2022-11-17 RX ORDER — DIPHENHYDRAMINE HYDROCHLORIDE 50 MG/ML
12.5 INJECTION INTRAMUSCULAR; INTRAVENOUS
Status: DISCONTINUED | OUTPATIENT
Start: 2022-11-17 | End: 2022-11-18 | Stop reason: HOSPADM

## 2022-11-17 RX ORDER — SODIUM CHLORIDE, SODIUM LACTATE, POTASSIUM CHLORIDE, CALCIUM CHLORIDE 600; 310; 30; 20 MG/100ML; MG/100ML; MG/100ML; MG/100ML
125 INJECTION, SOLUTION INTRAVENOUS CONTINUOUS
Status: DISCONTINUED | OUTPATIENT
Start: 2022-11-17 | End: 2022-11-18 | Stop reason: HOSPADM

## 2022-11-17 RX ORDER — PROPOFOL 10 MG/ML
VIAL (ML) INTRAVENOUS
Status: DISCONTINUED | OUTPATIENT
Start: 2022-11-17 | End: 2022-11-17

## 2022-11-17 RX ORDER — ONDANSETRON 2 MG/ML
4 INJECTION INTRAMUSCULAR; INTRAVENOUS DAILY PRN
Status: DISCONTINUED | OUTPATIENT
Start: 2022-11-17 | End: 2022-11-18 | Stop reason: HOSPADM

## 2022-11-17 RX ORDER — MIDAZOLAM HYDROCHLORIDE 1 MG/ML
INJECTION INTRAMUSCULAR; INTRAVENOUS
Status: DISCONTINUED | OUTPATIENT
Start: 2022-11-17 | End: 2022-11-17

## 2022-11-17 RX ORDER — SODIUM CHLORIDE, SODIUM LACTATE, POTASSIUM CHLORIDE, CALCIUM CHLORIDE 600; 310; 30; 20 MG/100ML; MG/100ML; MG/100ML; MG/100ML
INJECTION, SOLUTION INTRAVENOUS CONTINUOUS
Status: CANCELLED | OUTPATIENT
Start: 2022-11-17

## 2022-11-17 RX ORDER — FENTANYL CITRATE 50 UG/ML
INJECTION, SOLUTION INTRAMUSCULAR; INTRAVENOUS
Status: DISCONTINUED | OUTPATIENT
Start: 2022-11-17 | End: 2022-11-17

## 2022-11-17 RX ORDER — SODIUM CHLORIDE, SODIUM LACTATE, POTASSIUM CHLORIDE, CALCIUM CHLORIDE 600; 310; 30; 20 MG/100ML; MG/100ML; MG/100ML; MG/100ML
INJECTION, SOLUTION INTRAVENOUS CONTINUOUS PRN
Status: DISCONTINUED | OUTPATIENT
Start: 2022-11-17 | End: 2022-11-17

## 2022-11-17 RX ORDER — DEXAMETHASONE SODIUM PHOSPHATE 4 MG/ML
INJECTION, SOLUTION INTRA-ARTICULAR; INTRALESIONAL; INTRAMUSCULAR; INTRAVENOUS; SOFT TISSUE
Status: DISCONTINUED | OUTPATIENT
Start: 2022-11-17 | End: 2022-11-17

## 2022-11-17 RX ADMIN — ONDANSETRON 4 MG: 2 INJECTION INTRAMUSCULAR; INTRAVENOUS at 01:11

## 2022-11-17 RX ADMIN — AMOXICILLIN AND CLAVULANATE POTASSIUM 1 TABLET: 875; 125 TABLET, FILM COATED ORAL at 08:11

## 2022-11-17 RX ADMIN — ENOXAPARIN SODIUM 40 MG: 40 INJECTION SUBCUTANEOUS at 09:11

## 2022-11-17 RX ADMIN — MORPHINE SULFATE 2 MG: 4 INJECTION INTRAVENOUS at 01:11

## 2022-11-17 RX ADMIN — DEXAMETHASONE SODIUM PHOSPHATE 4 MG: 4 INJECTION, SOLUTION INTRAMUSCULAR; INTRAVENOUS at 01:11

## 2022-11-17 RX ADMIN — MORPHINE SULFATE 2 MG: 4 INJECTION INTRAVENOUS at 09:11

## 2022-11-17 RX ADMIN — LIDOCAINE HYDROCHLORIDE 40 MG: 20 INJECTION, SOLUTION EPIDURAL; INFILTRATION; INTRACAUDAL; PERINEURAL at 01:11

## 2022-11-17 RX ADMIN — IPRATROPIUM BROMIDE 0.5 MG: 0.5 SOLUTION RESPIRATORY (INHALATION) at 06:11

## 2022-11-17 RX ADMIN — PHENYLEPHRINE HYDROCHLORIDE 100 MCG: 10 INJECTION INTRAVENOUS at 01:11

## 2022-11-17 RX ADMIN — FLUTICASONE FUROATE AND VILANTEROL TRIFENATATE 1 PUFF: 100; 25 POWDER RESPIRATORY (INHALATION) at 06:11

## 2022-11-17 RX ADMIN — MUPIROCIN: 20 OINTMENT TOPICAL at 09:11

## 2022-11-17 RX ADMIN — PROPOFOL 200 MG: 10 INJECTION, EMULSION INTRAVENOUS at 01:11

## 2022-11-17 RX ADMIN — FENTANYL CITRATE 100 MCG: 50 INJECTION, SOLUTION INTRAMUSCULAR; INTRAVENOUS at 01:11

## 2022-11-17 RX ADMIN — PHENYLEPHRINE HYDROCHLORIDE 100 MCG: 10 INJECTION INTRAVENOUS at 02:11

## 2022-11-17 RX ADMIN — AMOXICILLIN AND CLAVULANATE POTASSIUM 1 TABLET: 875; 125 TABLET, FILM COATED ORAL at 09:11

## 2022-11-17 RX ADMIN — SODIUM CHLORIDE, POTASSIUM CHLORIDE, SODIUM LACTATE AND CALCIUM CHLORIDE: 600; 310; 30; 20 INJECTION, SOLUTION INTRAVENOUS at 01:11

## 2022-11-17 RX ADMIN — HYDROCODONE BITARTRATE AND ACETAMINOPHEN 1 TABLET: 10; 325 TABLET ORAL at 04:11

## 2022-11-17 RX ADMIN — MIDAZOLAM HYDROCHLORIDE 5 MG: 1 INJECTION, SOLUTION INTRAMUSCULAR; INTRAVENOUS at 01:11

## 2022-11-17 NOTE — PLAN OF CARE
Patient in no apparent distress. Sat's  92-94 % on room air. Aerosol treatments now PRN. BBS clear. Breo given daily. Will continue to monitor.

## 2022-11-17 NOTE — INTERVAL H&P NOTE
The patient has been examined and the H&P has been reviewed:    I concur with the findings and changes have been noted since the H&P was written:  Irrigation and debridement with tendon repair both forearm    Surgery risks, benefits and alternative options discussed and understood by patient/family.          Active Hospital Problems    Diagnosis  POA    *Dog bite [W54.0XXA]  Yes    COPD (chronic obstructive pulmonary disease) [J44.9]  Yes      Resolved Hospital Problems   No resolved problems to display.

## 2022-11-17 NOTE — NURSING
Returned back from surgery, NADN. Resp even and unlabored on room air. Tolerating clear liquids, AAOX4. Dressings to bilateral upper extremities CDI. Good cap refill, nail beds pink. Daughter at bedside. Cb in reach, bed in lowest locked position, side rails elevated x2.

## 2022-11-17 NOTE — PLAN OF CARE
Transferred to room 125 per hospital bed. Resting in bed eyes open. Resp even and unlabored on room air. No distress noted. Post-op dsg clean, dry and intact. 20G right foot saline lock site remains without noted problems to site. Upper and lower dentures in mouth per pt. SR up x2. Bed in lowest position with brakes on. Door open. Call light placed within reach. Daughter at pts bedside.

## 2022-11-17 NOTE — ANESTHESIA PREPROCEDURE EVALUATION
11/17/2022  Geraldine Cooksey is a 82 y.o., female.      Pre-op Assessment    I have reviewed the Patient Summary Reports.     I have reviewed the Nursing Notes. I have reviewed the NPO Status.   I have reviewed the Medications.     Review of Systems  Social:  Smoker    Hematology/Oncology:  Hematology Normal   Oncology Normal     EENT/Dental:EENT/Dental Normal   Cardiovascular:  Cardiovascular Normal     Pulmonary:   COPD    Renal/:  Renal/ Normal     Hepatic/GI:  Hepatic/GI Normal    Musculoskeletal:   Arthritis     Neurological:  Neurology Normal    Endocrine:  Endocrine Normal        Physical Exam    Airway:  Mallampati: II   Mouth Opening: Normal  TM Distance: Normal  Neck ROM: Normal ROM    Dental:  Dentures    Chest/Lungs:  Clear to auscultation    Heart:  Rate: Normal  Rhythm: Regular Rhythm        Anesthesia Plan  Type of Anesthesia, risks & benefits discussed:    Anesthesia Type: Gen ETT  Post Op Pain Control Plan: multimodal analgesia  Induction:  IV  Airway Plan: Direct  Informed Consent: Informed consent signed with the Patient and all parties understand the risks and agree with anesthesia plan.  All questions answered.   ASA Score: 3  Day of Surgery Review of History & Physical: H&P Update referred to the surgeon/provider.    Ready For Surgery From Anesthesia Perspective.     .

## 2022-11-17 NOTE — SUBJECTIVE & OBJECTIVE
Interval History: having some slight worsening of pain today    Review of Systems   Constitutional:  Positive for fatigue. Negative for fever.   HENT: Negative.     Eyes: Negative.    Respiratory: Negative.     Cardiovascular: Negative.    Gastrointestinal: Negative.    Endocrine: Negative.    Genitourinary: Negative.    Musculoskeletal:         Pain bilateral upper extremities   Skin:  Positive for wound.   Allergic/Immunologic: Negative.    Neurological: Negative.  Negative for numbness.   Hematological: Negative.    Psychiatric/Behavioral: Negative.     Objective:     Vital Signs (Most Recent):  Temp: 98.7 °F (37.1 °C) (11/17/22 1058)  Pulse: 73 (11/17/22 1058)  Resp: 15 (11/17/22 1058)  BP: 130/71 (11/17/22 1058)  SpO2: 95 % (11/17/22 1058) Vital Signs (24h Range):  Temp:  [97.4 °F (36.3 °C)-100.3 °F (37.9 °C)] 98.7 °F (37.1 °C)  Pulse:  [68-79] 73  Resp:  [15-20] 15  SpO2:  [91 %-95 %] 95 %  BP: (119-157)/(56-85) 130/71     Weight: 65.3 kg (143 lb 15.4 oz)  Body mass index is 23.24 kg/m².    Intake/Output Summary (Last 24 hours) at 11/17/2022 1347  Last data filed at 11/17/2022 0509  Gross per 24 hour   Intake 240 ml   Output --   Net 240 ml      Physical Exam  Vitals reviewed.   Constitutional:       General: She is not in acute distress.     Appearance: She is not toxic-appearing or diaphoretic.   HENT:      Head: Normocephalic and atraumatic.      Right Ear: External ear normal.      Left Ear: External ear normal.      Nose: Nose normal.      Mouth/Throat:      Mouth: Mucous membranes are moist.   Eyes:      Conjunctiva/sclera: Conjunctivae normal.   Cardiovascular:      Rate and Rhythm: Normal rate and regular rhythm.      Pulses: Normal pulses.      Heart sounds: No murmur heard.    No gallop.   Pulmonary:      Effort: Pulmonary effort is normal.   Abdominal:      General: There is no distension.   Musculoskeletal:      Comments: Bilateral upper extremities wrapped in surgical dressing/casting   Skin:      Findings: Bruising present.   Neurological:      General: No focal deficit present.      Mental Status: She is alert.   Psychiatric:         Mood and Affect: Mood normal.       Significant Labs: All pertinent labs within the past 24 hours have been reviewed.  CBC:   Recent Labs   Lab 11/16/22 0613 11/17/22 0719   WBC 11.95 14.09*   HGB 11.9* 12.1   HCT 36.8* 37.3    279     CMP:   Recent Labs   Lab 11/16/22 0613 11/17/22 0719   * 134*   K 3.9 3.6    99   CO2 24 22*   GLU 87 79   BUN 9 7*   CREATININE 0.6 0.5   CALCIUM 8.5* 8.9   ANIONGAP 9 13       Significant Imaging: I have reviewed all pertinent imaging results/findings within the past 24 hours.  X-Ray Forearm Left   Final Result   Abnormal      1. Extensive soft tissue laceration soft tissue swelling of the bilateral forearm secondary to reported dog bite.   2. Questionable small punctate radiopaque foreign bodies of the left forearm.   This report was flagged in Epic as abnormal.         Electronically signed by: Yousuf Edmondson   Date:    11/15/2022   Time:    07:47      X-Ray Forearm Right   Final Result   Abnormal      1. Extensive soft tissue laceration soft tissue swelling of the bilateral forearm secondary to reported dog bite.   2. Questionable small punctate radiopaque foreign bodies of the left forearm.   This report was flagged in Epic as abnormal.         Electronically signed by: Yousuf Edmondson   Date:    11/15/2022   Time:    07:47

## 2022-11-17 NOTE — TRANSFER OF CARE
"Anesthesia Transfer of Care Note    Patient: Geraldine Cooksey    Procedure(s) Performed: Procedure(s) (LRB):  IRRIGATION AND DEBRIDEMENT, UPPER EXTREMITY (Bilateral)    Patient location: PACU    Anesthesia Type: general    Transport from OR: Transported from OR on room air with adequate spontaneous ventilation    Post pain: adequate analgesia    Post assessment: no apparent anesthetic complications    Post vital signs: stable    Level of consciousness: sedated and responds to stimulation    Nausea/Vomiting: no nausea/vomiting    Complications: none    Transfer of care protocol was followed      Last vitals:   Visit Vitals  /71 (BP Location: Right leg, Patient Position: Lying)   Pulse 73   Temp 36.4 °C (97.5 °F)   Resp 15   Ht 5' 6" (1.676 m)   Wt 65.3 kg (143 lb 15.4 oz)   SpO2 95%   Breastfeeding No   BMI 23.24 kg/m²     "

## 2022-11-17 NOTE — OP NOTE
[facility]  Orthopedic Surgery    11/17/2022    Geraldine Cooksey  [id number]      PREOPERATIVE DIAGNOSIS: Dog bite, initial encounter [W54.0XXA]    POSTOPERATIVE DIAGNOSIS:    1. Dog bite with dorsal skin loss, right forearm.    2. Dog bite with dorsal skin loss, left forearm.    PROCEDURE:  1. Second look irrigation and debridement right forearm.  2. Partial closure dorsal laceration right forearm with residual 3 cm circumference with depth to muscle open wound.    3. Second look irrigation debridement left forearm.  4. Partial closure dorsal laceration left forearm with residual 3 cm circumference with depth to muscle open wound.      SURGEON: Leighton Cronin D.O.    ASSISTANT: Orton Grinnell, CFA    ANESTHESIA:  General    BLOOD LOSS:  Less than 10 cc.    TOURNIQUET:  Non applicable.    DRAINS:  Moistened gauze packing.    PATHOLOGY:  Debrided necrotic skin.    COMPLICATION:  None.    INDICATIONS FOR PROCEDURE:   Ms. Cooksey is an 82-year-old right-hand-dominant lady who presented to the emergency room by EMS with bilateral forearm injuries after she was attacked by a pit bull dog on 11/14/2022.  She injured both forearms while she was trying to rescue another individual who the dog attacked and then the dog turned on her while she was doing the rescue.  She was taken to the operating room on the date of injury where irrigation debridement was completed on her forearms.  She needed a 2nd look irrigation debridement.   She elected to proceed with surgery after complications to include bleeding, infection, scarring, nerve/blood vessel/tendon damage, need for further surgery, failed surgery, failure to improve, stiffness, skin slough, and possible amputation were discussed.  She signed a consent.    PROCEDURE IN DETAIL:   The patient was brought to the operating room was transferred to the operating bed where all bony prominences were well padded.  General anesthesia was then administered by the  Anesthesiology Department.  After general anesthesia was administered a tourniquet was applied to both of her upper extremities.  Both arms were then prepped with Betadine solution and draped in the normal sterile fashion.       The right upper extremity was inspected and there was necrotic skin this was debrided sharply with a #15 blade and rongeur.  Once clean viable tissue was obtained the patient's arm was extensively irrigated.  Some of the residual open skin was then reapproximated to itself with nylon suture.  A residual open skin loss area was present on the dorsal aspect of the patient's right forearm of approximately 3 cm in circumference with a depth into muscle.  Her right arm was then dressed with a moistened 4x4s placed within the residual open wound followed by Xeroform gauze more 4x4s ABD and sterile cast padding.       Attention was then placed on the left upper extremity where it was inspected and there was necrotic skin this was debrided sharply with a #15 blade and rongeur.  Once clean viable tissue was obtained the patient's arm was extensively irrigated.  Some of the residual open skin was then reapproximated to itself with nylon suture.  A residual open skin loss area was present on the dorsal aspect of the patient's left forearm of approximately 3 cm in circumference with a depth into muscle.  Her left arm was then dressed with a moistened 4x4s placed within the residual open wound followed by Xeroform gauze more 4x4s ABD and sterile cast padding.        Further cast padding was placed on both forearms and then short-arm volar plaster splints were placed with Ace wrap.  She was then awakened by anesthesia and transferred from the operating room to the recovery room in stable condition.  She tolerated the procedures well without complication.

## 2022-11-17 NOTE — ANESTHESIA POSTPROCEDURE EVALUATION
Anesthesia Post Evaluation    Patient: Geraldine Cooksey    Procedure(s) Performed: Procedure(s) (LRB):  IRRIGATION AND DEBRIDEMENT, UPPER EXTREMITY (Bilateral)    Final Anesthesia Type: general      Patient location during evaluation: PACU  Patient participation: Yes- Able to Participate  Level of consciousness: awake and alert  Post-procedure vital signs: reviewed and stable  Pain management: adequate  Airway patency: patent    PONV status at discharge: No PONV  Anesthetic complications: no      Cardiovascular status: blood pressure returned to baseline  Respiratory status: unassisted  Hydration status: euvolemic  Follow-up not needed.          Vitals Value Taken Time   /60 11/17/22 1517   Temp 36.4 °C (97.5 °F) 11/17/22 1440   Pulse 64 11/17/22 1519   Resp 8 11/17/22 1519   SpO2 98 % 11/17/22 1519   Vitals shown include unvalidated device data.      No case tracking events are documented in the log.      Pain/Manolo Score: Pain Rating Prior to Med Admin: 8 (11/17/2022  9:07 AM)  Pain Rating Post Med Admin: 3 (11/17/2022  5:08 AM)  Manolo Score: 9 (11/17/2022  3:10 PM)

## 2022-11-17 NOTE — PROGRESS NOTES
[facility] Medicine  Progress Note    Patient Name: Geraldine Cooksey  MRN: [id number]  Patient Class: IP- Inpatient   Admission Date: 11/14/2022  Length of Stay: 2 days  Attending Physician: Job Smallwood MD  Primary Care Provider: DAVE Israel        Subjective:     Principal Problem:Dog bite        HPI:  83 yo w/ hx of COPD admitted following trauma for dog bite that required extensive orthopaedic intervention to bilateral upper extremities. Patient doing well postoperatively only complaining of mild pain.       Overview/Hospital Course:  No notes on file    Interval History: having some slight worsening of pain today    Review of Systems   Constitutional:  Positive for fatigue. Negative for fever.   HENT: Negative.     Eyes: Negative.    Respiratory: Negative.     Cardiovascular: Negative.    Gastrointestinal: Negative.    Endocrine: Negative.    Genitourinary: Negative.    Musculoskeletal:         Pain bilateral upper extremities   Skin:  Positive for wound.   Allergic/Immunologic: Negative.    Neurological: Negative.  Negative for numbness.   Hematological: Negative.    Psychiatric/Behavioral: Negative.     Objective:     Vital Signs (Most Recent):  Temp: 98.7 °F (37.1 °C) (11/17/22 1058)  Pulse: 73 (11/17/22 1058)  Resp: 15 (11/17/22 1058)  BP: 130/71 (11/17/22 1058)  SpO2: 95 % (11/17/22 1058) Vital Signs (24h Range):  Temp:  [97.4 °F (36.3 °C)-100.3 °F (37.9 °C)] 98.7 °F (37.1 °C)  Pulse:  [68-79] 73  Resp:  [15-20] 15  SpO2:  [91 %-95 %] 95 %  BP: (119-157)/(56-85) 130/71     Weight: 65.3 kg (143 lb 15.4 oz)  Body mass index is 23.24 kg/m².    Intake/Output Summary (Last 24 hours) at 11/17/2022 1347  Last data filed at 11/17/2022 0509  Gross per 24 hour   Intake 240 ml   Output --   Net 240 ml      Physical Exam  Vitals reviewed.   Constitutional:       General: She is not in acute distress.     Appearance: She is not toxic-appearing or diaphoretic.   HENT:      Head:  Normocephalic and atraumatic.      Right Ear: External ear normal.      Left Ear: External ear normal.      Nose: Nose normal.      Mouth/Throat:      Mouth: Mucous membranes are moist.   Eyes:      Conjunctiva/sclera: Conjunctivae normal.   Cardiovascular:      Rate and Rhythm: Normal rate and regular rhythm.      Pulses: Normal pulses.      Heart sounds: No murmur heard.    No gallop.   Pulmonary:      Effort: Pulmonary effort is normal.   Abdominal:      General: There is no distension.   Musculoskeletal:      Comments: Bilateral upper extremities wrapped in surgical dressing/casting   Skin:     Findings: Bruising present.   Neurological:      General: No focal deficit present.      Mental Status: She is alert.   Psychiatric:         Mood and Affect: Mood normal.       Significant Labs: All pertinent labs within the past 24 hours have been reviewed.  CBC:   Recent Labs   Lab 11/16/22 0613 11/17/22 0719   WBC 11.95 14.09*   HGB 11.9* 12.1   HCT 36.8* 37.3    279     CMP:   Recent Labs   Lab 11/16/22 0613 11/17/22 0719   * 134*   K 3.9 3.6    99   CO2 24 22*   GLU 87 79   BUN 9 7*   CREATININE 0.6 0.5   CALCIUM 8.5* 8.9   ANIONGAP 9 13       Significant Imaging: I have reviewed all pertinent imaging results/findings within the past 24 hours.  X-Ray Forearm Left   Final Result   Abnormal      1. Extensive soft tissue laceration soft tissue swelling of the bilateral forearm secondary to reported dog bite.   2. Questionable small punctate radiopaque foreign bodies of the left forearm.   This report was flagged in Epic as abnormal.         Electronically signed by: Yousuf Edmondson   Date:    11/15/2022   Time:    07:47      X-Ray Forearm Right   Final Result   Abnormal      1. Extensive soft tissue laceration soft tissue swelling of the bilateral forearm secondary to reported dog bite.   2. Questionable small punctate radiopaque foreign bodies of the left forearm.   This report was flagged in Epic  as abnormal.         Electronically signed by: Yousuf Edmondson   Date:    11/15/2022   Time:    07:47              Assessment/Plan:      * Dog bite  S/P surgical intervention with ortho  Return to OR on 11/17  Will require wound vac due to extensive nature of injury and skin loss  nursing/case management working to acquire wound vac  Patient educated on high protein to promote wound healing. Arginade added BID.  Continue treatment with augmentin  PT/OT when cleared by ortho  PRN pain medications      COPD (chronic obstructive pulmonary disease)  Continue current inhalers  PRN duonebs        VTE Risk Mitigation (From admission, onward)         Ordered     enoxaparin injection 40 mg  Daily         11/15/22 0450     Place sequential compression device  Until discontinued         11/15/22 0245                Discharge Planning   PEDRO:      Code Status: Full Code   Is the patient medically ready for discharge?:     Reason for patient still in hospital (select all that apply): Treatment  Discharge Plan A: Home Health   Discharge Delays: None known at this time              Flor Aguayo MD  Department of Hospital Medicine   [facility]

## 2022-11-18 VITALS
OXYGEN SATURATION: 90 % | BODY MASS INDEX: 23.13 KG/M2 | HEIGHT: 66 IN | RESPIRATION RATE: 18 BRPM | HEART RATE: 66 BPM | SYSTOLIC BLOOD PRESSURE: 114 MMHG | TEMPERATURE: 98 F | WEIGHT: 143.94 LBS | DIASTOLIC BLOOD PRESSURE: 86 MMHG

## 2022-11-18 LAB
ANION GAP SERPL CALC-SCNC: 11 MMOL/L (ref 8–16)
BASOPHILS # BLD AUTO: 0.06 K/UL (ref 0–0.2)
BASOPHILS NFR BLD: 0.4 % (ref 0–1.9)
BUN SERPL-MCNC: 11 MG/DL (ref 8–23)
CALCIUM SERPL-MCNC: 8.9 MG/DL (ref 8.7–10.5)
CHLORIDE SERPL-SCNC: 103 MMOL/L (ref 95–110)
CO2 SERPL-SCNC: 24 MMOL/L (ref 23–29)
CREAT SERPL-MCNC: 0.5 MG/DL (ref 0.5–1.4)
DIFFERENTIAL METHOD: ABNORMAL
EOSINOPHIL # BLD AUTO: 0 K/UL (ref 0–0.5)
EOSINOPHIL NFR BLD: 0.1 % (ref 0–8)
ERYTHROCYTE [DISTWIDTH] IN BLOOD BY AUTOMATED COUNT: 14.6 % (ref 11.5–14.5)
EST. GFR  (NO RACE VARIABLE): >60 ML/MIN/1.73 M^2
GLUCOSE SERPL-MCNC: 105 MG/DL (ref 70–110)
HCT VFR BLD AUTO: 36.2 % (ref 37–48.5)
HGB BLD-MCNC: 11.8 G/DL (ref 12–16)
IMM GRANULOCYTES # BLD AUTO: 0.07 K/UL (ref 0–0.04)
IMM GRANULOCYTES NFR BLD AUTO: 0.5 % (ref 0–0.5)
LYMPHOCYTES # BLD AUTO: 1.5 K/UL (ref 1–4.8)
LYMPHOCYTES NFR BLD: 10.4 % (ref 18–48)
MAGNESIUM SERPL-MCNC: 1.7 MG/DL (ref 1.6–2.6)
MCH RBC QN AUTO: 29.6 PG (ref 27–31)
MCHC RBC AUTO-ENTMCNC: 32.6 G/DL (ref 32–36)
MCV RBC AUTO: 91 FL (ref 82–98)
MONOCYTES # BLD AUTO: 2 K/UL (ref 0.3–1)
MONOCYTES NFR BLD: 13.9 % (ref 4–15)
NEUTROPHILS # BLD AUTO: 10.6 K/UL (ref 1.8–7.7)
NEUTROPHILS NFR BLD: 74.7 % (ref 38–73)
NRBC BLD-RTO: 0 /100 WBC
PLATELET # BLD AUTO: 322 K/UL (ref 150–450)
PMV BLD AUTO: 11.7 FL (ref 9.2–12.9)
POTASSIUM SERPL-SCNC: 4.1 MMOL/L (ref 3.5–5.1)
RBC # BLD AUTO: 3.99 M/UL (ref 4–5.4)
SODIUM SERPL-SCNC: 138 MMOL/L (ref 136–145)
WBC # BLD AUTO: 14.15 K/UL (ref 3.9–12.7)

## 2022-11-18 PROCEDURE — 97530 THERAPEUTIC ACTIVITIES: CPT

## 2022-11-18 PROCEDURE — 99900035 HC TECH TIME PER 15 MIN (STAT)

## 2022-11-18 PROCEDURE — 97535 SELF CARE MNGMENT TRAINING: CPT

## 2022-11-18 PROCEDURE — 99239 PR HOSPITAL DISCHARGE DAY,>30 MIN: ICD-10-PCS | Mod: ,,, | Performed by: FAMILY MEDICINE

## 2022-11-18 PROCEDURE — 63600175 PHARM REV CODE 636 W HCPCS: Performed by: ANESTHESIOLOGY

## 2022-11-18 PROCEDURE — 97165 OT EVAL LOW COMPLEX 30 MIN: CPT

## 2022-11-18 PROCEDURE — 80048 BASIC METABOLIC PNL TOTAL CA: CPT | Performed by: STUDENT IN AN ORGANIZED HEALTH CARE EDUCATION/TRAINING PROGRAM

## 2022-11-18 PROCEDURE — 1111F DSCHRG MED/CURRENT MED MERGE: CPT | Mod: CPTII,,, | Performed by: FAMILY MEDICINE

## 2022-11-18 PROCEDURE — 25000003 PHARM REV CODE 250: Performed by: STUDENT IN AN ORGANIZED HEALTH CARE EDUCATION/TRAINING PROGRAM

## 2022-11-18 PROCEDURE — 94761 N-INVAS EAR/PLS OXIMETRY MLT: CPT

## 2022-11-18 PROCEDURE — 99239 HOSP IP/OBS DSCHRG MGMT >30: CPT | Mod: ,,, | Performed by: FAMILY MEDICINE

## 2022-11-18 PROCEDURE — 1111F PR DISCHARGE MEDS RECONCILED W/ CURRENT OUTPATIENT MED LIST: ICD-10-PCS | Mod: CPTII,,, | Performed by: FAMILY MEDICINE

## 2022-11-18 PROCEDURE — 85025 COMPLETE CBC W/AUTO DIFF WBC: CPT | Performed by: STUDENT IN AN ORGANIZED HEALTH CARE EDUCATION/TRAINING PROGRAM

## 2022-11-18 PROCEDURE — 94640 AIRWAY INHALATION TREATMENT: CPT

## 2022-11-18 PROCEDURE — 36415 COLL VENOUS BLD VENIPUNCTURE: CPT | Performed by: STUDENT IN AN ORGANIZED HEALTH CARE EDUCATION/TRAINING PROGRAM

## 2022-11-18 PROCEDURE — 83735 ASSAY OF MAGNESIUM: CPT | Performed by: STUDENT IN AN ORGANIZED HEALTH CARE EDUCATION/TRAINING PROGRAM

## 2022-11-18 RX ORDER — AMOXICILLIN AND CLAVULANATE POTASSIUM 875; 125 MG/1; MG/1
1 TABLET, FILM COATED ORAL EVERY 12 HOURS
Qty: 20 TABLET | Refills: 0 | OUTPATIENT
Start: 2022-11-18 | End: 2023-04-11

## 2022-11-18 RX ORDER — HYDROCODONE BITARTRATE AND ACETAMINOPHEN 10; 325 MG/1; MG/1
1 TABLET ORAL EVERY 6 HOURS PRN
Qty: 20 TABLET | Refills: 0 | OUTPATIENT
Start: 2022-11-18 | End: 2023-04-11

## 2022-11-18 RX ORDER — HYDROCODONE BITARTRATE AND ACETAMINOPHEN 10; 325 MG/1; MG/1
1 TABLET ORAL EVERY 6 HOURS PRN
Qty: 20 TABLET | Refills: 0 | Status: SHIPPED | OUTPATIENT
Start: 2022-11-18 | End: 2022-11-18 | Stop reason: SDUPTHER

## 2022-11-18 RX ORDER — AMOXICILLIN AND CLAVULANATE POTASSIUM 875; 125 MG/1; MG/1
1 TABLET, FILM COATED ORAL EVERY 12 HOURS
Qty: 20 TABLET | Refills: 0 | Status: SHIPPED | OUTPATIENT
Start: 2022-11-18 | End: 2022-11-18 | Stop reason: SDUPTHER

## 2022-11-18 RX ADMIN — SODIUM CHLORIDE, SODIUM LACTATE, POTASSIUM CHLORIDE, AND CALCIUM CHLORIDE 125 ML/HR: .6; .31; .03; .02 INJECTION, SOLUTION INTRAVENOUS at 01:11

## 2022-11-18 RX ADMIN — AMOXICILLIN AND CLAVULANATE POTASSIUM 1 TABLET: 875; 125 TABLET, FILM COATED ORAL at 10:11

## 2022-11-18 RX ADMIN — FLUTICASONE FUROATE AND VILANTEROL TRIFENATATE 1 PUFF: 100; 25 POWDER RESPIRATORY (INHALATION) at 07:11

## 2022-11-18 NOTE — PLAN OF CARE
Patient in no apparent distress. Sat's  94 % on room air. PRN Duoneb not needed. Breo given daily . Will continue to monitor.

## 2022-11-18 NOTE — NURSING
D/C instructions provided and explained to pt and pt's daughter, understanding of D/C instructions verbalized. IV removed, catheter intact. Tolerated well. VSS. Pt denies complaints.

## 2022-11-18 NOTE — PLAN OF CARE
Problem: Adult Inpatient Plan of Care  Goal: Plan of Care Review  Outcome: Ongoing, Progressing  Goal: Patient-Specific Goal (Individualized)  Outcome: Ongoing, Progressing  Goal: Absence of Hospital-Acquired Illness or Injury  Outcome: Ongoing, Progressing  Goal: Optimal Comfort and Wellbeing  Outcome: Ongoing, Progressing  Goal: Readiness for Transition of Care  Outcome: Ongoing, Progressing     Problem: Impaired Wound Healing  Goal: Optimal Wound Healing  Outcome: Ongoing, Progressing     Problem: Infection  Goal: Absence of Infection Signs and Symptoms  Outcome: Ongoing, Progressing     Problem: Infection  Goal: Absence of Infection Signs and Symptoms  Outcome: Ongoing, Progressing     Problem: Fall Injury Risk  Goal: Absence of Fall and Fall-Related Injury  Outcome: Ongoing, Progressing   POC reviewed at bedside. Questions and concerns addressed. VSS. Placed bed in low and locked position. Call light within reach. Side rails up x2. Instructed to call for any needs. Verbalized understanding of all instructions. Frequent rounds.

## 2022-11-18 NOTE — PT/OT/SLP EVAL
Occupational Therapy   Evaluation    Name: Geraldine Cooksey  MRN: [id number]  Admitting Diagnosis:  Dog bite  Recent Surgery: Procedure(s) (LRB):  IRRIGATION AND DEBRIDEMENT, UPPER EXTREMITY (Bilateral) 1 Day Post-Op    Recommendations:     Discharge Recommendations:  Home with home health  Discharge Equipment Recommendations:   None   Barriers to discharge:   Limited hand function    Assessment:   83 yo w/ hx of COPD admitted following trauma for dog bite that required extensive orthopaedic intervention to bilateral upper extremities. Patient doing well postoperatively only complaining of mild pain.        Rehab Prognosis: Good      Plan:     Patient to be seen   to address the above listed problems via    Plan of Care Expires:  When patient is discharged from hospital.  Patient will be treated 3-5x per week.  Plan of Care Reviewed with:  Patient     Subjective       Occupational Profile:  Living Environment: Patient lives in a mobile home with three steps to enter.  Patient's grandchildren live with her.    Previous level of function: Independent   Equipment Used at Home:   None   Assistance upon Discharge: Family    Pain/Comfort:   5/10 BUE       Objective:     Communicated with: OT spoke with patient's nurse prior to entering patient's room for evaluation.    General Precautions: Standard,     Orthopedic Precautions: WBAT through elbow on BUE  Braces:    Respiratory Status: Room air    Occupational Performance:    Bed Mobility:    Patient requires SBA with bed mobility.      Functional Mobility/Transfers:  Patient requires SBA with functional transfer and when ambulating 15 feet.    Activities of Daily Living:  Patient requires César with ADLs due to limited hand function.    Cognitive/Visual Perceptual:  Patient is alert and oriented x4.    Physical Exam:  -Patient's BUE AROM is WNL at shoulder and elbow.  -Patient has decreased BUE functional  strength.  -Patient's BUE sensation is intact.        Treatment &  Education:  Patient tolerated OT evaluation well on this date.  Patient is modified independent with bed mobility.  Patient requires SBA with functional transfers.  Patient has limited BUE functional  strength.        GOALS:   Patient will be modified independent with UE dressing.  Patient will require SBA with LE dressing.  Patient will be modified independent with grooming.       History:     Past Medical History:   Diagnosis Date    Bronchitis     COPD (chronic obstructive pulmonary disease)     PONV (postoperative nausea and vomiting)          Past Surgical History:   Procedure Laterality Date    COSMETIC SURGERY  2012    bilateral eye lift    HYSTERECTOMY      IRRIGATION AND DEBRIDEMENT OF UPPER EXTREMITY Bilateral 11/15/2022    Procedure: IRRIGATION AND DEBRIDEMENT, UPPER EXTREMITY;  Surgeon: Leighton Cronin DO;  Location: [facility] OR;  Service: Orthopedics;  Laterality: Bilateral;    OPEN REDUCTION AND INTERNAL FIXATION (ORIF) OF FRACTURE OF FEMUR USING DYNAMIC HIP SCREW Left 7/3/2019    Procedure: ORIF, HIP, USING DYNAMIC HIP SCREW;  Surgeon: Juan Jose Gunn MD;  Location: [facility] OR;  Service: Orthopedics;  Laterality: Left;       Time Tracking:     OT Date of Treatment:  11/18/2022  OT Start Time:  9:50  OT Stop Time:  10:15  OT Total Time (min):  25 minutes     Praful Larson OTR/L    11/18/2022

## 2022-11-18 NOTE — PLAN OF CARE
11/18/22 1149   Final Note   Assessment Type Final Discharge Note   Anticipated Discharge Disposition Home-Health   What phone number can be called within the next 1-3 days to see how you are doing after discharge? [phone]     Patient to be discharged to home today. F/U appointments given to patient with PCP PELON Dykes and someone from Dr Cronin's clinic will call the patient with her f/u appointment. Information given to patient in written and verbal format and patient verbalized understanding.  Home Health arranged with [facility]. No other needs voiced at this time.

## 2022-11-18 NOTE — NURSING
Patient's home medications have been reviewed by the pharmacy team.     Past Medical History:   Diagnosis Date   • Schizophrenia ([facility])    • TBI (traumatic brain injury) ([facility])        Patient's Medications    No medications on file        Patient denies taking any prescription or OTC medications  No recommendations at this time.    Amy Reich, PharmD          Transported off unit via wheelchair.

## 2022-11-18 NOTE — PLAN OF CARE
11/17/22 1000   Discharge Reassessment   Assessment Type Discharge Planning Reassessment   Did the patient's condition or plan change since previous assessment? No   Discharge Plan discussed with: Patient   Communicated PEDRO with patient/caregiver Yes   Discharge Plan A Home Health   Discharge Plan B Home with family   DME Needed Upon Discharge  none   Discharge Barriers Identified None   Why the patient remains in the hospital Requires continued medical care   Post-Acute Status   Post-Acute Authorization Home Health   Home Health Status Pending Clinical Review   Discharge Delays None known at this time   Patient resting in bed waiting to go to surgery. Wound vac not here yet. Patient would like to use [facility] for home health when discharged. Referral sent to them. Denies any other needs at this time.

## 2022-11-18 NOTE — PLAN OF CARE
11/17/22 1000   Medicare Message   Important Message from Medicare regarding Discharge Appeal Rights Given to patient/caregiver;Explained to patient/caregiver;Signed/date by patient/caregiver   Date IMM was signed 11/17/22   Time IMM was signed 1000

## 2022-11-21 ENCOUNTER — TELEPHONE (OUTPATIENT)
Dept: ORTHOPEDICS | Facility: CLINIC | Age: 82
End: 2022-11-21
Payer: MEDICARE

## 2022-11-21 NOTE — TELEPHONE ENCOUNTER
Returned call. Patient stated [facility] was on their way to the her home to assess her. I stated I spoke with Dr. Cronin and he stated that home health is to change the patient's dressing. Patient stated that if [facility] does not change the dressing, she would like a referral to another home health company. I stated understanding. Patient stated she would call back after [facility] came to her home today. I stated understanding.    ----- Message from Marlena Griffin sent at 11/21/2022 11:18 AM CST -----  Contact: Pt  Type: Needs Medical Advice    Who Called:Pt Mae   Best Call Back Number:[phone]    Additional Information Requesting a call back regarding Granddaughter was calling to speak with nurse regarding pt referral to home health pt wanted to see about switching to a different home health company due to issues they have had in the past with current company please all when available Thank you  Please Advise-Thank you

## 2022-11-22 NOTE — PHYSICIAN QUERY
"PT Name: Geraldine Cooksey  MR #: [id number]    DOCUMENTATION CLARIFICATION     CDS/: Will Lim. SHARDA, RN, CDS              Contact information:[email or web address]   This form is a permanent document in the medical record.    Query Date: November 22, 2022  By submitting this query, we are merely seeking further clarification of documentation. Please utilize your independent clinical judgment when addressing the question(s) below.    The Medical Record contains the following:   Indicator Supporting Clinical Findings Location in Medical Record   X Documentation of "Debridement" Post operative Diagnosis:    1. Dog bite with dorsal skin loss, right forearm.    2. Dog bite with dorsal skin loss, left forearm.     Procedure:  1. Second look irrigation and debridement right forearm.  2. Partial closure dorsal laceration right forearm with residual 3 cm circumference with depth to muscle open wound.    3. Second look irrigation debridement left forearm.  4. Partial closure dorsal laceration left forearm with residual 3 cm circumference with depth to muscle open wound.         The right upper extremity was inspected and there was necrotic skin this was debrided sharply with a #15 blade and rongeur.  Once clean viable tissue was obtained the patient's arm was extensively irrigated.  Some of the residual open skin was then reapproximated to itself with nylon suture     Attention was then placed on the left upper extremity where it was inspected and there was necrotic skin this was debrided sharply with a #15 blade and rongeur.  Once clean viable tissue was obtained the patient's arm was extensively irrigated.  Some of the residual open skin was then reapproximated to itself with nylon suture            Op note, Dr. Cronin, 11/17    Documentation of "I&D"      Other       Excisional debridement is the surgical removal or cutting away of such tissue, necrosis, or slough and is classified to the root operation " ""Excision." Use of a sharp instrument does not always indicate that an excisional debridement was performed. Minor removal of loose fragments with scissors or using a sharp instrument to scrape away tissue is not an excisional debridement. Excisional debridement involves the use of a scalpel to remove devitalized tissue.  Nonexcisional debridement is the nonoperative brushing, irrigating, scrubbing, or washing of devitalized tissue, necrosis, slough, or foreign material. Most nonexcisional debridement procedures are classified to the root operation "Extraction" (pulling or stripping out or off all or a portion of a body part by the use of force).     Provider, please provide further clarification on the procedure performed on Right Upper Extremity:    [   ] Excisional Debridement of skin   [   ] Excisional Debridement of subcutaneous tissue/fascia   [  x ] Excisional Debridement of muscle   [   ] Excisional Debridement of tendon   [   ] Excisional Debridement of bone        [   ] Non-excisional Debridement of skin   [   ] Non-excisional Debridement of subcutaneous tissue/fascia   [   ] Non-excisional Debridement of muscle   [   ] Non-excisional Debridement of tendon   [   ] Non-excisional Debridement of bone     [   ] Other Procedure (please specify): _____________   [  ] Clinically Undetermined     Provider, please provide further clarification on the procedure performed on Left Upper Extremity:    [   ] Excisional Debridement of skin   [   ] Excisional Debridement of subcutaneous tissue/fascia   [  x ] Excisional Debridement of muscle   [   ] Excisional Debridement of tendon   [   ] Excisional Debridement of bone        [   ] Non-excisional Debridement of skin   [   ] Non-excisional Debridement of subcutaneous tissue/fascia   [   ] Non-excisional Debridement of muscle   [   ] Non-excisional Debridement of tendon   [   ] Non-excisional Debridement of bone     [   ] Other Procedure (please specify): _____________ "   [  ] Clinically Undetermined           Reference:    ICD-10-CM/PCS Coding Clinic Third Quarter ICD-10, Effective with discharges: October 7, 2015 [facility] § Excisional and nonexcisional debridement (2015).    Form No. 18428

## 2022-11-29 ENCOUNTER — OFFICE VISIT (OUTPATIENT)
Dept: ORTHOPEDICS | Facility: CLINIC | Age: 82
End: 2022-11-29
Payer: MEDICARE

## 2022-11-29 VITALS — BODY MASS INDEX: 23.13 KG/M2 | HEIGHT: 66 IN | RESPIRATION RATE: 19 BRPM | WEIGHT: 143.94 LBS

## 2022-11-29 DIAGNOSIS — Z48.02 VISIT FOR SUTURE REMOVAL: Primary | ICD-10-CM
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
## 2022-11-29 PROCEDURE — 1159F PR MEDICATION LIST DOCUMENTED IN MEDICAL RECORD: ICD-10-PCS | Mod: CPTII,S$GLB,, | Performed by: ORTHOPAEDIC SURGERY

## 2022-11-29 PROCEDURE — 99024 POSTOP FOLLOW-UP VISIT: CPT | Mod: S$GLB,,, | Performed by: ORTHOPAEDIC SURGERY

## 2022-11-29 PROCEDURE — 1125F PR PAIN SEVERITY QUANTIFIED, PAIN PRESENT: ICD-10-PCS | Mod: CPTII,S$GLB,, | Performed by: ORTHOPAEDIC SURGERY

## 2022-11-29 PROCEDURE — 1125F AMNT PAIN NOTED PAIN PRSNT: CPT | Mod: CPTII,S$GLB,, | Performed by: ORTHOPAEDIC SURGERY

## 2022-11-29 PROCEDURE — 99024 PR POST-OP FOLLOW-UP VISIT: ICD-10-PCS | Mod: S$GLB,,, | Performed by: ORTHOPAEDIC SURGERY

## 2022-11-29 PROCEDURE — 1100F PTFALLS ASSESS-DOCD GE2>/YR: CPT | Mod: CPTII,S$GLB,, | Performed by: ORTHOPAEDIC SURGERY

## 2022-11-29 PROCEDURE — 99999 PR PBB SHADOW E&M-EST. PATIENT-LVL III: ICD-10-PCS | Mod: PBBFAC,,, | Performed by: ORTHOPAEDIC SURGERY

## 2022-11-29 PROCEDURE — 1100F PR PT FALLS ASSESS DOC 2+ FALLS/FALL W/INJURY/YR: ICD-10-PCS | Mod: CPTII,S$GLB,, | Performed by: ORTHOPAEDIC SURGERY

## 2022-11-29 PROCEDURE — 3288F FALL RISK ASSESSMENT DOCD: CPT | Mod: CPTII,S$GLB,, | Performed by: ORTHOPAEDIC SURGERY

## 2022-11-29 PROCEDURE — 99024 SUTURE REMOVAL: ICD-10-PCS | Mod: S$GLB,,, | Performed by: ORTHOPAEDIC SURGERY

## 2022-11-29 PROCEDURE — 99999 PR PBB SHADOW E&M-EST. PATIENT-LVL III: CPT | Mod: PBBFAC,,, | Performed by: ORTHOPAEDIC SURGERY

## 2022-11-29 PROCEDURE — 1159F MED LIST DOCD IN RCRD: CPT | Mod: CPTII,S$GLB,, | Performed by: ORTHOPAEDIC SURGERY

## 2022-11-29 PROCEDURE — 3288F PR FALLS RISK ASSESSMENT DOCUMENTED: ICD-10-PCS | Mod: CPTII,S$GLB,, | Performed by: ORTHOPAEDIC SURGERY

## 2022-11-29 NOTE — PROGRESS NOTES
Subjective:      Patient ID: Geraldine Cooksey is a 82 y.o. female.    Chief Complaint: Post-op Evaluation of the Right Forearm and Post-op Evaluation of the Left Forearm      HPI:  Ms. Cooksey returns today for her 1st postop outpatient visit on irrigation debridement with tendon repair of both of her forearms.  She originally injured her forearms on 11/14/2022 after she was attacked by a pit bull dog.  She was taken to surgery on the date of injury where an irrigation debridement of her forearms was completed.  She has 2nd look irrigation debridement on 11/17/2022.  She states she is doing well.  She is having home health do dressing changes on her forearms.    ROS:  No new diagnosis/surgery/prescriptions since last being seen in the hospital on 11/17/2022.  Constitutional: Negative for chills and fever.   HENT:  Negative for congestion.    Eyes:  Negative for blurred vision and double vision.   Cardiovascular:  Negative for chest pain and cyanosis.   Respiratory:  Negative for cough and shortness of breath.    Endocrine: Negative for polydipsia.   Hematologic/Lymphatic: Negative for adenopathy.   Skin:  Negative for flushing, itching and skin cancer.   Musculoskeletal:  Positive for gout. Negative for muscle cramps.   Gastrointestinal:  Negative for constipation, diarrhea and heartburn.   Genitourinary:  Negative for nocturia.   Neurological:  Negative for headaches and seizures.   Psychiatric/Behavioral:  Positive for depression. Negative for substance abuse. The patient is not nervous/anxious.    Allergic/Immunologic: Positive for environmental allergies.       Objective:      Physical Exam:   General: AAOx3.  No acute distress  Vascular:  Pulses intact and equal bilaterally.  Capillary refill less than 3 seconds and equal bilaterally  Neurologic:  Pinprick and soft touch intact and equal bilaterally  Integment:  Wounds well approximated with sutures in place open wounds on dorsal aspect both forearms with  Xeroform gauze.  Extremity:  Forearm:  Multiple laceration sites.  Pronation/supination equal bilaterally.  Nontender with motion.  Relatively nontender with palpation.  No swelling.  No erythema.  Passive motion of the wrists with no pain..  Compartments soft.  Radiography:  No new x-rays done today.      Assessment:       Impression:     1. Dog bite, subsequent encounter          Plan:       1.  Discussed physical examination with the patient. Emmy understands that she appears to be doing well and is healing her wounds.  2. Remove sutures.    3. Place a wet-to-dry dressing in open wound areas.    4. Discussed with the patient that she should start doing wet-to-dry dressings on a daily basis she was shown how to do wet-to-dry dressings.    5. She understands she should cleanse her open wounds with warm soapy water on a daily basis and then re-dress with wet-to-dry dressings.    6. Any pain can be treated with over-the-counter medications dosed per box instructions.    7. At this point she does not appear to have infection and she can discontinue all antibiotics.    8. Removable off-the-shelf long cock-up splint right wrist , 1 was fitted to the patient she understands she must wear it at all times and only remove it for hygiene.    9.  Follow-up in 1 week for recheck of the wound

## 2022-12-04 NOTE — DISCHARGE SUMMARY
[facility] Hospital Medicine  Discharge Summary      Patient Name: Geraldine Cooksey  MRN: [id number]  Patient Class: IP- Inpatient  Admission Date: 11/14/2022  Hospital Length of Stay: 3 days  Discharge Date and Time: 11/18/2022  3:20 PM  Attending Physician: Flor Aguayo MD  Discharging Provider: Flor Aguayo MD  Primary Care Provider: DAVE Israel      HPI:   81 yo w/ hx of COPD admitted following trauma for dog bite that required extensive orthopaedic intervention to bilateral upper extremities. Patient doing well postoperatively only complaining of mild pain.       Procedure(s) (LRB):  IRRIGATION AND DEBRIDEMENT, UPPER EXTREMITY (Bilateral)        Goals of Care Treatment Preferences:  Code Status: Full Code      Consults:     Hospital Course:   Patient admitted and treated for dog bite. Required multiple trips to the OR for I&D. Started on augmentin for prophylaxis. Worked with PT/OT. Ultimately did not require wound vac and was able to be discharged home with wound care instructions and Home Health, PCP and Ortho follow up. Instructed on optimizing diet and protein levels to promote wound healing.    * Dog bite  S/P surgical intervention with ortho  Return to OR on 11/17  Will require wound vac due to extensive nature of injury and skin loss  nursing/case management working to acquire wound vac  Patient educated on high protein to promote wound healing. Arginade added BID.  Continue treatment with augmentin  PT/OT when cleared by ortho  PRN pain medications      COPD (chronic obstructive pulmonary disease)  Continue current inhalers  PRN duonebs            Final Active Diagnoses:    Diagnosis Date Noted POA    PRINCIPAL PROBLEM:  Dog bite [W54.0XXA] 11/15/2022 Yes    COPD (chronic obstructive pulmonary disease) [J44.9] 07/02/2019 Yes      Problems Resolved During this Admission:       Discharged Condition: good    Disposition: Home-Health Care Svc    Follow  Up:   Follow-up Information       DAVE Israel Follow up on 11/28/2022.    Specialty: Internal Medicine  Why: 10am for hospital follow-up appointment  Contact information:  [address] [facility] [place] [address] [phone]               Leighton Cronin,  Follow up.    Specialty: Orthopedic Surgery  Why: Office will call patient with an appointment  Contact information:  [address]  [place] [address]  [phone]                           Patient Instructions:      Ambulatory referral/consult to Home Health   Standing Status: Future   Referral Priority: Routine Referral Type: Home Health   Referral Reason: Specialty Services Required   Requested Specialty: Home Health Services   Number of Visits Requested: 1     Diet Adult Regular     Other restrictions (specify):   Order Comments: Orthopedic Instructions:  1. Elevate both upper extremities.  2. Continue with K-pad to both arms set at 101° F.  3. Continue with antibiotic therapy.     Notify your health care provider if you experience any of the following:  temperature >100.4     Notify your health care provider if you experience any of the following:  severe uncontrolled pain       Significant Diagnostic Studies:   Results for orders placed or performed during the hospital encounter of 11/14/22   Comprehensive metabolic panel   Result Value Ref Range    Sodium 140 136 - 145 mmol/L    Potassium 3.8 3.5 - 5.1 mmol/L    Chloride 104 95 - 110 mmol/L    CO2 24 23 - 29 mmol/L    Glucose 111 (H) 70 - 110 mg/dL    BUN 20 8 - 23 mg/dL    Creatinine 0.7 0.5 - 1.4 mg/dL    Calcium 9.5 8.7 - 10.5 mg/dL    Total Protein 6.6 6.0 - 8.4 g/dL    Albumin 3.8 3.5 - 5.2 g/dL    Total Bilirubin 0.4 0.1 - 1.0 mg/dL    Alkaline Phosphatase 69 55 - 135 U/L    AST 28 10 - 40 U/L    ALT 13 10 - 44 U/L    Anion Gap 12 8 - 16 mmol/L    eGFR >60.0 >60 mL/min/1.73 m^2   CBC auto differential   Result Value Ref Range    WBC 20.05 (H) 3.90 - 12.70 K/uL     RBC 4.55 4.00 - 5.40 M/uL    Hemoglobin 13.4 12.0 - 16.0 g/dL    Hematocrit 41.1 37.0 - 48.5 %    MCV 90 82 - 98 fL    MCH 29.5 27.0 - 31.0 pg    MCHC 32.6 32.0 - 36.0 g/dL    RDW 14.6 (H) 11.5 - 14.5 %    Platelets 347 150 - 450 K/uL    MPV 10.8 9.2 - 12.9 fL    Immature Granulocytes 0.4 0.0 - 0.5 %    Gran # (ANC) 16.6 (H) 1.8 - 7.7 K/uL    Immature Grans (Abs) 0.09 (H) 0.00 - 0.04 K/uL    Lymph # 1.4 1.0 - 4.8 K/uL    Mono # 1.9 (H) 0.3 - 1.0 K/uL    Eos # 0.0 0.0 - 0.5 K/uL    Baso # 0.16 0.00 - 0.20 K/uL    nRBC 0 0 /100 WBC    Gran % 82.6 (H) 38.0 - 73.0 %    Lymph % 6.8 (L) 18.0 - 48.0 %    Mono % 9.4 4.0 - 15.0 %    Eosinophil % 0.0 0.0 - 8.0 %    Basophil % 0.8 0.0 - 1.9 %    Differential Method Automated    Protime-INR   Result Value Ref Range    Prothrombin Time 10.9 9.0 - 12.5 sec    INR 1.0 0.8 - 1.2   APTT   Result Value Ref Range    aPTT 25.7 21.0 - 32.0 sec   CBC with Automated Differential   Result Value Ref Range    WBC 14.85 (H) 3.90 - 12.70 K/uL    RBC 4.13 4.00 - 5.40 M/uL    Hemoglobin 12.2 12.0 - 16.0 g/dL    Hematocrit 37.7 37.0 - 48.5 %    MCV 91 82 - 98 fL    MCH 29.5 27.0 - 31.0 pg    MCHC 32.4 32.0 - 36.0 g/dL    RDW 14.9 (H) 11.5 - 14.5 %    Platelets 306 150 - 450 K/uL    MPV 11.3 9.2 - 12.9 fL    Immature Granulocytes 0.4 0.0 - 0.5 %    Gran # (ANC) 10.1 (H) 1.8 - 7.7 K/uL    Immature Grans (Abs) 0.06 (H) 0.00 - 0.04 K/uL    Lymph # 2.9 1.0 - 4.8 K/uL    Mono # 1.6 (H) 0.3 - 1.0 K/uL    Eos # 0.0 0.0 - 0.5 K/uL    Baso # 0.16 0.00 - 0.20 K/uL    nRBC 0 0 /100 WBC    Gran % 68.3 38.0 - 73.0 %    Lymph % 19.4 18.0 - 48.0 %    Mono % 10.6 4.0 - 15.0 %    Eosinophil % 0.2 0.0 - 8.0 %    Basophil % 1.1 0.0 - 1.9 %    Differential Method Automated    Comprehensive Metabolic Panel (CMP)   Result Value Ref Range    Sodium 139 136 - 145 mmol/L    Potassium 3.8 3.5 - 5.1 mmol/L    Chloride 105 95 - 110 mmol/L    CO2 24 23 - 29 mmol/L    Glucose 103 70 - 110 mg/dL    BUN 13 8 - 23 mg/dL     Creatinine 0.6 0.5 - 1.4 mg/dL    Calcium 8.6 (L) 8.7 - 10.5 mg/dL    Total Protein 6.1 6.0 - 8.4 g/dL    Albumin 3.3 (L) 3.5 - 5.2 g/dL    Total Bilirubin 0.7 0.1 - 1.0 mg/dL    Alkaline Phosphatase 70 55 - 135 U/L    AST 29 10 - 40 U/L    ALT 12 10 - 44 U/L    Anion Gap 10 8 - 16 mmol/L    eGFR >60.0 >60 mL/min/1.73 m^2   Magnesium   Result Value Ref Range    Magnesium 1.7 1.6 - 2.6 mg/dL   Phosphorus   Result Value Ref Range    Phosphorus 4.0 2.7 - 4.5 mg/dL   CBC Auto Differential   Result Value Ref Range    WBC 11.95 3.90 - 12.70 K/uL    RBC 3.99 (L) 4.00 - 5.40 M/uL    Hemoglobin 11.9 (L) 12.0 - 16.0 g/dL    Hematocrit 36.8 (L) 37.0 - 48.5 %    MCV 92 82 - 98 fL    MCH 29.8 27.0 - 31.0 pg    MCHC 32.3 32.0 - 36.0 g/dL    RDW 15.0 (H) 11.5 - 14.5 %    Platelets 283 150 - 450 K/uL    MPV 11.6 9.2 - 12.9 fL    Immature Granulocytes 0.3 0.0 - 0.5 %    Gran # (ANC) 7.7 1.8 - 7.7 K/uL    Immature Grans (Abs) 0.04 0.00 - 0.04 K/uL    Lymph # 1.8 1.0 - 4.8 K/uL    Mono # 2.2 (H) 0.3 - 1.0 K/uL    Eos # 0.0 0.0 - 0.5 K/uL    Baso # 0.14 0.00 - 0.20 K/uL    nRBC 0 0 /100 WBC    Gran % 64.8 38.0 - 73.0 %    Lymph % 15.1 (L) 18.0 - 48.0 %    Mono % 18.3 (H) 4.0 - 15.0 %    Eosinophil % 0.3 0.0 - 8.0 %    Basophil % 1.2 0.0 - 1.9 %    Differential Method Automated    Basic Metabolic Panel   Result Value Ref Range    Sodium 135 (L) 136 - 145 mmol/L    Potassium 3.9 3.5 - 5.1 mmol/L    Chloride 102 95 - 110 mmol/L    CO2 24 23 - 29 mmol/L    Glucose 87 70 - 110 mg/dL    BUN 9 8 - 23 mg/dL    Creatinine 0.6 0.5 - 1.4 mg/dL    Calcium 8.5 (L) 8.7 - 10.5 mg/dL    Anion Gap 9 8 - 16 mmol/L    eGFR >60.0 >60 mL/min/1.73 m^2   Magnesium   Result Value Ref Range    Magnesium 1.8 1.6 - 2.6 mg/dL   CBC Auto Differential   Result Value Ref Range    WBC 14.09 (H) 3.90 - 12.70 K/uL    RBC 4.07 4.00 - 5.40 M/uL    Hemoglobin 12.1 12.0 - 16.0 g/dL    Hematocrit 37.3 37.0 - 48.5 %    MCV 92 82 - 98 fL    MCH 29.7 27.0 - 31.0 pg    MCHC  32.4 32.0 - 36.0 g/dL    RDW 14.6 (H) 11.5 - 14.5 %    Platelets 279 150 - 450 K/uL    MPV 11.6 9.2 - 12.9 fL    Immature Granulocytes 0.6 (H) 0.0 - 0.5 %    Gran # (ANC) 10.4 (H) 1.8 - 7.7 K/uL    Immature Grans (Abs) 0.09 (H) 0.00 - 0.04 K/uL    Lymph # 1.3 1.0 - 4.8 K/uL    Mono # 2.2 (H) 0.3 - 1.0 K/uL    Eos # 0.0 0.0 - 0.5 K/uL    Baso # 0.13 0.00 - 0.20 K/uL    nRBC 0 0 /100 WBC    Gran % 73.9 (H) 38.0 - 73.0 %    Lymph % 9.2 (L) 18.0 - 48.0 %    Mono % 15.3 (H) 4.0 - 15.0 %    Eosinophil % 0.1 0.0 - 8.0 %    Basophil % 0.9 0.0 - 1.9 %    Differential Method Automated    Basic Metabolic Panel   Result Value Ref Range    Sodium 134 (L) 136 - 145 mmol/L    Potassium 3.6 3.5 - 5.1 mmol/L    Chloride 99 95 - 110 mmol/L    CO2 22 (L) 23 - 29 mmol/L    Glucose 79 70 - 110 mg/dL    BUN 7 (L) 8 - 23 mg/dL    Creatinine 0.5 0.5 - 1.4 mg/dL    Calcium 8.9 8.7 - 10.5 mg/dL    Anion Gap 13 8 - 16 mmol/L    eGFR >60.0 >60 mL/min/1.73 m^2   Magnesium   Result Value Ref Range    Magnesium 1.7 1.6 - 2.6 mg/dL   CBC Auto Differential   Result Value Ref Range    WBC 14.15 (H) 3.90 - 12.70 K/uL    RBC 3.99 (L) 4.00 - 5.40 M/uL    Hemoglobin 11.8 (L) 12.0 - 16.0 g/dL    Hematocrit 36.2 (L) 37.0 - 48.5 %    MCV 91 82 - 98 fL    MCH 29.6 27.0 - 31.0 pg    MCHC 32.6 32.0 - 36.0 g/dL    RDW 14.6 (H) 11.5 - 14.5 %    Platelets 322 150 - 450 K/uL    MPV 11.7 9.2 - 12.9 fL    Immature Granulocytes 0.5 0.0 - 0.5 %    Gran # (ANC) 10.6 (H) 1.8 - 7.7 K/uL    Immature Grans (Abs) 0.07 (H) 0.00 - 0.04 K/uL    Lymph # 1.5 1.0 - 4.8 K/uL    Mono # 2.0 (H) 0.3 - 1.0 K/uL    Eos # 0.0 0.0 - 0.5 K/uL    Baso # 0.06 0.00 - 0.20 K/uL    nRBC 0 0 /100 WBC    Gran % 74.7 (H) 38.0 - 73.0 %    Lymph % 10.4 (L) 18.0 - 48.0 %    Mono % 13.9 4.0 - 15.0 %    Eosinophil % 0.1 0.0 - 8.0 %    Basophil % 0.4 0.0 - 1.9 %    Differential Method Automated    Basic Metabolic Panel   Result Value Ref Range    Sodium 138 136 - 145 mmol/L    Potassium 4.1 3.5 - 5.1  mmol/L    Chloride 103 95 - 110 mmol/L    CO2 24 23 - 29 mmol/L    Glucose 105 70 - 110 mg/dL    BUN 11 8 - 23 mg/dL    Creatinine 0.5 0.5 - 1.4 mg/dL    Calcium 8.9 8.7 - 10.5 mg/dL    Anion Gap 11 8 - 16 mmol/L    eGFR >60.0 >60 mL/min/1.73 m^2   Magnesium   Result Value Ref Range    Magnesium 1.7 1.6 - 2.6 mg/dL     X-Ray Forearm Left   Final Result   Abnormal      1. Extensive soft tissue laceration soft tissue swelling of the bilateral forearm secondary to reported dog bite.   2. Questionable small punctate radiopaque foreign bodies of the left forearm.   This report was flagged in Epic as abnormal.         Electronically signed by: Yousuf Edmondson   Date:    11/15/2022   Time:    07:47      X-Ray Forearm Right   Final Result   Abnormal      1. Extensive soft tissue laceration soft tissue swelling of the bilateral forearm secondary to reported dog bite.   2. Questionable small punctate radiopaque foreign bodies of the left forearm.   This report was flagged in Epic as abnormal.         Electronically signed by: Yousuf Edmondson   Date:    11/15/2022   Time:    07:47            Pending Diagnostic Studies:       None           Medications:  Reconciled Home Medications:      Medication List        START taking these medications      amoxicillin-clavulanate 875-125mg 875-125 mg per tablet  Commonly known as: AUGMENTIN  Take 1 tablet by mouth every 12 (twelve) hours.     HYDROcodone-acetaminophen  mg per tablet  Commonly known as: NORCO  Take 1 tablet by mouth every 6 (six) hours as needed for Pain.  Replaces: HYDROcodone-acetaminophen 5-325 mg per tablet            CONTINUE taking these medications      albuterol 90 mcg/actuation inhaler  Commonly known as: PROVENTIL/VENTOLIN HFA  Inhale 1-2 puffs into the lungs every 6 (six) hours as needed for Wheezing. Rescue     cetirizine 10 MG tablet  Commonly known as: ZYRTEC  Take 10 mg by mouth daily as needed for Allergies.     ondansetron 4 MG Tbdl  Commonly known as:  ZOFRAN-ODT  Take 1 tablet (4 mg total) by mouth every 6 (six) hours as needed (nausea).     TRELEGY ELLIPTA 100-62.5-25 mcg Dsdv  Generic drug: fluticasone-umeclidin-vilanter  Take 1 puff by mouth once daily.            STOP taking these medications      aspirin 325 MG tablet     HYDROcodone-acetaminophen 5-325 mg per tablet  Commonly known as: NORCO  Replaced by: HYDROcodone-acetaminophen  mg per tablet     ibuprofen 800 MG tablet  Commonly known as: ADVIL,MOTRIN     meloxicam 7.5 MG tablet  Commonly known as: MOBIC     tiotropium 18 mcg inhalation capsule  Commonly known as: SPIRIVA              Indwelling Lines/Drains at time of discharge:   Lines/Drains/Airways       None                   Time spent on the discharge of patient: 65 minutes         Flor Aguayo MD  Department of Hospital Medicine  [facility]

## 2022-12-09 ENCOUNTER — OFFICE VISIT (OUTPATIENT)
Dept: ORTHOPEDICS | Facility: CLINIC | Age: 82
End: 2022-12-09
Payer: MEDICARE

## 2022-12-09 VITALS — WEIGHT: 143.94 LBS | BODY MASS INDEX: 23.13 KG/M2 | HEIGHT: 66 IN | RESPIRATION RATE: 16 BRPM
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
## 2022-12-09 DIAGNOSIS — T81.33XA DISRUPTION OF TRAUMATIC INJURY WOUND REPAIR, INITIAL ENCOUNTER: ICD-10-CM

## 2022-12-09 PROCEDURE — 1125F AMNT PAIN NOTED PAIN PRSNT: CPT | Mod: CPTII,S$GLB,, | Performed by: ORTHOPAEDIC SURGERY

## 2022-12-09 PROCEDURE — 1159F MED LIST DOCD IN RCRD: CPT | Mod: CPTII,S$GLB,, | Performed by: ORTHOPAEDIC SURGERY

## 2022-12-09 PROCEDURE — 99999 PR PBB SHADOW E&M-EST. PATIENT-LVL II: CPT | Mod: PBBFAC,,, | Performed by: ORTHOPAEDIC SURGERY

## 2022-12-09 PROCEDURE — 99024 PR POST-OP FOLLOW-UP VISIT: ICD-10-PCS | Mod: S$GLB,,, | Performed by: ORTHOPAEDIC SURGERY

## 2022-12-09 PROCEDURE — 1159F PR MEDICATION LIST DOCUMENTED IN MEDICAL RECORD: ICD-10-PCS | Mod: CPTII,S$GLB,, | Performed by: ORTHOPAEDIC SURGERY

## 2022-12-09 PROCEDURE — 3288F PR FALLS RISK ASSESSMENT DOCUMENTED: ICD-10-PCS | Mod: CPTII,S$GLB,, | Performed by: ORTHOPAEDIC SURGERY

## 2022-12-09 PROCEDURE — 99999 PR PBB SHADOW E&M-EST. PATIENT-LVL II: ICD-10-PCS | Mod: PBBFAC,,, | Performed by: ORTHOPAEDIC SURGERY

## 2022-12-09 PROCEDURE — 1125F PR PAIN SEVERITY QUANTIFIED, PAIN PRESENT: ICD-10-PCS | Mod: CPTII,S$GLB,, | Performed by: ORTHOPAEDIC SURGERY

## 2022-12-09 PROCEDURE — 3288F FALL RISK ASSESSMENT DOCD: CPT | Mod: CPTII,S$GLB,, | Performed by: ORTHOPAEDIC SURGERY

## 2022-12-09 PROCEDURE — 1101F PR PT FALLS ASSESS DOC 0-1 FALLS W/OUT INJ PAST YR: ICD-10-PCS | Mod: CPTII,S$GLB,, | Performed by: ORTHOPAEDIC SURGERY

## 2022-12-09 PROCEDURE — 1101F PT FALLS ASSESS-DOCD LE1/YR: CPT | Mod: CPTII,S$GLB,, | Performed by: ORTHOPAEDIC SURGERY

## 2022-12-09 PROCEDURE — 99024 POSTOP FOLLOW-UP VISIT: CPT | Mod: S$GLB,,, | Performed by: ORTHOPAEDIC SURGERY

## 2022-12-09 NOTE — PROGRESS NOTES
Patient ID: Geraldine Cooksey is a 82 y.o. female.     Chief Complaint: Post-op Evaluation of the Right Forearm and Post-op Evaluation of the Left Forearm        HPI:  Ms. Cooksey returned today for re-evaluation of both of her forearms after irrigation debridement with tendon repair of both of her forearms.  She originally injured her forearms on 11/14/2022 after she was attacked by a pit bull dog.  She was taken to surgery on the date of injury where an irrigation debridement of her forearms was completed.  She had a 2nd look irrigation debridement on 11/17/2022.  She states she is doing well.  She is having home health do dressing changes on her forearms.  She feels her lacerations have near completely healed.  She will be going out of the area in the near future for eye surgery and would like a prescription to transfer her physical therapy care to her new location until she can return back to [place]     ROS:  No new diagnosis/surgery/prescriptions since last being seen in the hospital on 11/29/2022.  Constitutional: Negative for chills and fever.   HENT:  Negative for congestion.    Eyes:  Negative for blurred vision and double vision.   Cardiovascular:  Negative for chest pain and cyanosis.   Respiratory:  Negative for cough and shortness of breath.    Endocrine: Negative for polydipsia.   Hematologic/Lymphatic: Negative for adenopathy.   Skin:  Negative for flushing, itching and skin cancer.   Musculoskeletal:  Positive for gout. Negative for muscle cramps.   Gastrointestinal:  Negative for constipation, diarrhea and heartburn.   Genitourinary:  Negative for nocturia.   Neurological:  Negative for headaches and seizures.   Psychiatric/Behavioral:  Positive for depression. Negative for substance abuse. The patient is not nervous/anxious.    Allergic/Immunologic: Positive for environmental allergies.       Objective:   Physical Exam:   General: AAOx3.  No acute distress  Vascular:  Pulses intact and  equal bilaterally.  Capillary refill less than 3 seconds and equal bilaterally  Neurologic:  Pinprick and soft touch intact and equal bilaterally  Integment:  Forearm wounds healing well right forearm with residual open skin loss site 2 cm x 1 cm and left forearm with 1 cm by 0.5 cm.  Extremity:  Forearm:  Multiple laceration sites well healed.  Pronation/supination equal bilaterally.  Nontender with motion.  Relatively nontender with palpation.  No swelling.  No erythema.  Passive motion of the wrists with no pain..  Compartments soft.  Radiography:  No new x-rays done today.      Assessment:       Impression:      1. Dog bite, subsequent encounter           Plan:       1.  Discussed physical examination with the patient. Emmy understands that she appears to be doing well and is healing her wounds.  2. May discontinue wet-to-dry dressings and start placing a fresh dressing with bacitracin ointment and Mepilex dressing.  She understands she can leave her wound open to the air several times a day.    3. A prescription was prepared for the patient to take to [place] to start occupational therapy until she can return to the local area.  4. Continue to wear cock-up wrist splint right upper extremity.  5. Continue with current occupational therapy until depart from the local area.    6. Any pain can be treated with over-the-counter medications dosed per box instructions.    7. Start some home exercises with wrist motion exercises and hand motion exercises  8. Continue to elevate both upper extremities.  9. Follow up in 2 weeks for further evaluation expected her wounds will be healed by next follow-up.

## 2022-12-12 ENCOUNTER — TELEPHONE (OUTPATIENT)
Dept: ORTHOPEDICS | Facility: CLINIC | Age: 82
End: 2022-12-12
Payer: MEDICARE

## 2022-12-12 NOTE — TELEPHONE ENCOUNTER
Returned call. LVM stating per provider patient can discontinue home health and to call back with any questions or concerns.    ----- Message from Marcus Bishop sent at 12/9/2022 12:57 PM CST -----  Regarding:  discharging per pt request  Contact: [facility] discharging per pt request

## 2023-01-03 ENCOUNTER — TELEPHONE (OUTPATIENT)
Dept: ORTHOPEDICS | Facility: CLINIC | Age: 83
End: 2023-01-03
Payer: MEDICARE

## 2023-01-03 NOTE — TELEPHONE ENCOUNTER
Returned call. The patient was agreeable to appointment of 1/6/23 in [place].    ----- Message from Terence Copeland MA sent at 1/3/2023 10:58 AM CST -----  Contact: No/Shane Sarabia called in and stated patient needs appt but cannot wait until next available.    Call back number is [phone]

## 2023-01-06 ENCOUNTER — OFFICE VISIT (OUTPATIENT)
Dept: ORTHOPEDICS | Facility: CLINIC | Age: 83
End: 2023-01-06
Payer: MEDICARE

## 2023-01-06 VITALS — BODY MASS INDEX: 23.13 KG/M2 | WEIGHT: 143.94 LBS | RESPIRATION RATE: 16 BRPM | HEIGHT: 66 IN
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
## 2023-01-06 PROCEDURE — 1159F PR MEDICATION LIST DOCUMENTED IN MEDICAL RECORD: ICD-10-PCS | Mod: CPTII,S$GLB,, | Performed by: ORTHOPAEDIC SURGERY

## 2023-01-06 PROCEDURE — 99024 PR POST-OP FOLLOW-UP VISIT: ICD-10-PCS | Mod: S$GLB,,, | Performed by: ORTHOPAEDIC SURGERY

## 2023-01-06 PROCEDURE — 3288F PR FALLS RISK ASSESSMENT DOCUMENTED: ICD-10-PCS | Mod: CPTII,S$GLB,, | Performed by: ORTHOPAEDIC SURGERY

## 2023-01-06 PROCEDURE — 1159F MED LIST DOCD IN RCRD: CPT | Mod: CPTII,S$GLB,, | Performed by: ORTHOPAEDIC SURGERY

## 2023-01-06 PROCEDURE — 1126F PR PAIN SEVERITY QUANTIFIED, NO PAIN PRESENT: ICD-10-PCS | Mod: CPTII,S$GLB,, | Performed by: ORTHOPAEDIC SURGERY

## 2023-01-06 PROCEDURE — 3288F FALL RISK ASSESSMENT DOCD: CPT | Mod: CPTII,S$GLB,, | Performed by: ORTHOPAEDIC SURGERY

## 2023-01-06 PROCEDURE — 99999 PR PBB SHADOW E&M-EST. PATIENT-LVL II: CPT | Mod: PBBFAC,,, | Performed by: ORTHOPAEDIC SURGERY

## 2023-01-06 PROCEDURE — 1101F PR PT FALLS ASSESS DOC 0-1 FALLS W/OUT INJ PAST YR: ICD-10-PCS | Mod: CPTII,S$GLB,, | Performed by: ORTHOPAEDIC SURGERY

## 2023-01-06 PROCEDURE — 99999 PR PBB SHADOW E&M-EST. PATIENT-LVL II: ICD-10-PCS | Mod: PBBFAC,,, | Performed by: ORTHOPAEDIC SURGERY

## 2023-01-06 PROCEDURE — 1126F AMNT PAIN NOTED NONE PRSNT: CPT | Mod: CPTII,S$GLB,, | Performed by: ORTHOPAEDIC SURGERY

## 2023-01-06 PROCEDURE — 1101F PT FALLS ASSESS-DOCD LE1/YR: CPT | Mod: CPTII,S$GLB,, | Performed by: ORTHOPAEDIC SURGERY

## 2023-01-06 PROCEDURE — 99024 POSTOP FOLLOW-UP VISIT: CPT | Mod: S$GLB,,, | Performed by: ORTHOPAEDIC SURGERY

## 2023-01-06 NOTE — PROGRESS NOTES
Patient ID: Geraldine Cooksey is a 82 y.o. female.     Chief Complaint: Post-op Evaluation of the Right Forearm and Post-op Evaluation of the Left Forearm        HPI:  Ms. Cooksey returned today for re-evaluation of both of her forearms.  Previously she had irrigation debridement with tendon repair of both of her forearms after she injured them on 11/14/2022 after she was attacked by a pit bull dog.  She had a 2nd look irrigation debridement on 11/17/2022.  She is doing well.  At her last visit on 12/09/2022 she was referred to Occupational therapy she stated she did not go.  Her lacerations have completely healed.      ROS:  No new diagnosis/surgery/prescriptions since last being seen in the hospital on 12/09/2022  Constitutional: Negative for chills and fever.   HENT:  Negative for congestion.    Eyes:  Negative for blurred vision and double vision.   Cardiovascular:  Negative for chest pain and cyanosis.   Respiratory:  Negative for cough and shortness of breath.    Endocrine: Negative for polydipsia.   Hematologic/Lymphatic: Negative for adenopathy.   Skin:  Negative for flushing, itching and skin cancer.   Musculoskeletal:  Positive for gout. Negative for muscle cramps.   Gastrointestinal:  Negative for constipation, diarrhea and heartburn.   Genitourinary:  Negative for nocturia.   Neurological:  Negative for headaches and seizures.   Psychiatric/Behavioral:  Positive for depression. Negative for substance abuse. The patient is not nervous/anxious.    Allergic/Immunologic: Positive for environmental allergies.       Objective:   Physical Exam:   General: AAOx3.  No acute distress  Vascular:  Pulses intact and equal bilaterally.  Capillary refill less than 3 seconds and equal bilaterally  Neurologic:  Pinprick and soft touch intact and equal bilaterally  Integment:  Forearm wounds well healed  Extremity:  Forearm:  Multiple laceration sites well healed.  Pronation/supination equal bilaterally.  Nontender with  motion. Nontender with palpation.  No swelling.  No erythema.  Active motion both wrist equal.  Radiography:  No new x-rays done today.      Assessment:       Impression:      1. Dog bite, subsequent encounter           Plan:       1.  Discussed physical examination with the patient. Emmy understands that she has healed her lacerations and at this point can return to full activity.  2. Finish current occupational therapy prescription and discharged to [facility].    3. Any minor pain can be treated with over-the-counter medications dosed per box instructions.  4. May return to full activities without restriction.  5. Follow up p.r.n.

## 2023-03-17 ENCOUNTER — PATIENT MESSAGE (OUTPATIENT)
Dept: RESEARCH | Facility: HOSPITAL | Age: 83
End: 2023-03-17
Payer: MEDICARE

## 2023-04-11 ENCOUNTER — HOSPITAL ENCOUNTER (EMERGENCY)
Facility: HOSPITAL | Age: 83
Discharge: HOME | End: 2023-04-11
Attending: EMERGENCY MEDICINE
Payer: MEDICARE

## 2023-04-11 VITALS
HEIGHT: 66 IN | BODY MASS INDEX: 22.98 KG/M2 | WEIGHT: 143 LBS | HEART RATE: 73 BPM | TEMPERATURE: 98 F | RESPIRATION RATE: 18 BRPM | DIASTOLIC BLOOD PRESSURE: 90 MMHG | OXYGEN SATURATION: 95 % | SYSTOLIC BLOOD PRESSURE: 207 MMHG

## 2023-04-11 DIAGNOSIS — S01.81XA FACIAL LACERATION, INITIAL ENCOUNTER: ICD-10-CM

## 2023-04-11 DIAGNOSIS — S42.201A CLOSED FRACTURE OF PROXIMAL END OF RIGHT HUMERUS, UNSPECIFIED FRACTURE MORPHOLOGY, INITIAL ENCOUNTER: Primary | ICD-10-CM

## 2023-04-11 PROCEDURE — 12011 RPR F/E/E/N/L/M 2.5 CM/<: CPT

## 2023-04-11 PROCEDURE — 99284 EMERGENCY DEPT VISIT MOD MDM: CPT

## 2023-04-11 RX ORDER — HYDROCODONE BITARTRATE AND ACETAMINOPHEN 5; 325 MG/1; MG/1
1 TABLET ORAL EVERY 6 HOURS PRN
Qty: 20 TABLET | Refills: 0 | Status: SHIPPED | OUTPATIENT
Start: 2023-04-11 | End: 2023-04-21

## 2023-04-11 NOTE — ED PROVIDER NOTES
Encounter Date: 4/11/2023       History     Chief Complaint   Patient presents with    Fall     Pt comes in via ems with R shoulder pain and pain/abrasions to R side of face after a trip and fall. Denies LOC neck pain and back pain      Chief complaint: Right shoulder pain    HPI:  82-year-old female presents via ambulance with right shoulder pain after a mechanical fall.  She reports that she landed on a shoulder held in adduction.  She denies any weakness or numbness.  She has no headache or neck pain.  Past medical history is significant for COPD.    Review of patient's allergies indicates:   Allergen Reactions    Codeine Nausea Only     Happened 50 years ago      Past Medical History:   Diagnosis Date    Bronchitis     COPD (chronic obstructive pulmonary disease)     PONV (postoperative nausea and vomiting)      Past Surgical History:   Procedure Laterality Date    COSMETIC SURGERY  2012    bilateral eye lift    HYSTERECTOMY      IRRIGATION AND DEBRIDEMENT OF UPPER EXTREMITY Bilateral 11/15/2022    Procedure: IRRIGATION AND DEBRIDEMENT, UPPER EXTREMITY;  Surgeon: Leighton Cronin DO;  Location: [facility] OR;  Service: Orthopedics;  Laterality: Bilateral;    IRRIGATION AND DEBRIDEMENT OF UPPER EXTREMITY Bilateral 11/17/2022    Procedure: IRRIGATION AND DEBRIDEMENT, UPPER EXTREMITY;  Surgeon: Leighton Cronin DO;  Location: [facility] OR;  Service: Orthopedics;  Laterality: Bilateral;  Second look irrigation debridement need wound VAC for possible wound VAC placement    OPEN REDUCTION AND INTERNAL FIXATION (ORIF) OF FRACTURE OF FEMUR USING DYNAMIC HIP SCREW Left 7/3/2019    Procedure: ORIF, HIP, USING DYNAMIC HIP SCREW;  Surgeon: Juan Jose Gunn MD;  Location: [facility] OR;  Service: Orthopedics;  Laterality: Left;     No family history on file.  Social History     Tobacco Use    Smoking status: Every Day     Packs/day: 1.00     Types: Cigarettes    Smokeless tobacco: Never   Substance Use Topics    Alcohol use: Yes    Drug use:  No     Review of Systems   Constitutional:  Negative for activity change, appetite change, chills, fatigue and fever.   Eyes:  Negative for visual disturbance.   Respiratory:  Negative for apnea and shortness of breath.    Cardiovascular:  Negative for chest pain and palpitations.   Gastrointestinal:  Negative for abdominal distention and abdominal pain.   Genitourinary:  Negative for difficulty urinating.   Musculoskeletal:  Positive for arthralgias. Negative for back pain and neck pain.   Skin:  Negative for pallor and rash.   Neurological:  Negative for headaches.   Hematological:  Does not bruise/bleed easily.   Psychiatric/Behavioral:  Negative for agitation.      Physical Exam     Initial Vitals [04/11/23 1204]   BP Pulse Resp Temp SpO2   (!) 204/85 64 18 98 °F (36.7 °C) 95 %      MAP       --         Physical Exam    Nursing note and vitals reviewed.  Constitutional: She appears well-developed and well-nourished.   HENT:   Head: Normocephalic.   Right periorbital swelling and tenderness with a 2.5 cm right lateral orbital laceration   Eyes: Conjunctivae are normal.   Neck: Neck supple.   Normal range of motion.  Cardiovascular:  Normal rate, regular rhythm and normal heart sounds.     Exam reveals no gallop and no friction rub.       No murmur heard.  Pulmonary/Chest: Effort normal and breath sounds normal. No respiratory distress. She has no wheezes. She has no rhonchi. She has no rales.   Abdominal: Abdomen is soft. She exhibits no distension. There is no abdominal tenderness.   Musculoskeletal:         General: Tenderness (Tenderness of the proximal right humerus with limited range of motion secondary to pain) present.      Cervical back: Normal range of motion and neck supple.     Neurological: She is alert and oriented to person, place, and time.   Skin: Skin is warm and dry. No erythema.   Psychiatric: She has a normal mood and affect.       ED Course   Lac Repair    Date/Time: 4/11/2023 12:38  PM  Performed by: George De La Paz III, MD  Authorized by: George De La Paz III, MD     Anesthesia:     Anesthesia method:  None  Laceration details:     Location:  Face    Face location:  R cheek    Length (cm):  2.5  Exploration:     Imaging outcome: foreign body not noted      Wound exploration: entire depth of wound visualized    Treatment:     Area cleansed with:  Povidone-iodine    Amount of cleaning:  Standard    Debridement:  None    Scar revision: no    Skin repair:     Repair method:  Tissue adhesive  Approximation:     Approximation:  Close  Repair type:     Repair type:  Simple  Post-procedure details:     Dressing:  Open (no dressing)    Procedure completion:  Tolerated  Labs Reviewed - No data to display       Imaging Results              X-Ray Humerus 2 View Right (Final result)  Result time 04/11/23 12:29:20      Final result by Yousuf Patel MD (04/11/23 12:29:20)                   Impression:      Acute right humeral neck fracture.    Calcific tendinitis of the right rotator cuff.      Electronically signed by: Yousuf Patel MD  Date:    04/11/2023  Time:    12:29               Narrative:    EXAMINATION:  XR HUMERUS 2 VIEW RIGHT    CLINICAL HISTORY:  right shoulder pain;    TECHNIQUE:  AP and lateral views of the right humerus    COMPARISON:  None    FINDINGS:  There is an impacted fracture of the right humeral neck with mild displacement.  The distal humerus is intact and located.  There is abundant calcific density along the right rotator cuff.                                       Medications - No data to display  Medical Decision Making:   ED Management:  82-year-old female presents with right shoulder pain after mechanical fall.  X-rays independently interpreted by me demonstrate a humeral neck fracture.  She is placed in a sling and swath and prescribed hydrocodone.  She also has a right periorbital laceration which is repaired with wound adhesive.  There is no evidence of  intracranial injury.  Cervical spine is cleared clinically with [place] cervical spine rules.                        Clinical Impression:   Final diagnoses:  [S42.201A] Closed fracture of proximal end of right humerus, unspecified fracture morphology, initial encounter (Primary)  [S01.81XA] Facial laceration, initial encounter        ED Disposition Condition    Discharge Stable          ED Prescriptions       Medication Sig Dispense Start Date End Date Auth. Provider    HYDROcodone-acetaminophen (NORCO) 5-325 mg per tablet Take 1 tablet by mouth every 6 (six) hours as needed for Pain. 20 tablet 4/11/2023 4/21/2023 George De La Paz III, MD          Follow-up Information       Follow up With Specialties Details Why Contact Info    Luis Enrique Pimentel II, MD Orthopedic Surgery In 3 days  [address] [place] [address] [address] [id number]  [phone]               George De La Paz III, MD  04/11/23 8711

## 2023-04-11 NOTE — ED NOTES
Assumed care    Patient presents to ED with complaints of right sided facial skin tear, right shoulder pain, and inability to move right arm appropriately. Patient denies any change in LOC. Denies blurry vision or dizziness. Patient had fall at home outside walking to shed. Patient has skin tear to left hand and skin tear to right side of face near right eye. Patient without any change in mental status. Able to stand without difficulty. Daughter at bedside. Arrived through EMS.

## 2023-04-13 ENCOUNTER — TELEPHONE (OUTPATIENT)
Dept: ORTHOPEDICS | Facility: CLINIC | Age: 83
End: 2023-04-13
Payer: MEDICARE

## 2023-04-13 NOTE — TELEPHONE ENCOUNTER
Returned call. The patient stated that her mother, Mrs. Cooksey, is in extreme pain. I stated for her to take her to the emergency room as Dr. Cronin is in surgery today and on call. Carie, the patient's daughter, stated she was going to take her to the emergency room for her to be evaluated further. I stated understanding.    ----- Message from Marcus Bishop sent at 4/13/2023  9:17 AM CDT -----  Regarding: has appt but is getting worse, call daughter Carie   Contact: daughter Carie   has appt tomorrow but is getting worse, call daughter Carie

## 2023-04-14 ENCOUNTER — HOSPITAL ENCOUNTER (OUTPATIENT)
Dept: RADIOLOGY | Facility: HOSPITAL | Age: 83
Discharge: HOME | End: 2023-04-14
Attending: ORTHOPAEDIC SURGERY
Payer: MEDICARE

## 2023-04-14 ENCOUNTER — OFFICE VISIT (OUTPATIENT)
Dept: ORTHOPEDICS | Facility: CLINIC | Age: 83
End: 2023-04-14
Payer: MEDICARE

## 2023-04-14 VITALS — BODY MASS INDEX: 22.99 KG/M2 | RESPIRATION RATE: 16 BRPM | WEIGHT: 143.06 LBS | HEIGHT: 66 IN

## 2023-04-14 DIAGNOSIS — S42.293A: Primary | ICD-10-CM

## 2023-04-14 DIAGNOSIS — M75.31 CALCIFIC TENDINITIS OF RIGHT SHOULDER: ICD-10-CM

## 2023-04-14 DIAGNOSIS — M19.011 GLENOHUMERAL ARTHRITIS, RIGHT: ICD-10-CM

## 2023-04-14 DIAGNOSIS — M19.011 ARTHRITIS OF RIGHT ACROMIOCLAVICULAR JOINT: ICD-10-CM

## 2023-04-14 DIAGNOSIS — S42.293A: ICD-10-CM

## 2023-04-14 DIAGNOSIS — S49.91XA INJURY OF RIGHT SHOULDER, INITIAL ENCOUNTER: ICD-10-CM

## 2023-04-14 DIAGNOSIS — M25.511 ACUTE PAIN OF RIGHT SHOULDER: ICD-10-CM

## 2023-04-14 DIAGNOSIS — S49.91XA INJURY OF RIGHT SHOULDER, INITIAL ENCOUNTER: Primary | ICD-10-CM

## 2023-04-14 PROCEDURE — 1100F PR PT FALLS ASSESS DOC 2+ FALLS/FALL W/INJURY/YR: ICD-10-PCS | Mod: CPTII,,, | Performed by: ORTHOPAEDIC SURGERY

## 2023-04-14 PROCEDURE — 3288F PR FALLS RISK ASSESSMENT DOCUMENTED: ICD-10-PCS | Mod: CPTII,,, | Performed by: ORTHOPAEDIC SURGERY

## 2023-04-14 PROCEDURE — 73200 CT UPPER EXTREMITY W/O DYE: CPT | Mod: TC,RT

## 2023-04-14 PROCEDURE — 23600 CLTX PROX HUMRL FX W/O MNPJ: CPT | Mod: RT,,, | Performed by: ORTHOPAEDIC SURGERY

## 2023-04-14 PROCEDURE — 73200 CT SHOULDER WITHOUT CONTRAST RIGHT: ICD-10-PCS | Mod: 26,RT,, | Performed by: RADIOLOGY

## 2023-04-14 PROCEDURE — 23600 PR CLOSED RX PROX HUMERUS FRACTURE: ICD-10-PCS | Mod: RT,,, | Performed by: ORTHOPAEDIC SURGERY

## 2023-04-14 PROCEDURE — 1125F AMNT PAIN NOTED PAIN PRSNT: CPT | Mod: CPTII,,, | Performed by: ORTHOPAEDIC SURGERY

## 2023-04-14 PROCEDURE — 99999 PR PBB SHADOW E&M-EST. PATIENT-LVL III: CPT | Mod: PBBFAC,,, | Performed by: ORTHOPAEDIC SURGERY

## 2023-04-14 PROCEDURE — 73200 CT UPPER EXTREMITY W/O DYE: CPT | Mod: 26,RT,, | Performed by: RADIOLOGY

## 2023-04-14 PROCEDURE — 99213 OFFICE O/P EST LOW 20 MIN: CPT | Mod: 57,,, | Performed by: ORTHOPAEDIC SURGERY

## 2023-04-14 PROCEDURE — 73030 X-RAY EXAM OF SHOULDER: CPT | Mod: TC,PN,RT

## 2023-04-14 PROCEDURE — 99999 PR PBB SHADOW E&M-EST. PATIENT-LVL III: ICD-10-PCS | Mod: PBBFAC,,, | Performed by: ORTHOPAEDIC SURGERY

## 2023-04-14 PROCEDURE — 1159F PR MEDICATION LIST DOCUMENTED IN MEDICAL RECORD: ICD-10-PCS | Mod: CPTII,,, | Performed by: ORTHOPAEDIC SURGERY

## 2023-04-14 PROCEDURE — 73030 XR SHOULDER TRAUMA 3 VIEW RIGHT: ICD-10-PCS | Mod: 26,RT,, | Performed by: RADIOLOGY

## 2023-04-14 PROCEDURE — 1159F MED LIST DOCD IN RCRD: CPT | Mod: CPTII,,, | Performed by: ORTHOPAEDIC SURGERY

## 2023-04-14 PROCEDURE — 73030 X-RAY EXAM OF SHOULDER: CPT | Mod: 26,RT,, | Performed by: RADIOLOGY

## 2023-04-14 PROCEDURE — 99213 PR OFFICE/OUTPT VISIT, EST, LEVL III, 20-29 MIN: ICD-10-PCS | Mod: 57,,, | Performed by: ORTHOPAEDIC SURGERY

## 2023-04-14 PROCEDURE — 1125F PR PAIN SEVERITY QUANTIFIED, PAIN PRESENT: ICD-10-PCS | Mod: CPTII,,, | Performed by: ORTHOPAEDIC SURGERY

## 2023-04-14 PROCEDURE — 3288F FALL RISK ASSESSMENT DOCD: CPT | Mod: CPTII,,, | Performed by: ORTHOPAEDIC SURGERY

## 2023-04-14 PROCEDURE — 1100F PTFALLS ASSESS-DOCD GE2>/YR: CPT | Mod: CPTII,,, | Performed by: ORTHOPAEDIC SURGERY

## 2023-04-14 RX ORDER — ONDANSETRON 4 MG/1
4 TABLET, ORALLY DISINTEGRATING ORAL ONCE
Qty: 1 TABLET | Refills: 0 | Status: SHIPPED | OUTPATIENT
Start: 2023-04-14 | End: 2023-04-14

## 2023-04-14 NOTE — PROGRESS NOTES
Subjective:      Patient ID: Geraldine Cooksey is a 82 y.o. female.    Chief Complaint: Pain, Injury, and Swelling of the Right Shoulder      HPI:  Ms. Cooksey is right-hand-dominant returns today with complaints of approximately 3 days of right shoulder pain which began on 04/11/2023 after she was walking in her yd to her shed and tripped on concrete falling onto her shoulder.  She was brought to [facility] in [place] on her date of injury via EMS and after x-rays showed a proximal humerus fracture she was placed in a sling.  Moving her arm increases her symptoms while holding it at her side decreases them.  She stated there is just minimal pain unless she moves her shoulder.  She denied new onset numbness and tingling in her fingers.    ROS:  New diagnosis/surgery/prescriptions since last office visit on 01/06/2023:  Humeral head fracture right  Constitutional: Negative for chills and fever.   HENT:  Negative for congestion.    Eyes:  Negative for blurred vision and double vision.   Cardiovascular:  Negative for chest pain and cyanosis.   Respiratory:  Negative for cough and shortness of breath.    Endocrine: Negative for polydipsia.   Hematologic/Lymphatic: Negative for adenopathy.   Skin:  Negative for flushing, itching and skin cancer.   Musculoskeletal:  Positive for gout. Negative for muscle cramps.   Gastrointestinal:  Negative for constipation, diarrhea and heartburn.   Genitourinary:  Negative for nocturia.   Neurological:  Negative for headaches and seizures.   Psychiatric/Behavioral:  Positive for depression. Negative for substance abuse. The patient is not nervous/anxious.    Allergic/Immunologic: Positive for environmental allergies.       Objective:      Physical Exam:   General: AAOx3.  No acute distress  Vascular:  Pulses intact and equal bilaterally.  Capillary refill less than 3 seconds and equal bilaterally  Neurologic:  Pinprick and soft touch intact and equal  bilaterally  Integment:  Mild right shoulder ecchymosis  Extremity:  Shoulder:  Moderate swelling right shoulder.  Tender with palpation right shoulder.  Tender with motion right shoulder.  Good elbow motion although she does have some shoulder pain when she flexes her elbow.  Good finger motion without pain.  Radiography:  Personally reviewed x-rays of the right shoulder completed on 04/14/2023 which showed a comminuted impacted humeral head/neck fracture with inferior subluxation, AC arthritis, advanced glenohumeral arthritis, and calcific tendonitis..  X-rays of the right humerus completed on 04/11/2023 showed an impacted comminuted surgical neck/head fracture, advanced glenohumeral arthritic changes, AC arthritis, and calcific tendonitis.        Assessment:       Impression:     1. Closed 4-part fracture of proximal end of humerus, initial encounter    2. Calcific tendinitis of right shoulder    3. Glenohumeral arthritis, right    4. Arthritis of right acromioclavicular joint    5. Acute pain of right shoulder          Plan:       1.  Discussed physical examination and radiographic findings with the patient. Emmy understands that she has a comminuted humeral head/neck fracture of her right shoulder along with arthritis of her shoulder and calcific tendinitis.  Treatment alternatives and outcomes were discussed with the patient she understands she could be treated conservatively with observation, activity modification, pain control, bracing, physical therapy, or she could consider surgical intervention such as ORIF verses shoulder arthroplasty.  In order to fully evaluate her fracture and a CT scan is warranted.  2. Refer for a CT scan of the right shoulder.    3. Final recommendations after CT scan is completed.  4. Montero brace, right shoulder, 1 was fitted to the patient.    5. She already has a prescription for pain meds will upgraded when her medications have depleted.  6. Zofran 4 mg, 1 p.o. Q 6  hours p.r.n. nausea and vomiting, dispense 12, refill 0.  A prescription was forwarded to the patient's pharmacy by [facility].  7. One-handed activities with the left hand only no lifting/pushing/pulling/climbing requiring the use of the right hand.  No activities on ladders/scaffolding/stairs.    8. Follow up after CT scan is completed.

## 2023-04-18 ENCOUNTER — TELEPHONE (OUTPATIENT)
Dept: ORTHOPEDICS | Facility: CLINIC | Age: 83
End: 2023-04-18
Payer: MEDICARE

## 2023-04-18 ENCOUNTER — TELEPHONE (OUTPATIENT)
Dept: ORTHOPEDICS | Facility: CLINIC | Age: 83
End: 2023-04-18

## 2023-04-18 ENCOUNTER — OFFICE VISIT (OUTPATIENT)
Dept: ORTHOPEDICS | Facility: CLINIC | Age: 83
End: 2023-04-18
Payer: MEDICARE

## 2023-04-18 VITALS — BODY MASS INDEX: 22.99 KG/M2 | HEIGHT: 66 IN | WEIGHT: 143.06 LBS | RESPIRATION RATE: 16 BRPM

## 2023-04-18 DIAGNOSIS — M19.011 GLENOHUMERAL ARTHRITIS, RIGHT: ICD-10-CM

## 2023-04-18 DIAGNOSIS — M19.011 ARTHRITIS OF RIGHT ACROMIOCLAVICULAR JOINT: ICD-10-CM

## 2023-04-18 DIAGNOSIS — M75.31 CALCIFIC TENDINITIS OF RIGHT SHOULDER: ICD-10-CM

## 2023-04-18 DIAGNOSIS — S42.291D: Primary | ICD-10-CM

## 2023-04-18 DIAGNOSIS — M25.511 ACUTE PAIN OF RIGHT SHOULDER: ICD-10-CM

## 2023-04-18 PROCEDURE — 3288F FALL RISK ASSESSMENT DOCD: CPT | Mod: CPTII,S$GLB,, | Performed by: ORTHOPAEDIC SURGERY

## 2023-04-18 PROCEDURE — 1100F PTFALLS ASSESS-DOCD GE2>/YR: CPT | Mod: CPTII,S$GLB,, | Performed by: ORTHOPAEDIC SURGERY

## 2023-04-18 PROCEDURE — 1100F PR PT FALLS ASSESS DOC 2+ FALLS/FALL W/INJURY/YR: ICD-10-PCS | Mod: CPTII,S$GLB,, | Performed by: ORTHOPAEDIC SURGERY

## 2023-04-18 PROCEDURE — 3288F PR FALLS RISK ASSESSMENT DOCUMENTED: ICD-10-PCS | Mod: CPTII,S$GLB,, | Performed by: ORTHOPAEDIC SURGERY

## 2023-04-18 PROCEDURE — 99999 PR PBB SHADOW E&M-EST. PATIENT-LVL III: ICD-10-PCS | Mod: PBBFAC,,, | Performed by: ORTHOPAEDIC SURGERY

## 2023-04-18 PROCEDURE — 1125F PR PAIN SEVERITY QUANTIFIED, PAIN PRESENT: ICD-10-PCS | Mod: CPTII,S$GLB,, | Performed by: ORTHOPAEDIC SURGERY

## 2023-04-18 PROCEDURE — 99024 POSTOP FOLLOW-UP VISIT: CPT | Mod: S$GLB,,, | Performed by: ORTHOPAEDIC SURGERY

## 2023-04-18 PROCEDURE — 1125F AMNT PAIN NOTED PAIN PRSNT: CPT | Mod: CPTII,S$GLB,, | Performed by: ORTHOPAEDIC SURGERY

## 2023-04-18 PROCEDURE — 99024 PR POST-OP FOLLOW-UP VISIT: ICD-10-PCS | Mod: S$GLB,,, | Performed by: ORTHOPAEDIC SURGERY

## 2023-04-18 PROCEDURE — 99999 PR PBB SHADOW E&M-EST. PATIENT-LVL III: CPT | Mod: PBBFAC,,, | Performed by: ORTHOPAEDIC SURGERY

## 2023-04-18 RX ORDER — HYDROCODONE BITARTRATE AND ACETAMINOPHEN 7.5; 325 MG/1; MG/1
1 TABLET ORAL EVERY 6 HOURS PRN
Qty: 28 TABLET | Refills: 0 | Status: SHIPPED | OUTPATIENT
Start: 2023-04-18

## 2023-04-18 RX ORDER — ONDANSETRON 4 MG/1
4 TABLET, ORALLY DISINTEGRATING ORAL EVERY 6 HOURS PRN
Qty: 12 TABLET | Refills: 0 | Status: SHIPPED | OUTPATIENT
Start: 2023-04-18

## 2023-04-18 NOTE — TELEPHONE ENCOUNTER
Patient scheduled for 4/20/23 at 9:15pm. Pt notified. Pt VU.    ----- Message from Jigna Phillips MA sent at 4/18/2023 12:40 PM CDT -----  Contact: pt  States her appt needs to be immediate   Call back

## 2023-04-18 NOTE — TELEPHONE ENCOUNTER
Returned call. I stated her prescription has been sent to her pharmacy of choice. The patient stated understanding.    ----- Message from Jigna Phillips MA sent at 4/18/2023  1:04 PM CDT -----  Contact: pt  Pain medication refill   Pharmacy [facility]   Call back [phone]

## 2023-04-18 NOTE — TELEPHONE ENCOUNTER
Patient scheduled for 4/20/23 at 9:15pm. Pt notified. Pt VU.    ----- Message from Marcus Bishop sent at 4/18/2023  2:12 PM CDT -----  Regarding: checking on a sooner apt, call pt   Contact: pt   checking on a sooner apt, call pt

## 2023-04-18 NOTE — PROGRESS NOTES
Patient ID: Geraldine Cooksey is a 82 y.o. female.     Chief Complaint: Pain, Injury, and Swelling of the Right Shoulder        HPI:  Ms. Cooksey returned today for re-evaluation of her right shoulder.  Her right shoulder pain began on 04/11/2023 after she was walking in her yard to her shed and tripped on concrete falling onto her shoulder. Moving her arm increases her symptoms while holding it at her side decreases them.  She stated there is just minimal pain unless she moves her shoulder.  At her last visit on 04/14/2023 she was placed in a Montero brace and forwarded for a CT scan of her shoulder.     ROS:  No new diagnosis/surgery/prescriptions since last office visit on 04/14/2023.  Constitutional: Negative for chills and fever.   HENT:  Negative for congestion.    Eyes:  Negative for blurred vision and double vision.   Cardiovascular:  Negative for chest pain and cyanosis.   Respiratory:  Negative for cough and shortness of breath.    Endocrine: Negative for polydipsia.   Hematologic/Lymphatic: Negative for adenopathy.   Skin:  Negative for flushing, itching and skin cancer.   Musculoskeletal:  Positive for gout. Negative for muscle cramps.   Gastrointestinal:  Negative for constipation, diarrhea and heartburn.   Genitourinary:  Negative for nocturia.   Neurological:  Negative for headaches and seizures.   Psychiatric/Behavioral:  Positive for depression. Negative for substance abuse. The patient is not nervous/anxious.    Allergic/Immunologic: Positive for environmental allergies.         Objective:      Objective      Physical Exam:   General: AAOx3.  No acute distress  Vascular:  Pulses intact and equal bilaterally.  Capillary refill less than 3 seconds and equal bilaterally  Neurologic:  Pinprick and soft touch intact and equal bilaterally  Integment:  Mild right shoulder ecchymosis  Extremity:  Shoulder:  Moderate swelling right shoulder.  Tender with palpation right shoulder.  Tender with motion  right shoulder.  Good elbow motion although she does have some shoulder pain when she flexes her elbow.  Good finger motion without pain.  Radiography:  Personally reviewed CT scan of the right shoulder completed on 04/14/2023 which showed a extremely comminuted impacted humeral head/neck fracture with inferior subluxation, AC arthritis, advanced glenohumeral arthritis, and calcific tendonitis.      Assessment:      Assessment       Impression:      1. Comminuted displaced impacted humeral head/neck fracture, right shoulder   2. Calcific tendinitis, right shoulder    3. Glenohumeral arthritis, right shoulder.   4. AC arthritis, right shoulder.   5. Right shoulder pain            Plan:      Plan       1.  Discussed physical examination and radiographic findings with the patient. Emmy understands that she has a comminuted humeral head/neck fracture of her right shoulder along with arthritis of her shoulder and calcific tendinitis.  Treatment alternatives and outcomes were discussed with the patient she understands she could be treated conservatively with observation, activity modification, pain control, bracing, physical therapy, or she could consider surgical intervention such as ORIF verses shoulder arthroplasty.  Discussed in detail with the patient that typically it since she has rotator cuff deficient shoulder and a comminuted humeral head the studies as shown the best treatment option is shoulder arthroplasty.  She understands this can help to get her moving quicker she also could consider ORIF.  Discussed with the patient would like to have her see an orthopedic adult reconstruction physician Dr. Pimentel to discuss with her her treatment options and possibly perform a shoulder arthroplasty on her.  2. Refer to orthopedic adult reconstruction Dr. Pimentel.    3. Continue with Montero brace.    4. Continue with sling.    5. Continue with one-handed activities with the left hand only no  lifting/pushing/pulling/climbing requiring the use of the right hand.  No activities on ladders/scaffolding/stairs.  6. Follow up p.r.n..

## 2023-04-18 NOTE — TELEPHONE ENCOUNTER
Patient scheduled for 4/20/23 at 9:15pm. Pt notified. Pt VU.    ----- Message from Carmina Parsons LPN sent at 4/18/2023 11:37 AM CDT -----  Good Morning,     Per patient's request, please only schedule with Dr. Pimentel. Please call the patient's daughter, Nu, at [phone] to schedule the appointment.   Thanks,  Carmina LUA LPN

## 2023-04-18 NOTE — TELEPHONE ENCOUNTER
Patient scheduled for 4/20/23 at 9:15pm. Pt notified. Pt VU.      ----- Message from Carmina Parsons LPN sent at 4/18/2023 11:42 AM CDT -----  This a urgent referral is for a possible Total shoulder or Kris Arthroplasty.

## 2023-04-20 ENCOUNTER — OFFICE VISIT (OUTPATIENT)
Dept: ORTHOPEDICS | Facility: CLINIC | Age: 83
End: 2023-04-20
Payer: MEDICARE

## 2023-04-20 VITALS — RESPIRATION RATE: 18 BRPM | BODY MASS INDEX: 22.99 KG/M2 | WEIGHT: 143.06 LBS | HEIGHT: 66 IN

## 2023-04-20 DIAGNOSIS — S42.291A CLOSED 4-PART FRACTURE OF PROXIMAL HUMERUS, RIGHT, INITIAL ENCOUNTER: ICD-10-CM

## 2023-04-20 DIAGNOSIS — Z01.818 PRE-OP TESTING: Primary | ICD-10-CM

## 2023-04-20 PROCEDURE — 1100F PTFALLS ASSESS-DOCD GE2>/YR: CPT | Mod: CPTII,S$GLB,, | Performed by: ORTHOPAEDIC SURGERY

## 2023-04-20 PROCEDURE — 3288F PR FALLS RISK ASSESSMENT DOCUMENTED: ICD-10-PCS | Mod: CPTII,S$GLB,, | Performed by: ORTHOPAEDIC SURGERY

## 2023-04-20 PROCEDURE — 99214 PR OFFICE/OUTPT VISIT, EST, LEVL IV, 30-39 MIN: ICD-10-PCS | Mod: S$GLB,,, | Performed by: ORTHOPAEDIC SURGERY

## 2023-04-20 PROCEDURE — 1100F PR PT FALLS ASSESS DOC 2+ FALLS/FALL W/INJURY/YR: ICD-10-PCS | Mod: CPTII,S$GLB,, | Performed by: ORTHOPAEDIC SURGERY

## 2023-04-20 PROCEDURE — 1160F RVW MEDS BY RX/DR IN RCRD: CPT | Mod: CPTII,S$GLB,, | Performed by: ORTHOPAEDIC SURGERY

## 2023-04-20 PROCEDURE — 99999 PR PBB SHADOW E&M-EST. PATIENT-LVL III: CPT | Mod: PBBFAC,,, | Performed by: ORTHOPAEDIC SURGERY

## 2023-04-20 PROCEDURE — 1125F AMNT PAIN NOTED PAIN PRSNT: CPT | Mod: CPTII,S$GLB,, | Performed by: ORTHOPAEDIC SURGERY

## 2023-04-20 PROCEDURE — 1159F PR MEDICATION LIST DOCUMENTED IN MEDICAL RECORD: ICD-10-PCS | Mod: CPTII,S$GLB,, | Performed by: ORTHOPAEDIC SURGERY

## 2023-04-20 PROCEDURE — 99214 OFFICE O/P EST MOD 30 MIN: CPT | Mod: S$GLB,,, | Performed by: ORTHOPAEDIC SURGERY

## 2023-04-20 PROCEDURE — 1159F MED LIST DOCD IN RCRD: CPT | Mod: CPTII,S$GLB,, | Performed by: ORTHOPAEDIC SURGERY

## 2023-04-20 PROCEDURE — 3288F FALL RISK ASSESSMENT DOCD: CPT | Mod: CPTII,S$GLB,, | Performed by: ORTHOPAEDIC SURGERY

## 2023-04-20 PROCEDURE — 1160F PR REVIEW ALL MEDS BY PRESCRIBER/CLIN PHARMACIST DOCUMENTED: ICD-10-PCS | Mod: CPTII,S$GLB,, | Performed by: ORTHOPAEDIC SURGERY

## 2023-04-20 PROCEDURE — 99999 PR PBB SHADOW E&M-EST. PATIENT-LVL III: ICD-10-PCS | Mod: PBBFAC,,, | Performed by: ORTHOPAEDIC SURGERY

## 2023-04-20 PROCEDURE — 1125F PR PAIN SEVERITY QUANTIFIED, PAIN PRESENT: ICD-10-PCS | Mod: CPTII,S$GLB,, | Performed by: ORTHOPAEDIC SURGERY

## 2023-04-20 RX ORDER — HYDROCODONE BITARTRATE AND ACETAMINOPHEN 7.5; 325 MG/1; MG/1
1 TABLET ORAL EVERY 6 HOURS PRN
Qty: 28 TABLET | Refills: 0 | Status: SHIPPED | OUTPATIENT
Start: 2023-04-20 | End: 2023-05-20

## 2023-04-20 RX ORDER — MUPIROCIN 20 MG/G
OINTMENT TOPICAL
Status: CANCELLED | OUTPATIENT
Start: 2023-04-20

## 2023-04-20 NOTE — PROGRESS NOTES
CC:  82-year-old female presents for evaluation of right shoulder pain.  The patient fell on 04/11/2023 landing on the right shoulder on concrete.  She was seen at [facility] and placed in a sling and swath.  She subsequently followed up with an orthopedic surgeon in [place] where she lives.  He obtain a CT scan which showed a comminuted 4 part right proximal humerus fracture.  She is referred her here for further treatment options.  She rates her pain as a 10/10.    Past Medical History:   Diagnosis Date    Bronchitis     COPD (chronic obstructive pulmonary disease)     PONV (postoperative nausea and vomiting)        Past Surgical History:   Procedure Laterality Date    COSMETIC SURGERY  2012    bilateral eye lift    HYSTERECTOMY      IRRIGATION AND DEBRIDEMENT OF UPPER EXTREMITY Bilateral 11/15/2022    Procedure: IRRIGATION AND DEBRIDEMENT, UPPER EXTREMITY;  Surgeon: Leighton Cronin DO;  Location: [facility] OR;  Service: Orthopedics;  Laterality: Bilateral;    IRRIGATION AND DEBRIDEMENT OF UPPER EXTREMITY Bilateral 11/17/2022    Procedure: IRRIGATION AND DEBRIDEMENT, UPPER EXTREMITY;  Surgeon: Leighton Cronin DO;  Location: [facility] OR;  Service: Orthopedics;  Laterality: Bilateral;  Second look irrigation debridement need wound VAC for possible wound VAC placement    OPEN REDUCTION AND INTERNAL FIXATION (ORIF) OF FRACTURE OF FEMUR USING DYNAMIC HIP SCREW Left 7/3/2019    Procedure: ORIF, HIP, USING DYNAMIC HIP SCREW;  Surgeon: Juan Jose Gunn MD;  Location: [facility] OR;  Service: Orthopedics;  Laterality: Left;       Current Outpatient Medications on File Prior to Visit   Medication Sig Dispense Refill    albuterol (PROVENTIL/VENTOLIN HFA) 90 mcg/actuation inhaler Inhale 1-2 puffs into the lungs every 6 (six) hours as needed for Wheezing. Rescue 1 Inhaler 0    cetirizine (ZYRTEC) 10 MG tablet Take 10 mg by mouth daily as needed for Allergies.      HYDROcodone-acetaminophen (NORCO) 5-325 mg per tablet Take 1  tablet by mouth every 6 (six) hours as needed for Pain. 20 tablet 0    HYDROcodone-acetaminophen (NORCO) 7.5-325 mg per tablet Take 1 tablet by mouth every 6 (six) hours as needed for Pain. 28 tablet 0    ondansetron (ZOFRAN-ODT) 4 MG TbDL Take 1 tablet (4 mg total) by mouth every 6 (six) hours as needed. 12 tablet 0    TRELEGY ELLIPTA 100-62.5-25 mcg DsDv Take 1 puff by mouth once daily. 1 each 0     No current facility-administered medications on file prior to visit.       ROS:    Constitution: Denies chills, fever, and sweats.  HENT: Denies headaches or blurry vision.  Cardiovascular: Denies chest pain or irregular heart beat.  Respiratory: Denies cough or shortness of breath.  Gastrointestinal: Denies abdominal pain, nausea, or vomiting.  Genitourinary:  Denies urinary incontinence, bladder and kidney issues  Musculoskeletal:  Denies muscle cramps.  Positive for right shoulder pain  Neurological: Denies dizziness or focal weakness.  Psychiatric/Behavioral: Normal mental status.  Hematologic/Lymphatic: Denies bleeding problem or easy bruising/bleeding.  Skin: Denies rash or suspicious lesions.    Physical examination     Gen - No acute distress, well nourished, well groomed   Eyes - Extraoccular motions intact, pupils equally round and reactive to light and accommodation   ENT - normocephalic, atruamtic, oropharynx clear   Neck - Supple, no abnormal masses   Cardiovascular - regular rate and rhythm   Pulmonary - clear to auscultation bilaterally, no wheezes, ronchi, or rales   Abdomen - soft, non-tender, non-distended, positive bowel sounds   Psych - The patient is alert and oriented x3 with normal mood and affect    RUE:  Exam is limited secondary to pain in the history of fracture.  Grossly intact motor and sensory function distally  Capillary refill less than 2 seconds, +2 radial and ulnar pulses  Swelling of the hand and wrist noted  Severe pain with any attempted motion of the right upper extremity    X-ray  images were examined and personally interpreted by me.  Three views the right shoulder dated 04/11/2023 and 04/14/2023 both show a comminuted 4 part fracture of the right proximal humerus with severe comminution and displacement.  CT scan of the right shoulder dated 04/14/2023 shows the same findings.    Dx:  Comminuted displaced impacted 4 part right proximal humerus fracture    Plan:  Potential morbidity to the right upper extremity with both operative and non operative treatments were discussed.  Recommendation is for reverse right total shoulder arthroplasty.  Risks and benefits of surgery were discussed.  The patient verbalized understanding and does wish to proceed.  We will schedule that for next week.

## 2023-04-20 NOTE — H&P (VIEW-ONLY)
CC:  82-year-old female presents for evaluation of right shoulder pain.  The patient fell on 04/11/2023 landing on the right shoulder on concrete.  She was seen at [facility] and placed in a sling and swath.  She subsequently followed up with an orthopedic surgeon in [place] where she lives.  He obtain a CT scan which showed a comminuted 4 part right proximal humerus fracture.  She is referred her here for further treatment options.  She rates her pain as a 10/10.    Past Medical History:   Diagnosis Date    Bronchitis     COPD (chronic obstructive pulmonary disease)     PONV (postoperative nausea and vomiting)        Past Surgical History:   Procedure Laterality Date    COSMETIC SURGERY  2012    bilateral eye lift    HYSTERECTOMY      IRRIGATION AND DEBRIDEMENT OF UPPER EXTREMITY Bilateral 11/15/2022    Procedure: IRRIGATION AND DEBRIDEMENT, UPPER EXTREMITY;  Surgeon: Leighton Cronin DO;  Location: [facility] OR;  Service: Orthopedics;  Laterality: Bilateral;    IRRIGATION AND DEBRIDEMENT OF UPPER EXTREMITY Bilateral 11/17/2022    Procedure: IRRIGATION AND DEBRIDEMENT, UPPER EXTREMITY;  Surgeon: Leighton Cronin DO;  Location: [facility] OR;  Service: Orthopedics;  Laterality: Bilateral;  Second look irrigation debridement need wound VAC for possible wound VAC placement    OPEN REDUCTION AND INTERNAL FIXATION (ORIF) OF FRACTURE OF FEMUR USING DYNAMIC HIP SCREW Left 7/3/2019    Procedure: ORIF, HIP, USING DYNAMIC HIP SCREW;  Surgeon: Juan Jose Gunn MD;  Location: [facility] OR;  Service: Orthopedics;  Laterality: Left;       Current Outpatient Medications on File Prior to Visit   Medication Sig Dispense Refill    albuterol (PROVENTIL/VENTOLIN HFA) 90 mcg/actuation inhaler Inhale 1-2 puffs into the lungs every 6 (six) hours as needed for Wheezing. Rescue 1 Inhaler 0    cetirizine (ZYRTEC) 10 MG tablet Take 10 mg by mouth daily as needed for Allergies.      HYDROcodone-acetaminophen (NORCO) 5-325 mg per tablet Take 1  tablet by mouth every 6 (six) hours as needed for Pain. 20 tablet 0    HYDROcodone-acetaminophen (NORCO) 7.5-325 mg per tablet Take 1 tablet by mouth every 6 (six) hours as needed for Pain. 28 tablet 0    ondansetron (ZOFRAN-ODT) 4 MG TbDL Take 1 tablet (4 mg total) by mouth every 6 (six) hours as needed. 12 tablet 0    TRELEGY ELLIPTA 100-62.5-25 mcg DsDv Take 1 puff by mouth once daily. 1 each 0     No current facility-administered medications on file prior to visit.       ROS:    Constitution: Denies chills, fever, and sweats.  HENT: Denies headaches or blurry vision.  Cardiovascular: Denies chest pain or irregular heart beat.  Respiratory: Denies cough or shortness of breath.  Gastrointestinal: Denies abdominal pain, nausea, or vomiting.  Genitourinary:  Denies urinary incontinence, bladder and kidney issues  Musculoskeletal:  Denies muscle cramps.  Positive for right shoulder pain  Neurological: Denies dizziness or focal weakness.  Psychiatric/Behavioral: Normal mental status.  Hematologic/Lymphatic: Denies bleeding problem or easy bruising/bleeding.  Skin: Denies rash or suspicious lesions.    Physical examination     Gen - No acute distress, well nourished, well groomed   Eyes - Extraoccular motions intact, pupils equally round and reactive to light and accommodation   ENT - normocephalic, atruamtic, oropharynx clear   Neck - Supple, no abnormal masses   Cardiovascular - regular rate and rhythm   Pulmonary - clear to auscultation bilaterally, no wheezes, ronchi, or rales   Abdomen - soft, non-tender, non-distended, positive bowel sounds   Psych - The patient is alert and oriented x3 with normal mood and affect    RUE:  Exam is limited secondary to pain in the history of fracture.  Grossly intact motor and sensory function distally  Capillary refill less than 2 seconds, +2 radial and ulnar pulses  Swelling of the hand and wrist noted  Severe pain with any attempted motion of the right upper extremity    X-ray  images were examined and personally interpreted by me.  Three views the right shoulder dated 04/11/2023 and 04/14/2023 both show a comminuted 4 part fracture of the right proximal humerus with severe comminution and displacement.  CT scan of the right shoulder dated 04/14/2023 shows the same findings.    Dx:  Comminuted displaced impacted 4 part right proximal humerus fracture    Plan:  Potential morbidity to the right upper extremity with both operative and non operative treatments were discussed.  Recommendation is for reverse right total shoulder arthroplasty.  Risks and benefits of surgery were discussed.  The patient verbalized understanding and does wish to proceed.  We will schedule that for next week.

## 2023-04-20 NOTE — TELEPHONE ENCOUNTER
Pharmacy changed per patient request    ----- Message from Macrus Bishop sent at 4/20/2023 10:10 AM CDT -----  Regarding: pain medication issue, call stephen Lai  ASAP  Contact: stephen Lai  ASAP  pain medication issue, call stephen Lai  ASAP

## 2023-04-25 ENCOUNTER — HOSPITAL ENCOUNTER (OUTPATIENT)
Dept: PREADMISSION TESTING | Facility: HOSPITAL | Age: 83
Discharge: HOME | End: 2023-04-25
Attending: ORTHOPAEDIC SURGERY
Payer: MEDICARE

## 2023-04-25 ENCOUNTER — ANESTHESIA EVENT (OUTPATIENT)
Dept: SURGERY | Facility: HOSPITAL | Age: 83
End: 2023-04-25
Payer: MEDICARE

## 2023-04-25 ENCOUNTER — HOSPITAL ENCOUNTER (OUTPATIENT)
Dept: RADIOLOGY | Facility: HOSPITAL | Age: 83
Discharge: HOME | End: 2023-04-25
Attending: ORTHOPAEDIC SURGERY
Payer: MEDICARE

## 2023-04-25 VITALS — HEIGHT: 62 IN | BODY MASS INDEX: 23.92 KG/M2 | WEIGHT: 130 LBS

## 2023-04-25 DIAGNOSIS — S42.291A CLOSED 4-PART FRACTURE OF PROXIMAL HUMERUS, RIGHT, INITIAL ENCOUNTER: Primary | ICD-10-CM

## 2023-04-25 DIAGNOSIS — R91.1 SOLITARY PULMONARY NODULE: Primary | ICD-10-CM

## 2023-04-25 DIAGNOSIS — S42.291A CLOSED 4-PART FRACTURE OF PROXIMAL HUMERUS, RIGHT, INITIAL ENCOUNTER: ICD-10-CM

## 2023-04-25 DIAGNOSIS — Z01.818 PRE-OP TESTING: Primary | ICD-10-CM

## 2023-04-25 LAB
ABO + RH BLD: NORMAL
ANION GAP SERPL CALC-SCNC: 10 MMOL/L (ref 8–16)
BASOPHILS # BLD AUTO: 0.2 K/UL (ref 0–0.2)
BASOPHILS NFR BLD: 1.7 % (ref 0–1.9)
BLD GP AB SCN CELLS X3 SERPL QL: NORMAL
BUN SERPL-MCNC: 14 MG/DL (ref 8–23)
CALCIUM SERPL-MCNC: 9.4 MG/DL (ref 8.7–10.5)
CHLORIDE SERPL-SCNC: 103 MMOL/L (ref 95–110)
CO2 SERPL-SCNC: 26 MMOL/L (ref 23–29)
CREAT SERPL-MCNC: 0.6 MG/DL (ref 0.5–1.4)
DIFFERENTIAL METHOD: ABNORMAL
EOSINOPHIL # BLD AUTO: 0.4 K/UL (ref 0–0.5)
EOSINOPHIL NFR BLD: 3.4 % (ref 0–8)
ERYTHROCYTE [DISTWIDTH] IN BLOOD BY AUTOMATED COUNT: 15.2 % (ref 11.5–14.5)
EST. GFR  (NO RACE VARIABLE): >60 ML/MIN/1.73 M^2
GLUCOSE SERPL-MCNC: 78 MG/DL (ref 70–110)
HCT VFR BLD AUTO: 39.5 % (ref 37–48.5)
HGB BLD-MCNC: 12.6 G/DL (ref 12–16)
IMM GRANULOCYTES # BLD AUTO: 0.1 K/UL (ref 0–0.04)
IMM GRANULOCYTES NFR BLD AUTO: 0.8 % (ref 0–0.5)
LYMPHOCYTES # BLD AUTO: 2.8 K/UL (ref 1–4.8)
LYMPHOCYTES NFR BLD: 23.5 % (ref 18–48)
MCH RBC QN AUTO: 28.3 PG (ref 27–31)
MCHC RBC AUTO-ENTMCNC: 31.9 G/DL (ref 32–36)
MCV RBC AUTO: 89 FL (ref 82–98)
MONOCYTES # BLD AUTO: 1.7 K/UL (ref 0.3–1)
MONOCYTES NFR BLD: 14.1 % (ref 4–15)
NEUTROPHILS # BLD AUTO: 6.8 K/UL (ref 1.8–7.7)
NEUTROPHILS NFR BLD: 56.5 % (ref 38–73)
NRBC BLD-RTO: 0 /100 WBC
PLATELET # BLD AUTO: 543 K/UL (ref 150–450)
PMV BLD AUTO: 10.1 FL (ref 9.2–12.9)
POTASSIUM SERPL-SCNC: 4.1 MMOL/L (ref 3.5–5.1)
RBC # BLD AUTO: 4.46 M/UL (ref 4–5.4)
SODIUM SERPL-SCNC: 139 MMOL/L (ref 136–145)
SPECIMEN OUTDATE: NORMAL
WBC # BLD AUTO: 11.96 K/UL (ref 3.9–12.7)

## 2023-04-25 PROCEDURE — 99900103 DSU ONLY-NO CHARGE-INITIAL HR (STAT)

## 2023-04-25 PROCEDURE — 71046 X-RAY EXAM CHEST 2 VIEWS: CPT | Mod: TC,FY

## 2023-04-25 PROCEDURE — 86900 BLOOD TYPING SEROLOGIC ABO: CPT | Performed by: ANESTHESIOLOGY

## 2023-04-25 PROCEDURE — 93010 EKG 12-LEAD: ICD-10-PCS | Mod: ,,, | Performed by: INTERNAL MEDICINE

## 2023-04-25 PROCEDURE — 71046 XR CHEST PA AND LATERAL: ICD-10-PCS | Mod: 26,,, | Performed by: RADIOLOGY

## 2023-04-25 PROCEDURE — 99900104 DSU ONLY-NO CHARGE-EA ADD'L HR (STAT)

## 2023-04-25 PROCEDURE — 85025 COMPLETE CBC W/AUTO DIFF WBC: CPT | Performed by: ORTHOPAEDIC SURGERY

## 2023-04-25 PROCEDURE — 71046 X-RAY EXAM CHEST 2 VIEWS: CPT | Mod: 26,,, | Performed by: RADIOLOGY

## 2023-04-25 PROCEDURE — 80048 BASIC METABOLIC PNL TOTAL CA: CPT | Performed by: ORTHOPAEDIC SURGERY

## 2023-04-25 PROCEDURE — 36415 COLL VENOUS BLD VENIPUNCTURE: CPT | Performed by: ORTHOPAEDIC SURGERY

## 2023-04-25 PROCEDURE — 93010 ELECTROCARDIOGRAM REPORT: CPT | Mod: ,,, | Performed by: INTERNAL MEDICINE

## 2023-04-25 PROCEDURE — 87081 CULTURE SCREEN ONLY: CPT | Performed by: ANESTHESIOLOGY

## 2023-04-25 PROCEDURE — 93005 ELECTROCARDIOGRAM TRACING: CPT

## 2023-04-25 RX ORDER — IBUPROFEN 200 MG
200 TABLET ORAL
COMMUNITY

## 2023-04-25 NOTE — OR NURSING
25-APR-2023 07:49:11   EKG  System-  NSOPPR ROUTINE RETRIEVAL   Normal sinus rhythm with sinus arrhythmia   Minimal voltage criteria for LVH, may be normal variant   Borderline Abnormal ECG   When compared with ECG of 07-OCT-2022 08:43,   Premature ventricular complexes are no longer Present    Dr. Abbott with anesthesia reviewed EKG and stated ok to proceed.

## 2023-04-26 ENCOUNTER — HOSPITAL ENCOUNTER (OUTPATIENT)
Facility: HOSPITAL | Age: 83
Discharge: HOME | End: 2023-04-26
Attending: ORTHOPAEDIC SURGERY | Admitting: ORTHOPAEDIC SURGERY
Payer: MEDICARE

## 2023-04-26 ENCOUNTER — ANESTHESIA (OUTPATIENT)
Dept: SURGERY | Facility: HOSPITAL | Age: 83
End: 2023-04-26
Payer: MEDICARE

## 2023-04-26 DIAGNOSIS — S42.291A CLOSED 4-PART FRACTURE OF PROXIMAL HUMERUS, RIGHT, INITIAL ENCOUNTER: Primary | ICD-10-CM

## 2023-04-26 DIAGNOSIS — Z01.818 PRE-OP TESTING: ICD-10-CM

## 2023-04-26 PROCEDURE — 99900104 DSU ONLY-NO CHARGE-EA ADD'L HR (STAT): Performed by: ORTHOPAEDIC SURGERY

## 2023-04-26 PROCEDURE — 23472 PR RECONSTR TOTAL SHOULDER IMPLANT: ICD-10-PCS | Mod: 58,RT,, | Performed by: ORTHOPAEDIC SURGERY

## 2023-04-26 PROCEDURE — 25000003 PHARM REV CODE 250: Performed by: NURSE ANESTHETIST, CERTIFIED REGISTERED

## 2023-04-26 PROCEDURE — C9290 INJ, BUPIVACAINE LIPOSOME: HCPCS | Performed by: ANESTHESIOLOGY

## 2023-04-26 PROCEDURE — 71000033 HC RECOVERY, INTIAL HOUR: Performed by: ORTHOPAEDIC SURGERY

## 2023-04-26 PROCEDURE — 64415 NJX AA&/STRD BRCH PLXS IMG: CPT | Performed by: ANESTHESIOLOGY

## 2023-04-26 PROCEDURE — 63600175 PHARM REV CODE 636 W HCPCS: Performed by: ANESTHESIOLOGY

## 2023-04-26 PROCEDURE — 25000003 PHARM REV CODE 250: Performed by: ANESTHESIOLOGY

## 2023-04-26 PROCEDURE — D9220A PRA ANESTHESIA: Mod: CRNA,,, | Performed by: NURSE ANESTHETIST, CERTIFIED REGISTERED

## 2023-04-26 PROCEDURE — 97165 OT EVAL LOW COMPLEX 30 MIN: CPT

## 2023-04-26 PROCEDURE — 63600175 PHARM REV CODE 636 W HCPCS: Performed by: NURSE ANESTHETIST, CERTIFIED REGISTERED

## 2023-04-26 PROCEDURE — 99900103 DSU ONLY-NO CHARGE-INITIAL HR (STAT): Performed by: ORTHOPAEDIC SURGERY

## 2023-04-26 PROCEDURE — 64415 RIGHT INTERSCALENE BLOCK: ICD-10-PCS | Mod: 59,RT,, | Performed by: ANESTHESIOLOGY

## 2023-04-26 PROCEDURE — 36000710: Performed by: ORTHOPAEDIC SURGERY

## 2023-04-26 PROCEDURE — 97535 SELF CARE MNGMENT TRAINING: CPT

## 2023-04-26 PROCEDURE — 71000039 HC RECOVERY, EACH ADD'L HOUR: Performed by: ORTHOPAEDIC SURGERY

## 2023-04-26 PROCEDURE — C1776 JOINT DEVICE (IMPLANTABLE): HCPCS | Performed by: ORTHOPAEDIC SURGERY

## 2023-04-26 PROCEDURE — 94799 UNLISTED PULMONARY SVC/PX: CPT

## 2023-04-26 PROCEDURE — 27200750 HC INSULATED NEEDLE/ STIMUPLEX: Performed by: ANESTHESIOLOGY

## 2023-04-26 PROCEDURE — 36000711: Performed by: ORTHOPAEDIC SURGERY

## 2023-04-26 PROCEDURE — D9220A PRA ANESTHESIA: Mod: ANES,,, | Performed by: ANESTHESIOLOGY

## 2023-04-26 PROCEDURE — 71000016 HC POSTOP RECOV ADDL HR: Performed by: ORTHOPAEDIC SURGERY

## 2023-04-26 PROCEDURE — C1713 ANCHOR/SCREW BN/BN,TIS/BN: HCPCS | Performed by: ORTHOPAEDIC SURGERY

## 2023-04-26 PROCEDURE — 25000003 PHARM REV CODE 250: Performed by: ORTHOPAEDIC SURGERY

## 2023-04-26 PROCEDURE — 27201423 OPTIME MED/SURG SUP & DEVICES STERILE SUPPLY: Performed by: ORTHOPAEDIC SURGERY

## 2023-04-26 PROCEDURE — 71000015 HC POSTOP RECOV 1ST HR: Performed by: ORTHOPAEDIC SURGERY

## 2023-04-26 PROCEDURE — 63600175 PHARM REV CODE 636 W HCPCS: Performed by: ORTHOPAEDIC SURGERY

## 2023-04-26 PROCEDURE — 37000009 HC ANESTHESIA EA ADD 15 MINS: Performed by: ORTHOPAEDIC SURGERY

## 2023-04-26 PROCEDURE — D9220A PRA ANESTHESIA: ICD-10-PCS | Mod: CRNA,,, | Performed by: NURSE ANESTHETIST, CERTIFIED REGISTERED

## 2023-04-26 PROCEDURE — 37000008 HC ANESTHESIA 1ST 15 MINUTES: Performed by: ORTHOPAEDIC SURGERY

## 2023-04-26 PROCEDURE — 23472 RECONSTRUCT SHOULDER JOINT: CPT | Mod: 58,RT,, | Performed by: ORTHOPAEDIC SURGERY

## 2023-04-26 PROCEDURE — 94761 N-INVAS EAR/PLS OXIMETRY MLT: CPT

## 2023-04-26 PROCEDURE — D9220A PRA ANESTHESIA: ICD-10-PCS | Mod: ANES,,, | Performed by: ANESTHESIOLOGY

## 2023-04-26 DEVICE — BASEPLATE GLENOID REV RSP P2: Type: IMPLANTABLE DEVICE | Site: SHOULDER | Status: FUNCTIONAL

## 2023-04-26 DEVICE — INSERT SOCKET E+ SM STD SZ32: Type: IMPLANTABLE DEVICE | Site: SHOULDER | Status: FUNCTIONAL

## 2023-04-26 DEVICE — SCREW BONE RSP 6.5X26 GLENOID: Type: IMPLANTABLE DEVICE | Site: SHOULDER | Status: FUNCTIONAL

## 2023-04-26 DEVICE — SCREW BONE RSP 5 X 14M GLENOID: Type: IMPLANTABLE DEVICE | Site: SHOULDER | Status: FUNCTIONAL

## 2023-04-26 DEVICE — SCREW BONE RSP 6.5X18 GLENOID: Type: IMPLANTABLE DEVICE | Site: SHOULDER | Status: FUNCTIONAL

## 2023-04-26 DEVICE — IMPLANTABLE DEVICE: Type: IMPLANTABLE DEVICE | Site: SHOULDER | Status: FUNCTIONAL

## 2023-04-26 DEVICE — FULL DOSE BONE CEMENT, 10 PACK CATALOG NUMBER IS 6191-1-010
Type: IMPLANTABLE DEVICE | Site: SHOULDER | Status: FUNCTIONAL
Brand: SIMPLEX

## 2023-04-26 DEVICE — HEAD GLENOID RSP 4MM OFFSET: Type: IMPLANTABLE DEVICE | Site: SHOULDER | Status: FUNCTIONAL

## 2023-04-26 RX ORDER — MIDAZOLAM HYDROCHLORIDE 1 MG/ML
2 INJECTION INTRAMUSCULAR; INTRAVENOUS
Status: DISCONTINUED | OUTPATIENT
Start: 2023-04-26 | End: 2023-04-26 | Stop reason: HOSPADM

## 2023-04-26 RX ORDER — ROCURONIUM BROMIDE 10 MG/ML
INJECTION, SOLUTION INTRAVENOUS
Status: DISCONTINUED | OUTPATIENT
Start: 2023-04-26 | End: 2023-04-26

## 2023-04-26 RX ORDER — HYDROCODONE BITARTRATE AND ACETAMINOPHEN 7.5; 325 MG/1; MG/1
1 TABLET ORAL EVERY 6 HOURS PRN
Qty: 28 TABLET | Refills: 0 | Status: SHIPPED | OUTPATIENT
Start: 2023-04-26 | End: 2023-05-20

## 2023-04-26 RX ORDER — FENTANYL CITRATE 50 UG/ML
25-200 INJECTION, SOLUTION INTRAMUSCULAR; INTRAVENOUS
Status: DISCONTINUED | OUTPATIENT
Start: 2023-04-26 | End: 2023-04-26 | Stop reason: HOSPADM

## 2023-04-26 RX ORDER — MUPIROCIN 20 MG/G
OINTMENT TOPICAL
Status: DISCONTINUED | OUTPATIENT
Start: 2023-04-26 | End: 2023-04-26 | Stop reason: HOSPADM

## 2023-04-26 RX ORDER — ASPIRIN 81 MG/1
81 TABLET ORAL 2 TIMES DAILY
Qty: 28 TABLET | Refills: 0 | Status: SHIPPED | OUTPATIENT
Start: 2023-04-26 | End: 2023-05-11

## 2023-04-26 RX ORDER — FENTANYL CITRATE 50 UG/ML
INJECTION, SOLUTION INTRAMUSCULAR; INTRAVENOUS
Status: DISCONTINUED | OUTPATIENT
Start: 2023-04-26 | End: 2023-04-26

## 2023-04-26 RX ORDER — DEXAMETHASONE SODIUM PHOSPHATE 4 MG/ML
INJECTION, SOLUTION INTRA-ARTICULAR; INTRALESIONAL; INTRAMUSCULAR; INTRAVENOUS; SOFT TISSUE
Status: DISCONTINUED | OUTPATIENT
Start: 2023-04-26 | End: 2023-04-26

## 2023-04-26 RX ORDER — PROPOFOL 10 MG/ML
VIAL (ML) INTRAVENOUS
Status: DISCONTINUED | OUTPATIENT
Start: 2023-04-26 | End: 2023-04-26

## 2023-04-26 RX ORDER — CEFAZOLIN SODIUM 2 G/50ML
2 SOLUTION INTRAVENOUS
Status: COMPLETED | OUTPATIENT
Start: 2023-04-26 | End: 2023-04-26

## 2023-04-26 RX ORDER — SUCCINYLCHOLINE CHLORIDE 20 MG/ML
INJECTION INTRAMUSCULAR; INTRAVENOUS
Status: DISCONTINUED | OUTPATIENT
Start: 2023-04-26 | End: 2023-04-26

## 2023-04-26 RX ORDER — ONDANSETRON 2 MG/ML
INJECTION INTRAMUSCULAR; INTRAVENOUS
Status: DISCONTINUED | OUTPATIENT
Start: 2023-04-26 | End: 2023-04-26

## 2023-04-26 RX ORDER — TRANEXAMIC ACID 100 MG/ML
INJECTION, SOLUTION INTRAVENOUS
Status: DISCONTINUED | OUTPATIENT
Start: 2023-04-26 | End: 2023-04-26

## 2023-04-26 RX ORDER — LIDOCAINE HYDROCHLORIDE 20 MG/ML
INJECTION INTRAVENOUS
Status: DISCONTINUED | OUTPATIENT
Start: 2023-04-26 | End: 2023-04-26

## 2023-04-26 RX ORDER — ONDANSETRON 2 MG/ML
4 INJECTION INTRAMUSCULAR; INTRAVENOUS ONCE AS NEEDED
Status: DISCONTINUED | OUTPATIENT
Start: 2023-04-26 | End: 2023-04-26 | Stop reason: HOSPADM

## 2023-04-26 RX ORDER — PHENYLEPHRINE HYDROCHLORIDE 10 MG/ML
INJECTION INTRAVENOUS
Status: DISCONTINUED | OUTPATIENT
Start: 2023-04-26 | End: 2023-04-26

## 2023-04-26 RX ORDER — LIDOCAINE HYDROCHLORIDE 10 MG/ML
1 INJECTION, SOLUTION EPIDURAL; INFILTRATION; INTRACAUDAL; PERINEURAL ONCE
Status: DISCONTINUED | OUTPATIENT
Start: 2023-04-26 | End: 2023-04-26 | Stop reason: HOSPADM

## 2023-04-26 RX ORDER — ONDANSETRON 4 MG/1
4 TABLET, FILM COATED ORAL EVERY 6 HOURS PRN
Qty: 30 TABLET | Refills: 0 | Status: SHIPPED | OUTPATIENT
Start: 2023-04-26 | End: 2023-05-20

## 2023-04-26 RX ORDER — EPHEDRINE SULFATE 50 MG/ML
INJECTION, SOLUTION INTRAVENOUS
Status: DISCONTINUED | OUTPATIENT
Start: 2023-04-26 | End: 2023-04-26

## 2023-04-26 RX ORDER — ACETAMINOPHEN 10 MG/ML
INJECTION, SOLUTION INTRAVENOUS
Status: DISCONTINUED | OUTPATIENT
Start: 2023-04-26 | End: 2023-04-26

## 2023-04-26 RX ORDER — FENTANYL CITRATE 50 UG/ML
25 INJECTION, SOLUTION INTRAMUSCULAR; INTRAVENOUS EVERY 5 MIN PRN
Status: DISCONTINUED | OUTPATIENT
Start: 2023-04-26 | End: 2023-04-26 | Stop reason: HOSPADM

## 2023-04-26 RX ORDER — BUPIVACAINE HYDROCHLORIDE 5 MG/ML
INJECTION, SOLUTION EPIDURAL; INTRACAUDAL
Status: DISCONTINUED | OUTPATIENT
Start: 2023-04-26 | End: 2023-04-26

## 2023-04-26 RX ADMIN — FENTANYL CITRATE 50 MCG: 50 INJECTION, SOLUTION INTRAMUSCULAR; INTRAVENOUS at 10:04

## 2023-04-26 RX ADMIN — PROPOFOL 10 MG: 10 INJECTION, EMULSION INTRAVENOUS at 11:04

## 2023-04-26 RX ADMIN — ROCURONIUM BROMIDE 5 MG: 10 INJECTION, SOLUTION INTRAVENOUS at 11:04

## 2023-04-26 RX ADMIN — ACETAMINOPHEN 1000 MG: 10 INJECTION, SOLUTION INTRAVENOUS at 12:04

## 2023-04-26 RX ADMIN — PHENYLEPHRINE HYDROCHLORIDE 0.5 MCG/KG/MIN: 10 INJECTION INTRAVENOUS at 12:04

## 2023-04-26 RX ADMIN — SODIUM CHLORIDE, SODIUM GLUCONATE, SODIUM ACETATE, POTASSIUM CHLORIDE, MAGNESIUM CHLORIDE, SODIUM PHOSPHATE, DIBASIC, AND POTASSIUM PHOSPHATE: .53; .5; .37; .037; .03; .012; .00082 INJECTION, SOLUTION INTRAVENOUS at 10:04

## 2023-04-26 RX ADMIN — BUPIVACAINE HYDROCHLORIDE 5 ML: 5 INJECTION, SOLUTION EPIDURAL; INTRACAUDAL; PERINEURAL at 10:04

## 2023-04-26 RX ADMIN — SUCCINYLCHOLINE CHLORIDE 100 MG: 20 INJECTION, SOLUTION INTRAMUSCULAR; INTRAVENOUS at 11:04

## 2023-04-26 RX ADMIN — ROCURONIUM BROMIDE 25 MG: 10 INJECTION, SOLUTION INTRAVENOUS at 11:04

## 2023-04-26 RX ADMIN — SODIUM CHLORIDE, SODIUM GLUCONATE, SODIUM ACETATE, POTASSIUM CHLORIDE, MAGNESIUM CHLORIDE, SODIUM PHOSPHATE, DIBASIC, AND POTASSIUM PHOSPHATE: .53; .5; .37; .037; .03; .012; .00082 INJECTION, SOLUTION INTRAVENOUS at 01:04

## 2023-04-26 RX ADMIN — PHENYLEPHRINE HYDROCHLORIDE 100 MCG: 10 INJECTION INTRAVENOUS at 12:04

## 2023-04-26 RX ADMIN — EPHEDRINE SULFATE 10 MG: 50 INJECTION, SOLUTION INTRAMUSCULAR; INTRAVENOUS; SUBCUTANEOUS at 12:04

## 2023-04-26 RX ADMIN — DEXAMETHASONE SODIUM PHOSPHATE 4 MG: 4 INJECTION, SOLUTION INTRA-ARTICULAR; INTRALESIONAL; INTRAMUSCULAR; INTRAVENOUS; SOFT TISSUE at 11:04

## 2023-04-26 RX ADMIN — CEFAZOLIN SODIUM 2 G: 2 SOLUTION INTRAVENOUS at 11:04

## 2023-04-26 RX ADMIN — ONDANSETRON 4 MG: 2 INJECTION INTRAMUSCULAR; INTRAVENOUS at 11:04

## 2023-04-26 RX ADMIN — BUPIVACAINE 10 ML: 13.3 INJECTION, SUSPENSION, LIPOSOMAL INFILTRATION at 10:04

## 2023-04-26 RX ADMIN — LIDOCAINE HYDROCHLORIDE 50 MG: 20 INJECTION, SOLUTION INTRAVENOUS at 11:04

## 2023-04-26 RX ADMIN — MUPIROCIN: 20 OINTMENT TOPICAL at 10:04

## 2023-04-26 RX ADMIN — SUGAMMADEX 200 MG: 100 INJECTION, SOLUTION INTRAVENOUS at 01:04

## 2023-04-26 RX ADMIN — TRANEXAMIC ACID 1000 MG: 100 INJECTION, SOLUTION INTRAVENOUS at 11:04

## 2023-04-26 NOTE — PLAN OF CARE
Report called to ANN Ray at [facility].  Awaiting  to come get patient.

## 2023-04-26 NOTE — OP NOTE
[facility]  Orthopedic Surgery Department  Operative Note    SUMMARY     Date of Procedure: 4/26/2023     Procedure: Procedure(s) (LRB):  ARTHROPLASTY, SHOULDER, TOTAL, REVERSE (Right)     Surgeon(s) and Role:     * Luis Enrique Pimentel II, MD - Primary    Assisting Surgeon: None    First Assist:  ANTONIO Velazco    Pre-Operative Diagnosis: Closed 4-part fracture of proximal humerus, right, initial encounter [S42.291A]  Pre-op testing [Z01.818]    Post-Operative Diagnosis: Post-Op Diagnosis Codes:     * Closed 4-part fracture of proximal humerus, right, initial encounter [S42.291A]     * Pre-op testing [Z01.818]    Anesthesia: General    Technical Procedures Used: Reverse right total shoulder arthroplasty    Description of the Findings of the Procedure: Dictated    Significant Surgical Tasks Conducted by the Assistant(s), if Applicable: Positioning and prepping the patient, retraction during exposure and implant insertion, wound closure and bandage application    Complications: No    Estimated Blood Loss (EBL): 50 mL           Implants:   Implant Name Type Inv. Item Serial No.  Lot No. LRB No. Used Action   CEMENT BONE SURG SMPLX P RADPQ - [id number]  CEMENT BONE SURG SMPLX P RADPQ  [employer]  Right 2 Implanted   BASEPLATE GLENOID REV RSP P2 - [id number]  BASEPLATE GLENOID REV RSP P2  DJO [id number] Right 1 Implanted   SCREW BONE RSP 6.5X26 GLENOID - [id number]  SCREW BONE RSP 6.5X26 GLENOID  DJO [id number] Right 1 Implanted   SCREW BONE RSP 6.5X18 GLENOID - [id number]  SCREW BONE RSP 6.5X18 GLENOID  DJO [id number] Right 1 Implanted   INSERT SOCKET E+ SM STD SZ32 - [id number]  INSERT SOCKET E+ SM STD SZ32  DJO [id number] Right 1 Implanted   HUMERAL STEM SMALL SHELL 12MM X 108MM    DJO [id number] Right 1 Implanted   SCREW BONE RSP 6.5X26 GLENOID - [id number]  SCREW BONE RSP 6.5X26 GLENOID  DJO  Right 1 Implanted   SCREW BONE RSP 5 X 14M GLENOID - [id number]  SCREW BONE RSP 5 X 14M  GLENOID  DJO [id number] Right 1 Implanted   HEAD GLENOID RSP 4MM OFFSET - [id number]  HEAD GLENOID RSP 4MM OFFSET  DJO [id number] Right 1 Implanted       Specimens:   Specimen (24h ago, onward)      None                    Condition: Good    Disposition: PACU - hemodynamically stable.    Attestation: I was present for the entire procedure.    Procedure In Detail:  The patient is brought to the operating room and placed on the table in the supine position.  The patient underwent anesthesia with the anesthesia service.  The patient was then sat up in the beach chair position and the right upper extremity was prepped and draped in the normal sterile fashion.  A deltopectoral approach to the shoulder was created.  Dissection was taken through skin and subcutaneous tissue over the deltopectoral interval.  The fat stripe was identified along the cephalic vein.  The vein was mobilized medially.  Blunt dissection was then taken down to the anterior capsule of the shoulder.  A Fukuda retractor was placed.  The a arm was externally rotated.  The capsule and subscapularis were then incised and tagged with Ethibond suture for later repair.  The capsulotomy was extended towards the glenoid and the humeral head was exposed.  After exposing the head an oscillating saw was used to make a cut on the head.  Retractors were then placed anteriorly and posteriorly to the glenoid and the humerus was allowed to translate posteriorly.  This led excellent exposure of the glenoid.  The remnant of the labrum was resected.  The drill guide from the DJO set was then placed over the glenoid making sure we were inferior enough.  We then drilled through the guide to create  hole.  The  hole measured at least 30 mm depth.  The tap was then advanced through the  hole and fully seated.  The Reamer was then placed over the shaft of the tap in used to ream the glenoid.  After reaming the glenoid the tap was removed.  A DJO RSP glenoid base  plate with a 30 mm screw length was then inserted into the glenoid and fully seated.  We then placed peripheral locking screws using the guide from the set.  After completing placing the locking screws a 32mm glenosphere was then impacted onto the base plate and secured in place with a retaining screw.    Attention was next turned to the humerus.  A  hole was drilled and the proximal humerus was reamed with a cup Reamer.  We then began reaming the humeral shaft.  We reamed the humeral shaft up to a size 14. We then began sequential broaching up to size 12 to accommodate for the cement mantle. Cement was mixed and a restrictor placed in the humeral shaft.  When it had reached an appropriate consistency, the humeral canal was filled with cement.  We then opened the humeral component and impacted it into the proximal humerus.  After seating the humeral component we waited for the cement to harden then trialed different polyethylene thicknesses an with a 32 mm neutral small socket insert the shoulder was reduced.  Shoulder was taken through range of motion and noted be stable in all planes of motion and offset appeared to be restored.  We then dislocated the shoulder and removed the trial poly and impacted the final polyethylene into place.  The shoulder was again reduced and with the final components in place the same stability was noted.    Closure was then begun.  The remnant of the capsule and subscap and one residual bony fragment with attached soft tissue were repaired with Ethibond suture.  We then closed in layered fashion using 0 PDS Stratafix, 2-0 PDS Stratafix, and 3-0 Monocryl on the subcuticular layer.  A Dermabond Prineo device was applied.  Once that had dried, a sterile Bioclusive intraoperative dressing was applied.  A polar Care was applied for postoperative pain control and the patient was placed in a sling.  She was then awakened from anesthesia and taken recovery where she was noted to be  stable postoperatively.  Needle and lap counts were correct at the end of the case.

## 2023-04-26 NOTE — DISCHARGE SUMMARY
[facility]  Department of Orthopedic Surgery  Discharge Note  Short Stay    Admit Date: 4/26/2023    Discharge Date and Time: No discharge date for patient encounter.     Attending Physician: Luis Enrique Pimentel II, MD     Discharge Provider: Luis Enrique Pimentel II    Diagnoses:  Active Hospital Problems    Diagnosis  POA    *Closed 4-part fracture of proximal humerus, right, initial encounter [S42.291A]  Yes      Resolved Hospital Problems   No resolved problems to display.       Discharged Condition: good    Hospital Course: Patient was admitted for an outpatient procedure and tolerated the procedure well with no complications.    Final Diagnoses: Same as principal problem.    Disposition: Home or Self Care    Follow up/Patient Instructions:    Medications:  Reconciled Home Medications:      Medication List        CONTINUE taking these medications      albuterol 90 mcg/actuation inhaler  Commonly known as: PROVENTIL/VENTOLIN HFA  Inhale 1-2 puffs into the lungs every 6 (six) hours as needed for Wheezing. Rescue     aspirin 81 MG EC tablet  Commonly known as: ECOTRIN  Take 1 tablet (81 mg total) by mouth 2 (two) times a day. for 14 days     cetirizine 10 MG tablet  Commonly known as: ZYRTEC  Take 10 mg by mouth daily as needed for Allergies.     * HYDROcodone-acetaminophen 7.5-325 mg per tablet  Commonly known as: NORCO  Take 1 tablet by mouth every 6 (six) hours as needed for Pain.     * HYDROcodone-acetaminophen 7.5-325 mg per tablet  Commonly known as: NORCO  Take 1 tablet by mouth every 6 (six) hours as needed for Pain.     * HYDROcodone-acetaminophen 7.5-325 mg per tablet  Commonly known as: NORCO  Take 1 tablet by mouth every 6 (six) hours as needed for Pain.     ibuprofen 200 MG tablet  Commonly known as: ADVIL,MOTRIN  Take 200 mg by mouth.     ondansetron 4 MG tablet  Commonly known as: ZOFRAN  Take 1 tablet (4 mg total) by mouth every 6 (six) hours as needed for Nausea.     ondansetron 4 MG  Tbdl  Commonly known as: ZOFRAN-ODT  Take 1 tablet (4 mg total) by mouth every 6 (six) hours as needed.     TRELEGY ELLIPTA 100-62.5-25 mcg Dsdv  Generic drug: fluticasone-umeclidin-vilanter  Take 1 puff by mouth once daily.           * This list has 3 medication(s) that are the same as other medications prescribed for you. Read the directions carefully, and ask your doctor or other care provider to review them with you.                Discharge Procedure Orders   Diet Adult Regular     Ice to affected area     Notify your health care provider if you experience any of the following:  increased confusion or weakness     Notify your health care provider if you experience any of the following:  persistent dizziness, light-headedness, or visual disturbances     Notify your health care provider if you experience any of the following:  worsening rash     Notify your health care provider if you experience any of the following:  severe persistent headache     Notify your health care provider if you experience any of the following:  difficulty breathing or increased cough     Notify your health care provider if you experience any of the following:  redness, tenderness, or signs of infection (pain, swelling, redness, odor or green/yellow discharge around incision site)     Notify your health care provider if you experience any of the following:  severe uncontrolled pain     Notify your health care provider if you experience any of the following:  persistent nausea and vomiting or diarrhea     Notify your health care provider if you experience any of the following:  temperature >100.4     Leave dressing on - Keep it clean, dry, and intact until clinic visit     Weight bearing restrictions (specify):   Order Comments: MITCH KWAN but encourage finger movement      Follow-up Information       Luis Enrique MIRYAM Pimentel II, MD Follow up in 2 week(s).    Specialty: Orthopedic Surgery  Contact information:  [address] [place] [address] DR Jf JERNIGAN  [id number]  [phone]                             Discharge Procedure Orders (must include Diet, Follow-up, Activity):   Discharge Procedure Orders (must include Diet, Follow-up, Activity)   Diet Adult Regular     Ice to affected area     Notify your health care provider if you experience any of the following:  increased confusion or weakness     Notify your health care provider if you experience any of the following:  persistent dizziness, light-headedness, or visual disturbances     Notify your health care provider if you experience any of the following:  worsening rash     Notify your health care provider if you experience any of the following:  severe persistent headache     Notify your health care provider if you experience any of the following:  difficulty breathing or increased cough     Notify your health care provider if you experience any of the following:  redness, tenderness, or signs of infection (pain, swelling, redness, odor or green/yellow discharge around incision site)     Notify your health care provider if you experience any of the following:  severe uncontrolled pain     Notify your health care provider if you experience any of the following:  persistent nausea and vomiting or diarrhea     Notify your health care provider if you experience any of the following:  temperature >100.4     Leave dressing on - Keep it clean, dry, and intact until clinic visit     Weight bearing restrictions (specify):   Order Comments: NWB RUE but encourage finger movement

## 2023-04-26 NOTE — PT/OT/SLP EVAL
Occupational Therapy   Evaluation and Discharge Note    Name: Geraldine Cooksey  MRN: [id number]  Admitting Diagnosis: Closed 4-part fracture of proximal humerus, right, initial encounter  Recent Surgery: Procedure(s) (LRB):  ARTHROPLASTY, SHOULDER, TOTAL, REVERSE (Right) Day of Surgery    Recommendations:     Discharge Recommendations: home  Discharge Equipment Recommendations: shower chair  Barriers to discharge:  None    Assessment:     Geraldine Cooksey is a 82 y.o. female with a medical diagnosis of Closed 4-part fracture of proximal humerus, right, initial encounter. Patient is scheduled for discharge today and does not require further acute OT services.     Plan:     Patient does not require further acute OT services.  Please re-consult if situation changes.      Subjective     Chief Complaint: R shoulder pain  Patient/Family Comments/goals: Pt was agreeable to participate in OT.    Occupational Profile:  Living Environment: Pt lives with her family.  Previous level of function: Independent  Equipment Used at home: none  Assistance upon Discharge: Pt will have assistance from her family.    Pain/Comfort:  Pain Rating 1: 3/10  Location - Side 1: Right  Location 1: shoulder  Pain Addressed 1: Nurse notified    Patients cultural, spiritual, Buddhism conflicts given the current situation: yes    Objective:     Communicated with: nurse prior to session.  Patient found HOB elevated with shoulder abduction brace upon OT entry to room.    General Precautions: Standard, fall  Orthopedic Precautions: RUE non weight bearing  Braces: Shoulder abduction brace  Respiratory Status: Room air     Occupational Performance:    Bed Mobility:    Patient completed Supine to Sit with minimum assistance  Patient completed Sit to Supine with minimum assistance    Functional Mobility/Transfers:  Patient completed Sit <> Stand Transfer with contact guard assistance with hand-held assist   Functional Mobility: Pt ambulated ~ 15 feet  with CGA with HHA.    Activities of Daily Living:  Grooming: maximal assistance  Upper Body Dressing: maximal assistance  Lower Body Dressing: maximal assistance    Cognitive/Visual Perceptual:  Cognitive/Psychosocial Skills:  -       Oriented to: Person, Place, Time, and Situation   -       Follows Commands/attention: Follows multistep commands  -       Communication: clear/fluent    Physical Exam:  Upper Extremity Range of Motion:  -       Right Upper Extremity: NT  -       Left Upper Extremity: WFL  Upper Extremity Strength: -       Right Upper Extremity: NT  -       Left Upper Extremity: WFL    AMPAC 6 Click ADL:  AMPAC Total Score: 13    Treatment & Education:  Educated on R UE non weight bearing status  Educated on use, wear and positioning of shoulder abduction brace  Patient and her daughters were given instruction, demonstration and a handout of finger ROM exercises. Pt verbalized and demonstrated understanding. Patient's daughters verbalized understanding.   Educated on performing functional mobility and ADL's in adherence to orthopedic precautions.    Patient left sitting edge of bed with all lines intact, call button in reach, nurse notified and patient's daughters present.    GOALS:   Multidisciplinary Problems       Occupational Therapy Goals       Not on file                    History:     Past Medical History:   Diagnosis Date    Bronchitis     COPD (chronic obstructive pulmonary disease)     PONV (postoperative nausea and vomiting)          Past Surgical History:   Procedure Laterality Date    COSMETIC SURGERY  2012    bilateral eye lift    HYSTERECTOMY      IRRIGATION AND DEBRIDEMENT OF UPPER EXTREMITY Bilateral 11/15/2022    Procedure: IRRIGATION AND DEBRIDEMENT, UPPER EXTREMITY;  Surgeon: Leighton Cronin DO;  Location: [facility] OR;  Service: Orthopedics;  Laterality: Bilateral;    IRRIGATION AND DEBRIDEMENT OF UPPER EXTREMITY Bilateral 11/17/2022    Procedure: IRRIGATION AND DEBRIDEMENT, UPPER  EXTREMITY;  Surgeon: Leighton Cronin DO;  Location: [facility] OR;  Service: Orthopedics;  Laterality: Bilateral;  Second look irrigation debridement need wound VAC for possible wound VAC placement    OPEN REDUCTION AND INTERNAL FIXATION (ORIF) OF FRACTURE OF FEMUR USING DYNAMIC HIP SCREW Left 7/3/2019    Procedure: ORIF, HIP, USING DYNAMIC HIP SCREW;  Surgeon: Juan Jose Gunn MD;  Location: [facility] OR;  Service: Orthopedics;  Laterality: Left;       Time Tracking:     OT Date of Treatment: 04/26/23  OT Start Time: 1510  OT Stop Time: 1555  OT Total Time (min): 45 min    Billable Minutes:Evaluation 15  Self Care/Home Management 30    4/26/2023

## 2023-04-26 NOTE — ANESTHESIA PREPROCEDURE EVALUATION
04/26/2023  Geraldine Cooksey is a 82 y.o., female.      Pre-op Assessment    I have reviewed the Patient Summary Reports.     I have reviewed the Nursing Notes. I have reviewed the NPO Status.   I have reviewed the Medications.     Review of Systems  Anesthesia Hx:  PONV     Social:  Smoker    Cardiovascular:  Cardiovascular Normal     Pulmonary:   COPD, moderate    Renal/:  Renal/ Normal     Musculoskeletal:   Arthritis     Neurological:  Neurology Normal    Endocrine:  Endocrine Normal        Physical Exam  General: Well nourished, Cooperative, Alert and Oriented    Airway:  Mallampati: II   Mouth Opening: Normal  TM Distance: Normal  Neck ROM: Normal ROM        Anesthesia Plan  Type of Anesthesia, risks & benefits discussed:    Anesthesia Type: Gen ETT, Gen Supraglottic Airway, Gen Natural Airway, MAC  Intra-op Monitoring Plan: Standard ASA Monitors  Post Op Pain Control Plan: multimodal analgesia  Induction:  IV  Airway Plan: Direct, Video and Fiberoptic, Post-Induction  Informed Consent: Informed consent signed with the Patient and all parties understand the risks and agree with anesthesia plan.  All questions answered.   ASA Score: 3  Anesthesia Plan Notes: Advanced age    Ready For Surgery From Anesthesia Perspective.     .

## 2023-04-26 NOTE — ANESTHESIA PROCEDURE NOTES
Intubation    Date/Time: 4/26/2023 11:41 AM  Performed by: Dionicio Simmons CRNA  Authorized by: Remi Yost MD     Intubation:     Induction:  Intravenous    Intubated:  Postinduction    Mask Ventilation:  Easy mask    Attempts:  1    Attempted By:  PAUL    Blade:  Melecio 3    Difficult Airway Encountered?: No      Complications:  None    Airway Device:  Oral endotracheal tube    Airway Device Size:  7.0    Style/Cuff Inflation:  Cuffed (inflated to minimal occlusive pressure)    Tube secured:  21    Secured at:  The lips    Placement Verified By:  Capnometry    Complicating Factors:  None    Findings Post-Intubation:  BS equal bilateral and atraumatic/condition of teeth unchanged

## 2023-04-26 NOTE — CARE UPDATE
04/26/23 1002   Patient Assessment/Suction   Level of Consciousness (AVPU) alert  (Simultaneous filing. User may not have seen previous data.)   Respiratory Effort Normal;Unlabored   Expansion/Accessory Muscles/Retractions no use of accessory muscles;no retractions;expansion symmetric   All Lung Fields Breath Sounds clear;diminished   Rhythm/Pattern, Respiratory unlabored;pattern regular;depth regular   Cough Frequency no cough  (Simultaneous filing. User may not have seen previous data.)   Cough Type productive   PRE-TX-O2   Device (Oxygen Therapy) room air   SpO2 (!) 94 %   Pulse Oximetry Type Continuous   $ Pulse Oximetry - Multiple Charge Pulse Oximetry - Multiple   Pulse 68   Resp 18   Positioning HOB elevated 45 degrees   Incentive Spirometer   $ Incentive Spirometer Charges preop instruction   Incentive Spirometer Predicted Level (mL) 760   Administration (IS) instruction provided, initial   Number of Repetitions (IS) 10   Level Incentive Spirometer (mL) 1000   Patient Tolerance (IS) good

## 2023-04-26 NOTE — DISCHARGE INSTRUCTIONS
"Discharge Instructions: After Your Surgery/Procedure  Youve just had surgery. During surgery you were given medicine called anesthesia to keep you relaxed and free of pain. After surgery you may have some pain or nausea. This is common. Here are some tips for feeling better and getting well after surgery.     Stay on schedule with your medication.   Going home  Your doctor or nurse will show you how to take care of yourself when you go home. He or she will also answer your questions. Have an adult family member or friend drive you home.      For your safety we recommend these precaution for the first 24 hours after your procedure:  Do not drive or use heavy equipment.  Do not make important decisions or sign legal papers.  Do not drink alcohol.  Have someone stay with you, if needed. He or she can watch for problems and help keep you safe.  Your concentration, balance, coordination, and judgement may be impaired for many hours after anesthesia.  Use caution when ambulating or standing up.     You may feel weak and "washed out" after anesthesia and surgery.      Subtle residual effects of general anesthesia or sedation with regional / local anesthesia can last more than 24 hours.  Rest for the remainder of the day or longer if your Doctor/Surgeon has advised you to do so.  Although you may feel normal within the first 24 hours, your reflexes and mental ability may be impaired without you realizing it.  You may feel dizzy, lightheaded or sleepy for 24 hours or longer.      Be sure to go to all follow-up visits with your doctor. And rest after your surgery for as long as your doctor tells you to.  Coping with pain  If you have pain after surgery, pain medicine will help you feel better. Take it as told, before pain becomes severe. Also, ask your doctor or pharmacist about other ways to control pain. This might be with heat, ice, or relaxation. And follow any other instructions your surgeon or nurse gives you.  Tips " for taking pain medicine  To get the best relief possible, remember these points:  Pain medicines can upset your stomach. Taking them with a little food may help.  Most pain relievers taken by mouth need at least 20 to 30 minutes to start to work.  Taking medicine on a schedule can help you remember to take it. Try to time your medicine so that you can take it before starting an activity. This might be before you get dressed, go for a walk, or sit down for dinner.  Constipation is a common side effect of pain medicines. Call your doctor before taking any medicines such as laxatives or stool softeners to help ease constipation. Also ask if you should skip any foods. Drinking lots of fluids and eating foods such as fruits and vegetables that are high in fiber can also help. Remember, do not take laxatives unless your surgeon has prescribed them.  Drinking alcohol and taking pain medicine can cause dizziness and slow your breathing. It can even be deadly. Do not drink alcohol while taking pain medicine.  Pain medicine can make you react more slowly to things. Do not drive or run machinery while taking pain medicine.  Your health care provider may tell you to take acetaminophen to help ease your pain. Ask him or her how much you are supposed to take each day. Acetaminophen or other pain relievers may interact with your prescription medicines or other over-the-counter (OTC) drugs. Some prescription medicines have acetaminophen and other ingredients. Using both prescription and OTC acetaminophen for pain can cause you to overdose. Read the labels on your OTC medicines with care. This will help you to clearly know the list of ingredients, how much to take, and any warnings. It may also help you not take too much acetaminophen. If you have questions or do not understand the information, ask your pharmacist or health care provider to explain it to you before you take the OTC medicine.  Managing nausea  Some people have an  upset stomach after surgery. This is often because of anesthesia, pain, or pain medicine, or the stress of surgery. These tips will help you handle nausea and eat healthy foods as you get better. If you were on a special food plan before surgery, ask your doctor if you should follow it while you get better. These tips may help:  Do not push yourself to eat. Your body will tell you when to eat and how much.  Start off with clear liquids and soup. They are easier to digest.  Next try semi-solid foods, such as mashed potatoes, applesauce, and gelatin, as you feel ready.  Slowly move to solid foods. Dont eat fatty, rich, or spicy foods at first.  Do not force yourself to have 3 large meals a day. Instead eat smaller amounts more often.  Take pain medicines with a small amount of solid food, such as crackers or toast, to avoid nausea.     Call your surgeon if  You still have pain an hour after taking medicine. The medicine may not be strong enough.  You feel too sleepy, dizzy, or groggy. The medicine may be too strong.  You have side effects like nausea, vomiting, or skin changes, such as rash, itching, or hives.       If you have obstructive sleep apnea  You were given anesthesia medicine during surgery to keep you comfortable and free of pain. After surgery, you may have more apnea spells because of this medicine and other medicines you were given. The spells may last longer than usual.   At home:  Keep using the continuous positive airway pressure (CPAP) device when you sleep. Unless your health care provider tells you not to, use it when you sleep, day or night. CPAP is a common device used to treat obstructive sleep apnea.  Talk with your provider before taking any pain medicine, muscle relaxants, or sedatives. Your provider will tell you about the possible dangers of taking these medicines.  © 3165-3144 [employer]. [address] [place] [address], [place] [address]. All rights reserved. This information is  not intended as a substitute for professional medical care. Always follow your healthcare professional's instructions.       Post op instructions for prevention of DVT  What is deep vein thrombosis?  Deep vein thrombosis (DVT) is the medical term for blood clots in the deep veins of the leg.  These blood clots can be dangerous.  A DVT can block a blood vessel and keep blood from getting where it needs to go.  Another problem is that the clot can travel to other parts of the body such as the lungs.  A clot that travels to the lungs is called a pulmonary embolus (PE) and can cause serious problems with breathing which can lead to death.  Am I at risk for DVT/PE?  If you are not very active, you are at risk of DVT.  Anyone confined to bed, sitting for long periods of time, recovering from surgery, etc. increases the risk of DVT.  Other risk factors are cancer diagnosis, certain medications, estrogen replacement in any form,older age, obesity, pregnancy, smoking, history of clotting disorders, and dehydration.  How will I know if I have a DVT?  Swelling in the lower leg  Pain  Warmth, redness, hardness or bulging of the vein  If you have any of these symptoms, call your doctors office right away.  Some people will not have any symptoms until the clot moves to the lungs.  What are the symptoms of a PE?  Panting, shortness of breath, or trouble breathing  Sharp, knife-like chest pain when you breathe  Coughing or coughing up blood  Rapid heartbeat  If you have any of these symptoms or get worse quickly, call 911 for emergency treatment.  How can I prevent a DVT?  Avoid long periods of inactivity and dont cross your legs--get up and walk around every hour or so.  Stay active--walking after surgery is highly encouraged.  This means you should get out of the house and walk in the neighborhood.  Going up and down stairs will not impair healing (unless advised against such activity by your doctor).    Drink plenty of  noncaffeinated, nonalcoholic fluids each day to prevent dehydration.  Wear special support stockings, if they have been advised by your doctor.  If you travel, stop at least once an hour and walk around.  Avoid smoking (assistance with stopping is available through your healthcare provider)  Always notify your doctor if you are not able to follow the post operative instructions that are given to you at the time of discharge.  It may be necessary to prescribe one of the medications available to prevent DVT.       Using an Incentive Spirometer    An incentive spirometer is a device that helps you do deep breathing exercises. These exercises expand your lungs, aid in circulation, and help prevent pneumonia. Deep breathing exercises also help you breathe better and improve the function of your lungs by:  Keeping your lungs clear  Strengthening your breathing muscles  Helping prevent respiratory complications or problems  The incentive spirometer gives you a way to take an active part in recover. A nurse or therapist will teach you breathing exercises. To do these exercises, you will breathe in through your mouth and not your nose. The incentive spirometer only works correctly if you breathe in through your mouth.  Steps to clear lungs  Step 1. Exhale normally. Then, inhale normally.  Relax and breathe out.  Step 2. Place your lips tightly around the mouthpiece.  Make sure the device is upright and not tilted.  Step 3. Inhale as much air as you can through the mouthpiece (don't breath through your nose).  Inhale slowly and deeply.  Hold your breath long enough to keep the balls or disk raised for at least 3 to 5 seconds, or as instructed by your healthcare provider.  Some spirometers have an indicator to let you know that you are breathing in too fast. If the indicator goes off, breathe in more slowly.  Step 4. Repeat the exercise regularly.  Do this exercise every hour while you're awake, or as instructed by your  healthcare provider.  If you were taught deep breathing and coughing exercises, do them regularly as instructed by your healthcare provider.      We hope your stay was comfortable as you heal now, mend and rest.    For we have enjoyed taking care of you by giving your our best.    And as you get better, by regaining your health and strength;   We count it as a privilege to have served you and hope your time at [facility] was well spent.      Thank  You!!!

## 2023-04-26 NOTE — ANESTHESIA PROCEDURE NOTES
Right Interscalene block    Patient location during procedure: pre-op   Block not for primary anesthetic.  Reason for block: at surgeon's request and post-op pain management   Post-op Pain Location: Right shoulder pain   Start time: 4/26/2023 10:50 AM  Timeout: 4/26/2023 10:50 AM   End time: 4/26/2023 10:55 AM    Staffing  Authorizing Provider: Remi Yost MD  Performing Provider: Remi Yost MD    Preanesthetic Checklist  Completed: patient identified, IV checked, site marked, risks and benefits discussed, surgical consent, monitors and equipment checked, pre-op evaluation and timeout performed  Peripheral Block  Patient position: sitting  Prep: ChloraPrep  Patient monitoring: heart rate, cardiac monitor, continuous pulse ox, continuous capnometry and frequent blood pressure checks  Block type: interscalene  Laterality: right  Injection technique: single shot  Needle  Needle type: Stimuplex   Needle gauge: 22 G  Needle length: 2 in  Needle localization: anatomical landmarks and ultrasound guidance  Needle insertion depth: 4 cm   -ultrasound image captured on disc.  Assessment  Injection assessment: negative aspiration, negative parasthesia and local visualized surrounding nerve  Paresthesia pain: none  Heart rate change: no  Slow fractionated injection: yes  Pain Tolerance: comfortable throughout block and no complaints  Medications:    Medications: bupivacaine (pf) (MARCAINE) injection 0.5% - Perineural   5 mL - 4/26/2023 10:55:00 AM    Additional Notes  VSS.  [facility] RN monitoring vitals throughout procedure.  Patient tolerated procedure well.     Exparel 10ml + Marcaine 0.5% 5ml dosed under direct Ultrasound guidance.

## 2023-04-26 NOTE — ANESTHESIA POSTPROCEDURE EVALUATION
Anesthesia Post Evaluation    Patient: Geraldine Cooksey    Procedure(s) Performed: Procedure(s) (LRB):  ARTHROPLASTY, SHOULDER, TOTAL, REVERSE (Right)    Final Anesthesia Type: general      Patient location during evaluation: PACU  Patient participation: Yes- Able to Participate  Level of consciousness: awake and alert and oriented  Post-procedure vital signs: reviewed and stable  Pain management: adequate  Airway patency: patent    PONV status at discharge: No PONV  Anesthetic complications: no      Cardiovascular status: blood pressure returned to baseline and stable  Respiratory status: unassisted and spontaneous ventilation  Hydration status: euvolemic  Follow-up not needed.          Vitals Value Taken Time   /62 04/26/23 1417   Temp 36.6 °C (97.9 °F) 04/26/23 1335   Pulse 60 04/26/23 1420   Resp 17 04/26/23 1420   SpO2 93 % 04/26/23 1420   Vitals shown include unvalidated device data.      No case tracking events are documented in the log.      Pain/Manolo Score: Manolo Score: 8 (4/26/2023  1:45 PM)

## 2023-04-26 NOTE — TRANSFER OF CARE
"Anesthesia Transfer of Care Note    Patient: Geraldine Cooksey    Procedure(s) Performed: Procedure(s) (LRB):  ARTHROPLASTY, SHOULDER, TOTAL, REVERSE (Right)    Patient location: PACU    Anesthesia Type: general    Transport from OR: Transported from OR on 2-3 L/min O2 by NC with adequate spontaneous ventilation    Post pain: adequate analgesia    Post assessment: no apparent anesthetic complications and tolerated procedure well    Post vital signs: stable    Level of consciousness: sedated and responds to stimulation    Nausea/Vomiting: no nausea/vomiting    Complications: none    Transfer of care protocol was followed      Last vitals:   Visit Vitals  BP (!) 191/84   Pulse 68   Temp 36.8 °C (98.2 °F) (Skin)   Resp 16   Ht 5' 2" (1.575 m)   Wt 59 kg (130 lb 1.1 oz)   SpO2 95%   Breastfeeding No   BMI 23.79 kg/m²     "

## 2023-04-27 VITALS
HEIGHT: 62 IN | BODY MASS INDEX: 23.93 KG/M2 | TEMPERATURE: 98 F | WEIGHT: 130.06 LBS | OXYGEN SATURATION: 93 % | HEART RATE: 63 BPM | RESPIRATION RATE: 18 BRPM | SYSTOLIC BLOOD PRESSURE: 131 MMHG | DIASTOLIC BLOOD PRESSURE: 88 MMHG

## 2023-04-27 LAB — MRSA SPEC QL CULT: NORMAL

## 2023-04-27 NOTE — PROGRESS NOTES
Very pleased with care given.  All staff were supportive and great.  States they have read all discharge instructions

## 2023-05-09 ENCOUNTER — PATIENT MESSAGE (OUTPATIENT)
Dept: ORTHOPEDICS | Facility: CLINIC | Age: 83
End: 2023-05-09
Payer: MEDICARE

## 2023-05-10 ENCOUNTER — TELEPHONE (OUTPATIENT)
Dept: ORTHOPEDICS | Facility: CLINIC | Age: 83
End: 2023-05-10
Payer: MEDICARE

## 2023-05-10 NOTE — TELEPHONE ENCOUNTER
Called and spoke to pt's daughter Ming. Ming states pt is in pain and would like to be seen on Monday instead of Tuesday. I informed Ming that I have rescheduled pt for the 15th at 2:45pm. Ming VU

## 2023-05-10 NOTE — TELEPHONE ENCOUNTER
----- Message from Jigna Phillips MA sent at 5/10/2023  8:16 AM CDT -----  Contact: daughter, mckinley wilde  Pt in pain,  no other information could be obtained     Call back  [phone]  or

## 2023-05-11 DIAGNOSIS — S42.291A CLOSED 4-PART FRACTURE OF PROXIMAL HUMERUS, RIGHT, INITIAL ENCOUNTER: Primary | ICD-10-CM

## 2023-05-15 ENCOUNTER — OFFICE VISIT (OUTPATIENT)
Dept: ORTHOPEDICS | Facility: CLINIC | Age: 83
End: 2023-05-15
Payer: MEDICARE

## 2023-05-15 ENCOUNTER — HOSPITAL ENCOUNTER (OUTPATIENT)
Dept: RADIOLOGY | Facility: HOSPITAL | Age: 83
Discharge: HOME | End: 2023-05-15
Attending: ORTHOPAEDIC SURGERY
Payer: MEDICARE

## 2023-05-15 VITALS — WEIGHT: 130.06 LBS | BODY MASS INDEX: 23.93 KG/M2 | HEIGHT: 62 IN

## 2023-05-15 DIAGNOSIS — S42.291A CLOSED 4-PART FRACTURE OF PROXIMAL HUMERUS, RIGHT, INITIAL ENCOUNTER: ICD-10-CM

## 2023-05-15 DIAGNOSIS — S42.291D FRACTURE OF HUMERAL HEAD, CLOSED, RIGHT, WITH ROUTINE HEALING, SUBSEQUENT ENCOUNTER: Primary | ICD-10-CM

## 2023-05-15 PROCEDURE — 73030 X-RAY EXAM OF SHOULDER: CPT | Mod: 26,RT,, | Performed by: RADIOLOGY

## 2023-05-15 PROCEDURE — 99999 PR PBB SHADOW E&M-EST. PATIENT-LVL III: CPT | Mod: PBBFAC,,, | Performed by: ORTHOPAEDIC SURGERY

## 2023-05-15 PROCEDURE — 1159F MED LIST DOCD IN RCRD: CPT | Mod: CPTII,S$GLB,, | Performed by: ORTHOPAEDIC SURGERY

## 2023-05-15 PROCEDURE — 99024 POSTOP FOLLOW-UP VISIT: CPT | Mod: S$GLB,,, | Performed by: ORTHOPAEDIC SURGERY

## 2023-05-15 PROCEDURE — 99999 PR PBB SHADOW E&M-EST. PATIENT-LVL III: ICD-10-PCS | Mod: PBBFAC,,, | Performed by: ORTHOPAEDIC SURGERY

## 2023-05-15 PROCEDURE — 1125F AMNT PAIN NOTED PAIN PRSNT: CPT | Mod: CPTII,S$GLB,, | Performed by: ORTHOPAEDIC SURGERY

## 2023-05-15 PROCEDURE — 1101F PR PT FALLS ASSESS DOC 0-1 FALLS W/OUT INJ PAST YR: ICD-10-PCS | Mod: CPTII,S$GLB,, | Performed by: ORTHOPAEDIC SURGERY

## 2023-05-15 PROCEDURE — 73030 X-RAY EXAM OF SHOULDER: CPT | Mod: TC,PO,RT

## 2023-05-15 PROCEDURE — 73030 XR SHOULDER COMPLETE 2 OR MORE VIEWS RIGHT: ICD-10-PCS | Mod: 26,RT,, | Performed by: RADIOLOGY

## 2023-05-15 PROCEDURE — 3288F FALL RISK ASSESSMENT DOCD: CPT | Mod: CPTII,S$GLB,, | Performed by: ORTHOPAEDIC SURGERY

## 2023-05-15 PROCEDURE — 3288F PR FALLS RISK ASSESSMENT DOCUMENTED: ICD-10-PCS | Mod: CPTII,S$GLB,, | Performed by: ORTHOPAEDIC SURGERY

## 2023-05-15 PROCEDURE — 1101F PT FALLS ASSESS-DOCD LE1/YR: CPT | Mod: CPTII,S$GLB,, | Performed by: ORTHOPAEDIC SURGERY

## 2023-05-15 PROCEDURE — 1159F PR MEDICATION LIST DOCUMENTED IN MEDICAL RECORD: ICD-10-PCS | Mod: CPTII,S$GLB,, | Performed by: ORTHOPAEDIC SURGERY

## 2023-05-15 PROCEDURE — 99024 PR POST-OP FOLLOW-UP VISIT: ICD-10-PCS | Mod: S$GLB,,, | Performed by: ORTHOPAEDIC SURGERY

## 2023-05-15 PROCEDURE — 1125F PR PAIN SEVERITY QUANTIFIED, PAIN PRESENT: ICD-10-PCS | Mod: CPTII,S$GLB,, | Performed by: ORTHOPAEDIC SURGERY

## 2023-05-15 NOTE — PROGRESS NOTES
CC:  82-year-old female follows up status post reverse right total shoulder arthroplasty.  Date of surgery was 04/26/2023.  She is almost 3 weeks out.    RUE:  Incision is clean, dry, and intact.  Healing well with no sign of infection  Grossly intact motor and sensory function distally  Plus 2 distal pulses    X-ray images were examined and personally interpreted by me.  Three views the right shoulder dated 05/15/2023 show a reverse right total shoulder arthroplasty that is well fixed and in good alignment.    Dx:  Status post reverse right total shoulder arthroplasty, stable    Plan:  We got rid of the abduction pillow and she can just use the sling for comfort.  We made a PT referral to the [facility].  Follow up in 4 weeks for re-evaluation.

## 2023-05-20 ENCOUNTER — NURSE TRIAGE (OUTPATIENT)
Dept: ADMINISTRATIVE | Facility: CLINIC | Age: 83
End: 2023-05-20
Payer: MEDICARE

## 2023-05-20 ENCOUNTER — HOSPITAL ENCOUNTER (EMERGENCY)
Facility: HOSPITAL | Age: 83
Discharge: HOME | End: 2023-05-20
Attending: EMERGENCY MEDICINE
Payer: MEDICARE

## 2023-05-20 VITALS
TEMPERATURE: 98 F | BODY MASS INDEX: 23.92 KG/M2 | HEIGHT: 62 IN | RESPIRATION RATE: 20 BRPM | HEART RATE: 65 BPM | WEIGHT: 130 LBS | DIASTOLIC BLOOD PRESSURE: 74 MMHG | OXYGEN SATURATION: 95 % | SYSTOLIC BLOOD PRESSURE: 170 MMHG

## 2023-05-20 DIAGNOSIS — L03.113 CELLULITIS OF RIGHT UPPER ARM: Primary | ICD-10-CM

## 2023-05-20 LAB
ALBUMIN SERPL BCP-MCNC: 3.3 G/DL (ref 3.5–5.2)
ALP SERPL-CCNC: 92 U/L (ref 55–135)
ALT SERPL W/O P-5'-P-CCNC: 11 U/L (ref 10–44)
ANION GAP SERPL CALC-SCNC: 10 MMOL/L (ref 8–16)
AST SERPL-CCNC: 15 U/L (ref 10–40)
BASOPHILS # BLD AUTO: 0.25 K/UL (ref 0–0.2)
BASOPHILS NFR BLD: 1.9 % (ref 0–1.9)
BILIRUB SERPL-MCNC: 0.3 MG/DL (ref 0.1–1)
BUN SERPL-MCNC: 15 MG/DL (ref 8–23)
CALCIUM SERPL-MCNC: 9.7 MG/DL (ref 8.7–10.5)
CHLORIDE SERPL-SCNC: 101 MMOL/L (ref 95–110)
CO2 SERPL-SCNC: 26 MMOL/L (ref 23–29)
CREAT SERPL-MCNC: 0.7 MG/DL (ref 0.5–1.4)
CRP SERPL-MCNC: 78.8 MG/L (ref 0–8.2)
DIFFERENTIAL METHOD: ABNORMAL
EOSINOPHIL # BLD AUTO: 0.5 K/UL (ref 0–0.5)
EOSINOPHIL NFR BLD: 3.9 % (ref 0–8)
ERYTHROCYTE [DISTWIDTH] IN BLOOD BY AUTOMATED COUNT: 15.6 % (ref 11.5–14.5)
EST. GFR  (NO RACE VARIABLE): >60 ML/MIN/1.73 M^2
GLUCOSE SERPL-MCNC: 130 MG/DL (ref 70–110)
HCT VFR BLD AUTO: 38.8 % (ref 37–48.5)
HGB BLD-MCNC: 12.4 G/DL (ref 12–16)
IMM GRANULOCYTES # BLD AUTO: 0.04 K/UL (ref 0–0.04)
IMM GRANULOCYTES NFR BLD AUTO: 0.3 % (ref 0–0.5)
LYMPHOCYTES # BLD AUTO: 2.8 K/UL (ref 1–4.8)
LYMPHOCYTES NFR BLD: 21.6 % (ref 18–48)
MCH RBC QN AUTO: 28.2 PG (ref 27–31)
MCHC RBC AUTO-ENTMCNC: 32 G/DL (ref 32–36)
MCV RBC AUTO: 88 FL (ref 82–98)
MONOCYTES # BLD AUTO: 1.7 K/UL (ref 0.3–1)
MONOCYTES NFR BLD: 13.4 % (ref 4–15)
NEUTROPHILS # BLD AUTO: 7.6 K/UL (ref 1.8–7.7)
NEUTROPHILS NFR BLD: 58.9 % (ref 38–73)
NRBC BLD-RTO: 0 /100 WBC
PLATELET # BLD AUTO: 551 K/UL (ref 150–450)
PMV BLD AUTO: 10.2 FL (ref 9.2–12.9)
POTASSIUM SERPL-SCNC: 4.2 MMOL/L (ref 3.5–5.1)
PROT SERPL-MCNC: 7.5 G/DL (ref 6–8.4)
RBC # BLD AUTO: 4.4 M/UL (ref 4–5.4)
SODIUM SERPL-SCNC: 137 MMOL/L (ref 136–145)
WBC # BLD AUTO: 12.95 K/UL (ref 3.9–12.7)

## 2023-05-20 PROCEDURE — 85025 COMPLETE CBC W/AUTO DIFF WBC: CPT | Performed by: EMERGENCY MEDICINE

## 2023-05-20 PROCEDURE — 36415 COLL VENOUS BLD VENIPUNCTURE: CPT | Performed by: EMERGENCY MEDICINE

## 2023-05-20 PROCEDURE — 86140 C-REACTIVE PROTEIN: CPT | Performed by: EMERGENCY MEDICINE

## 2023-05-20 PROCEDURE — 96365 THER/PROPH/DIAG IV INF INIT: CPT

## 2023-05-20 PROCEDURE — 63600175 PHARM REV CODE 636 W HCPCS: Performed by: EMERGENCY MEDICINE

## 2023-05-20 PROCEDURE — 80053 COMPREHEN METABOLIC PANEL: CPT | Performed by: EMERGENCY MEDICINE

## 2023-05-20 PROCEDURE — 99284 EMERGENCY DEPT VISIT MOD MDM: CPT | Mod: 25

## 2023-05-20 RX ORDER — CEFAZOLIN SODIUM 1 G/50ML
1 SOLUTION INTRAVENOUS ONCE
Status: COMPLETED | OUTPATIENT
Start: 2023-05-20 | End: 2023-05-20

## 2023-05-20 RX ORDER — CEPHALEXIN 500 MG/1
500 CAPSULE ORAL 4 TIMES DAILY
Qty: 28 CAPSULE | Refills: 0 | Status: SHIPPED | OUTPATIENT
Start: 2023-05-20 | End: 2023-05-27

## 2023-05-20 RX ADMIN — CEFAZOLIN SODIUM 1 G: 1 SOLUTION INTRAVENOUS at 03:05

## 2023-05-20 NOTE — ED PROVIDER NOTES
Encounter Date: 5/20/2023    SCRIBE #1 NOTE: IBimal, criss scribing for, and in the presence of,  George De La Paz III, MD.     History     Chief Complaint   Patient presents with    Post-op Problem     Pt c/o pain and swelling to R shoulder post surgery on the 26th pt is also leaking fluid from elbow      Time seen by provider: 2:06 PM on 05/20/2023    Geraldine Cooksey is a 82 y.o. female who presents to the ED with an onset of right arm pain and swelling with drainage for 1 month after a total reverse right shoulder arthroplasty. The patient had a total reverse right shoulder arthroplasty on 4/26/2023 by Luis Enrique Pimentel II, MD. The patient states she was sweating last night and felt feverish. The patient denies taking any medications for HTN. The patient reports taking Norco 1 hour ago for the pain. The patient denies fever or any other symptoms at this time. PMHx of COPD and PONV. PSHx of total reverse right shoulder arthroplasty.    The history is provided by the patient.   Review of patient's allergies indicates:   Allergen Reactions    Codeine Nausea Only     Happened 50 years ago      Past Medical History:   Diagnosis Date    Bronchitis     COPD (chronic obstructive pulmonary disease)     PONV (postoperative nausea and vomiting)      Past Surgical History:   Procedure Laterality Date    COSMETIC SURGERY  2012    bilateral eye lift    HYSTERECTOMY      IRRIGATION AND DEBRIDEMENT OF UPPER EXTREMITY Bilateral 11/15/2022    Procedure: IRRIGATION AND DEBRIDEMENT, UPPER EXTREMITY;  Surgeon: Leighton Cronin DO;  Location: [facility] OR;  Service: Orthopedics;  Laterality: Bilateral;    IRRIGATION AND DEBRIDEMENT OF UPPER EXTREMITY Bilateral 11/17/2022    Procedure: IRRIGATION AND DEBRIDEMENT, UPPER EXTREMITY;  Surgeon: Leighton Cronin DO;  Location: [facility] OR;  Service: Orthopedics;  Laterality: Bilateral;  Second look irrigation debridement need wound VAC for possible wound VAC placement    OPEN REDUCTION AND  INTERNAL FIXATION (ORIF) OF FRACTURE OF FEMUR USING DYNAMIC HIP SCREW Left 7/3/2019    Procedure: ORIF, HIP, USING DYNAMIC HIP SCREW;  Surgeon: Juan Jose Gunn MD;  Location: [facility] OR;  Service: Orthopedics;  Laterality: Left;    REVERSE TOTAL SHOULDER ARTHROPLASTY Right 4/26/2023    Procedure: ARTHROPLASTY, SHOULDER, TOTAL, REVERSE;  Surgeon: Luis Enrique Pimentel II, MD;  Location: [facility] OR;  Service: Orthopedics;  Laterality: Right;     No family history on file.  Social History     Tobacco Use    Smoking status: Every Day     Packs/day: 1.00     Types: Cigarettes    Smokeless tobacco: Never   Substance Use Topics    Alcohol use: Yes     Comment: rarely    Drug use: No     Review of Systems   Constitutional:  Positive for diaphoresis. Negative for activity change, appetite change, chills, fatigue and fever.   Eyes:  Negative for visual disturbance.   Respiratory:  Negative for apnea and shortness of breath.    Cardiovascular:  Negative for chest pain and palpitations.   Gastrointestinal:  Negative for abdominal distention and abdominal pain.   Genitourinary:  Negative for difficulty urinating and flank pain.   Musculoskeletal:  Positive for myalgias (right arm). Negative for gait problem and neck pain.        Positive for right arm swelling with drainage.   Skin:  Negative for pallor and rash.   Neurological:  Negative for weakness and headaches.   Hematological:  Does not bruise/bleed easily.   Psychiatric/Behavioral:  Negative for agitation and confusion.      Physical Exam     Initial Vitals [05/20/23 1344]   BP Pulse Resp Temp SpO2   (!) 209/84 78 18 98.3 °F (36.8 °C) 95 %      MAP       --         Physical Exam    Nursing note and vitals reviewed.  Constitutional: She appears well-developed and well-nourished.   HENT:   Head: Normocephalic and atraumatic.   Eyes: Conjunctivae are normal.   Neck: Neck supple.   Normal range of motion.  Cardiovascular:  Normal rate, regular rhythm and normal heart sounds.     Exam  reveals no gallop and no friction rub.       No murmur heard.  Pulmonary/Chest: Effort normal and breath sounds normal. No respiratory distress. She has no wheezes. She has no rhonchi. She has no rales.   Abdominal: Abdomen is soft. She exhibits no distension. There is no abdominal tenderness.   Musculoskeletal:         General: Normal range of motion.      Right upper arm: Swelling present.      Right forearm: Swelling present.      Cervical back: Normal range of motion and neck supple.      Comments: Swelling of distal right upper arm and proximal right forearm. Serous drainage from the right elbow.     Neurological: She is alert and oriented to person, place, and time.   Skin: Skin is warm and dry. No erythema.   Psychiatric: She has a normal mood and affect.       ED Course   Procedures  Labs Reviewed   C-REACTIVE PROTEIN - Abnormal; Notable for the following components:       Result Value    CRP 78.8 (*)     All other components within normal limits   CBC W/ AUTO DIFFERENTIAL - Abnormal; Notable for the following components:    WBC 12.95 (*)     RDW 15.6 (*)     Platelets 551 (*)     Mono # 1.7 (*)     Baso # 0.25 (*)     All other components within normal limits   COMPREHENSIVE METABOLIC PANEL - Abnormal; Notable for the following components:    Glucose 130 (*)     Albumin 3.3 (*)     All other components within normal limits          Imaging Results              X-Ray Humerus 2 View Right (Final result)  Result time 05/20/23 15:48:26      Final result by Xiomy Munoz MD (05/20/23 15:48:26)                   Impression:      As above      Electronically signed by: Fritz Munoz MD  Date:    05/20/2023  Time:    15:48               Narrative:    EXAMINATION:  XR HUMERUS 2 VIEW RIGHT    CLINICAL HISTORY:  arm pain;    TECHNIQUE:  Two views of the right humerus were acquired.    COMPARISON:  Right shoulder-05/15/2023    FINDINGS:  There are postoperative changes of reverse total right shoulder  arthroplasty.  There is a subacute impacted displaced periprosthetic fracture present in the vicinity of the right humeral neck, similar to the prior study.  No interval change in healing or alignment when compared to the prior study.  There is degenerative change of the right acromioclavicular joint.  There is continued demonstration of blunting of the right costophrenic angle and segmental airspace opacity at the right lung base, likely a combination of right basilar pleural fluid and associated atelectasis/consolidation.                                       Medications   ceFAZolin (ANCEF) 1 gram in dextrose 5 % 50 mL IVPB (premix) (0 g Intravenous Stopped 5/20/23 5507)     Medical Decision Making:   History:   Old Medical Records: I decided to obtain old medical records.  Clinical Tests:   Lab Tests: Ordered and Reviewed  Radiological Study: Ordered and Reviewed  ED Management:  82-year-old female status post right shoulder surgery presents with swelling of the lower arm and elbow.  She developed serous drainage.  She reports redness.  She is had no fever.  The symptoms suggest cellulitis.  There is no evidence of sepsis.  I discussed the case with Dr. Pimentel who recommends outpatient antibiotics and prompt outpatient follow-up.  She is to return immediately for confusion, tachycardia or worsening appearance of the arm.  DVT is unlikely in the absence of proximal arm swelling.     APC / Resident Notes:   I, Dr. George De La Paz III, personally performed the services described in this documentation. All medical record entries made by the scribe were at my direction and in my presence.  I have reviewed the chart and agree that the record reflects my personal performance and is accurate and complete   Scribe Attestation:   Scribe #1: I performed the above scribed service and the documentation accurately describes the services I performed. I attest to the accuracy of the note.                   Clinical Impression:    Final diagnoses:  [L03.113] Cellulitis of right upper arm (Primary)        ED Disposition Condition    Discharge Stable          ED Prescriptions       Medication Sig Dispense Start Date End Date Auth. Provider    cephALEXin (KEFLEX) 500 MG capsule Take 1 capsule (500 mg total) by mouth 4 (four) times daily. for 7 days 28 capsule 5/20/2023 5/27/2023 George De La Paz III, MD          Follow-up Information       Follow up With Specialties Details Why Contact Info    Luis Enrique Pimentel II, MD Orthopedic Surgery In 2 days  [address] [place] [address] [address]f [address] [id number]  [phone]               George De La Paz III, MD  05/20/23 2743

## 2023-05-20 NOTE — TELEPHONE ENCOUNTER
Pt's daughter, Carie, states the pt had shoulder surgery (4/26/23), and her entire arm is swollen with yellow fluid weeping out near her elbow. It was enough fluid to get her sling wet. Carie is not with the patient; connected to pt via three way call. Pt is with another daughter at the moment. Pt states the arm is hot to the touch and red. She having difficulty moving the arm. Care advice is to go to the ED now. They are currently in [place]; daughter states they prefer to drive back to [facility] in [place]. They will go straight there. Instructed to call back if further assistance is needed.     Reason for Disposition   SEVERE arm swelling (e.g., all of arm looks swollen)    Additional Information   Negative: SEVERE difficulty breathing (e.g., struggling for each breath, speaks in single words)   Negative: Sounds like a life-threatening emergency to the triager    Protocols used: Arm Swelling and Edema-A-AH

## 2023-05-23 ENCOUNTER — TELEPHONE (OUTPATIENT)
Dept: FAMILY MEDICINE | Facility: CLINIC | Age: 83
End: 2023-05-23
Payer: MEDICARE

## 2023-05-23 ENCOUNTER — TELEPHONE (OUTPATIENT)
Dept: ORTHOPEDICS | Facility: CLINIC | Age: 83
End: 2023-05-23
Payer: MEDICARE

## 2023-05-23 NOTE — TELEPHONE ENCOUNTER
LVM regarding PT. Pt may proceed with PT.     ----- Message from Deborah Augustin sent at 5/23/2023  8:39 AM CDT -----  Contact: pt daughter  Type: Needs Medical Advice  Who Called:  pt daughter     Best Call Back Number: [phone]    Additional Information: pt daughter states she never received a call back yesterday regarding pt arm. Please advise.

## 2023-05-23 NOTE — TELEPHONE ENCOUNTER
LVM regarding PT. OK to continue.    ----- Message from Luis Enrique Pimentel II, MD sent at 5/23/2023  8:05 AM CDT -----  Contact: daughter  saida  Yes    ----- Message -----  From: Marylu Smith LPN  Sent: 5/22/2023   1:32 PM CDT  To: Luis Enrique Pimentel II, MD    Pt's daughter states the swelling has decreased. Wants to know if it is ok to keep attending PT? Please advise.  ----- Message -----  From: Jigna Phillips MA  Sent: 5/22/2023   9:16 AM CDT  To: Loren Dudley Staff    Arm is swollen with celliutis,  seen at ED   Call back

## 2023-05-24 NOTE — TELEPHONE ENCOUNTER
----- Message from Andre Romero sent at 5/23/2023  2:50 PM CDT -----  Regarding: Return Call  Contact: Ming Bal daughter  Type:  Patient Returning Call    Who Called:daughter: Ming Bal  Who Left Message for Patient:office nurse  Does the patient know what this is regarding?:unknown  Would the patient rather a call back or a response via [facility]? call  Best Call Back Number:[phone]  Additional Information: Please call back.

## 2023-05-25 ENCOUNTER — TELEPHONE (OUTPATIENT)
Dept: FAMILY MEDICINE | Facility: CLINIC | Age: 83
End: 2023-05-25
Payer: MEDICARE

## 2023-06-13 ENCOUNTER — TELEPHONE (OUTPATIENT)
Dept: ORTHOPEDICS | Facility: CLINIC | Age: 83
End: 2023-06-13
Payer: MEDICARE

## 2023-06-13 DIAGNOSIS — S42.291A CLOSED 4-PART FRACTURE OF PROXIMAL HUMERUS, RIGHT, INITIAL ENCOUNTER: Primary | ICD-10-CM

## 2023-06-20 ENCOUNTER — HOSPITAL ENCOUNTER (OUTPATIENT)
Dept: RADIOLOGY | Facility: HOSPITAL | Age: 83
Discharge: HOME | End: 2023-06-20
Attending: ORTHOPAEDIC SURGERY
Payer: MEDICARE

## 2023-06-20 ENCOUNTER — OFFICE VISIT (OUTPATIENT)
Dept: ORTHOPEDICS | Facility: CLINIC | Age: 83
End: 2023-06-20
Payer: MEDICARE

## 2023-06-20 VITALS — HEIGHT: 62 IN | BODY MASS INDEX: 23.93 KG/M2 | WEIGHT: 130.06 LBS

## 2023-06-20 DIAGNOSIS — S42.291D FRACTURE OF HUMERAL HEAD, CLOSED, RIGHT, WITH ROUTINE HEALING, SUBSEQUENT ENCOUNTER: Primary | ICD-10-CM

## 2023-06-20 DIAGNOSIS — S42.291A CLOSED 4-PART FRACTURE OF PROXIMAL HUMERUS, RIGHT, INITIAL ENCOUNTER: ICD-10-CM

## 2023-06-20 PROCEDURE — 99999 PR PBB SHADOW E&M-EST. PATIENT-LVL III: ICD-10-PCS | Mod: PBBFAC,,, | Performed by: ORTHOPAEDIC SURGERY

## 2023-06-20 PROCEDURE — 1125F AMNT PAIN NOTED PAIN PRSNT: CPT | Mod: CPTII,S$GLB,, | Performed by: ORTHOPAEDIC SURGERY

## 2023-06-20 PROCEDURE — 1101F PT FALLS ASSESS-DOCD LE1/YR: CPT | Mod: CPTII,S$GLB,, | Performed by: ORTHOPAEDIC SURGERY

## 2023-06-20 PROCEDURE — 99024 POSTOP FOLLOW-UP VISIT: CPT | Mod: S$GLB,,, | Performed by: ORTHOPAEDIC SURGERY

## 2023-06-20 PROCEDURE — 1101F PR PT FALLS ASSESS DOC 0-1 FALLS W/OUT INJ PAST YR: ICD-10-PCS | Mod: CPTII,S$GLB,, | Performed by: ORTHOPAEDIC SURGERY

## 2023-06-20 PROCEDURE — 73030 XR SHOULDER COMPLETE 2 OR MORE VIEWS RIGHT: ICD-10-PCS | Mod: 26,RT,, | Performed by: RADIOLOGY

## 2023-06-20 PROCEDURE — 1159F PR MEDICATION LIST DOCUMENTED IN MEDICAL RECORD: ICD-10-PCS | Mod: CPTII,S$GLB,, | Performed by: ORTHOPAEDIC SURGERY

## 2023-06-20 PROCEDURE — 1159F MED LIST DOCD IN RCRD: CPT | Mod: CPTII,S$GLB,, | Performed by: ORTHOPAEDIC SURGERY

## 2023-06-20 PROCEDURE — 1160F RVW MEDS BY RX/DR IN RCRD: CPT | Mod: CPTII,S$GLB,, | Performed by: ORTHOPAEDIC SURGERY

## 2023-06-20 PROCEDURE — 3288F FALL RISK ASSESSMENT DOCD: CPT | Mod: CPTII,S$GLB,, | Performed by: ORTHOPAEDIC SURGERY

## 2023-06-20 PROCEDURE — 73030 X-RAY EXAM OF SHOULDER: CPT | Mod: 26,RT,, | Performed by: RADIOLOGY

## 2023-06-20 PROCEDURE — 99024 PR POST-OP FOLLOW-UP VISIT: ICD-10-PCS | Mod: S$GLB,,, | Performed by: ORTHOPAEDIC SURGERY

## 2023-06-20 PROCEDURE — 1160F PR REVIEW ALL MEDS BY PRESCRIBER/CLIN PHARMACIST DOCUMENTED: ICD-10-PCS | Mod: CPTII,S$GLB,, | Performed by: ORTHOPAEDIC SURGERY

## 2023-06-20 PROCEDURE — 99999 PR PBB SHADOW E&M-EST. PATIENT-LVL III: CPT | Mod: PBBFAC,,, | Performed by: ORTHOPAEDIC SURGERY

## 2023-06-20 PROCEDURE — 3288F PR FALLS RISK ASSESSMENT DOCUMENTED: ICD-10-PCS | Mod: CPTII,S$GLB,, | Performed by: ORTHOPAEDIC SURGERY

## 2023-06-20 PROCEDURE — 1125F PR PAIN SEVERITY QUANTIFIED, PAIN PRESENT: ICD-10-PCS | Mod: CPTII,S$GLB,, | Performed by: ORTHOPAEDIC SURGERY

## 2023-06-20 PROCEDURE — 73030 X-RAY EXAM OF SHOULDER: CPT | Mod: TC,PO,RT

## 2023-06-20 NOTE — PROGRESS NOTES
CC:  82-year-old female follows up status post reverse right total shoulder arthroplasty.  She had a 4 part proximal humerus fracture.  Date of surgery was 04/26/2023.  She is almost 8 weeks out.  Overall she is doing well and rates her current pain as a 2/10.    Right Upper Extremity Examination     Skin is intact throughout   Motor is intact distally radial, median, ulnar, AIN, PIN   +2 radial and ulnar pulses   Sensation to light touch is intact distally radial, median, and ulnar     Examination of the Right shoulder:   ROM:   For - 90   Abd - 90   Ext - 30   Int - back pocket    Tenderness to palpation:   Subacromial space - negative  Biceps Tendon - negative  Anterior Glenohumeral Joint - negative  AC joint - negative  Glenohumeral instability - negative      X-ray images were examined and personally interpreted by me.  Three views the right shoulder dated 06/20/2023 show a reverse right total shoulder arthroplasty that is well fixed and in good alignment.    Dx:  Status post reverse right total shoulder arthroplasty, stable    Plan:  Progress activity as tolerated.  Follow up in 6 weeks for re-evaluation.

## 2023-07-31 DIAGNOSIS — M25.511 RIGHT SHOULDER PAIN, UNSPECIFIED CHRONICITY: Primary | ICD-10-CM

## 2023-08-03 ENCOUNTER — HOSPITAL ENCOUNTER (OUTPATIENT)
Dept: RADIOLOGY | Facility: HOSPITAL | Age: 83
Discharge: HOME | End: 2023-08-03
Attending: ORTHOPAEDIC SURGERY
Payer: MEDICARE

## 2023-08-03 ENCOUNTER — OFFICE VISIT (OUTPATIENT)
Dept: ORTHOPEDICS | Facility: CLINIC | Age: 83
End: 2023-08-03
Payer: MEDICARE

## 2023-08-03 VITALS — WEIGHT: 130.06 LBS | BODY MASS INDEX: 23.93 KG/M2 | HEIGHT: 62 IN

## 2023-08-03 DIAGNOSIS — M25.511 RIGHT SHOULDER PAIN, UNSPECIFIED CHRONICITY: ICD-10-CM

## 2023-08-03 DIAGNOSIS — Z96.611 HISTORY OF REVERSE TOTAL REPLACEMENT OF RIGHT SHOULDER JOINT: Primary | ICD-10-CM

## 2023-08-03 PROCEDURE — 1159F PR MEDICATION LIST DOCUMENTED IN MEDICAL RECORD: ICD-10-PCS | Mod: CPTII,S$GLB,, | Performed by: ORTHOPAEDIC SURGERY

## 2023-08-03 PROCEDURE — 1101F PR PT FALLS ASSESS DOC 0-1 FALLS W/OUT INJ PAST YR: ICD-10-PCS | Mod: CPTII,S$GLB,, | Performed by: ORTHOPAEDIC SURGERY

## 2023-08-03 PROCEDURE — 99999 PR PBB SHADOW E&M-EST. PATIENT-LVL III: CPT | Mod: PBBFAC,,, | Performed by: ORTHOPAEDIC SURGERY

## 2023-08-03 PROCEDURE — 73030 XR SHOULDER COMPLETE 2 OR MORE VIEWS RIGHT: ICD-10-PCS | Mod: 26,RT,, | Performed by: RADIOLOGY

## 2023-08-03 PROCEDURE — 1101F PT FALLS ASSESS-DOCD LE1/YR: CPT | Mod: CPTII,S$GLB,, | Performed by: ORTHOPAEDIC SURGERY

## 2023-08-03 PROCEDURE — 99214 PR OFFICE/OUTPT VISIT, EST, LEVL IV, 30-39 MIN: ICD-10-PCS | Mod: S$GLB,,, | Performed by: ORTHOPAEDIC SURGERY

## 2023-08-03 PROCEDURE — 3288F FALL RISK ASSESSMENT DOCD: CPT | Mod: CPTII,S$GLB,, | Performed by: ORTHOPAEDIC SURGERY

## 2023-08-03 PROCEDURE — 1159F MED LIST DOCD IN RCRD: CPT | Mod: CPTII,S$GLB,, | Performed by: ORTHOPAEDIC SURGERY

## 2023-08-03 PROCEDURE — 1125F AMNT PAIN NOTED PAIN PRSNT: CPT | Mod: CPTII,S$GLB,, | Performed by: ORTHOPAEDIC SURGERY

## 2023-08-03 PROCEDURE — 3288F PR FALLS RISK ASSESSMENT DOCUMENTED: ICD-10-PCS | Mod: CPTII,S$GLB,, | Performed by: ORTHOPAEDIC SURGERY

## 2023-08-03 PROCEDURE — 73030 X-RAY EXAM OF SHOULDER: CPT | Mod: TC,PO,RT

## 2023-08-03 PROCEDURE — 73030 X-RAY EXAM OF SHOULDER: CPT | Mod: 26,RT,, | Performed by: RADIOLOGY

## 2023-08-03 PROCEDURE — 99999 PR PBB SHADOW E&M-EST. PATIENT-LVL III: ICD-10-PCS | Mod: PBBFAC,,, | Performed by: ORTHOPAEDIC SURGERY

## 2023-08-03 PROCEDURE — 99214 OFFICE O/P EST MOD 30 MIN: CPT | Mod: S$GLB,,, | Performed by: ORTHOPAEDIC SURGERY

## 2023-08-03 PROCEDURE — 1125F PR PAIN SEVERITY QUANTIFIED, PAIN PRESENT: ICD-10-PCS | Mod: CPTII,S$GLB,, | Performed by: ORTHOPAEDIC SURGERY

## 2023-08-03 RX ORDER — HYDROCODONE BITARTRATE AND ACETAMINOPHEN 7.5; 325 MG/1; MG/1
1 TABLET ORAL EVERY 6 HOURS PRN
Qty: 28 TABLET | Refills: 0 | Status: SHIPPED | OUTPATIENT
Start: 2023-08-03 | End: 2023-08-10

## 2023-08-03 NOTE — PROGRESS NOTES
CC:  82-year-old female follows up status post reverse right total shoulder arthroplasty.  Date of surgery was 04/26/2023.  She is just over 3 months out.  She completed physical therapy over a month ago.  She states she occasionally has some pain at night but takes ibuprofen and a half of a Norco for that.  She would like some additional physical therapy.    Past Medical History:   Diagnosis Date    Bronchitis     COPD (chronic obstructive pulmonary disease)     PONV (postoperative nausea and vomiting)        Past Surgical History:   Procedure Laterality Date    COSMETIC SURGERY  2012    bilateral eye lift    HYSTERECTOMY      IRRIGATION AND DEBRIDEMENT OF UPPER EXTREMITY Bilateral 11/15/2022    Procedure: IRRIGATION AND DEBRIDEMENT, UPPER EXTREMITY;  Surgeon: Leighton Cronin DO;  Location: [facility] OR;  Service: Orthopedics;  Laterality: Bilateral;    IRRIGATION AND DEBRIDEMENT OF UPPER EXTREMITY Bilateral 11/17/2022    Procedure: IRRIGATION AND DEBRIDEMENT, UPPER EXTREMITY;  Surgeon: Leighton Cronin DO;  Location: [facility] OR;  Service: Orthopedics;  Laterality: Bilateral;  Second look irrigation debridement need wound VAC for possible wound VAC placement    OPEN REDUCTION AND INTERNAL FIXATION (ORIF) OF FRACTURE OF FEMUR USING DYNAMIC HIP SCREW Left 7/3/2019    Procedure: ORIF, HIP, USING DYNAMIC HIP SCREW;  Surgeon: Juan Jose Gunn MD;  Location: [facility] OR;  Service: Orthopedics;  Laterality: Left;    REVERSE TOTAL SHOULDER ARTHROPLASTY Right 4/26/2023    Procedure: ARTHROPLASTY, SHOULDER, TOTAL, REVERSE;  Surgeon: Luis Enrique Pimentel II, MD;  Location: [facility] OR;  Service: Orthopedics;  Laterality: Right;       Current Outpatient Medications on File Prior to Visit   Medication Sig Dispense Refill    albuterol (PROVENTIL/VENTOLIN HFA) 90 mcg/actuation inhaler Inhale 1-2 puffs into the lungs every 6 (six) hours as needed for Wheezing. Rescue 1 Inhaler 0    aspirin (ECOTRIN) 81 MG EC tablet Take 1 tablet (81 mg total) by  mouth 2 (two) times a day. for 14 days 28 tablet 0    cetirizine (ZYRTEC) 10 MG tablet Take 10 mg by mouth daily as needed for Allergies.      HYDROcodone-acetaminophen (NORCO) 7.5-325 mg per tablet Take 1 tablet by mouth every 6 (six) hours as needed for Pain. 28 tablet 0    ibuprofen (ADVIL,MOTRIN) 200 MG tablet Take 200 mg by mouth.      ondansetron (ZOFRAN-ODT) 4 MG TbDL Take 1 tablet (4 mg total) by mouth every 6 (six) hours as needed. 12 tablet 0    TRELEGY ELLIPTA 100-62.5-25 mcg DsDv Take 1 puff by mouth once daily. 1 each 0     No current facility-administered medications on file prior to visit.       ROS:    Constitution: Denies chills, fever, and sweats.  HENT: Denies headaches or blurry vision.  Cardiovascular: Denies chest pain or irregular heart beat.  Respiratory: Denies cough or shortness of breath.  Gastrointestinal: Denies abdominal pain, nausea, or vomiting.  Genitourinary:  Denies urinary incontinence, bladder and kidney issues  Musculoskeletal:  Denies muscle cramps.  Neurological: Denies dizziness or focal weakness.  Psychiatric/Behavioral: Normal mental status.  Hematologic/Lymphatic: Denies bleeding problem or easy bruising/bleeding.  Skin: Denies rash or suspicious lesions.    Physical examination     Gen - No acute distress, well nourished, well groomed   Eyes - Extraoccular motions intact, pupils equally round and reactive to light and accommodation   ENT - normocephalic, atruamtic, oropharynx clear   Neck - Supple, no abnormal masses   Cardiovascular - regular rate and rhythm   Pulmonary - clear to auscultation bilaterally, no wheezes, ronchi, or rales   Abdomen - soft, non-tender, non-distended, positive bowel sounds   Psych - The patient is alert and oriented x3 with normal mood and affect    Right Upper Extremity Examination     Skin is intact throughout   Motor is intact distally radial, median, ulnar, AIN, PIN   +2 radial and ulnar pulses   Sensation to light touch is intact distally  radial, median, and ulnar     Examination of the Right shoulder:   ROM:   For - 90   Abd - 90   Ext - 20   Int - back pocket     Tenderness to palpation:   Subacromial space - negative  Biceps Tendon - negative  Anterior Glenohumeral Joint - negative  AC joint - negative  Glenohumeral instability - negative    X-ray images were examined and personally interpreted by me.  Three views of the right shoulder dated 08/03/2023 show a reverse right total shoulder arthroplasty that is well fixed and in good alignment    Dx:  Status post reverse right total shoulder arthroplasty, stable    Plan:  The patient states she is might want to do some more therapy.  We are going to send us a message in my chart about where she wants to attend therapy as an outpatient.  She can continue to work on range of motion.  Follow up in 3 months with an x-ray.

## 2024-05-25 ENCOUNTER — HOSPITAL ENCOUNTER (INPATIENT)
Facility: HOSPITAL | Age: 84
LOS: 2 days | Discharge: HOME | DRG: 069 | End: 2024-05-27
Attending: EMERGENCY MEDICINE | Admitting: EMERGENCY MEDICINE
Payer: MEDICARE

## 2024-05-25 DIAGNOSIS — G45.9 TIA (TRANSIENT ISCHEMIC ATTACK): ICD-10-CM

## 2024-05-25 DIAGNOSIS — G45.1 TIA INVOLVING CAROTID ARTERY: ICD-10-CM

## 2024-05-25 DIAGNOSIS — R07.9 CHEST PAIN: ICD-10-CM

## 2024-05-25 DIAGNOSIS — R29.90 STROKE-LIKE SYMPTOMS: Primary | ICD-10-CM

## 2024-05-25 DIAGNOSIS — I47.20 V TACH: ICD-10-CM

## 2024-05-25 DIAGNOSIS — R29.818 ACUTE FOCAL NEUROLOGICAL DEFICIT: ICD-10-CM

## 2024-05-25 DIAGNOSIS — I10 SEVERE UNCONTROLLED HYPERTENSION: ICD-10-CM

## 2024-05-25 DIAGNOSIS — I49.9 CARDIAC RHYTHM DISORDER OR DISTURBANCE OR CHANGE: ICD-10-CM

## 2024-05-25 LAB
ALBUMIN SERPL BCP-MCNC: 3.8 G/DL (ref 3.5–5.2)
ALP SERPL-CCNC: 75 U/L (ref 55–135)
ALT SERPL W/O P-5'-P-CCNC: 14 U/L (ref 10–44)
ANION GAP SERPL CALC-SCNC: 11 MMOL/L (ref 8–16)
AST SERPL-CCNC: 20 U/L (ref 10–40)
BASOPHILS # BLD AUTO: 0.25 K/UL (ref 0–0.2)
BASOPHILS NFR BLD: 2.2 % (ref 0–1.9)
BILIRUB SERPL-MCNC: 0.5 MG/DL (ref 0.1–1)
BUN SERPL-MCNC: 15 MG/DL (ref 8–23)
CALCIUM SERPL-MCNC: 9.4 MG/DL (ref 8.7–10.5)
CHLORIDE SERPL-SCNC: 103 MMOL/L (ref 95–110)
CHOLEST SERPL-MCNC: 217 MG/DL (ref 120–199)
CHOLEST/HDLC SERPL: 2.8 {RATIO} (ref 2–5)
CO2 SERPL-SCNC: 25 MMOL/L (ref 23–29)
CREAT SERPL-MCNC: 0.8 MG/DL (ref 0.5–1.4)
DIFFERENTIAL METHOD BLD: ABNORMAL
EOSINOPHIL # BLD AUTO: 0.1 K/UL (ref 0–0.5)
EOSINOPHIL NFR BLD: 1.2 % (ref 0–8)
ERYTHROCYTE [DISTWIDTH] IN BLOOD BY AUTOMATED COUNT: 16.3 % (ref 11.5–14.5)
EST. GFR  (NO RACE VARIABLE): >60 ML/MIN/1.73 M^2
GLUCOSE SERPL-MCNC: 101 MG/DL (ref 70–110)
HCT VFR BLD AUTO: 51 % (ref 37–48.5)
HDLC SERPL-MCNC: 77 MG/DL (ref 40–75)
HDLC SERPL: 35.5 % (ref 20–50)
HGB BLD-MCNC: 16.8 G/DL (ref 12–16)
IMM GRANULOCYTES # BLD AUTO: 0.04 K/UL (ref 0–0.04)
IMM GRANULOCYTES NFR BLD AUTO: 0.4 % (ref 0–0.5)
INR PPP: 1.1 (ref 0.8–1.2)
LDLC SERPL CALC-MCNC: 122.2 MG/DL (ref 63–159)
LYMPHOCYTES # BLD AUTO: 2.7 K/UL (ref 1–4.8)
LYMPHOCYTES NFR BLD: 23.5 % (ref 18–48)
MCH RBC QN AUTO: 28.3 PG (ref 27–31)
MCHC RBC AUTO-ENTMCNC: 32.9 G/DL (ref 32–36)
MCV RBC AUTO: 86 FL (ref 82–98)
MONOCYTES # BLD AUTO: 1.4 K/UL (ref 0.3–1)
MONOCYTES NFR BLD: 12.4 % (ref 4–15)
NEUTROPHILS # BLD AUTO: 6.9 K/UL (ref 1.8–7.7)
NEUTROPHILS NFR BLD: 60.3 % (ref 38–73)
NONHDLC SERPL-MCNC: 140 MG/DL
NRBC BLD-RTO: 0 /100 WBC
PLATELET # BLD AUTO: 473 K/UL (ref 150–450)
PMV BLD AUTO: 10.8 FL (ref 9.2–12.9)
POCT GLUCOSE: 90 MG/DL (ref 70–110)
POTASSIUM SERPL-SCNC: 4.4 MMOL/L (ref 3.5–5.1)
PROT SERPL-MCNC: 7.1 G/DL (ref 6–8.4)
PROTHROMBIN TIME: 11.3 SEC (ref 9–12.5)
RBC # BLD AUTO: 5.93 M/UL (ref 4–5.4)
SODIUM SERPL-SCNC: 139 MMOL/L (ref 136–145)
TRIGL SERPL-MCNC: 89 MG/DL (ref 30–150)
TROPONIN I SERPL DL<=0.01 NG/ML-MCNC: 0.02 NG/ML (ref 0–0.03)
TSH SERPL DL<=0.005 MIU/L-ACNC: 2.31 UIU/ML (ref 0.4–4)
WBC # BLD AUTO: 11.39 K/UL (ref 3.9–12.7)

## 2024-05-25 PROCEDURE — 11000001 HC ACUTE MED/SURG PRIVATE ROOM

## 2024-05-25 PROCEDURE — 85610 PROTHROMBIN TIME: CPT | Performed by: EMERGENCY MEDICINE

## 2024-05-25 PROCEDURE — 25500020 PHARM REV CODE 255: Performed by: EMERGENCY MEDICINE

## 2024-05-25 PROCEDURE — 93005 ELECTROCARDIOGRAM TRACING: CPT

## 2024-05-25 PROCEDURE — 82962 GLUCOSE BLOOD TEST: CPT

## 2024-05-25 PROCEDURE — 70496 CT ANGIOGRAPHY HEAD: CPT | Mod: TC

## 2024-05-25 PROCEDURE — 84484 ASSAY OF TROPONIN QUANT: CPT | Performed by: INTERNAL MEDICINE

## 2024-05-25 PROCEDURE — 70498 CT ANGIOGRAPHY NECK: CPT | Mod: 26,,, | Performed by: RADIOLOGY

## 2024-05-25 PROCEDURE — 93010 ELECTROCARDIOGRAM REPORT: CPT | Mod: 76,,, | Performed by: INTERNAL MEDICINE

## 2024-05-25 PROCEDURE — 80061 LIPID PANEL: CPT | Performed by: EMERGENCY MEDICINE

## 2024-05-25 PROCEDURE — 99285 EMERGENCY DEPT VISIT HI MDM: CPT | Mod: 25

## 2024-05-25 PROCEDURE — 94640 AIRWAY INHALATION TREATMENT: CPT

## 2024-05-25 PROCEDURE — 93010 ELECTROCARDIOGRAM REPORT: CPT | Mod: ,,, | Performed by: INTERNAL MEDICINE

## 2024-05-25 PROCEDURE — 99223 1ST HOSP IP/OBS HIGH 75: CPT | Mod: ,,, | Performed by: INTERNAL MEDICINE

## 2024-05-25 PROCEDURE — 70496 CT ANGIOGRAPHY HEAD: CPT | Mod: 26,,, | Performed by: RADIOLOGY

## 2024-05-25 PROCEDURE — 25000242 PHARM REV CODE 250 ALT 637 W/ HCPCS: Performed by: INTERNAL MEDICINE

## 2024-05-25 PROCEDURE — 63600175 PHARM REV CODE 636 W HCPCS: Performed by: EMERGENCY MEDICINE

## 2024-05-25 PROCEDURE — 85025 COMPLETE CBC W/AUTO DIFF WBC: CPT | Performed by: EMERGENCY MEDICINE

## 2024-05-25 PROCEDURE — 84443 ASSAY THYROID STIM HORMONE: CPT | Performed by: EMERGENCY MEDICINE

## 2024-05-25 PROCEDURE — 25000003 PHARM REV CODE 250: Performed by: HOSPITALIST

## 2024-05-25 PROCEDURE — 80053 COMPREHEN METABOLIC PANEL: CPT | Performed by: EMERGENCY MEDICINE

## 2024-05-25 PROCEDURE — 36415 COLL VENOUS BLD VENIPUNCTURE: CPT | Performed by: EMERGENCY MEDICINE

## 2024-05-25 PROCEDURE — 96376 TX/PRO/DX INJ SAME DRUG ADON: CPT

## 2024-05-25 PROCEDURE — G0426 INPT/ED TELECONSULT50: HCPCS | Mod: 95,,, | Performed by: PSYCHIATRY & NEUROLOGY

## 2024-05-25 PROCEDURE — 96374 THER/PROPH/DIAG INJ IV PUSH: CPT

## 2024-05-25 PROCEDURE — 63600175 PHARM REV CODE 636 W HCPCS

## 2024-05-25 PROCEDURE — 96375 TX/PRO/DX INJ NEW DRUG ADDON: CPT

## 2024-05-25 PROCEDURE — 94760 N-INVAS EAR/PLS OXIMETRY 1: CPT

## 2024-05-25 PROCEDURE — 99900035 HC TECH TIME PER 15 MIN (STAT)

## 2024-05-25 PROCEDURE — 25000003 PHARM REV CODE 250: Performed by: EMERGENCY MEDICINE

## 2024-05-25 PROCEDURE — 25000003 PHARM REV CODE 250: Performed by: INTERNAL MEDICINE

## 2024-05-25 RX ORDER — ONDANSETRON HYDROCHLORIDE 2 MG/ML
4 INJECTION, SOLUTION INTRAVENOUS EVERY 8 HOURS PRN
Status: DISCONTINUED | OUTPATIENT
Start: 2024-05-25 | End: 2024-05-27 | Stop reason: HOSPADM

## 2024-05-25 RX ORDER — POLYETHYLENE GLYCOL 3350 17 G/17G
17 POWDER, FOR SOLUTION ORAL DAILY
Status: DISCONTINUED | OUTPATIENT
Start: 2024-05-26 | End: 2024-05-27 | Stop reason: HOSPADM

## 2024-05-25 RX ORDER — ASPIRIN 325 MG
325 TABLET ORAL
Status: COMPLETED | OUTPATIENT
Start: 2024-05-25 | End: 2024-05-25

## 2024-05-25 RX ORDER — ALUMINUM HYDROXIDE, MAGNESIUM HYDROXIDE, AND SIMETHICONE 1200; 120; 1200 MG/30ML; MG/30ML; MG/30ML
30 SUSPENSION ORAL 4 TIMES DAILY PRN
Status: DISCONTINUED | OUTPATIENT
Start: 2024-05-25 | End: 2024-05-27 | Stop reason: HOSPADM

## 2024-05-25 RX ORDER — NALOXONE HCL 0.4 MG/ML
0.02 VIAL (ML) INJECTION
Status: DISCONTINUED | OUTPATIENT
Start: 2024-05-25 | End: 2024-05-27 | Stop reason: HOSPADM

## 2024-05-25 RX ORDER — LABETALOL HYDROCHLORIDE 5 MG/ML
INJECTION, SOLUTION INTRAVENOUS
Status: COMPLETED
Start: 2024-05-25 | End: 2024-05-25

## 2024-05-25 RX ORDER — CETIRIZINE HYDROCHLORIDE 10 MG/1
10 TABLET ORAL DAILY
Status: DISCONTINUED | OUTPATIENT
Start: 2024-05-26 | End: 2024-05-27 | Stop reason: HOSPADM

## 2024-05-25 RX ORDER — TALC
6 POWDER (GRAM) TOPICAL NIGHTLY PRN
Status: DISCONTINUED | OUTPATIENT
Start: 2024-05-25 | End: 2024-05-27 | Stop reason: HOSPADM

## 2024-05-25 RX ORDER — IPRATROPIUM BROMIDE AND ALBUTEROL SULFATE 2.5; .5 MG/3ML; MG/3ML
3 SOLUTION RESPIRATORY (INHALATION)
Status: COMPLETED | OUTPATIENT
Start: 2024-05-25 | End: 2024-05-26

## 2024-05-25 RX ORDER — PROCHLORPERAZINE EDISYLATE 5 MG/ML
5 INJECTION INTRAMUSCULAR; INTRAVENOUS EVERY 6 HOURS PRN
Status: DISCONTINUED | OUTPATIENT
Start: 2024-05-25 | End: 2024-05-27 | Stop reason: HOSPADM

## 2024-05-25 RX ORDER — HYDRALAZINE HYDROCHLORIDE 20 MG/ML
10 INJECTION INTRAMUSCULAR; INTRAVENOUS
Status: COMPLETED | OUTPATIENT
Start: 2024-05-25 | End: 2024-05-25

## 2024-05-25 RX ORDER — LABETALOL HYDROCHLORIDE 5 MG/ML
10 INJECTION, SOLUTION INTRAVENOUS
Status: COMPLETED | OUTPATIENT
Start: 2024-05-25 | End: 2024-05-25

## 2024-05-25 RX ORDER — HYDRALAZINE HYDROCHLORIDE 25 MG/1
TABLET, FILM COATED ORAL
Status: DISPENSED
Start: 2024-05-25 | End: 2024-05-26

## 2024-05-25 RX ORDER — MORPHINE SULFATE 2 MG/ML
2 INJECTION, SOLUTION INTRAMUSCULAR; INTRAVENOUS EVERY 4 HOURS PRN
Status: DISCONTINUED | OUTPATIENT
Start: 2024-05-25 | End: 2024-05-27 | Stop reason: HOSPADM

## 2024-05-25 RX ORDER — HYDRALAZINE HYDROCHLORIDE 25 MG/1
25 TABLET, FILM COATED ORAL 4 TIMES DAILY PRN
Status: DISCONTINUED | OUTPATIENT
Start: 2024-05-25 | End: 2024-05-27 | Stop reason: HOSPADM

## 2024-05-25 RX ORDER — ACETAMINOPHEN 325 MG/1
650 TABLET ORAL EVERY 4 HOURS PRN
Status: DISCONTINUED | OUTPATIENT
Start: 2024-05-25 | End: 2024-05-27 | Stop reason: HOSPADM

## 2024-05-25 RX ORDER — CLOPIDOGREL BISULFATE 75 MG/1
300 TABLET ORAL
Status: COMPLETED | OUTPATIENT
Start: 2024-05-25 | End: 2024-05-25

## 2024-05-25 RX ORDER — HYDROCODONE BITARTRATE AND ACETAMINOPHEN 7.5; 325 MG/1; MG/1
1 TABLET ORAL EVERY 6 HOURS PRN
Status: DISCONTINUED | OUTPATIENT
Start: 2024-05-25 | End: 2024-05-27 | Stop reason: HOSPADM

## 2024-05-25 RX ORDER — GLUCAGON 1 MG
1 KIT INJECTION
Status: DISCONTINUED | OUTPATIENT
Start: 2024-05-25 | End: 2024-05-27 | Stop reason: HOSPADM

## 2024-05-25 RX ORDER — ENOXAPARIN SODIUM 100 MG/ML
40 INJECTION SUBCUTANEOUS EVERY 24 HOURS
Status: DISCONTINUED | OUTPATIENT
Start: 2024-05-25 | End: 2024-05-27 | Stop reason: HOSPADM

## 2024-05-25 RX ORDER — SIMETHICONE 80 MG
1 TABLET,CHEWABLE ORAL 4 TIMES DAILY PRN
Status: DISCONTINUED | OUTPATIENT
Start: 2024-05-25 | End: 2024-05-27 | Stop reason: HOSPADM

## 2024-05-25 RX ORDER — ASPIRIN 81 MG/1
81 TABLET ORAL 2 TIMES DAILY
Status: DISCONTINUED | OUTPATIENT
Start: 2024-05-25 | End: 2024-05-27 | Stop reason: HOSPADM

## 2024-05-25 RX ADMIN — ASPIRIN 325 MG ORAL TABLET 325 MG: 325 PILL ORAL at 02:05

## 2024-05-25 RX ADMIN — CLOPIDOGREL BISULFATE 300 MG: 75 TABLET ORAL at 02:05

## 2024-05-25 RX ADMIN — HYDRALAZINE HYDROCHLORIDE 10 MG: 20 INJECTION, SOLUTION INTRAMUSCULAR; INTRAVENOUS at 04:05

## 2024-05-25 RX ADMIN — LABETALOL HYDROCHLORIDE 10 MG: 5 INJECTION, SOLUTION INTRAVENOUS at 01:05

## 2024-05-25 RX ADMIN — LABETALOL HYDROCHLORIDE 10 MG: 5 INJECTION INTRAVENOUS at 01:05

## 2024-05-25 RX ADMIN — HYDRALAZINE HYDROCHLORIDE 10 MG: 20 INJECTION, SOLUTION INTRAMUSCULAR; INTRAVENOUS at 02:05

## 2024-05-25 RX ADMIN — IPRATROPIUM BROMIDE AND ALBUTEROL SULFATE 3 ML: .5; 2.5 SOLUTION RESPIRATORY (INHALATION) at 07:05

## 2024-05-25 RX ADMIN — HYDRALAZINE HYDROCHLORIDE 25 MG: 25 TABLET ORAL at 09:05

## 2024-05-25 RX ADMIN — IOHEXOL 75 ML: 350 INJECTION, SOLUTION INTRAVENOUS at 01:05

## 2024-05-25 RX ADMIN — ASPIRIN 81 MG: 81 TABLET, COATED ORAL at 09:05

## 2024-05-25 NOTE — ASSESSMENT & PLAN NOTE
Patient's COPD is controlled currently.  Patient is currently off COPD Pathway. Continue scheduled inhalers Steroids, Antibiotics, and Supplemental oxygen and monitor respiratory status closely.     Nebulizer therapy q4h for 24 hours then prn  Smoking Cessation Counseling

## 2024-05-25 NOTE — ASSESSMENT & PLAN NOTE
Chronic, controlled. Latest blood pressure and vitals reviewed-     Temp:  [98.4 °F (36.9 °C)]   Pulse:  [62-90]   Resp:  [17-25]   BP: (173-246)/()   SpO2:  [94 %-99 %] .   Home meds for hypertension were reviewed and noted below.       While in the hospital, will manage blood pressure as follows; Adjust home antihypertensive regimen as follows- As needed to keep BP <160/100    Will utilize p.r.n. blood pressure medication only if patient's blood pressure greater than 160/100 and she develops symptoms such as worsening chest pain or shortness of breath.

## 2024-05-25 NOTE — SUBJECTIVE & OBJECTIVE
HPI:,  83 y.o. female with PMH COPD, HTN, tobacco abuse presenting with weakness and L sided numbness.  Patient reports having difficulty getting up from a chair this morning, which is unusual for her.  She also developed L sided numbness.  Symptoms improved before arrival to ED.  Had a similar episode of leg weakness several days ago that self resolved.     Images personally reviewed and interpreted:  Study: Head CT and CTA Head & Neck  Study Interpretation: L MCA aneurysm     Assessment and plan:  Symptoms resolved, likely not related to intracranial aneurysm.  Recommend MRI kacie for TIA/minor stroke workup.  Start ASA 81 mg daily (will defer DAPT given aneurysm).  Refer to Neuro-IR or neurosurgery for aneurysm eval as outpatient.    Lytics recommendation: Thrombolytic therapy not recommended due to Patient back to neurological baseline  Thrombectomy recommendation: No; at this time symptoms not suggestive of large vessel occlusion  Placement recommendation: admit to inpatient

## 2024-05-25 NOTE — ED NOTES
Patient reports onset of intermittent weakness to the left leg and arm on Wednesday.  States symptoms resolved within minutes, but returned at 0400 and 1000 this morning.  At 1000 patient also felt numbness in her left leg.  Symptoms resolved until approximately 1230 when she states the numbness and weakness returned for 4-5 minutes, after which she got up and ambulated without difficulty.  Has not felt symptoms since.       Pain:  Rated 0/10.     Psychosocial:  Patient is calm and cooperative.  Patients insight and judgement are appropriate to situation.  Appears clean, well maintained, with clothing appropriate to environment.  No evidence of delusions, hallucinations, or psychosis.     Neuro:  Eyes open spontaneously.  Awake, alert, oriented x 4.  Speech clear and appropriate.  Tolerating saliva secretions well.  Able to follow commands, demonstrating ability to actively and appropriately communicate within context of current conversation.  Symmetrical facial muscles.  Moving all extremities well with no noted weakness.  Adequate muscle tone present.    Movement is purposeful.  No evidence of impaired sensation.  Responds to external stimuli with appropriate reflexes.  Pupils equally round and reactive to light.  No noted drifts.       Airway:  Bilateral chest rise and fall.  RR regular and non-labored.  Air entry patent and clear x 5 lobes of the lungs.  No crepitus or subcutaneous emphysema noted on palpation.       Circulatory:  Skin warm, dry, and pink.  Apical and radial pulses strong and regular.  Capillary refill/skin blanching less than 3 seconds to distal of 4 extremities.  SR on the CM without noted ectopy.     Abdomen:  Abdomen soft and non-distended.  Positive normo-active bowel sounds x 4 quadrants.       Urinary:  Patient denies pain, frequency, or urgency.  Voids independently.  Reports urine appears jamey/yellow in color.     Extremities:  No redness, heat, swelling, deformity, or pain.     Skin:   Intact with no bruising/discolorations noted.

## 2024-05-25 NOTE — PROGRESS NOTES
CT Head WO ran under same accession number as cta head and neck.     1318 hrs St[facility] called  1322 CT head wo complete  1326 IV placed by RN (Dc)  1333 CTA complete  Dc & Vy ashley pt in CT. GEORGINA

## 2024-05-25 NOTE — H&P
[facility]  History & Physical    Patient Name: Geraldine Cooksey  MRN: [id number]  Patient Class: IP- Inpatient  Admission Date: 5/25/2024  Attending Physician: Yong Connelly MD  Primary Care Provider: Yelena Dykes FNP         Patient information was obtained from patient and ER records.     Subjective:     Principal Problem:Acute focal neurological deficit    Chief Complaint:   Chief Complaint   Patient presents with    Weakness     Left sided ,onset 45 min PTA        HPI:         History           Chief Complaint   Patient presents with    Weakness       Left sided ,onset 45 min PTA      HPI:  Geraldine Cooksey is a 83 y.o. female with PMH as below who presents to the [facility] emergency department for evaluation of intermittent left arm and leg weakness and numbness over the past 24 hrs. It last recurred at 12:30 PM today and is resolving upon arrival. She has no other complaints.   In further history the patient states that this episodes actually started on Wednesday in has been coming intermittently a proximally 1-2 times per day since that time.  Patient states that today she had numbness of her left hand and left leg and was not able to walk to the ambulance.  Patient had to be carried.  By the time I saw this patient in the emergency department she was back to normal and had no neurologic deficits.  Strength was 5/5 equal and bilateral in  and lower extremity was equal and bilateral.  Patient denies any facial droop and or speech deficits.  Patient underwent vascular Neurology evaluation by telemedicine in the emergency department.  Recommendations are reviewed and appreciated.     PCP: Yelena Dykes FNP           Review of patient's allergies indicates:   Allergen Reactions    Codeine Nausea Only       Happened 50 years ago            Past Medical History:   Diagnosis Date    Bronchitis      COPD (chronic obstructive pulmonary disease)      PONV (postoperative  nausea and vomiting)             Patient is an everyday smoker.    Past Medical History:   Diagnosis Date    Bronchitis     COPD (chronic obstructive pulmonary disease)     PONV (postoperative nausea and vomiting)        Past Surgical History:   Procedure Laterality Date    COSMETIC SURGERY  2012    bilateral eye lift    HYSTERECTOMY      IRRIGATION AND DEBRIDEMENT OF UPPER EXTREMITY Bilateral 11/15/2022    Procedure: IRRIGATION AND DEBRIDEMENT, UPPER EXTREMITY;  Surgeon: Leighton Cronin DO;  Location: [facility] OR;  Service: Orthopedics;  Laterality: Bilateral;    IRRIGATION AND DEBRIDEMENT OF UPPER EXTREMITY Bilateral 11/17/2022    Procedure: IRRIGATION AND DEBRIDEMENT, UPPER EXTREMITY;  Surgeon: Leighton Cronin DO;  Location: [facility] OR;  Service: Orthopedics;  Laterality: Bilateral;  Second look irrigation debridement need wound VAC for possible wound VAC placement    OPEN REDUCTION AND INTERNAL FIXATION (ORIF) OF FRACTURE OF FEMUR USING DYNAMIC HIP SCREW Left 7/3/2019    Procedure: ORIF, HIP, USING DYNAMIC HIP SCREW;  Surgeon: Juan Jose Gunn MD;  Location: [facility] beds [facility] the [facility];  Service: Orthopedics;  Laterality: Left;    REVERSE TOTAL SHOULDER ARTHROPLASTY Right 4/26/2023    Procedure: ARTHROPLASTY, SHOULDER, TOTAL, REVERSE;  Surgeon: Luis Enrique Pimentel II, MD;  Location: [facility] OR;  Service: Orthopedics;  Laterality: Right;       Review of patient's allergies indicates:   Allergen Reactions    Codeine Nausea Only     Happened 50 years ago        No current facility-administered medications on file prior to encounter.     Current Outpatient Medications on File Prior to Encounter   Medication Sig    albuterol (PROVENTIL/VENTOLIN HFA) 90 mcg/actuation inhaler Inhale 1-2 puffs into the lungs every 6 (six) hours as needed for Wheezing. Rescue    aspirin (ECOTRIN) 81 MG EC tablet Take 1 tablet (81 mg total) by mouth 2 (two) times a day. for 14 days    cetirizine (ZYRTEC) 10 MG tablet Take 10 mg by mouth daily as needed for Allergies.     HYDROcodone-acetaminophen (NORCO) 7.5-325 mg per tablet Take 1 tablet by mouth every 6 (six) hours as needed for Pain.    ibuprofen (ADVIL,MOTRIN) 200 MG tablet Take 200 mg by mouth.    ondansetron (ZOFRAN-ODT) 4 MG TbDL Take 1 tablet (4 mg total) by mouth every 6 (six) hours as needed.    TRELEGY ELLIPTA 100-62.5-25 mcg DsDv Take 1 puff by mouth once daily.     Family History    None       Tobacco Use    Smoking status: Every Day     Current packs/day: 1.00     Types: Cigarettes    Smokeless tobacco: Never   Substance and Sexual Activity    Alcohol use: Yes     Comment: rarely    Drug use: No    Sexual activity: Never     Review of Systems   Constitutional:  Positive for fatigue. Negative for activity change, appetite change and fever.   HENT:  Negative for congestion, ear discharge, mouth sores, nosebleeds, rhinorrhea, sinus pressure, sinus pain and tinnitus.    Eyes: Negative.  Negative for pain, redness and itching.   Respiratory:  Negative for apnea, cough, choking, chest tightness, shortness of breath, wheezing and stridor.    Cardiovascular:  Negative for chest pain, palpitations and leg swelling.   Gastrointestinal:  Negative for abdominal distention, abdominal pain, anal bleeding, blood in stool, constipation and diarrhea.   Endocrine: Negative.    Genitourinary:  Negative for difficulty urinating, flank pain, frequency and urgency.   Musculoskeletal:  Positive for myalgias. Negative for arthralgias, back pain and gait problem.   Skin:  Negative for color change and pallor.   Allergic/Immunologic: Negative.    Neurological:  Positive for weakness, light-headedness, numbness and headaches. Negative for dizziness and facial asymmetry.   Hematological:  Negative for adenopathy. Does not bruise/bleed easily.   Psychiatric/Behavioral:  The patient is nervous/anxious.    All other systems reviewed and are negative.    Objective:     Vital Signs (Most Recent):  Temp: 98.4 °F (36.9 °C) (05/25/24 1348)  Pulse: 67  (05/25/24 1600)  Resp: 17 (05/25/24 1600)  BP: (!) 204/91 (05/25/24 1600)  SpO2: 98 % (05/25/24 1600) Vital Signs (24h Range):  Temp:  [98.4 °F (36.9 °C)] 98.4 °F (36.9 °C)  Pulse:  [62-90] 67  Resp:  [17-25] 17  SpO2:  [94 %-99 %] 98 %  BP: (173-246)/() 204/91     Weight: 61.2 kg (135 lb)  Body mass index is 23.91 kg/m².     Physical Exam  Vitals and nursing note reviewed.   Constitutional:       Appearance: Normal appearance. She is normal weight.   HENT:      Head: Normocephalic and atraumatic.      Nose: Nose normal.      Mouth/Throat:      Mouth: Mucous membranes are moist.      Pharynx: Oropharynx is clear.   Eyes:      Pupils: Pupils are equal, round, and reactive to light.   Pulmonary:      Breath sounds: Rhonchi and rales present.   Skin:     General: Skin is warm and dry.   Neurological:      General: No focal deficit present.      Mental Status: She is alert and oriented to person, place, and time. Mental status is at baseline.   Psychiatric:         Mood and Affect: Mood normal.         Behavior: Behavior normal.         Thought Content: Thought content normal.         Judgment: Judgment normal.              CRANIAL NERVES     CN III, IV, VI   Pupils are equal, round, and reactive to light.       Significant Labs: All pertinent labs within the past 24 hours have been reviewed.  Recent Lab Results         05/25/24  1320   05/25/24  1318        Albumin   3.8       ALP   75       ALT   14       Anion Gap   11       AST   20       Baso #   0.25       Basophil %   2.2       BILIRUBIN TOTAL   0.5  Comment: For infants and newborns, interpretation of results should be based  on gestational age, weight and in agreement with clinical  observations.    Premature Infant recommended reference ranges:  Up to 24 hours.............<8.0 mg/dL  Up to 48 hours............<12.0 mg/dL  3-5 days..................<15.0 mg/dL  6-29 days.................<15.0 mg/dL         BUN   15       Calcium   9.4       Chloride    103       Cholesterol Total   217  Comment: The National Cholesterol Education Program (NCEP) has set the  following guidelines (reference ranges) for Cholesterol:  Optimal.....................<200 mg/dL  Borderline High.............200-239 mg/dL  High........................> or = 240 mg/dL         CO2   25       Creatinine   0.8       Differential Method   Automated       eGFR   >60.0       Eos #   0.1       Eos %   1.2       Glucose   101       Gran # (ANC)   6.9       Gran %   60.3       HDL   77  Comment: The National Cholesterol Education Program (NCEP) has set the  following guidelines (reference values) for HDL Cholesterol:  Low...............<40 mg/dL  Optimal...........>60 mg/dL         HDL/Cholesterol Ratio   35.5       Hematocrit   51.0       Hemoglobin   16.8       Immature Grans (Abs)   0.04  Comment: Mild elevation in immature granulocytes is non specific and   can be seen in a variety of conditions including stress response,   acute inflammation, trauma and pregnancy. Correlation with other   laboratory and clinical findings is essential.         Immature Granulocytes   0.4       INR   1.1  Comment: Coumadin Therapy:  2.0 - 3.0 for INR for all indicators except mechanical heart valves  and antiphospholipid syndromes which should use 2.5 - 3.5.  LOT^040^PT Inn^[id number]         LDL Cholesterol   122.2  Comment: The National Cholesterol Education Program (NCEP) has set the  following guidelines (reference values) for LDL Cholesterol:  Optimal.......................<130 mg/dL  Borderline High...............130-159 mg/dL  High..........................160-189 mg/dL  Very High.....................>190 mg/dL         Lymph #   2.7       Lymph %   23.5       MCH   28.3       MCHC   32.9       MCV   86       Mono #   1.4       Mono %   12.4       MPV   10.8       Non-HDL Cholesterol   140  Comment: Risk category and Non-HDL cholesterol goals:  Coronary heart disease (CHD)or equivalent (10-year risk of CHD  >20%):  Non-HDL cholesterol goal     <130 mg/dL  Two or more CHD risk factors and 10-year risk of CHD <= 20%:  Non-HDL cholesterol goal     <160 mg/dL  0 to 1 CHD risk factor:  Non-HDL cholesterol goal     <190 mg/dL         nRBC   0       Platelet Count   473       POCT Glucose 90         Potassium   4.4       PROTEIN TOTAL   7.1       PT   11.3       RBC   5.93       RDW   16.3       Sodium   139       Total Cholesterol/HDL Ratio   2.8       Triglycerides   89  Comment: The National Cholesterol Education Program (NCEP) has set the  following guidelines (reference values) for triglycerides:  Normal......................<150 mg/dL  Borderline High.............150-199 mg/dL  High........................200-499 mg/dL         TSH   2.311       WBC   11.39               Significant Imaging: I have reviewed all pertinent imaging results/findings within the past 24 hours.  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 As soon as available  PT/OT consult  Swallowing study consult  Fasting Lipids in AM      COPD (chronic obstructive pulmonary disease)  Patient's COPD is controlled currently.  Patient is currently off COPD Pathway. Continue scheduled inhalers Steroids, Antibiotics, and Supplemental oxygen and monitor respiratory status closely.     Nebulizer therapy q4h for 24 hours then prn  Smoking Cessation Counseling    Primary osteoarthritis of left knee  Treat pain symptomatically with Tylenol for now      Tobacco abuse  Dangers of cigarette smoking were reviewed with patient in detail. Patient was Referred to Tobacco Cessation Program. Nicotine replacement options were discussed. Nicotine replacement was discussed- prescribed      Elevated blood pressure reading  Chronic, controlled. Latest blood pressure and vitals reviewed-     Temp:  [98.4 °F (36.9 °C)]   Pulse:  [62-90]   Resp:   [17-25]   BP: (173-246)/()   SpO2:  [94 %-99 %] .   Home meds for hypertension were reviewed and noted below.       While in the hospital, will manage blood pressure as follows; Adjust home antihypertensive regimen as follows- As needed to keep BP <160/100    Will utilize p.r.n. blood pressure medication only if patient's blood pressure greater than 160/100 and she develops symptoms such as worsening chest pain or shortness of breath.        VTE Risk Mitigation (From admission, onward)           Ordered     enoxaparin injection 40 mg  Daily         05/25/24 1650     IP VTE HIGH RISK PATIENT  Once         05/25/24 1650     Place sequential compression device  Until discontinued         05/25/24 1650                                   CT Head WO ran under same accession number as cta head and neck.     1318 hrs [facility] called  1322 CT head wo complete  1326 IV placed by RN (Dc)  1333 CTA complete  Dc & Vy ashley pt in CT. GEORGINA Connelly MD  Department of Hospital Medicine  [place] - Emergency Dept

## 2024-05-25 NOTE — ED TRIAGE NOTES
Patient relays that she began experiencing Left sided weakness approx 45 min PTA . The symptoms have resolved upon arrival. Patient is A&O x 4 .   Patient found to be hypertensive upon arrival.

## 2024-05-25 NOTE — SUBJECTIVE & OBJECTIVE
Past Medical History:   Diagnosis Date    Bronchitis     COPD (chronic obstructive pulmonary disease)     PONV (postoperative nausea and vomiting)        Past Surgical History:   Procedure Laterality Date    COSMETIC SURGERY  2012    bilateral eye lift    HYSTERECTOMY      IRRIGATION AND DEBRIDEMENT OF UPPER EXTREMITY Bilateral 11/15/2022    Procedure: IRRIGATION AND DEBRIDEMENT, UPPER EXTREMITY;  Surgeon: Leighton Cronin DO;  Location: [facility] OR;  Service: Orthopedics;  Laterality: Bilateral;    IRRIGATION AND DEBRIDEMENT OF UPPER EXTREMITY Bilateral 11/17/2022    Procedure: IRRIGATION AND DEBRIDEMENT, UPPER EXTREMITY;  Surgeon: Leighton Cronin DO;  Location: [facility] OR;  Service: Orthopedics;  Laterality: Bilateral;  Second look irrigation debridement need wound VAC for possible wound VAC placement    OPEN REDUCTION AND INTERNAL FIXATION (ORIF) OF FRACTURE OF FEMUR USING DYNAMIC HIP SCREW Left 7/3/2019    Procedure: ORIF, HIP, USING DYNAMIC HIP SCREW;  Surgeon: Juan Jose Gunn MD;  Location: [facility] OR;  Service: Orthopedics;  Laterality: Left;    REVERSE TOTAL SHOULDER ARTHROPLASTY Right 4/26/2023    Procedure: ARTHROPLASTY, SHOULDER, TOTAL, REVERSE;  Surgeon: Luis Enrique Pimentel II, MD;  Location: [facility] OR;  Service: Orthopedics;  Laterality: Right;       Review of patient's allergies indicates:   Allergen Reactions    Codeine Nausea Only     Happened 50 years ago        No current facility-administered medications on file prior to encounter.     Current Outpatient Medications on File Prior to Encounter   Medication Sig    albuterol (PROVENTIL/VENTOLIN HFA) 90 mcg/actuation inhaler Inhale 1-2 puffs into the lungs every 6 (six) hours as needed for Wheezing. Rescue    aspirin (ECOTRIN) 81 MG EC tablet Take 1 tablet (81 mg total) by mouth 2 (two) times a day. for 14 days    cetirizine (ZYRTEC) 10 MG tablet Take 10 mg by mouth daily as needed for Allergies.    HYDROcodone-acetaminophen (NORCO) 7.5-325 mg per tablet Take 1  tablet by mouth every 6 (six) hours as needed for Pain.    ibuprofen (ADVIL,MOTRIN) 200 MG tablet Take 200 mg by mouth.    ondansetron (ZOFRAN-ODT) 4 MG TbDL Take 1 tablet (4 mg total) by mouth every 6 (six) hours as needed.    TRELEGY ELLIPTA 100-62.5-25 mcg DsDv Take 1 puff by mouth once daily.     Family History    None       Tobacco Use    Smoking status: Every Day     Current packs/day: 1.00     Types: Cigarettes    Smokeless tobacco: Never   Substance and Sexual Activity    Alcohol use: Yes     Comment: rarely    Drug use: No    Sexual activity: Never     Review of Systems   Constitutional:  Positive for fatigue. Negative for activity change, appetite change and fever.   HENT:  Negative for congestion, ear discharge, mouth sores, nosebleeds, rhinorrhea, sinus pressure, sinus pain and tinnitus.    Eyes: Negative.  Negative for pain, redness and itching.   Respiratory:  Negative for apnea, cough, choking, chest tightness, shortness of breath, wheezing and stridor.    Cardiovascular:  Negative for chest pain, palpitations and leg swelling.   Gastrointestinal:  Negative for abdominal distention, abdominal pain, anal bleeding, blood in stool, constipation and diarrhea.   Endocrine: Negative.    Genitourinary:  Negative for difficulty urinating, flank pain, frequency and urgency.   Musculoskeletal:  Positive for myalgias. Negative for arthralgias, back pain and gait problem.   Skin:  Negative for color change and pallor.   Allergic/Immunologic: Negative.    Neurological:  Positive for weakness, light-headedness, numbness and headaches. Negative for dizziness and facial asymmetry.   Hematological:  Negative for adenopathy. Does not bruise/bleed easily.   Psychiatric/Behavioral:  The patient is nervous/anxious.    All other systems reviewed and are negative.    Objective:     Vital Signs (Most Recent):  Temp: 98.4 °F (36.9 °C) (05/25/24 1348)  Pulse: 67 (05/25/24 1600)  Resp: 17 (05/25/24 1600)  BP: (!) 204/91  (05/25/24 1600)  SpO2: 98 % (05/25/24 1600) Vital Signs (24h Range):  Temp:  [98.4 °F (36.9 °C)] 98.4 °F (36.9 °C)  Pulse:  [62-90] 67  Resp:  [17-25] 17  SpO2:  [94 %-99 %] 98 %  BP: (173-246)/() 204/91     Weight: 61.2 kg (135 lb)  Body mass index is 23.91 kg/m².     Physical Exam  Vitals and nursing note reviewed.   Constitutional:       Appearance: Normal appearance. She is normal weight.   HENT:      Head: Normocephalic and atraumatic.      Nose: Nose normal.      Mouth/Throat:      Mouth: Mucous membranes are moist.      Pharynx: Oropharynx is clear.   Eyes:      Pupils: Pupils are equal, round, and reactive to light.   Pulmonary:      Breath sounds: Rhonchi and rales present.   Skin:     General: Skin is warm and dry.   Neurological:      General: No focal deficit present.      Mental Status: She is alert and oriented to person, place, and time. Mental status is at baseline.   Psychiatric:         Mood and Affect: Mood normal.         Behavior: Behavior normal.         Thought Content: Thought content normal.         Judgment: Judgment normal.              CRANIAL NERVES     CN III, IV, VI   Pupils are equal, round, and reactive to light.       Significant Labs: All pertinent labs within the past 24 hours have been reviewed.  Recent Lab Results         05/25/24  1320   05/25/24  1318        Albumin   3.8       ALP   75       ALT   14       Anion Gap   11       AST   20       Baso #   0.25       Basophil %   2.2       BILIRUBIN TOTAL   0.5  Comment: For infants and newborns, interpretation of results should be based  on gestational age, weight and in agreement with clinical  observations.    Premature Infant recommended reference ranges:  Up to 24 hours.............<8.0 mg/dL  Up to 48 hours............<12.0 mg/dL  3-5 days..................<15.0 mg/dL  6-29 days.................<15.0 mg/dL         BUN   15       Calcium   9.4       Chloride   103       Cholesterol Total   217  Comment: The National  Cholesterol Education Program (NCEP) has set the  following guidelines (reference ranges) for Cholesterol:  Optimal.....................<200 mg/dL  Borderline High.............200-239 mg/dL  High........................> or = 240 mg/dL         CO2   25       Creatinine   0.8       Differential Method   Automated       eGFR   >60.0       Eos #   0.1       Eos %   1.2       Glucose   101       Gran # (ANC)   6.9       Gran %   60.3       HDL   77  Comment: The National Cholesterol Education Program (NCEP) has set the  following guidelines (reference values) for HDL Cholesterol:  Low...............<40 mg/dL  Optimal...........>60 mg/dL         HDL/Cholesterol Ratio   35.5       Hematocrit   51.0       Hemoglobin   16.8       Immature Grans (Abs)   0.04  Comment: Mild elevation in immature granulocytes is non specific and   can be seen in a variety of conditions including stress response,   acute inflammation, trauma and pregnancy. Correlation with other   laboratory and clinical findings is essential.         Immature Granulocytes   0.4       INR   1.1  Comment: Coumadin Therapy:  2.0 - 3.0 for INR for all indicators except mechanical heart valves  and antiphospholipid syndromes which should use 2.5 - 3.5.  LOT^040^PT Inn^[id number]         LDL Cholesterol   122.2  Comment: The National Cholesterol Education Program (NCEP) has set the  following guidelines (reference values) for LDL Cholesterol:  Optimal.......................<130 mg/dL  Borderline High...............130-159 mg/dL  High..........................160-189 mg/dL  Very High.....................>190 mg/dL         Lymph #   2.7       Lymph %   23.5       MCH   28.3       MCHC   32.9       MCV   86       Mono #   1.4       Mono %   12.4       MPV   10.8       Non-HDL Cholesterol   140  Comment: Risk category and Non-HDL cholesterol goals:  Coronary heart disease (CHD)or equivalent (10-year risk of CHD >20%):  Non-HDL cholesterol goal     <130 mg/dL  Two or more  CHD risk factors and 10-year risk of CHD <= 20%:  Non-HDL cholesterol goal     <160 mg/dL  0 to 1 CHD risk factor:  Non-HDL cholesterol goal     <190 mg/dL         nRBC   0       Platelet Count   473       POCT Glucose 90         Potassium   4.4       PROTEIN TOTAL   7.1       PT   11.3       RBC   5.93       RDW   16.3       Sodium   139       Total Cholesterol/HDL Ratio   2.8       Triglycerides   89  Comment: The National Cholesterol Education Program (NCEP) has set the  following guidelines (reference values) for triglycerides:  Normal......................<150 mg/dL  Borderline High.............150-199 mg/dL  High........................200-499 mg/dL         TSH   2.311       WBC   11.39               Significant Imaging: I have reviewed all pertinent imaging results/findings within the past 24 hours.

## 2024-05-25 NOTE — TELEMEDICINE CONSULT
"[facility]  Vascular Neurology  Comprehensive Stroke Center  TeleVascular Neurology Acute Consultation Note        Consult Information  Consults    Consulting Provider: ANDREW IRVIN   Current Providers  No providers found    Patient Location:  [facility] EMERGENCY DEPARTMENT Emergency Department    Spoke hospital nurse at bedside with patient assisting consultant.  Patient information was obtained from relative(s).       Stroke Documentation  Acute Stroke Times   Last Known Normal Date: 05/25/24  Last Known Normal Time: 0800  Stroke Team Called Date: 05/25/24  Stroke Team Called Time: 1330  Stroke Team Arrival Date: 05/25/24  Stroke Team Arrival Time: 1340  CT Interpretation Time: 1340  Thrombolytic Therapy Recommended: No  Thrombectomy Recommended: No    NIH Scale:  1a. Level of Consciousness: 0-->Alert, keenly responsive  1b. LOC Questions: 0-->Answers both questions correctly  1c. LOC Commands: 0-->Performs both tasks correctly  2. Best Gaze: 0-->Normal  3. Visual: 0-->No visual loss  4. Facial Palsy: 0-->Normal symmetrical movements  5a. Motor Arm, Left: 0-->No drift, limb holds 90 (or 45) degrees for full 10 secs  5b. Motor Arm, Right: 0-->No drift, limb holds 90 (or 45) degrees for full 10 secs  6a. Motor Leg, Left: 0-->No drift, leg holds 30 degree position for full 5 secs  6b. Motor Leg, Right: 0-->No drift, leg holds 30 degree position for full 5 secs  7. Limb Ataxia: 0-->Absent  8. Sensory: 0-->Normal, no sensory loss  9. Best Language: 0-->No aphasia, normal  10. Dysarthria: 0-->Normal  11. Extinction and Inattention (formerly Neglect): 0-->No abnormality  Total (NIH Stroke Scale): 0      Modified Cornelio:    [place] Coma Scale:     ABCD2 Score:    [id number] Score:    HAS -BLED Score:    ICH Score:    Hunt & Ortiz Classification:      Blood pressure (!) 238/103, pulse 82, temperature 98.4 °F (36.9 °C), temperature source Oral, resp. rate 18, height 5' 3" (1.6 m), weight 61.2 kg " (135 lb), SpO2 96%.    Van Negative    Medical Decision Making  HPI:,  83 y.o. female with PMH COPD, HTN, tobacco abuse presenting with weakness and L sided numbness.  Patient reports having difficulty getting up from a chair this morning, which is unusual for her.  She also developed L sided numbness.  Symptoms improved before arrival to ED.  Had a similar episode of leg weakness several days ago that self resolved.     Images personally reviewed and interpreted:  Study: Head CT and CTA Head & Neck  Study Interpretation: L MCA aneurysm     Assessment and plan:  Symptoms resolved, likely not related to intracranial aneurysm.  Recommend MRI kacie for TIA/minor stroke workup.  Start ASA 81 mg daily (will defer DAPT given aneurysm).  Refer to Neuro-IR or neurosurgery for aneurysm eval as outpatient.  Recommend BP control <160.    Lytics recommendation: Thrombolytic therapy not recommended due to Patient back to neurological baseline  Thrombectomy recommendation: No; at this time symptoms not suggestive of large vessel occlusion  Placement recommendation: admit to inpatient               ROS  Physical Exam  Neurological:      General: No focal deficit present.      Cranial Nerves: No cranial nerve deficit.      Sensory: No sensory deficit.      Motor: No weakness.       Past Medical History:   Diagnosis Date    Bronchitis     COPD (chronic obstructive pulmonary disease)     PONV (postoperative nausea and vomiting)      Past Surgical History:   Procedure Laterality Date    COSMETIC SURGERY  2012    bilateral eye lift    HYSTERECTOMY      IRRIGATION AND DEBRIDEMENT OF UPPER EXTREMITY Bilateral 11/15/2022    Procedure: IRRIGATION AND DEBRIDEMENT, UPPER EXTREMITY;  Surgeon: Leighton Cronin DO;  Location: [facility] OR;  Service: Orthopedics;  Laterality: Bilateral;    IRRIGATION AND DEBRIDEMENT OF UPPER EXTREMITY Bilateral 11/17/2022    Procedure: IRRIGATION AND DEBRIDEMENT, UPPER EXTREMITY;  Surgeon: Leighton Cronin DO;   Location: [facility] OR;  Service: Orthopedics;  Laterality: Bilateral;  Second look irrigation debridement need wound VAC for possible wound VAC placement    OPEN REDUCTION AND INTERNAL FIXATION (ORIF) OF FRACTURE OF FEMUR USING DYNAMIC HIP SCREW Left 7/3/2019    Procedure: ORIF, HIP, USING DYNAMIC HIP SCREW;  Surgeon: Juan Jose Gunn MD;  Location: [facility] OR;  Service: Orthopedics;  Laterality: Left;    REVERSE TOTAL SHOULDER ARTHROPLASTY Right 4/26/2023    Procedure: ARTHROPLASTY, SHOULDER, TOTAL, REVERSE;  Surgeon: Luis Enrique Pimentel II, MD;  Location: [facility] OR;  Service: Orthopedics;  Laterality: Right;     No family history on file.    Diagnoses  Problem Noted   Tia Involving Carotid Artery 5/25/2024       Chen Goode MD      Emergent/Acute neurological consultation requested by spoke provider due to critical concerns for possible cerebrovascular event that could result in permanent loss of neurologic/bodily function, severe disability or death of this patient.  Immediate/timely evaluation by a highly prepared expert is paramount for optimal outcomes  High risk for neurological deterioration if not properly diagnosed  High risk for neurological deterioration if not treated promplty/as soon as possible  Complex diagnostic evaluation may be required (advanced imaging)  High risk treatment options (thrombolytics and/or thrombectomy)    Patient care was coordinated with spoke provider, including but not limted to    Discussing likely diagnosis/etiology of symptoms  Making recommendations for further diagnostic studies  Making recommendations for intravenous thrombolytics or other advanced therapies  Making recommendations for disposition (admission/transfer for higher level of care)

## 2024-05-25 NOTE — HPI
History           Chief Complaint   Patient presents with    Weakness       Left sided ,onset 45 min PTA      HPI:  Geraldine Cooksey is a 83 y.o. female with PMH as below who presents to the [facility] emergency department for evaluation of intermittent left arm and leg weakness and numbness over the past 24 hrs. It last recurred at 12:30 PM today and is resolving upon arrival. She has no other complaints.   In further history the patient states that this episodes actually started on Wednesday in has been coming intermittently a proximally 1-2 times per day since that time.  Patient states that today she had numbness of her left hand and left leg and was not able to walk to the ambulance.  Patient had to be carried.  By the time I saw this patient in the emergency department she was back to normal and had no neurologic deficits.  Strength was 5/5 equal and bilateral in  and lower extremity was equal and bilateral.  Patient denies any facial droop and or speech deficits.  Patient underwent vascular Neurology evaluation by telemedicine in the emergency department.  Recommendations are reviewed and appreciated.     PCP: Yelena Dykes FNP           Review of patient's allergies indicates:   Allergen Reactions    Codeine Nausea Only       Happened 50 years ago            Past Medical History:   Diagnosis Date    Bronchitis      COPD (chronic obstructive pulmonary disease)      PONV (postoperative nausea and vomiting)             Patient is an everyday smoker.

## 2024-05-25 NOTE — Clinical Note
Diagnosis: Acute focal neurological deficit [318713]  Future Attending Provider: BONNIE WOODS [[id number]]  Special Needs:: No Special Needs [1]

## 2024-05-25 NOTE — ASSESSMENT & PLAN NOTE
Admit for Neuro checks  Begin Aspirin 81 mg   DAPT will be held due to 8mm  MCA aneurysm  Monitor for changes  MRI of Brain. As soon as available  PT/OT consult  Swallowing study consult  Fasting Lipids in AM

## 2024-05-25 NOTE — ED PROVIDER NOTES
History     Chief Complaint   Patient presents with    Weakness     Left sided ,onset 45 min PTA     HPI:  Geraldine Cooksey is a 83 y.o. female with PMH as below who presents to the [facility] emergency department for evaluation of intermittent left arm and leg weakness and numbness over the past 24 hrs. It last recurred at 12:30 PM today and is resolving upon arrival. She has no other complaints.       PCP: Yelena Dykes FNP    Review of patient's allergies indicates:   Allergen Reactions    Codeine Nausea Only     Happened 50 years ago       Past Medical History:   Diagnosis Date    Bronchitis     COPD (chronic obstructive pulmonary disease)     PONV (postoperative nausea and vomiting)      Past Surgical History:   Procedure Laterality Date    COSMETIC SURGERY  2012    bilateral eye lift    HYSTERECTOMY      IRRIGATION AND DEBRIDEMENT OF UPPER EXTREMITY Bilateral 11/15/2022    Procedure: IRRIGATION AND DEBRIDEMENT, UPPER EXTREMITY;  Surgeon: Leighton Cronin DO;  Location: [facility] OR;  Service: Orthopedics;  Laterality: Bilateral;    IRRIGATION AND DEBRIDEMENT OF UPPER EXTREMITY Bilateral 11/17/2022    Procedure: IRRIGATION AND DEBRIDEMENT, UPPER EXTREMITY;  Surgeon: Leighton Cronin DO;  Location: [facility] OR;  Service: Orthopedics;  Laterality: Bilateral;  Second look irrigation debridement need wound VAC for possible wound VAC placement    OPEN REDUCTION AND INTERNAL FIXATION (ORIF) OF FRACTURE OF FEMUR USING DYNAMIC HIP SCREW Left 7/3/2019    Procedure: ORIF, HIP, USING DYNAMIC HIP SCREW;  Surgeon: Juan Jose Gunn MD;  Location: [facility] OR;  Service: Orthopedics;  Laterality: Left;    REVERSE TOTAL SHOULDER ARTHROPLASTY Right 4/26/2023    Procedure: ARTHROPLASTY, SHOULDER, TOTAL, REVERSE;  Surgeon: Luis Enrique Pimentel II, MD;  Location: [facility] OR;  Service: Orthopedics;  Laterality: Right;       No family history on file.  Social History     Tobacco Use    Smoking status: Every Day     Current packs/day: 1.00      Types: Cigarettes    Smokeless tobacco: Never   Substance and Sexual Activity    Alcohol use: Yes     Comment: rarely    Drug use: No    Sexual activity: Never      Review of Systems     Review of Systems   Constitutional: Negative.    HENT: Negative.     Eyes: Negative.    Respiratory: Negative.     Cardiovascular: Negative.    Gastrointestinal: Negative.    Endocrine: Negative.    Genitourinary: Negative.    Musculoskeletal: Negative.    Skin: Negative.    Allergic/Immunologic: Negative.    Neurological:  Positive for weakness and numbness. Negative for facial asymmetry and speech difficulty.   Hematological: Negative.    Psychiatric/Behavioral: Negative.     All other systems reviewed and are negative.       Physical Exam     Initial Vitals   BP Pulse Resp Temp SpO2   05/25/24 1348 05/25/24 1338 05/25/24 1338 05/25/24 1348 05/25/24 1338   (!) 220/110 90 20 98.4 °F (36.9 °C) 96 %      MAP       --                 Nursing notes and vital signs reviewed.  Constitutional: Patient is in no acute distress.   Head: Normocephalic. Atraumatic.   Eyes:  Conjunctivae are not pale. No scleral icterus.   ENT: Mucous membranes moist.   Neck: Supple.   Cardiovascular: Regular rate. Regular rhythm. No murmurs, rubs, or gallops   Pulmonary: No respiratory distress. Clear to auscultation bilaterally   Abdominal: Non-distended.   Musculoskeletal: Moves all extremities. No obvious deformities.   Skin: Warm and dry.   Neurological:  Alert, awake, and appropriate. Normal speech. No acute lateralizing neurologic deficits appreciated. No pronator drift. Strength 5/5 in all four extremities.  Sensation to light touch intact x4 extremities.  CNII-XII intact. No dysmetria. Normal tone. Follows commands. Intact speech, intact comprehension and repetition.     Psychiatric: Normal affect.       ED Course   Critical Care    Date/Time: 5/25/2024 3:46 PM    Performed by: Tito Farmer MD  Authorized by: Tito Farmer MD  Direct  "patient critical care time: 18 minutes  Additional history critical care time: 5 minutes  Ordering / reviewing critical care time: 5 minutes  Documentation critical care time: 5 minutes  Consulting other physicians critical care time: 6 minutes  Total critical care time (exclusive of procedural time) : 39 minutes  Critical care time was exclusive of separately billable procedures and treating other patients and teaching time.  Critical care was necessary to treat or prevent imminent or life-threatening deterioration of the following conditions: CNS failure or compromise (stroke-like symptoms).  Critical care was time spent personally by me on the following activities: blood draw for specimens, development of treatment plan with patient or surrogate, interpretation of cardiac output measurements, evaluation of patient's response to treatment, examination of patient, obtaining history from patient or surrogate, ordering and performing treatments and interventions, ordering and review of laboratory studies, ordering and review of radiographic studies, pulse oximetry, re-evaluation of patient's condition, review of old charts and discussions with consultants.        Vitals:    05/25/24 1338 05/25/24 1348 05/25/24 1350 05/25/24 1400   BP:  (!) 220/110 (!) 238/103 (!) 231/107   Pulse: 90 82 81 66   Resp: 20 18 18 20   Temp:  98.4 °F (36.9 °C)     TempSrc:  Oral     SpO2: 96% 96% (!) 94% (!) 94%   Weight:  61.2 kg (135 lb)     Height:  5' 3" (1.6 m)      05/25/24 1415 05/25/24 1419 05/25/24 1430 05/25/24 1440   BP: (!) 246/108 (!) 246/108 (!) 200/85 (!) 188/79   Pulse: 66 62 64 66   Resp: (!) 25 (!) 25 (!) 23 (!) 22   Temp:       TempSrc:       SpO2: 99% 99% 99% 98%   Weight:       Height:        05/25/24 1450 05/25/24 1542   BP: (!) 173/74 (!) 228/101   Pulse: 66 70   Resp: 18 18   Temp:     TempSrc:     SpO2: 99% 99%   Weight:     Height:       Lab Results Interpreted as Abnormal:  Labs Reviewed   CBC W/ AUTO " DIFFERENTIAL - Abnormal; Notable for the following components:       Result Value    RBC 5.93 (*)     Hemoglobin 16.8 (*)     Hematocrit 51.0 (*)     RDW 16.3 (*)     Platelets 473 (*)     Mono # 1.4 (*)     Baso # 0.25 (*)     Basophil % 2.2 (*)     All other components within normal limits   LIPID PANEL - Abnormal; Notable for the following components:    Cholesterol 217 (*)     HDL 77 (*)     All other components within normal limits   COMPREHENSIVE METABOLIC PANEL   PROTIME-INR   TSH   POCT GLUCOSE   POCT GLUCOSE MONITORING CONTINUOUS      All Lab Results:  Results for orders placed or performed during the hospital encounter of 05/25/24   CBC W/ AUTO DIFFERENTIAL   Result Value Ref Range    WBC 11.39 3.90 - 12.70 K/uL    RBC 5.93 (H) 4.00 - 5.40 M/uL    Hemoglobin 16.8 (H) 12.0 - 16.0 g/dL    Hematocrit 51.0 (H) 37.0 - 48.5 %    MCV 86 82 - 98 fL    MCH 28.3 27.0 - 31.0 pg    MCHC 32.9 32.0 - 36.0 g/dL    RDW 16.3 (H) 11.5 - 14.5 %    Platelets 473 (H) 150 - 450 K/uL    MPV 10.8 9.2 - 12.9 fL    Immature Granulocytes 0.4 0.0 - 0.5 %    Gran # (ANC) 6.9 1.8 - 7.7 K/uL    Immature Grans (Abs) 0.04 0.00 - 0.04 K/uL    Lymph # 2.7 1.0 - 4.8 K/uL    Mono # 1.4 (H) 0.3 - 1.0 K/uL    Eos # 0.1 0.0 - 0.5 K/uL    Baso # 0.25 (H) 0.00 - 0.20 K/uL    nRBC 0 0 /100 WBC    Gran % 60.3 38.0 - 73.0 %    Lymph % 23.5 18.0 - 48.0 %    Mono % 12.4 4.0 - 15.0 %    Eosinophil % 1.2 0.0 - 8.0 %    Basophil % 2.2 (H) 0.0 - 1.9 %    Differential Method Automated    Comprehensive metabolic panel   Result Value Ref Range    Sodium 139 136 - 145 mmol/L    Potassium 4.4 3.5 - 5.1 mmol/L    Chloride 103 95 - 110 mmol/L    CO2 25 23 - 29 mmol/L    Glucose 101 70 - 110 mg/dL    BUN 15 8 - 23 mg/dL    Creatinine 0.8 0.5 - 1.4 mg/dL    Calcium 9.4 8.7 - 10.5 mg/dL    Total Protein 7.1 6.0 - 8.4 g/dL    Albumin 3.8 3.5 - 5.2 g/dL    Total Bilirubin 0.5 0.1 - 1.0 mg/dL    Alkaline Phosphatase 75 55 - 135 U/L    AST 20 10 - 40 U/L    ALT 14 10 -  44 U/L    eGFR >60.0 >60 mL/min/1.73 m^2    Anion Gap 11 8 - 16 mmol/L   Protime-INR   Result Value Ref Range    Prothrombin Time 11.3 9.0 - 12.5 sec    INR 1.1 0.8 - 1.2   TSH   Result Value Ref Range    TSH 2.311 0.400 - 4.000 uIU/mL   LDL - Lipid Panel   Result Value Ref Range    Cholesterol 217 (H) 120 - 199 mg/dL    Triglycerides 89 30 - 150 mg/dL    HDL 77 (H) 40 - 75 mg/dL    LDL Cholesterol 122.2 63.0 - 159.0 mg/dL    HDL/Cholesterol Ratio 35.5 20.0 - 50.0 %    Total Cholesterol/HDL Ratio 2.8 2.0 - 5.0    Non-HDL Cholesterol 140 mg/dL   POCT glucose   Result Value Ref Range    POCT Glucose 90 70 - 110 mg/dL     Imaging Results               CTA Head and Neck (xpd) (Final result)  Result time 05/25/24 14:02:04      Final result by Yousuf Patel MD (05/25/24 14:02:04)                   Impression:      Left MCA trifurcation aneurysm, 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    Emphysema.  Partially imaged irregular right upper lobe nodule for which further evaluation with chest CT is recommended.    This report was flagged in Epic as abnormal.      Electronically signed by: Yousuf Patel MD  Date:    05/25/2024  Time:    14:02               Narrative:    EXAMINATION:  CTA HEAD AND NECK (XPD)    CLINICAL HISTORY:  Stroke/TIA, determine embolic source;    TECHNIQUE:  CT angiogram was performed from the level of the sonido to the top of the head following the IV administration of 75mL of Omnipaque 350.   Sagittal and coronal reconstructions and maximum intensity projection reconstructions were performed. Arterial stenosis percentages are based on NASCET measurement criteria.    COMPARISON:  None    FINDINGS:  There is an 8 mm left middle cerebral artery trifurcation aneurysm.    There is moderate calcification of the intracranial ICAs without significant stenosis.  The bilateral middle cerebral, anterior cerebral, and posterior cerebral arteries are  patent.  There is short segment moderate focal stenosis of the left distal vertebral artery.  The right distal vertebral artery is widely patent.  The basilar artery is normal caliber and patent.  No additional aneurysm is identified.    The arch vessels exhibit conventional anatomy and are patent.  There is moderate calcified plaque formation of both carotid bulbs without significant stenosis.  There is scattered plaque formation of the internal carotid arteries without significant stenosis.  There is absence of flow within nearly the entire left vertebral artery, with reconstitution at its V4 segment.  The right vertebral artery is patent.    Emphysematous changes are present the lung apices.  There is a partially imaged irregular nodular opacity in the right upper lobe measuring 1.1 cm in its imaged portion.                                     ED Physician's independent review of the above imaging: agree with radiologist.    The EKG was ordered, reviewed, and independently interpreted by the ED Physician:  Rhythm: ectopic atrial rhythm with junctional beats and PVCs  Rate: 78 bpm  No ST-T changes concerning for acute ischemia  Normal axis   Normal intervals     The emergency physician reviewed the vital signs and test results, which are outlined above.     ED Discussion      Discussed patient with Dr. Goode who states left sided aneurysm noted, not causative of today's symptoms. Keep BP <220/115. Admit for routine MRI.     I discussed the patient's case with Dr. Yong Connelly, hospitalist, who accepts the patient for admission.     Admitting physician: Dr. Connelly  Admitting service:  Hospital Medicine   Admission type: Observation         ED Medication(s) Administered:  Medications   hydrALAZINE injection 10 mg (has no administration in time range)   iohexoL (OMNIPAQUE 350) injection 75 mL (75 mLs Intravenous Given 5/25/24 1335)   labetaloL injection 10 mg (10 mg Intravenous Given 5/25/24 1350)   hydrALAZINE  injection 10 mg (10 mg Intravenous Given by Other 5/25/24 1419)   aspirin tablet 325 mg (325 mg Oral Given 5/25/24 1439)   clopidogreL tablet 300 mg (300 mg Oral Given 5/25/24 1439)       Prescription Management: I performed a review of the patient's current Rx medication list as input by nursing staff.    Patient's Medications   New Prescriptions    No medications on file   Previous Medications    ALBUTEROL (PROVENTIL/VENTOLIN HFA) 90 MCG/ACTUATION INHALER    Inhale 1-2 puffs into the lungs every 6 (six) hours as needed for Wheezing. Rescue    ASPIRIN (ECOTRIN) 81 MG EC TABLET    Take 1 tablet (81 mg total) by mouth 2 (two) times a day. for 14 days    CETIRIZINE (ZYRTEC) 10 MG TABLET    Take 10 mg by mouth daily as needed for Allergies.    HYDROCODONE-ACETAMINOPHEN (NORCO) 7.5-325 MG PER TABLET    Take 1 tablet by mouth every 6 (six) hours as needed for Pain.    IBUPROFEN (ADVIL,MOTRIN) 200 MG TABLET    Take 200 mg by mouth.    ONDANSETRON (ZOFRAN-ODT) 4 MG TBDL    Take 1 tablet (4 mg total) by mouth every 6 (six) hours as needed.    TRELEGY ELLIPTA 100-62.5-25 MCG DSDV    Take 1 puff by mouth once daily.   Modified Medications    No medications on file   Discontinued Medications    No medications on file           Clinical Impression       ICD-10-CM ICD-9-CM   1. Stroke-like symptoms  R29.90 781.99   2. Acute focal neurological deficit  R29.818 781.99   3. Severe uncontrolled hypertension  I10 401.9      ED Disposition Condition    Observation Serious             Tito Farmer MD  05/25/24 [id number]

## 2024-05-25 NOTE — ED NOTES
Patient states she feels like her brief needs to be changed.  Patient's brief taken off, patient cleaned with patient wipes, external catheter placed with a new brief.  New warm blanket given.  Assisted by [employer] nursing student.

## 2024-05-25 NOTE — ASSESSMENT & PLAN NOTE
Dangers of cigarette smoking were reviewed with patient in detail. Patient was Referred to Tobacco Cessation Program. Nicotine replacement options were discussed. Nicotine replacement was discussed- prescribed

## 2024-05-26 LAB
ALBUMIN SERPL BCP-MCNC: 3.3 G/DL (ref 3.5–5.2)
ALP SERPL-CCNC: 65 U/L (ref 55–135)
ALT SERPL W/O P-5'-P-CCNC: 13 U/L (ref 10–44)
ANION GAP SERPL CALC-SCNC: 9 MMOL/L (ref 8–16)
AST SERPL-CCNC: 18 U/L (ref 10–40)
BASOPHILS # BLD AUTO: 0.28 K/UL (ref 0–0.2)
BASOPHILS NFR BLD: 1.9 % (ref 0–1.9)
BILIRUB SERPL-MCNC: 0.8 MG/DL (ref 0.1–1)
BUN SERPL-MCNC: 13 MG/DL (ref 8–23)
CALCIUM SERPL-MCNC: 9 MG/DL (ref 8.7–10.5)
CHLORIDE SERPL-SCNC: 104 MMOL/L (ref 95–110)
CO2 SERPL-SCNC: 26 MMOL/L (ref 23–29)
CREAT SERPL-MCNC: 0.7 MG/DL (ref 0.5–1.4)
DIFFERENTIAL METHOD BLD: ABNORMAL
EOSINOPHIL # BLD AUTO: 0.2 K/UL (ref 0–0.5)
EOSINOPHIL NFR BLD: 1 % (ref 0–8)
ERYTHROCYTE [DISTWIDTH] IN BLOOD BY AUTOMATED COUNT: 15.9 % (ref 11.5–14.5)
EST. GFR  (NO RACE VARIABLE): >60 ML/MIN/1.73 M^2
GLUCOSE SERPL-MCNC: 81 MG/DL (ref 70–110)
HCT VFR BLD AUTO: 47.8 % (ref 37–48.5)
HGB BLD-MCNC: 16.1 G/DL (ref 12–16)
IMM GRANULOCYTES # BLD AUTO: 0.06 K/UL (ref 0–0.04)
IMM GRANULOCYTES NFR BLD AUTO: 0.4 % (ref 0–0.5)
LYMPHOCYTES # BLD AUTO: 3.4 K/UL (ref 1–4.8)
LYMPHOCYTES NFR BLD: 23 % (ref 18–48)
MAGNESIUM SERPL-MCNC: 1.9 MG/DL (ref 1.6–2.6)
MCH RBC QN AUTO: 28.5 PG (ref 27–31)
MCHC RBC AUTO-ENTMCNC: 33.7 G/DL (ref 32–36)
MCV RBC AUTO: 85 FL (ref 82–98)
MONOCYTES # BLD AUTO: 1.9 K/UL (ref 0.3–1)
MONOCYTES NFR BLD: 13 % (ref 4–15)
NEUTROPHILS # BLD AUTO: 9.1 K/UL (ref 1.8–7.7)
NEUTROPHILS NFR BLD: 60.7 % (ref 38–73)
NRBC BLD-RTO: 0 /100 WBC
PHOSPHATE SERPL-MCNC: 3.9 MG/DL (ref 2.7–4.5)
PLATELET # BLD AUTO: 424 K/UL (ref 150–450)
PMV BLD AUTO: 10.4 FL (ref 9.2–12.9)
POTASSIUM SERPL-SCNC: 3.7 MMOL/L (ref 3.5–5.1)
PROT SERPL-MCNC: 6.1 G/DL (ref 6–8.4)
RBC # BLD AUTO: 5.64 M/UL (ref 4–5.4)
SODIUM SERPL-SCNC: 139 MMOL/L (ref 136–145)
TROPONIN I SERPL DL<=0.01 NG/ML-MCNC: 0.02 NG/ML (ref 0–0.03)
WBC # BLD AUTO: 14.93 K/UL (ref 3.9–12.7)

## 2024-05-26 PROCEDURE — 94761 N-INVAS EAR/PLS OXIMETRY MLT: CPT

## 2024-05-26 PROCEDURE — 80053 COMPREHEN METABOLIC PANEL: CPT | Performed by: INTERNAL MEDICINE

## 2024-05-26 PROCEDURE — 25000003 PHARM REV CODE 250: Performed by: HOSPITALIST

## 2024-05-26 PROCEDURE — 84484 ASSAY OF TROPONIN QUANT: CPT | Performed by: INTERNAL MEDICINE

## 2024-05-26 PROCEDURE — 94640 AIRWAY INHALATION TREATMENT: CPT

## 2024-05-26 PROCEDURE — 63600175 PHARM REV CODE 636 W HCPCS: Performed by: INTERNAL MEDICINE

## 2024-05-26 PROCEDURE — 84100 ASSAY OF PHOSPHORUS: CPT | Performed by: INTERNAL MEDICINE

## 2024-05-26 PROCEDURE — 27000221 HC OXYGEN, UP TO 24 HOURS

## 2024-05-26 PROCEDURE — 99233 SBSQ HOSP IP/OBS HIGH 50: CPT | Mod: ,,, | Performed by: INTERNAL MEDICINE

## 2024-05-26 PROCEDURE — 83735 ASSAY OF MAGNESIUM: CPT | Performed by: INTERNAL MEDICINE

## 2024-05-26 PROCEDURE — 94799 UNLISTED PULMONARY SVC/PX: CPT

## 2024-05-26 PROCEDURE — 25000003 PHARM REV CODE 250: Performed by: INTERNAL MEDICINE

## 2024-05-26 PROCEDURE — 25000242 PHARM REV CODE 250 ALT 637 W/ HCPCS: Performed by: INTERNAL MEDICINE

## 2024-05-26 PROCEDURE — 11000001 HC ACUTE MED/SURG PRIVATE ROOM

## 2024-05-26 PROCEDURE — 99900031 HC PATIENT EDUCATION (STAT)

## 2024-05-26 PROCEDURE — 99900035 HC TECH TIME PER 15 MIN (STAT)

## 2024-05-26 PROCEDURE — 85025 COMPLETE CBC W/AUTO DIFF WBC: CPT | Performed by: INTERNAL MEDICINE

## 2024-05-26 RX ORDER — HYDRALAZINE HYDROCHLORIDE 25 MG/1
25 TABLET, FILM COATED ORAL EVERY 8 HOURS
Status: DISCONTINUED | OUTPATIENT
Start: 2024-05-26 | End: 2024-05-27 | Stop reason: HOSPADM

## 2024-05-26 RX ORDER — HYDRALAZINE HYDROCHLORIDE 20 MG/ML
10 INJECTION INTRAMUSCULAR; INTRAVENOUS ONCE
Status: COMPLETED | OUTPATIENT
Start: 2024-05-26 | End: 2024-05-26

## 2024-05-26 RX ORDER — SODIUM CHLORIDE 9 MG/ML
INJECTION, SOLUTION INTRAVENOUS CONTINUOUS
Status: ACTIVE | OUTPATIENT
Start: 2024-05-26 | End: 2024-05-27

## 2024-05-26 RX ADMIN — ASPIRIN 81 MG: 81 TABLET, COATED ORAL at 09:05

## 2024-05-26 RX ADMIN — IPRATROPIUM BROMIDE AND ALBUTEROL SULFATE 3 ML: .5; 2.5 SOLUTION RESPIRATORY (INHALATION) at 11:05

## 2024-05-26 RX ADMIN — CETIRIZINE HYDROCHLORIDE 10 MG: 10 TABLET, FILM COATED ORAL at 08:05

## 2024-05-26 RX ADMIN — HYDRALAZINE HYDROCHLORIDE 10 MG: 20 INJECTION, SOLUTION INTRAMUSCULAR; INTRAVENOUS at 08:05

## 2024-05-26 RX ADMIN — IPRATROPIUM BROMIDE AND ALBUTEROL SULFATE 3 ML: .5; 2.5 SOLUTION RESPIRATORY (INHALATION) at 06:05

## 2024-05-26 RX ADMIN — IPRATROPIUM BROMIDE AND ALBUTEROL SULFATE 3 ML: .5; 2.5 SOLUTION RESPIRATORY (INHALATION) at 03:05

## 2024-05-26 RX ADMIN — SODIUM CHLORIDE: 9 INJECTION, SOLUTION INTRAVENOUS at 06:05

## 2024-05-26 RX ADMIN — ASPIRIN 81 MG: 81 TABLET, COATED ORAL at 08:05

## 2024-05-26 RX ADMIN — HYDRALAZINE HYDROCHLORIDE 25 MG: 25 TABLET ORAL at 09:05

## 2024-05-26 RX ADMIN — HYDRALAZINE HYDROCHLORIDE 25 MG: 25 TABLET ORAL at 05:05

## 2024-05-26 NOTE — PLAN OF CARE
Problem: Adult Inpatient Plan of Care  Goal: Plan of Care Review  5/26/2024 0514 by Blanca Stevens RN  Outcome: Progressing  5/25/2024 2224 by Blanca Stevens RN  Outcome: Progressing  Goal: Patient-Specific Goal (Individualized)  5/26/2024 0514 by Blanca Stevens RN  Outcome: Progressing  5/25/2024 2224 by Blanca Stevens RN  Outcome: Progressing  Goal: Absence of Hospital-Acquired Illness or Injury  5/26/2024 0514 by Blanca Stevens RN  Outcome: Progressing  5/25/2024 2224 by Blanca Stevens RN  Outcome: Progressing  Goal: Optimal Comfort and Wellbeing  5/26/2024 0514 by Blanca Stevens RN  Outcome: Progressing  5/25/2024 2224 by Blanca Stevens RN  Outcome: Progressing  Goal: Readiness for Transition of Care  5/26/2024 0514 by Blanca Stevens RN  Outcome: Progressing  5/25/2024 2224 by Blanca Stevens RN  Outcome: Progressing     Problem: Fall Injury Risk  Goal: Absence of Fall and Fall-Related Injury  5/26/2024 0514 by Blanca Stevens, RN  Outcome: Progressing  5/25/2024 2224 by Blanca Stevens RN  Outcome: Progressing     Problem: Adult Inpatient Plan of Care  Goal: Plan of Care Review  5/26/2024 0514 by Blanca Stevens RN  Outcome: Progressing  5/25/2024 2224 by Blanca Stevens RN  Outcome: Progressing  Goal: Patient-Specific Goal (Individualized)  5/26/2024 0514 by Blanca Stevens RN  Outcome: Progressing  5/25/2024 2224 by Blanca Stevens RN  Outcome: Progressing  Goal: Absence of Hospital-Acquired Illness or Injury  5/26/2024 0514 by Blanca Stevens RN  Outcome: Progressing  5/25/2024 2224 by Brimingham, Blanca L., RN  Outcome: Progressing  Goal: Optimal Comfort and Wellbeing  5/26/2024 0514 by Blanca Stevens RN  Outcome: Progressing  5/25/2024 2224 by Blanca Stevens RN  Outcome: Progressing  Goal: Readiness for Transition of Care  5/26/2024 0514 by Blanca Stevens,  RN  Outcome: Progressing  5/25/2024 2224 by Blanca Stevens RN  Outcome: Progressing     Problem: Fall Injury Risk  Goal: Absence of Fall and Fall-Related Injury  5/26/2024 0514 by Blanca Stevens RN  Outcome: Progressing  5/25/2024 2224 by Blanca Stevens RN  Outcome: Progressing

## 2024-05-26 NOTE — SUBJECTIVE & OBJECTIVE
Interval History:  Patient is stable overnight with no further neurologic deficits and no return of symptoms that she presented to the emergency department with yesterday.  No weakness no sensory or motor deficits noted overnight.  Vital signs are stable with blood pressure now normalized at 124/57 and pulse 80 respirations even nonlabored and 96% sats.  Patient awaits MRI of the brain tomorrow as well as echocardiogram.  No further recommendations at this time other than monitor symptom in neurologic deficits.    Review of Systems   Constitutional:  Positive for fatigue. Negative for activity change, appetite change and fever.   HENT:  Negative for congestion, ear discharge, mouth sores, nosebleeds, rhinorrhea, sinus pressure, sinus pain and tinnitus.    Eyes: Negative.  Negative for pain, redness and itching.   Respiratory:  Negative for apnea, cough, choking, chest tightness, shortness of breath, wheezing and stridor.    Cardiovascular:  Negative for chest pain, palpitations and leg swelling.   Gastrointestinal:  Negative for abdominal distention, abdominal pain, anal bleeding, blood in stool, constipation and diarrhea.   Endocrine: Negative.    Genitourinary:  Negative for difficulty urinating, flank pain, frequency and urgency.   Musculoskeletal:  Positive for myalgias. Negative for arthralgias, back pain and gait problem.   Skin:  Negative for color change and pallor.   Allergic/Immunologic: Negative.    Neurological:  Positive for weakness, light-headedness, numbness and headaches. Negative for dizziness and facial asymmetry.   Hematological:  Negative for adenopathy. Does not bruise/bleed easily.   Psychiatric/Behavioral:  The patient is nervous/anxious.    All other systems reviewed and are negative.    Objective:     Vital Signs (Most Recent):  Temp: 97.8 °F (36.6 °C) (05/26/24 0724)  Pulse: 80 (05/26/24 0915)  Resp: 14 (05/26/24 0724)  BP: (!) 124/57 (05/26/24 0915)  SpO2: 96 % (05/26/24 0834) Vital Signs  (24h Range):  Temp:  [96 °F (35.6 °C)-98.4 °F (36.9 °C)] 97.8 °F (36.6 °C)  Pulse:  [62-90] 80  Resp:  [14-25] 14  SpO2:  [92 %-99 %] 96 %  BP: (124-246)/() 124/57     Weight: 61.2 kg (135 lb)  Body mass index is 23.91 kg/m².    Intake/Output Summary (Last 24 hours) at 5/26/2024 1020  Last data filed at 5/26/2024 0513  Gross per 24 hour   Intake --   Output 500 ml   Net -500 ml         Physical Exam  Vitals and nursing note reviewed.   Constitutional:       Appearance: Normal appearance. She is normal weight.   HENT:      Head: Normocephalic and atraumatic.      Nose: Nose normal.      Mouth/Throat:      Mouth: Mucous membranes are moist.      Pharynx: Oropharynx is clear.   Eyes:      Pupils: Pupils are equal, round, and reactive to light.   Pulmonary:      Breath sounds: Rhonchi and rales present.   Skin:     General: Skin is warm and dry.   Neurological:      General: No focal deficit present.      Mental Status: She is alert and oriented to person, place, and time. Mental status is at baseline.   Psychiatric:         Mood and Affect: Mood normal.         Behavior: Behavior normal.         Thought Content: Thought content normal.         Judgment: Judgment normal.             Significant Labs: All pertinent labs within the past 24 hours have been reviewed.  Recent Lab Results         05/26/24  0451   05/25/24  2117   05/25/24  1320   05/25/24  1318        Albumin 3.3       3.8       ALP 65       75       ALT 13       14       Anion Gap 9       11       AST 18       20       Baso # 0.28       0.25       Basophil % 1.9       2.2       BILIRUBIN TOTAL 0.8  Comment: For infants and newborns, interpretation of results should be based  on gestational age, weight and in agreement with clinical  observations.    Premature Infant recommended reference ranges:  Up to 24 hours.............<8.0 mg/dL  Up to 48 hours............<12.0 mg/dL  3-5 days..................<15.0 mg/dL  6-29 days.................<15.0 mg/dL          0.5  Comment: For infants and newborns, interpretation of results should be based  on gestational age, weight and in agreement with clinical  observations.    Premature Infant recommended reference ranges:  Up to 24 hours.............<8.0 mg/dL  Up to 48 hours............<12.0 mg/dL  3-5 days..................<15.0 mg/dL  6-29 days.................<15.0 mg/dL         BUN 13       15       Calcium 9.0       9.4       Chloride 104       103       Cholesterol Total       217  Comment: The National Cholesterol Education Program (NCEP) has set the  following guidelines (reference ranges) for Cholesterol:  Optimal.....................<200 mg/dL  Borderline High.............200-239 mg/dL  High........................> or = 240 mg/dL         CO2 26       25       Creatinine 0.7       0.8       Differential Method Automated       Automated       eGFR >60.0       >60.0       Eos # 0.2       0.1       Eos % 1.0       1.2       Glucose 81       101       Gran # (ANC) 9.1       6.9       Gran % 60.7       60.3       HDL       77  Comment: The National Cholesterol Education Program (NCEP) has set the  following guidelines (reference values) for HDL Cholesterol:  Low...............<40 mg/dL  Optimal...........>60 mg/dL         HDL/Cholesterol Ratio       35.5       Hematocrit 47.8       51.0       Hemoglobin 16.1       16.8       Immature Grans (Abs) 0.06  Comment: Mild elevation in immature granulocytes is non specific and   can be seen in a variety of conditions including stress response,   acute inflammation, trauma and pregnancy. Correlation with other   laboratory and clinical findings is essential.         0.04  Comment: Mild elevation in immature granulocytes is non specific and   can be seen in a variety of conditions including stress response,   acute inflammation, trauma and pregnancy. Correlation with other   laboratory and clinical findings is essential.         Immature Granulocytes 0.4       0.4       INR        1.1  Comment: Coumadin Therapy:  2.0 - 3.0 for INR for all indicators except mechanical heart valves  and antiphospholipid syndromes which should use 2.5 - 3.5.  LOT^040^PT Inn^[id number]         LDL Cholesterol       122.2  Comment: The National Cholesterol Education Program (NCEP) has set the  following guidelines (reference values) for LDL Cholesterol:  Optimal.......................<130 mg/dL  Borderline High...............130-159 mg/dL  High..........................160-189 mg/dL  Very High.....................>190 mg/dL         Lymph # 3.4       2.7       Lymph % 23.0       23.5       Magnesium  1.9             MCH 28.5       28.3       MCHC 33.7       32.9       MCV 85       86       Mono # 1.9       1.4       Mono % 13.0       12.4       MPV 10.4       10.8       Non-HDL Cholesterol       140  Comment: Risk category and Non-HDL cholesterol goals:  Coronary heart disease (CHD)or equivalent (10-year risk of CHD >20%):  Non-HDL cholesterol goal     <130 mg/dL  Two or more CHD risk factors and 10-year risk of CHD <= 20%:  Non-HDL cholesterol goal     <160 mg/dL  0 to 1 CHD risk factor:  Non-HDL cholesterol goal     <190 mg/dL         nRBC 0       0       Phosphorus Level 3.9             Platelet Count 424       473       POCT Glucose     90         Potassium 3.7       4.4       PROTEIN TOTAL 6.1       7.1       PT       11.3       RBC 5.64       5.93       RDW 15.9       16.3       Sodium 139       139       Total Cholesterol/HDL Ratio       2.8       Triglycerides       89  Comment: The National Cholesterol Education Program (NCEP) has set the  following guidelines (reference values) for triglycerides:  Normal......................<150 mg/dL  Borderline High.............150-199 mg/dL  High........................200-499 mg/dL         Troponin I 0.020  Comment: The reference interval for Troponin I represents the 99th percentile   cutoff   for our facility and is consistent with 3rd generation assay    performance.     0.022  Comment: The reference interval for Troponin I represents the 99th percentile   cutoff   for our facility and is consistent with 3rd generation assay   performance.             TSH       2.311       WBC 14.93       11.39               Significant Imaging: I have reviewed all pertinent imaging results/findings within the past 24 hours.

## 2024-05-26 NOTE — NURSING
Notified Dr. Connelly of B/P and run of V tach, new orders received.  Dr. Mayorga also notified and new orders received.

## 2024-05-26 NOTE — PLAN OF CARE
Problem: Adult Inpatient Plan of Care  Goal: Plan of Care Review  5/26/2024 1705 by Derrick Arellano RN  Outcome: Progressing  5/26/2024 1704 by Derrick Arellano RN  Outcome: Progressing  Goal: Patient-Specific Goal (Individualized)  5/26/2024 1705 by Derrick Arellano RN  Outcome: Progressing  5/26/2024 1704 by Derrick Arellano RN  Outcome: Progressing  Goal: Absence of Hospital-Acquired Illness or Injury  5/26/2024 1705 by Derrick Arellano RN  Outcome: Progressing  5/26/2024 1704 by Derrick Arellano RN  Outcome: Progressing  Goal: Optimal Comfort and Wellbeing  5/26/2024 1705 by Derrick Arellano RN  Outcome: Progressing  5/26/2024 1704 by Derrick Arellano RN  Outcome: Progressing  Goal: Readiness for Transition of Care  5/26/2024 1705 by Derrick Arellano RN  Outcome: Progressing  5/26/2024 1704 by Derrick Arellano RN  Outcome: Progressing     Problem: Fall Injury Risk  Goal: Absence of Fall and Fall-Related Injury  5/26/2024 1705 by Derrick Arellano RN  Outcome: Progressing  5/26/2024 1704 by Derrick Arellano RN  Outcome: Progressing     Problem: Stroke, Ischemic (Includes Transient Ischemic Attack)  Goal: Optimal Coping  Outcome: Progressing  Goal: Effective Bowel Elimination  Outcome: Progressing  Goal: Optimal Cerebral Tissue Perfusion  Outcome: Progressing  Goal: Optimal Cognitive Function  Outcome: Progressing  Goal: Improved Communication Skills  Outcome: Progressing  Goal: Optimal Functional Ability  Outcome: Progressing  Goal: Optimal Nutrition Intake  Outcome: Progressing  Goal: Effective Oxygenation and Ventilation  Outcome: Progressing  Goal: Improved Sensorimotor Function  Outcome: Progressing  Goal: Safe and Effective Swallow  Outcome: Progressing  Goal: Effective Urinary Elimination  Outcome: Progressing

## 2024-05-26 NOTE — NURSING
Notified Dr. Connelly of patient approximate urine OP of 400 cc this shift and decreased PO intake. Orders to start NS @ 125 cc/hr received.

## 2024-05-26 NOTE — PROGRESS NOTES
[facility] Medicine  Progress Note    Patient Name: Geraldine Cooksey  MRN: [id number]  Patient Class: IP- Inpatient   Admission Date: 5/25/2024  Length of Stay: 1 days  Attending Physician: Yong Connelly MD  Primary Care Provider: Yelena Dykes FNP        Subjective:     Principal Problem:Acute focal neurological deficit        HPI:        History           Chief Complaint   Patient presents with    Weakness       Left sided ,onset 45 min PTA      HPI:  Geraldine Cooksey is a 83 y.o. female with PMH as below who presents to the [facility] emergency department for evaluation of intermittent left arm and leg weakness and numbness over the past 24 hrs. It last recurred at 12:30 PM today and is resolving upon arrival. She has no other complaints.   In further history the patient states that this episodes actually started on Wednesday in has been coming intermittently a proximally 1-2 times per day since that time.  Patient states that today she had numbness of her left hand and left leg and was not able to walk to the ambulance.  Patient had to be carried.  By the time I saw this patient in the emergency department she was back to normal and had no neurologic deficits.  Strength was 5/5 equal and bilateral in  and lower extremity was equal and bilateral.  Patient denies any facial droop and or speech deficits.  Patient underwent vascular Neurology evaluation by telemedicine in the emergency department.  Recommendations are reviewed and appreciated.     PCP: Yelena Dykes FNP           Review of patient's allergies indicates:   Allergen Reactions    Codeine Nausea Only       Happened 50 years ago            Past Medical History:   Diagnosis Date    Bronchitis      COPD (chronic obstructive pulmonary disease)      PONV (postoperative nausea and vomiting)             Patient is an everyday smoker.    Overview/Hospital Course:  No notes on file    Interval History:  Patient is stable overnight  with no further neurologic deficits and no return of symptoms that she presented to the emergency department with yesterday.  No weakness no sensory or motor deficits noted overnight.  Vital signs are stable with blood pressure now normalized at 124/57 and pulse 80 respirations even nonlabored and 96% sats.  Patient awaits MRI of the brain tomorrow as well as echocardiogram.  No further recommendations at this time other than monitor symptom in neurologic deficits.    Review of Systems   Constitutional:  Positive for fatigue. Negative for activity change, appetite change and fever.   HENT:  Negative for congestion, ear discharge, mouth sores, nosebleeds, rhinorrhea, sinus pressure, sinus pain and tinnitus.    Eyes: Negative.  Negative for pain, redness and itching.   Respiratory:  Negative for apnea, cough, choking, chest tightness, shortness of breath, wheezing and stridor.    Cardiovascular:  Negative for chest pain, palpitations and leg swelling.   Gastrointestinal:  Negative for abdominal distention, abdominal pain, anal bleeding, blood in stool, constipation and diarrhea.   Endocrine: Negative.    Genitourinary:  Negative for difficulty urinating, flank pain, frequency and urgency.   Musculoskeletal:  Positive for myalgias. Negative for arthralgias, back pain and gait problem.   Skin:  Negative for color change and pallor.   Allergic/Immunologic: Negative.    Neurological:  Positive for weakness, light-headedness, numbness and headaches. Negative for dizziness and facial asymmetry.   Hematological:  Negative for adenopathy. Does not bruise/bleed easily.   Psychiatric/Behavioral:  The patient is nervous/anxious.    All other systems reviewed and are negative.    Objective:     Vital Signs (Most Recent):  Temp: 97.8 °F (36.6 °C) (05/26/24 0724)  Pulse: 80 (05/26/24 0915)  Resp: 14 (05/26/24 0724)  BP: (!) 124/57 (05/26/24 0915)  SpO2: 96 % (05/26/24 0834) Vital Signs (24h Range):  Temp:  [96 °F (35.6 °C)-98.4 °F  (36.9 °C)] 97.8 °F (36.6 °C)  Pulse:  [62-90] 80  Resp:  [14-25] 14  SpO2:  [92 %-99 %] 96 %  BP: (124-246)/() 124/57     Weight: 61.2 kg (135 lb)  Body mass index is 23.91 kg/m².    Intake/Output Summary (Last 24 hours) at 5/26/2024 1020  Last data filed at 5/26/2024 0513  Gross per 24 hour   Intake --   Output 500 ml   Net -500 ml         Physical Exam  Vitals and nursing note reviewed.   Constitutional:       Appearance: Normal appearance. She is normal weight.   HENT:      Head: Normocephalic and atraumatic.      Nose: Nose normal.      Mouth/Throat:      Mouth: Mucous membranes are moist.      Pharynx: Oropharynx is clear.   Eyes:      Pupils: Pupils are equal, round, and reactive to light.   Pulmonary:      Breath sounds: Rhonchi and rales present.   Skin:     General: Skin is warm and dry.   Neurological:      General: No focal deficit present.      Mental Status: She is alert and oriented to person, place, and time. Mental status is at baseline.   Psychiatric:         Mood and Affect: Mood normal.         Behavior: Behavior normal.         Thought Content: Thought content normal.         Judgment: Judgment normal.             Significant Labs: All pertinent labs within the past 24 hours have been reviewed.  Recent Lab Results         05/26/24  0451   05/25/24  2117   05/25/24  1320   05/25/24  1318        Albumin 3.3       3.8       ALP 65       75       ALT 13       14       Anion Gap 9       11       AST 18       20       Baso # 0.28       0.25       Basophil % 1.9       2.2       BILIRUBIN TOTAL 0.8  Comment: For infants and newborns, interpretation of results should be based  on gestational age, weight and in agreement with clinical  observations.    Premature Infant recommended reference ranges:  Up to 24 hours.............<8.0 mg/dL  Up to 48 hours............<12.0 mg/dL  3-5 days..................<15.0 mg/dL  6-29 days.................<15.0 mg/dL         0.5  Comment: For infants and newborns,  interpretation of results should be based  on gestational age, weight and in agreement with clinical  observations.    Premature Infant recommended reference ranges:  Up to 24 hours.............<8.0 mg/dL  Up to 48 hours............<12.0 mg/dL  3-5 days..................<15.0 mg/dL  6-29 days.................<15.0 mg/dL         BUN 13       15       Calcium 9.0       9.4       Chloride 104       103       Cholesterol Total       217  Comment: The National Cholesterol Education Program (NCEP) has set the  following guidelines (reference ranges) for Cholesterol:  Optimal.....................<200 mg/dL  Borderline High.............200-239 mg/dL  High........................> or = 240 mg/dL         CO2 26       25       Creatinine 0.7       0.8       Differential Method Automated       Automated       eGFR >60.0       >60.0       Eos # 0.2       0.1       Eos % 1.0       1.2       Glucose 81       101       Gran # (ANC) 9.1       6.9       Gran % 60.7       60.3       HDL       77  Comment: The National Cholesterol Education Program (NCEP) has set the  following guidelines (reference values) for HDL Cholesterol:  Low...............<40 mg/dL  Optimal...........>60 mg/dL         HDL/Cholesterol Ratio       35.5       Hematocrit 47.8       51.0       Hemoglobin 16.1       16.8       Immature Grans (Abs) 0.06  Comment: Mild elevation in immature granulocytes is non specific and   can be seen in a variety of conditions including stress response,   acute inflammation, trauma and pregnancy. Correlation with other   laboratory and clinical findings is essential.         0.04  Comment: Mild elevation in immature granulocytes is non specific and   can be seen in a variety of conditions including stress response,   acute inflammation, trauma and pregnancy. Correlation with other   laboratory and clinical findings is essential.         Immature Granulocytes 0.4       0.4       INR       1.1  Comment: Coumadin Therapy:  2.0 - 3.0  for INR for all indicators except mechanical heart valves  and antiphospholipid syndromes which should use 2.5 - 3.5.  LOT^040^PT Inn^[id number]         LDL Cholesterol       122.2  Comment: The National Cholesterol Education Program (NCEP) has set the  following guidelines (reference values) for LDL Cholesterol:  Optimal.......................<130 mg/dL  Borderline High...............130-159 mg/dL  High..........................160-189 mg/dL  Very High.....................>190 mg/dL         Lymph # 3.4       2.7       Lymph % 23.0       23.5       Magnesium  1.9             MCH 28.5       28.3       MCHC 33.7       32.9       MCV 85       86       Mono # 1.9       1.4       Mono % 13.0       12.4       MPV 10.4       10.8       Non-HDL Cholesterol       140  Comment: Risk category and Non-HDL cholesterol goals:  Coronary heart disease (CHD)or equivalent (10-year risk of CHD >20%):  Non-HDL cholesterol goal     <130 mg/dL  Two or more CHD risk factors and 10-year risk of CHD <= 20%:  Non-HDL cholesterol goal     <160 mg/dL  0 to 1 CHD risk factor:  Non-HDL cholesterol goal     <190 mg/dL         nRBC 0       0       Phosphorus Level 3.9             Platelet Count 424       473       POCT Glucose     90         Potassium 3.7       4.4       PROTEIN TOTAL 6.1       7.1       PT       11.3       RBC 5.64       5.93       RDW 15.9       16.3       Sodium 139       139       Total Cholesterol/HDL Ratio       2.8       Triglycerides       89  Comment: The National Cholesterol Education Program (NCEP) has set the  following guidelines (reference values) for triglycerides:  Normal......................<150 mg/dL  Borderline High.............150-199 mg/dL  High........................200-499 mg/dL         Troponin I 0.020  Comment: The reference interval for Troponin I represents the 99th percentile   cutoff   for our facility and is consistent with 3rd generation assay   performance.     0.022  Comment: The reference interval  for Troponin I represents the 99th percentile   cutoff   for our facility and is consistent with 3rd generation assay   performance.             TSH       2.311       WBC 14.93       11.39               Significant Imaging: I have reviewed all pertinent imaging results/findings within the past 24 hours.    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 As soon as available  PT/OT consult  Swallowing study consult  Fasting Lipids in AM      COPD (chronic obstructive pulmonary disease)  Patient's COPD is controlled currently.  Patient is currently off COPD Pathway. Continue scheduled inhalers Steroids, Antibiotics, and Supplemental oxygen and monitor respiratory status closely.     Nebulizer therapy q4h for 24 hours then prn  Smoking Cessation Counseling    Primary osteoarthritis of left knee  Treat pain symptomatically with Tylenol for now      Tobacco abuse  Dangers of cigarette smoking were reviewed with patient in detail. Patient was Referred to Tobacco Cessation Program. Nicotine replacement options were discussed. Nicotine replacement was discussed- prescribed      Elevated blood pressure reading  Chronic, controlled. Latest blood pressure and vitals reviewed-     Temp:  [98.4 °F (36.9 °C)]   Pulse:  [62-90]   Resp:  [17-25]   BP: (173-246)/()   SpO2:  [94 %-99 %] .   Home meds for hypertension were reviewed and noted below.       While in the hospital, will manage blood pressure as follows; Adjust home antihypertensive regimen as follows- As needed to keep BP <160/100    Will utilize p.r.n. blood pressure medication only if patient's blood pressure greater than 160/100 and she develops symptoms such as worsening chest pain or shortness of breath.        VTE Risk Mitigation (From admission, onward)           Ordered     enoxaparin injection 40 mg  Daily         05/25/24  1650     IP VTE HIGH RISK PATIENT  Once         05/25/24 1650     Place sequential compression device  Until discontinued         05/25/24 1650                    Discharge Planning   PEDRO:      Code Status: Full Code   Is the patient medically ready for discharge?:     Reason for patient still in hospital (select all that apply): Treatment                     Yong Connelly MD  Department of Hospital Medicine   [facility]

## 2024-05-27 VITALS
BODY MASS INDEX: 23.92 KG/M2 | SYSTOLIC BLOOD PRESSURE: 150 MMHG | HEART RATE: 82 BPM | WEIGHT: 135 LBS | RESPIRATION RATE: 18 BRPM | OXYGEN SATURATION: 94 % | DIASTOLIC BLOOD PRESSURE: 65 MMHG | HEIGHT: 63 IN | TEMPERATURE: 98 F

## 2024-05-27 LAB
ALBUMIN SERPL BCP-MCNC: 3.2 G/DL (ref 3.5–5.2)
ALP SERPL-CCNC: 62 U/L (ref 55–135)
ALT SERPL W/O P-5'-P-CCNC: 11 U/L (ref 10–44)
ANION GAP SERPL CALC-SCNC: 8 MMOL/L (ref 8–16)
AST SERPL-CCNC: 18 U/L (ref 10–40)
BACTERIA #/AREA URNS HPF: NORMAL /HPF
BASOPHILS NFR BLD: 1 % (ref 0–1.9)
BILIRUB SERPL-MCNC: 0.9 MG/DL (ref 0.1–1)
BUN SERPL-MCNC: 16 MG/DL (ref 8–23)
CALCIUM SERPL-MCNC: 8.7 MG/DL (ref 8.7–10.5)
CHLORIDE SERPL-SCNC: 108 MMOL/L (ref 95–110)
CO2 SERPL-SCNC: 23 MMOL/L (ref 23–29)
CREAT SERPL-MCNC: 0.6 MG/DL (ref 0.5–1.4)
DIFFERENTIAL METHOD BLD: ABNORMAL
EOSINOPHIL NFR BLD: 0 % (ref 0–8)
ERYTHROCYTE [DISTWIDTH] IN BLOOD BY AUTOMATED COUNT: 16 % (ref 11.5–14.5)
EST. GFR  (NO RACE VARIABLE): >60 ML/MIN/1.73 M^2
GLUCOSE SERPL-MCNC: 84 MG/DL (ref 70–110)
HCT VFR BLD AUTO: 47.2 % (ref 37–48.5)
HGB BLD-MCNC: 15.6 G/DL (ref 12–16)
IMM GRANULOCYTES # BLD AUTO: ABNORMAL K/UL
IMM GRANULOCYTES NFR BLD AUTO: ABNORMAL %
LYMPHOCYTES NFR BLD: 8 % (ref 18–48)
MAGNESIUM SERPL-MCNC: 1.7 MG/DL (ref 1.6–2.6)
MCH RBC QN AUTO: 28.4 PG (ref 27–31)
MCHC RBC AUTO-ENTMCNC: 33.1 G/DL (ref 32–36)
MCV RBC AUTO: 86 FL (ref 82–98)
MICROSCOPIC COMMENT: NORMAL
MONOCYTES NFR BLD: 7 % (ref 4–15)
NEUTROPHILS NFR BLD: 83 % (ref 38–73)
NRBC BLD-RTO: 0 /100 WBC
OHS QRS DURATION: 86 MS
OHS QRS DURATION: 88 MS
OHS QTC CALCULATION: 417 MS
OHS QTC CALCULATION: 440 MS
PHOSPHATE SERPL-MCNC: 3.3 MG/DL (ref 2.7–4.5)
PLATELET # BLD AUTO: 411 K/UL (ref 150–450)
PLATELET BLD QL SMEAR: ABNORMAL
PMV BLD AUTO: 10.5 FL (ref 9.2–12.9)
POTASSIUM SERPL-SCNC: 3.6 MMOL/L (ref 3.5–5.1)
PROT SERPL-MCNC: 5.9 G/DL (ref 6–8.4)
RBC # BLD AUTO: 5.49 M/UL (ref 4–5.4)
RBC #/AREA URNS HPF: 1 /HPF (ref 0–4)
SODIUM SERPL-SCNC: 139 MMOL/L (ref 136–145)
SQUAMOUS #/AREA URNS HPF: 3 /HPF
WBC # BLD AUTO: 19.75 K/UL (ref 3.9–12.7)
WBC #/AREA URNS HPF: 1 /HPF (ref 0–5)

## 2024-05-27 PROCEDURE — 25000003 PHARM REV CODE 250: Performed by: INTERNAL MEDICINE

## 2024-05-27 PROCEDURE — 85027 COMPLETE CBC AUTOMATED: CPT | Performed by: INTERNAL MEDICINE

## 2024-05-27 PROCEDURE — 97530 THERAPEUTIC ACTIVITIES: CPT

## 2024-05-27 PROCEDURE — 85007 BL SMEAR W/DIFF WBC COUNT: CPT | Performed by: INTERNAL MEDICINE

## 2024-05-27 PROCEDURE — 83735 ASSAY OF MAGNESIUM: CPT | Performed by: INTERNAL MEDICINE

## 2024-05-27 PROCEDURE — 81000 URINALYSIS NONAUTO W/SCOPE: CPT | Performed by: INTERNAL MEDICINE

## 2024-05-27 PROCEDURE — 80053 COMPREHEN METABOLIC PANEL: CPT | Performed by: INTERNAL MEDICINE

## 2024-05-27 PROCEDURE — 25000242 PHARM REV CODE 250 ALT 637 W/ HCPCS: Performed by: INTERNAL MEDICINE

## 2024-05-27 PROCEDURE — 94761 N-INVAS EAR/PLS OXIMETRY MLT: CPT

## 2024-05-27 PROCEDURE — 84100 ASSAY OF PHOSPHORUS: CPT | Performed by: INTERNAL MEDICINE

## 2024-05-27 PROCEDURE — 1111F DSCHRG MED/CURRENT MED MERGE: CPT | Mod: CPTII,,, | Performed by: INTERNAL MEDICINE

## 2024-05-27 PROCEDURE — 27000221 HC OXYGEN, UP TO 24 HOURS

## 2024-05-27 PROCEDURE — 99239 HOSP IP/OBS DSCHRG MGMT >30: CPT | Mod: ,,, | Performed by: INTERNAL MEDICINE

## 2024-05-27 PROCEDURE — 97161 PT EVAL LOW COMPLEX 20 MIN: CPT

## 2024-05-27 RX ORDER — LEVOFLOXACIN 500 MG/1
500 TABLET, FILM COATED ORAL DAILY
Qty: 10 TABLET | Refills: 0 | Status: SHIPPED | OUTPATIENT
Start: 2024-05-27

## 2024-05-27 RX ORDER — HYDRALAZINE HYDROCHLORIDE 25 MG/1
25 TABLET, FILM COATED ORAL EVERY 8 HOURS
Qty: 90 TABLET | Refills: 11 | Status: SHIPPED | OUTPATIENT
Start: 2024-05-27 | End: 2025-05-27

## 2024-05-27 RX ORDER — HYDRALAZINE HYDROCHLORIDE 50 MG/1
25 TABLET, FILM COATED ORAL 3 TIMES DAILY
Qty: 45 TABLET | Refills: 11 | Status: SHIPPED | OUTPATIENT
Start: 2024-05-27 | End: 2025-05-27

## 2024-05-27 RX ORDER — IPRATROPIUM BROMIDE AND ALBUTEROL SULFATE 2.5; .5 MG/3ML; MG/3ML
SOLUTION RESPIRATORY (INHALATION)
Status: COMPLETED
Start: 2024-05-27 | End: 2024-05-27

## 2024-05-27 RX ADMIN — ASPIRIN 81 MG: 81 TABLET, COATED ORAL at 09:05

## 2024-05-27 RX ADMIN — FLUTICASONE FUROATE, UMECLIDINIUM BROMIDE AND VILANTEROL TRIFENATATE 1 PUFF: 100; 62.5; 25 POWDER RESPIRATORY (INHALATION) at 09:05

## 2024-05-27 RX ADMIN — HYDRALAZINE HYDROCHLORIDE 25 MG: 25 TABLET ORAL at 06:05

## 2024-05-27 RX ADMIN — HYDRALAZINE HYDROCHLORIDE 25 MG: 25 TABLET ORAL at 02:05

## 2024-05-27 RX ADMIN — CETIRIZINE HYDROCHLORIDE 10 MG: 10 TABLET, FILM COATED ORAL at 09:05

## 2024-05-27 NOTE — PLAN OF CARE
Patient cleared for discharge from case management standpoint.    Pt's doctors are closed for the holiday. Pt is to call and set up their own follow up appts. One hospital and one neuro.     Chart and discharge orders reviewed.  Patient discharged home with no further case management needs.       05/27/24 1414   Final Note   Assessment Type Final Discharge Note   Anticipated Discharge Disposition Home   What phone number can be called within the next 1-3 days to see how you are doing after discharge?   ([phone])   Hospital Resources/Appts/Education Provided Appointment suggestion unavailable   Post-Acute Status   Discharge Delays None known at this time

## 2024-05-27 NOTE — HOSPITAL COURSE
Patient is a 83-year-old female that presented to the emergency department complaining of left-sided weakness.  Patient states this started intermittently 2 days prior to presentation.  Patient states on the day of presentation the left-sided weakness seemed to get worse.  Patient had no speech difficulties in and only left arm and left leg weakness that made it so she could not walk to the ambulance.  By the time she reached the hospital this had resolved.  Patient had no further neurologic deficits during her admission here.  Vascular Neurology was consulted in the emergency department and recommended MRI of the brain.  Patient also had a left middle cerebral artery aneurysm that was noted in MRI was suggested for follow up.  Patient otherwise has remained stable during this admission without new focal neurologic deficits.  Patient did have a mild leukocytosis that was present at the time of discharge although differential showed 83 granulocytes 8 lymphocytes.  Chest x-ray and urinalysis was done just prior to discharge in were unremarkable.  All other labs during this admission were normal.  Patient will be sent home on empiric antibiotics for leukocytosis but no infective focus has been noted.  Patient was advised to follow up with her primary care physician within 1 week post discharge for repeat CBC.  Patient did have some systolic hypertension during this admission in the patient was started on hydralazine 25 mg q.8 hours for control.  At the time of discharge vital signs showed a blood pressure 150/65 pulse 82 respirations 18 temperature 98° 0.0 and pulse ox 98-99%.  Patient was stable at the time of discharge and we will follow up with primary care physician within 1 week and ambulatory referral for neurology was placed at the time of discharge.

## 2024-05-27 NOTE — PLAN OF CARE
Problem: Adult Inpatient Plan of Care  Goal: Plan of Care Review  Outcome: Progressing  Goal: Patient-Specific Goal (Individualized)  Outcome: Progressing  Goal: Absence of Hospital-Acquired Illness or Injury  Outcome: Progressing  Goal: Optimal Comfort and Wellbeing  Outcome: Progressing  Goal: Readiness for Transition of Care  Outcome: Progressing     Problem: Fall Injury Risk  Goal: Absence of Fall and Fall-Related Injury  Outcome: Progressing     Problem: Stroke, Ischemic (Includes Transient Ischemic Attack)  Goal: Optimal Coping  Outcome: Progressing  Goal: Effective Bowel Elimination  Outcome: Progressing  Goal: Optimal Cerebral Tissue Perfusion  Outcome: Progressing  Goal: Optimal Cognitive Function  Outcome: Progressing  Goal: Improved Communication Skills  Outcome: Progressing  Goal: Optimal Functional Ability  Outcome: Progressing  Goal: Optimal Nutrition Intake  Outcome: Progressing  Goal: Effective Oxygenation and Ventilation  Outcome: Progressing  Goal: Improved Sensorimotor Function  Outcome: Progressing  Goal: Safe and Effective Swallow  Outcome: Progressing  Goal: Effective Urinary Elimination  Outcome: Progressing

## 2024-05-27 NOTE — PLAN OF CARE
IMM signed by Pt at bedside.       05/27/24 1040   Medicare Message   Important Message from Medicare regarding Discharge Appeal Rights Given to patient/caregiver;Explained to patient/caregiver;Signed/date by patient/caregiver   Date IMM was signed 05/27/24   Time IMM was signed 1823

## 2024-05-27 NOTE — PLAN OF CARE
Problem: Adult Inpatient Plan of Care  Goal: Plan of Care Review  Outcome: Met  Goal: Patient-Specific Goal (Individualized)  Outcome: Met  Goal: Absence of Hospital-Acquired Illness or Injury  Outcome: Met  Goal: Optimal Comfort and Wellbeing  Outcome: Met  Goal: Readiness for Transition of Care  Outcome: Met     Problem: Fall Injury Risk  Goal: Absence of Fall and Fall-Related Injury  Outcome: Met     Problem: Stroke, Ischemic (Includes Transient Ischemic Attack)  Goal: Optimal Coping  Outcome: Met  Goal: Effective Bowel Elimination  Outcome: Met  Goal: Optimal Cerebral Tissue Perfusion  Outcome: Met  Goal: Optimal Cognitive Function  Outcome: Met  Goal: Improved Communication Skills  Outcome: Met  Goal: Optimal Functional Ability  Outcome: Met  Goal: Optimal Nutrition Intake  Outcome: Met  Goal: Effective Oxygenation and Ventilation  Outcome: Met  Goal: Improved Sensorimotor Function  Outcome: Met  Goal: Safe and Effective Swallow  Outcome: Met  Goal: Effective Urinary Elimination  Outcome: Met

## 2024-05-27 NOTE — PLAN OF CARE
Inpatient Upgrade Note    Geraldine Cooksey has warranted treatment spanning two or more midnights of hospital level care for the management of  intermittent left arm and leg weakness and numbness . She continues to require daily labs, further testing/imaging, monitoring of vital signs, and medication adjustments. Her condition is also complicated by the following comorbidities: Chronic respiratory disease.

## 2024-05-27 NOTE — PLAN OF CARE
[facility]urg  Initial Discharge Assessment       Assessment completed at bedside with Pt, and all information on FaceSheet confirmed, including demographics, PCP, pharmacy and insurance. Pt has not addressed advance directives, and reports Ming Bal Daughter [phone] is her NOK. She currently lives at home with grandson. Her PCP is Dr. Dykes , and last appointment was 1 year ago. Her preferred pharmacy is [facility]. She denies any HH/HD/Blood Thinners. For DME Pt has a walker. For transportation to appointments, she usually drives herself but at discharge, daughter to provide.  She denies any recent hospitalizations. Plan for discharge is to return home with family.  Case Management to continue to follow for discharge planning needs.              Primary Care Provider: Yelena Dykes FNP    Admission Diagnosis: TIA (transient ischemic attack) [G45.9]  Severe uncontrolled hypertension [I10]  Stroke-like symptoms [R29.90]  Acute focal neurological deficit [R29.818]  Chest pain [R07.9]    Admission Date: 5/25/2024  Expected Discharge Date: 5/27/2024    Transition of Care Barriers: None    Payor: HU[employer] MANAGED MEDICARE / Plan: HU[employer] MEDICARE HMO / Product Type: Capitation /     Extended Emergency Contact Information  Primary Emergency Contact: Mely Henderson   [place]  Mobile Phone: [phone]  Relation: Daughter  Secondary Emergency Contact: Ming Bal  Mobile Phone: [phone]  Relation: Daughter   needed? No    Discharge Plan A: Home with family  Discharge Plan B: Home with family      [facility] [place] - [address]  [address]  [address]  [place] [address]  Phone: [phone] Fax: [phone]    [facility] [place] - [address]  [address]  [place] [address]  Phone: [phone] Fax: [phone]    [facility] - [facility], [place] - [address]  [place] [address]  Phone: [phone] Fax: [phone]      Initial Assessment (most recent)       Adult Discharge Assessment - 05/27/24 1050          Discharge Assessment    Assessment Type Discharge Planning Assessment     Confirmed/corrected address, phone number and insurance Yes     Confirmed Demographics Correct on Facesheet     Source of Information patient     Reason For Admission Transient ischemic attack     People in Home grandchild(cortes)     Facility Arrived From: home     Do you expect to return to your current living situation? Yes     Do you have help at home or someone to help you manage your care at home? Yes     Who are your caregiver(s) and their phone number(s)? Ming Bal  Daughter  [phone]     Prior to hospitilization cognitive status: Alert/Oriented     Current cognitive status: Alert/Oriented     Walking or Climbing Stairs Difficulty no     Dressing/Bathing Difficulty no     Home Layout Able to live on 1st floor     Equipment Currently Used at Home walker, standard     Readmission within 30 days? No     Patient currently being followed by outpatient case management? No     Do you currently have service(s) that help you manage your care at home? No     Do you take prescription medications? Yes     Do you have prescription coverage? Yes     Coverage [employer]     Do you have any problems affording any of your prescribed medications? No     Is the patient taking medications as prescribed? yes     Who is going to help you get home at discharge? Ming Bal  Daughter  [phone]     How do you get to doctors appointments? family or friend will provide;car, drives self     Are you on dialysis? No     Do you take coumadin? No     Discharge Plan A Home with family     Discharge Plan B Home with family     DME Needed Upon Discharge  none     Discharge Plan discussed with: Patient     Transition of Care Barriers None        Physical Activity    On average, how many days per week do you  engage in moderate to strenuous exercise (like a brisk walk)? 0 days     On average, how many minutes do you engage in exercise at this level? 0 min        Financial Resource Strain    How hard is it for you to pay for the very basics like food, housing, medical care, and heating? Somewhat hard        Housing Stability    In the last 12 months, was there a time when you were not able to pay the mortgage or rent on time? No     At any time in the past 12 months, were you homeless or living in a shelter (including now)? No        Transportation Needs    Has the lack of transportation kept you from medical appointments, meetings, work or from getting things needed for daily living? No        Food Insecurity    Within the past 12 months, you worried that your food would run out before you got the money to buy more. Never true     Within the past 12 months, the food you bought just didn't last and you didn't have money to get more. Never true        Stress    Do you feel stress - tense, restless, nervous, or anxious, or unable to sleep at night because your mind is troubled all the time - these days? Not at all        Alcohol Use    Q1: How often do you have a drink containing alcohol? Never     Q2: How many drinks containing alcohol do you have on a typical day when you are drinking? Patient does not drink     Q3: How often do you have six or more drinks on one occasion? Never        Utilities    In the past 12 months has the electric, gas, oil, or water company threatened to shut off services in your home? No

## 2024-05-27 NOTE — DISCHARGE SUMMARY
[facility] Medicine  Discharge Summary      Patient Name: Geraldine Cooksey  MRN: [id number]  RIKKI: [id number]  Patient Class: IP- Inpatient  Admission Date: 5/25/2024  Hospital Length of Stay: 2 days  Discharge Date and Time:  05/27/2024 1:53 PM  Attending Physician: Yong Connelly MD   Discharging Provider: Yong Connelly MD  Primary Care Provider: Yelena Dykes FNP    Primary Care Team: Networked reference to record PCT     HPI:         History           Chief Complaint   Patient presents with    Weakness       Left sided ,onset 45 min PTA      HPI:  Geraldine Cooksey is a 83 y.o. female with PMH as below who presents to the [facility] emergency department for evaluation of intermittent left arm and leg weakness and numbness over the past 24 hrs. It last recurred at 12:30 PM today and is resolving upon arrival. She has no other complaints.   In further history the patient states that this episodes actually started on Wednesday in has been coming intermittently a proximally 1-2 times per day since that time.  Patient states that today she had numbness of her left hand and left leg and was not able to walk to the ambulance.  Patient had to be carried.  By the time I saw this patient in the emergency department she was back to normal and had no neurologic deficits.  Strength was 5/5 equal and bilateral in  and lower extremity was equal and bilateral.  Patient denies any facial droop and or speech deficits.  Patient underwent vascular Neurology evaluation by telemedicine in the emergency department.  Recommendations are reviewed and appreciated.     PCP: Yelena Dykes FNP           Review of patient's allergies indicates:   Allergen Reactions    Codeine Nausea Only       Happened 50 years ago            Past Medical History:   Diagnosis Date    Bronchitis      COPD (chronic obstructive pulmonary disease)      PONV (postoperative nausea and vomiting)             Patient is an everyday  smoker.    * No surgery found *      Hospital Course:   Patient is a 83-year-old female that presented to the emergency department complaining of left-sided weakness.  Patient states this started intermittently 2 days prior to presentation.  Patient states on the day of presentation the left-sided weakness seemed to get worse.  Patient had no speech difficulties in and only left arm and left leg weakness that made it so she could not walk to the ambulance.  By the time she reached the hospital this had resolved.  Patient had no further neurologic deficits during her admission here.  Vascular Neurology was consulted in the emergency department and recommended MRI of the brain.  Patient also had a left middle cerebral artery aneurysm that was noted in MRI was suggested for follow up.  Patient otherwise has remained stable during this admission without new focal neurologic deficits.  Patient did have a mild leukocytosis that was present at the time of discharge although differential showed 83 granulocytes 8 lymphocytes.  Chest x-ray and urinalysis was done just prior to discharge in were unremarkable.  All other labs during this admission were normal.  Patient will be sent home on empiric antibiotics for leukocytosis but no infective focus has been noted.  Patient was advised to follow up with her primary care physician within 1 week post discharge for repeat CBC.  Patient did have some systolic hypertension during this admission in the patient was started on hydralazine 25 mg q.8 hours for control.  At the time of discharge vital signs showed a blood pressure 150/65 pulse 82 respirations 18 temperature 98° 0.0 and pulse ox 98-99%.  Patient was stable at the time of discharge and we will follow up with primary care physician within 1 week and ambulatory referral for neurology was placed at the time of discharge.     Goals of Care Treatment Preferences:  Code Status: Full Code      Consults:   Consults (From admission,  onward)          Status Ordering Provider     Case Management/  Once        Provider:  (Not yet assigned)    Acknowledged BONNIE WOODS            No new Assessment & Plan notes have been filed under this hospital service since the last note was generated.  Service: Hospital Medicine    Final Active Diagnoses:    Diagnosis Date Noted POA    PRINCIPAL PROBLEM:  Acute focal neurological deficit [R29.818] 05/25/2024 Yes    COPD (chronic obstructive pulmonary disease) [J44.9] 07/02/2019 Yes    TIA involving carotid artery [G45.1] 05/25/2024 Yes    Tobacco abuse [Z72.0] 07/02/2019 Yes    Elevated blood pressure reading [R03.0] 07/02/2019 Yes      Problems Resolved During this Admission:       Discharged Condition: stable    Disposition: Home or Self Care    Follow Up:   Follow-up Information       Yelena Dykes FNP. Schedule an appointment as soon as possible for a visit.    Specialty: Internal Medicine  Why: Pt's doctor's office closed for holiday. Pt will need to call and set up their own hospital follow up appt.  Contact information:  [address] [place] [address] [phone]                           Patient Instructions:      Ambulatory referral/consult to Neurology   Standing Status: Future   Referral Priority: Routine Referral Type: Consultation   Referral Reason: Specialty Services Required   Requested Specialty: Neurology   Number of Visits Requested: 1     Diet Cardiac     Notify your health care provider if you experience any of the following:  temperature >100.4     Notify your health care provider if you experience any of the following:  persistent nausea and vomiting or diarrhea     Notify your health care provider if you experience any of the following:  severe uncontrolled pain     Notify your health care provider if you experience any of the following:  redness, tenderness, or signs of infection (pain, swelling, redness, odor or green/yellow discharge around  incision site)     Notify your health care provider if you experience any of the following:  difficulty breathing or increased cough     Notify your health care provider if you experience any of the following:  severe persistent headache     Activity as tolerated       Significant Diagnostic Studies: Labs: All labs within the past 24 hours have been reviewed    Pending Diagnostic Studies:       Procedure Component Value Units Date/Time    EKG 12-lead [[id number]]     Order Status: Sent Lab Status: No result            Medications:  Reconciled Home Medications:      Medication List        START taking these medications      hydrALAZINE 25 MG tablet  Commonly known as: APRESOLINE  Take 1 tablet (25 mg total) by mouth every 8 (eight) hours.     levoFLOXacin 500 MG tablet  Commonly known as: LEVAQUIN  Take 1 tablet (500 mg total) by mouth once daily.            CONTINUE taking these medications      albuterol 90 mcg/actuation inhaler  Commonly known as: PROVENTIL/VENTOLIN HFA  Inhale 1-2 puffs into the lungs every 6 (six) hours as needed for Wheezing. Rescue     aspirin 81 MG EC tablet  Commonly known as: ECOTRIN  Take 1 tablet (81 mg total) by mouth 2 (two) times a day. for 14 days     cetirizine 10 MG tablet  Commonly known as: ZYRTEC  Take 10 mg by mouth daily as needed for Allergies.     HYDROcodone-acetaminophen 7.5-325 mg per tablet  Commonly known as: NORCO  Take 1 tablet by mouth every 6 (six) hours as needed for Pain.     ibuprofen 200 MG tablet  Commonly known as: ADVIL,MOTRIN  Take 200 mg by mouth.     ondansetron 4 MG Tbdl  Commonly known as: ZOFRAN-ODT  Take 1 tablet (4 mg total) by mouth every 6 (six) hours as needed.     TRELEGY ELLIPTA 100-62.5-25 mcg Dsdv  Generic drug: fluticasone-umeclidin-vilanter  Take 1 puff by mouth once daily.              Indwelling Lines/Drains at time of discharge:   Lines/Drains/Airways       None                   Time spent on the discharge of patient: 35   minutes          Yong Connelly MD  Department of [facility] Medicine  [facility]

## 2024-05-27 NOTE — PT/OT/SLP EVAL
Physical Therapy Evaluation    Patient Name:  Geraldine Cooksey   MRN:  [id number]    Recommendations:     Discharge Recommendations: Home with family and home health    Discharge Equipment Recommendations:  none   Barriers to discharge:  Patient lives with nephew who works during the day.    Assessment:     Geraldine Cooksey is a 83 y.o. female admitted with a medical diagnosis of Acute focal neurological deficit.  She presents with the following impairments/functional limitations:   decreased functional activity tolerance.    Rehab Prognosis: Good; patient would benefit from acute skilled PT services to address these deficits and reach maximum level of function.    Recent Surgery: * No surgery found *      Plan:     During this hospitalization, patient to be seen  3x/wk to address the identified rehab impairments via   and progress toward the following goals:    Plan of Care Expires:   upon discharge    Subjective     Chief Complaint: acute focal neurological deficit  Patient/Family Comments/goals: to go home  Pain/Comfort:  None reported     Patients cultural, spiritual, Latter-day conflicts given the current situation:      Living Environment:  Lives in a single level home with 2 steps to enter with one handrail.  Prior to admission, patients level of function was independent.  Equipment used at home: rolling walker at times .  DME owned (not currently used): single point cane, bedside commode, and rollator and quad cane .  Upon discharge, patient will have assistance from nephew.    Objective:     Communicated with nursing prior to session.  Patient found supine with    upon PT entry to room.    General Precautions: Standard,    Orthopedic Precautions:  none  Braces: none   Respiratory Status: Room air    Exams:  RLE Strength: WFL  LLE Strength: WFL    Functional Mobility:  Bed Mobility:     Supine to Sit: independence  Sit to Supine: independence  Transfers:     Sit to Stand:  independence with rolling  walker  Gait: Ambulated 15 feet with rolling walker with SBA.      AM-PAC 6 CLICK MOBILITY  Total Score:        Treatment & Education:  Upon entering room, patient was found supine in bed with daughter present.  Performed supine to sit independently.  Performed sit to stand independently with rolling walker.  Patient ambulated 15 feet with rolling walker with SBA.  Patient returned to sitting edge of bed and performed sit to supine independently.    Patient left supine with all lines intact and call button in reach.    GOALS:   1.Patient to ambulate 100 feet with rolling walker independently.      History:     Past Medical History:   Diagnosis Date    Bronchitis     COPD (chronic obstructive pulmonary disease)     PONV (postoperative nausea and vomiting)        Past Surgical History:   Procedure Laterality Date    COSMETIC SURGERY  2012    bilateral eye lift    HYSTERECTOMY      IRRIGATION AND DEBRIDEMENT OF UPPER EXTREMITY Bilateral 11/15/2022    Procedure: IRRIGATION AND DEBRIDEMENT, UPPER EXTREMITY;  Surgeon: Leighton Cronin DO;  Location: [facility] OR;  Service: Orthopedics;  Laterality: Bilateral;    IRRIGATION AND DEBRIDEMENT OF UPPER EXTREMITY Bilateral 11/17/2022    Procedure: IRRIGATION AND DEBRIDEMENT, UPPER EXTREMITY;  Surgeon: Leighton Cronin DO;  Location: [facility] OR;  Service: Orthopedics;  Laterality: Bilateral;  Second look irrigation debridement need wound VAC for possible wound VAC placement    OPEN REDUCTION AND INTERNAL FIXATION (ORIF) OF FRACTURE OF FEMUR USING DYNAMIC HIP SCREW Left 7/3/2019    Procedure: ORIF, HIP, USING DYNAMIC HIP SCREW;  Surgeon: Juan Jose Gunn MD;  Location: [facility] OR;  Service: Orthopedics;  Laterality: Left;    REVERSE TOTAL SHOULDER ARTHROPLASTY Right 4/26/2023    Procedure: ARTHROPLASTY, SHOULDER, TOTAL, REVERSE;  Surgeon: Luis Enrique Pimentel II, MD;  Location: [facility] OR;  Service: Orthopedics;  Laterality: Right;       Time Tracking:     PT Received On:  5/27/24  PT Start Time:        PT Stop Time:    PT Total Time (min):   25    Billable Minutes: Evaluation 15 and Therapeutic Activity 15      05/27/2024

## 2024-05-27 NOTE — NURSING
Patient and daughter both verbalized understanding of new medications and follow up appointments. IV taken out with cath still in place. Patient tolerated well. Patient taken off unit via wheelchair

## 2024-05-31 ENCOUNTER — PATIENT OUTREACH (OUTPATIENT)
Dept: ADMINISTRATIVE | Facility: CLINIC | Age: 84
End: 2024-05-31
Payer: MEDICARE

## 2024-05-31 NOTE — PROGRESS NOTES
C3 nurse attempted to contact Geraldine Cooksey  for a TCC post hospital discharge follow up call. No answer. The patient does not have a scheduled HOSFU appointment, and the pt does not have an [facility] PCP.

## 2024-06-03 NOTE — PROGRESS NOTES
2nd Attempt made to reach patient for TCC call. Left voicemail please call [phone] leave first name, last name, and  Jodie will return your call.

## 2024-06-03 NOTE — PROGRESS NOTES
3rd Attempt made to reach patient for TCC call. Left voicemail please call [phone] leave first name, last name, and  Jodie will return your call.

## 2024-06-04 NOTE — PLAN OF CARE
Through communication with [facility], the Inpatient order is being changed to observation as the payer will not authorize Inpatient and the facility agrees not to appeal or challenge the change in status. The account will be changed to Observation for billing purposes.      Shruthi Robertson MD  Physician Advisor

## 2024-07-16 ENCOUNTER — HOSPITAL ENCOUNTER (INPATIENT)
Facility: HOSPITAL | Age: 84
LOS: 3 days | Discharge: HOME HEALTH | DRG: 871 | End: 2024-07-20
Attending: EMERGENCY MEDICINE | Admitting: STUDENT IN AN ORGANIZED HEALTH CARE EDUCATION/TRAINING PROGRAM
Payer: MEDICARE

## 2024-07-16 DIAGNOSIS — J18.9 COMMUNITY ACQUIRED PNEUMONIA: ICD-10-CM

## 2024-07-16 DIAGNOSIS — J18.9 COMMUNITY ACQUIRED PNEUMONIA OF RIGHT LOWER LOBE OF LUNG: ICD-10-CM

## 2024-07-16 DIAGNOSIS — J18.9 PNEUMONIA OF RIGHT LOWER LOBE DUE TO INFECTIOUS ORGANISM: Primary | ICD-10-CM

## 2024-07-16 DIAGNOSIS — R07.9 CHEST PAIN: ICD-10-CM

## 2024-07-16 DIAGNOSIS — R06.02 SHORTNESS OF BREATH: ICD-10-CM

## 2024-07-16 PROBLEM — J90 PLEURAL EFFUSION: Status: ACTIVE | Noted: 2024-07-16

## 2024-07-16 PROBLEM — E87.6 HYPOKALEMIA: Status: ACTIVE | Noted: 2024-07-16

## 2024-07-16 LAB
ALBUMIN SERPL BCP-MCNC: 2.8 G/DL (ref 3.5–5.2)
ALP SERPL-CCNC: 199 U/L (ref 55–135)
ALT SERPL W/O P-5'-P-CCNC: 71 U/L (ref 10–44)
ANION GAP SERPL CALC-SCNC: 13 MMOL/L (ref 8–16)
AORTIC ROOT ANNULUS: 3.05 CM
AORTIC VALVE CUSP SEPERATION: 2.14 CM
APICAL FOUR CHAMBER EJECTION FRACTION: 72 %
APPEARANCE FLD: CLEAR
ASCENDING AORTA: 2.86 CM
AST SERPL-CCNC: 72 U/L (ref 10–40)
AV INDEX (PROSTH): 0.85
AV MEAN GRADIENT: 6 MMHG
AV PEAK GRADIENT: 11 MMHG
AV REGURGITATION PRESSURE HALF TIME: 1112.52 MS
AV VALVE AREA BY VELOCITY RATIO: 2.56 CM²
AV VALVE AREA: 2.56 CM²
AV VELOCITY RATIO: 0.85
BASOPHILS # BLD AUTO: 0.18 K/UL (ref 0–0.2)
BASOPHILS NFR BLD: 1.4 % (ref 0–1.9)
BILIRUB SERPL-MCNC: 0.5 MG/DL (ref 0.1–1)
BILIRUB UR QL STRIP: NEGATIVE
BNP SERPL-MCNC: 166 PG/ML (ref 0–99)
BODY FLD TYPE: NORMAL
BSA FOR ECHO PROCEDURE: 1.68 M2
BUN SERPL-MCNC: 21 MG/DL (ref 8–23)
CALCIUM SERPL-MCNC: 8.7 MG/DL (ref 8.7–10.5)
CHLORIDE SERPL-SCNC: 101 MMOL/L (ref 95–110)
CLARITY UR: CLEAR
CO2 SERPL-SCNC: 23 MMOL/L (ref 23–29)
COLOR FLD: YELLOW
COLOR UR: YELLOW
CREAT SERPL-MCNC: 1 MG/DL (ref 0.5–1.4)
CV ECHO LV RWT: 0.49 CM
DIFFERENTIAL METHOD BLD: ABNORMAL
DOP CALC AO PEAK VEL: 1.66 M/S
DOP CALC AO VTI: 29.5 CM
DOP CALC LVOT AREA: 3 CM2
DOP CALC LVOT DIAMETER: 1.96 CM
DOP CALC LVOT PEAK VEL: 1.41 M/S
DOP CALC LVOT STROKE VOLUME: 75.39 CM3
DOP CALC MV VTI: 30.1 CM
DOP CALCLVOT PEAK VEL VTI: 25 CM
E WAVE DECELERATION TIME: 200.5 MSEC
E/A RATIO: 0.73
ECHO LV POSTERIOR WALL: 1.2 CM (ref 0.6–1.1)
EJECTION FRACTION: 57 %
EOSINOPHIL # BLD AUTO: 0.5 K/UL (ref 0–0.5)
EOSINOPHIL NFR BLD: 3.7 % (ref 0–8)
ERYTHROCYTE [DISTWIDTH] IN BLOOD BY AUTOMATED COUNT: 15.2 % (ref 11.5–14.5)
EST. GFR  (NO RACE VARIABLE): 55.9 ML/MIN/1.73 M^2
FRACTIONAL SHORTENING: 30 % (ref 28–44)
GLOBAL LONGITUIDAL STRAIN: 20 %
GLUCOSE SERPL-MCNC: 173 MG/DL (ref 70–110)
GLUCOSE UR QL STRIP: NEGATIVE
HCT VFR BLD AUTO: 43 % (ref 37–48.5)
HGB BLD-MCNC: 14 G/DL (ref 12–16)
HGB UR QL STRIP: NEGATIVE
IMM GRANULOCYTES # BLD AUTO: 0.09 K/UL (ref 0–0.04)
IMM GRANULOCYTES NFR BLD AUTO: 0.7 % (ref 0–0.5)
INTERVENTRICULAR SEPTUM: 1.31 CM (ref 0.6–1.1)
KETONES UR QL STRIP: NEGATIVE
LA MAJOR: 4.18 CM
LA MINOR: 2.8 CM
LACTATE SERPL-SCNC: 1.9 MMOL/L (ref 0.5–2.2)
LDH SERPL L TO P-CCNC: 269 U/L (ref 110–260)
LEFT ATRIUM SIZE: 4.11 CM
LEFT INTERNAL DIMENSION IN SYSTOLE: 3.46 CM (ref 2.1–4)
LEFT VENTRICLE DIASTOLIC VOLUME INDEX: 68.93 ML/M2
LEFT VENTRICLE DIASTOLIC VOLUME: 114.42 ML
LEFT VENTRICLE END DIASTOLIC VOLUME APICAL 4 CHAMBER: 38.17 ML
LEFT VENTRICLE MASS INDEX: 147 G/M2
LEFT VENTRICLE SYSTOLIC VOLUME INDEX: 29.7 ML/M2
LEFT VENTRICLE SYSTOLIC VOLUME: 49.32 ML
LEFT VENTRICULAR INTERNAL DIMENSION IN DIASTOLE: 4.93 CM (ref 3.5–6)
LEFT VENTRICULAR MASS: 243.55 G
LEUKOCYTE ESTERASE UR QL STRIP: NEGATIVE
LV LATERAL E/E' RATIO: 12.43 M/S
LVED V (TEICH): 114.42 ML
LVES V (TEICH): 49.32 ML
LVOT MG: 3.85 MMHG
LVOT MV: 0.91 CM/S
LYMPHOCYTES # BLD AUTO: 1.3 K/UL (ref 1–4.8)
LYMPHOCYTES NFR BLD: 10.2 % (ref 18–48)
LYMPHOCYTES NFR FLD MANUAL: 34 %
MAGNESIUM SERPL-MCNC: 1.9 MG/DL (ref 1.6–2.6)
MCH RBC QN AUTO: 27.7 PG (ref 27–31)
MCHC RBC AUTO-ENTMCNC: 32.6 G/DL (ref 32–36)
MCV RBC AUTO: 85 FL (ref 82–98)
MONOCYTES # BLD AUTO: 1.2 K/UL (ref 0.3–1)
MONOCYTES NFR BLD: 9.5 % (ref 4–15)
MONOS+MACROS NFR FLD MANUAL: 1 %
MV MEAN GRADIENT: 2 MMHG
MV PEAK A VEL: 1.2 M/S
MV PEAK E VEL: 0.87 M/S
MV PEAK GRADIENT: 5 MMHG
MV STENOSIS PRESSURE HALF TIME: 57.23 MS
MV VALVE AREA BY CONTINUITY EQUATION: 2.5 CM2
MV VALVE AREA P 1/2 METHOD: 3.84 CM2
NEUTROPHILS # BLD AUTO: 9.5 K/UL (ref 1.8–7.7)
NEUTROPHILS NFR BLD: 74.5 % (ref 38–73)
NEUTROPHILS NFR FLD MANUAL: 5 %
NITRITE UR QL STRIP: NEGATIVE
NRBC BLD-RTO: 0 /100 WBC
OHS CV RV/LV RATIO: 0.59 CM
PH UR STRIP: 6 [PH] (ref 5–8)
PISA AR MAX VEL: 3.58 M/S
PISA MRMAX VEL: 5 M/S
PISA TR MAX VEL: 2.79 M/S
PLATELET # BLD AUTO: 360 K/UL (ref 150–450)
PMV BLD AUTO: 10.1 FL (ref 9.2–12.9)
POTASSIUM SERPL-SCNC: 2.6 MMOL/L (ref 3.5–5.1)
PROT SERPL-MCNC: 6 G/DL (ref 6–8.4)
PROT UR QL STRIP: NEGATIVE
PV MV: 0.73 M/S
PV PEAK GRADIENT: 4 MMHG
PV PEAK VELOCITY: 1.03 M/S
RA MAJOR: 4.22 CM
RA PRESSURE ESTIMATED: 3 MMHG
RA WIDTH: 2.49 CM
RBC # BLD AUTO: 5.06 M/UL (ref 4–5.4)
RIGHT VENTRICLE DIASTOLIC BASEL DIMENSION: 1.8 CM
RIGHT VENTRICLE DIASTOLIC LENGTH: 4.8 CM
RIGHT VENTRICLE DIASTOLIC MID DIMENSION: 1.3 CM
RIGHT VENTRICULAR END-DIASTOLIC DIMENSION: 2.93 CM
RIGHT VENTRICULAR LENGTH IN DIASTOLE (APICAL 4-CHAMBER VIEW): 4.76 CM
RV MID DIAMA: 1.33 CM
RV TB RVSP: 6 MMHG
SARS-COV-2 RDRP RESP QL NAA+PROBE: NEGATIVE
SINUS: 2.47 CM
SODIUM SERPL-SCNC: 137 MMOL/L (ref 136–145)
SP GR UR STRIP: 1.01 (ref 1–1.03)
STJ: 2.83 CM
TDI LATERAL: 0.07 M/S
TR MAX PG: 31 MMHG
TRICUSPID ANNULAR PLANE SYSTOLIC EXCURSION: 2.5 CM
TROPONIN I SERPL DL<=0.01 NG/ML-MCNC: <0.006 NG/ML (ref 0–0.03)
TV REST PULMONARY ARTERY PRESSURE: 34 MMHG
URN SPEC COLLECT METH UR: NORMAL
UROBILINOGEN UR STRIP-ACNC: NEGATIVE EU/DL
WBC # BLD AUTO: 12.79 K/UL (ref 3.9–12.7)
WBC # FLD: 59 /CU MM
Z-SCORE OF LEFT VENTRICULAR DIMENSION IN END DIASTOLE: 0.59
Z-SCORE OF LEFT VENTRICULAR DIMENSION IN END SYSTOLE: 1.45

## 2024-07-16 PROCEDURE — 99900035 HC TECH TIME PER 15 MIN (STAT)

## 2024-07-16 PROCEDURE — 83615 LACTATE (LD) (LDH) ENZYME: CPT | Mod: 91 | Performed by: STUDENT IN AN ORGANIZED HEALTH CARE EDUCATION/TRAINING PROGRAM

## 2024-07-16 PROCEDURE — 0W993ZX DRAINAGE OF RIGHT PLEURAL CAVITY, PERCUTANEOUS APPROACH, DIAGNOSTIC: ICD-10-PCS | Performed by: RADIOLOGY

## 2024-07-16 PROCEDURE — 99285 EMERGENCY DEPT VISIT HI MDM: CPT | Mod: 25

## 2024-07-16 PROCEDURE — 93010 ELECTROCARDIOGRAM REPORT: CPT | Mod: ,,, | Performed by: INTERNAL MEDICINE

## 2024-07-16 PROCEDURE — G0378 HOSPITAL OBSERVATION PER HR: HCPCS

## 2024-07-16 PROCEDURE — 84484 ASSAY OF TROPONIN QUANT: CPT | Performed by: EMERGENCY MEDICINE

## 2024-07-16 PROCEDURE — 25000003 PHARM REV CODE 250: Performed by: STUDENT IN AN ORGANIZED HEALTH CARE EDUCATION/TRAINING PROGRAM

## 2024-07-16 PROCEDURE — 82945 GLUCOSE OTHER FLUID: CPT | Performed by: STUDENT IN AN ORGANIZED HEALTH CARE EDUCATION/TRAINING PROGRAM

## 2024-07-16 PROCEDURE — 94640 AIRWAY INHALATION TREATMENT: CPT

## 2024-07-16 PROCEDURE — 96365 THER/PROPH/DIAG IV INF INIT: CPT

## 2024-07-16 PROCEDURE — 71046 X-RAY EXAM CHEST 2 VIEWS: CPT | Mod: TC

## 2024-07-16 PROCEDURE — 32555 ASPIRATE PLEURA W/ IMAGING: CPT | Mod: RT | Performed by: RADIOLOGY

## 2024-07-16 PROCEDURE — 93005 ELECTROCARDIOGRAM TRACING: CPT

## 2024-07-16 PROCEDURE — 36415 COLL VENOUS BLD VENIPUNCTURE: CPT | Performed by: EMERGENCY MEDICINE

## 2024-07-16 PROCEDURE — 27000221 HC OXYGEN, UP TO 24 HOURS

## 2024-07-16 PROCEDURE — 94761 N-INVAS EAR/PLS OXIMETRY MLT: CPT

## 2024-07-16 PROCEDURE — 88112 CYTOPATH CELL ENHANCE TECH: CPT | Performed by: STUDENT IN AN ORGANIZED HEALTH CARE EDUCATION/TRAINING PROGRAM

## 2024-07-16 PROCEDURE — 87040 BLOOD CULTURE FOR BACTERIA: CPT | Mod: 59 | Performed by: EMERGENCY MEDICINE

## 2024-07-16 PROCEDURE — 80053 COMPREHEN METABOLIC PANEL: CPT | Performed by: EMERGENCY MEDICINE

## 2024-07-16 PROCEDURE — 89051 BODY FLUID CELL COUNT: CPT | Performed by: STUDENT IN AN ORGANIZED HEALTH CARE EDUCATION/TRAINING PROGRAM

## 2024-07-16 PROCEDURE — 83605 ASSAY OF LACTIC ACID: CPT | Performed by: EMERGENCY MEDICINE

## 2024-07-16 PROCEDURE — 85025 COMPLETE CBC W/AUTO DIFF WBC: CPT | Performed by: EMERGENCY MEDICINE

## 2024-07-16 PROCEDURE — 96367 TX/PROPH/DG ADDL SEQ IV INF: CPT

## 2024-07-16 PROCEDURE — 71045 X-RAY EXAM CHEST 1 VIEW: CPT | Mod: TC

## 2024-07-16 PROCEDURE — 83986 ASSAY PH BODY FLUID NOS: CPT | Performed by: STUDENT IN AN ORGANIZED HEALTH CARE EDUCATION/TRAINING PROGRAM

## 2024-07-16 PROCEDURE — 63600175 PHARM REV CODE 636 W HCPCS: Performed by: EMERGENCY MEDICINE

## 2024-07-16 PROCEDURE — 88305 TISSUE EXAM BY PATHOLOGIST: CPT | Performed by: STUDENT IN AN ORGANIZED HEALTH CARE EDUCATION/TRAINING PROGRAM

## 2024-07-16 PROCEDURE — 99223 1ST HOSP IP/OBS HIGH 75: CPT | Mod: AI,,, | Performed by: STUDENT IN AN ORGANIZED HEALTH CARE EDUCATION/TRAINING PROGRAM

## 2024-07-16 PROCEDURE — 87205 SMEAR GRAM STAIN: CPT | Performed by: STUDENT IN AN ORGANIZED HEALTH CARE EDUCATION/TRAINING PROGRAM

## 2024-07-16 PROCEDURE — 84157 ASSAY OF PROTEIN OTHER: CPT | Performed by: STUDENT IN AN ORGANIZED HEALTH CARE EDUCATION/TRAINING PROGRAM

## 2024-07-16 PROCEDURE — 83735 ASSAY OF MAGNESIUM: CPT | Performed by: EMERGENCY MEDICINE

## 2024-07-16 PROCEDURE — 81003 URINALYSIS AUTO W/O SCOPE: CPT | Performed by: EMERGENCY MEDICINE

## 2024-07-16 PROCEDURE — U0002 COVID-19 LAB TEST NON-CDC: HCPCS | Performed by: EMERGENCY MEDICINE

## 2024-07-16 PROCEDURE — 71046 X-RAY EXAM CHEST 2 VIEWS: CPT | Mod: 26,,, | Performed by: RADIOLOGY

## 2024-07-16 PROCEDURE — 25000003 PHARM REV CODE 250: Performed by: EMERGENCY MEDICINE

## 2024-07-16 PROCEDURE — 71045 X-RAY EXAM CHEST 1 VIEW: CPT | Mod: 26,,, | Performed by: RADIOLOGY

## 2024-07-16 PROCEDURE — C1729 CATH, DRAINAGE: HCPCS

## 2024-07-16 PROCEDURE — 87070 CULTURE OTHR SPECIMN AEROBIC: CPT | Performed by: STUDENT IN AN ORGANIZED HEALTH CARE EDUCATION/TRAINING PROGRAM

## 2024-07-16 PROCEDURE — 25000242 PHARM REV CODE 250 ALT 637 W/ HCPCS: Performed by: STUDENT IN AN ORGANIZED HEALTH CARE EDUCATION/TRAINING PROGRAM

## 2024-07-16 PROCEDURE — 83880 ASSAY OF NATRIURETIC PEPTIDE: CPT | Performed by: EMERGENCY MEDICINE

## 2024-07-16 PROCEDURE — 83615 LACTATE (LD) (LDH) ENZYME: CPT | Performed by: EMERGENCY MEDICINE

## 2024-07-16 RX ORDER — ALUMINUM HYDROXIDE, MAGNESIUM HYDROXIDE, AND SIMETHICONE 1200; 120; 1200 MG/30ML; MG/30ML; MG/30ML
30 SUSPENSION ORAL 4 TIMES DAILY PRN
Status: DISCONTINUED | OUTPATIENT
Start: 2024-07-16 | End: 2024-07-20 | Stop reason: HOSPADM

## 2024-07-16 RX ORDER — POTASSIUM CHLORIDE 20 MEQ/1
40 TABLET, EXTENDED RELEASE ORAL EVERY 4 HOURS
Status: COMPLETED | OUTPATIENT
Start: 2024-07-16 | End: 2024-07-16

## 2024-07-16 RX ORDER — HYDRALAZINE HYDROCHLORIDE 25 MG/1
25 TABLET, FILM COATED ORAL EVERY 8 HOURS
Status: DISCONTINUED | OUTPATIENT
Start: 2024-07-16 | End: 2024-07-20 | Stop reason: HOSPADM

## 2024-07-16 RX ORDER — ACETAMINOPHEN 325 MG/1
650 TABLET ORAL EVERY 4 HOURS PRN
Status: DISCONTINUED | OUTPATIENT
Start: 2024-07-16 | End: 2024-07-20 | Stop reason: HOSPADM

## 2024-07-16 RX ORDER — IBUPROFEN 200 MG
16 TABLET ORAL
Status: DISCONTINUED | OUTPATIENT
Start: 2024-07-16 | End: 2024-07-20 | Stop reason: HOSPADM

## 2024-07-16 RX ORDER — ONDANSETRON HYDROCHLORIDE 2 MG/ML
4 INJECTION, SOLUTION INTRAVENOUS EVERY 8 HOURS PRN
Status: DISCONTINUED | OUTPATIENT
Start: 2024-07-16 | End: 2024-07-20 | Stop reason: HOSPADM

## 2024-07-16 RX ORDER — SODIUM CHLORIDE 0.9 % (FLUSH) 0.9 %
10 SYRINGE (ML) INJECTION EVERY 12 HOURS PRN
Status: DISCONTINUED | OUTPATIENT
Start: 2024-07-16 | End: 2024-07-20 | Stop reason: HOSPADM

## 2024-07-16 RX ORDER — PROCHLORPERAZINE EDISYLATE 5 MG/ML
5 INJECTION INTRAMUSCULAR; INTRAVENOUS EVERY 6 HOURS PRN
Status: DISCONTINUED | OUTPATIENT
Start: 2024-07-16 | End: 2024-07-20 | Stop reason: HOSPADM

## 2024-07-16 RX ORDER — IPRATROPIUM BROMIDE AND ALBUTEROL SULFATE 2.5; .5 MG/3ML; MG/3ML
3 SOLUTION RESPIRATORY (INHALATION) EVERY 6 HOURS PRN
Status: DISCONTINUED | OUTPATIENT
Start: 2024-07-16 | End: 2024-07-20 | Stop reason: HOSPADM

## 2024-07-16 RX ORDER — NALOXONE HCL 0.4 MG/ML
0.02 VIAL (ML) INJECTION
Status: DISCONTINUED | OUTPATIENT
Start: 2024-07-16 | End: 2024-07-20 | Stop reason: HOSPADM

## 2024-07-16 RX ORDER — ASPIRIN 81 MG/1
81 TABLET ORAL 2 TIMES DAILY
Status: DISCONTINUED | OUTPATIENT
Start: 2024-07-17 | End: 2024-07-20 | Stop reason: HOSPADM

## 2024-07-16 RX ORDER — ENOXAPARIN SODIUM 100 MG/ML
30 INJECTION SUBCUTANEOUS EVERY 24 HOURS
Status: DISCONTINUED | OUTPATIENT
Start: 2024-07-17 | End: 2024-07-20 | Stop reason: HOSPADM

## 2024-07-16 RX ORDER — ASPIRIN 81 MG/1
81 TABLET ORAL 2 TIMES DAILY
Status: DISCONTINUED | OUTPATIENT
Start: 2024-07-16 | End: 2024-07-16

## 2024-07-16 RX ORDER — GLUCAGON 1 MG
1 KIT INJECTION
Status: DISCONTINUED | OUTPATIENT
Start: 2024-07-16 | End: 2024-07-20 | Stop reason: HOSPADM

## 2024-07-16 RX ORDER — IBUPROFEN 200 MG
24 TABLET ORAL
Status: DISCONTINUED | OUTPATIENT
Start: 2024-07-16 | End: 2024-07-20 | Stop reason: HOSPADM

## 2024-07-16 RX ADMIN — HYDRALAZINE HYDROCHLORIDE 25 MG: 25 TABLET ORAL at 03:07

## 2024-07-16 RX ADMIN — CEFTRIAXONE SODIUM 1 G: 1 INJECTION, POWDER, FOR SOLUTION INTRAMUSCULAR; INTRAVENOUS at 12:07

## 2024-07-16 RX ADMIN — IPRATROPIUM BROMIDE AND ALBUTEROL SULFATE 3 ML: .5; 2.5 SOLUTION RESPIRATORY (INHALATION) at 12:07

## 2024-07-16 RX ADMIN — POTASSIUM CHLORIDE 40 MEQ: 1500 TABLET, EXTENDED RELEASE ORAL at 02:07

## 2024-07-16 RX ADMIN — AZITHROMYCIN MONOHYDRATE 500 MG: 500 INJECTION, POWDER, LYOPHILIZED, FOR SOLUTION INTRAVENOUS at 01:07

## 2024-07-16 RX ADMIN — HYDRALAZINE HYDROCHLORIDE 25 MG: 25 TABLET ORAL at 10:07

## 2024-07-16 RX ADMIN — POTASSIUM CHLORIDE 40 MEQ: 1500 TABLET, EXTENDED RELEASE ORAL at 12:07

## 2024-07-16 NOTE — ED PROVIDER NOTES
"Encounter Date: 7/16/2024       History     Chief Complaint   Patient presents with    Shortness of Breath     Pt. Denies SOB at this time. Pt. States her home pulse ox would not read well. States, "I don't really feel much different than normal. I have COPD so I'm always a little short of breath.".     83-year-old female brought from home by her daughter for evaluation and treatment of possible low O2 saturations.  Patient denies any shortness of breath at this time.  Apparently the daughter was having trouble getting a good O2 saturation reading on the patient.  Daughter states that she would put it on her own finger and get a reading easily, but on her mother it was difficult to get a good reading.  When she did get a reading, it was low, in the mid 80s.  Daughter also states the patient has not been eating well as of late.  At home, she complains of fatigue and weakness.  No fevers or chills noted.  Patient states that she has had some mild occasional nausea and decreased appetite.  Denies nausea at this time.  Patient has a history of COPD.  She states that she almost always feels somewhat short of breath.  No chest pain or palpitations.  No cough.  Patient's vital signs show a temperature of 97.9°, pulse 80, respirations 16, 94% O2 saturations on room air, blood pressure somewhat elevated at 160 1/72.      Review of patient's allergies indicates:   Allergen Reactions    Codeine Nausea Only     Happened 50 years ago      Past Medical History:   Diagnosis Date    Bronchitis     COPD (chronic obstructive pulmonary disease)     PONV (postoperative nausea and vomiting)      Past Surgical History:   Procedure Laterality Date    COSMETIC SURGERY  2012    bilateral eye lift    HYSTERECTOMY      IRRIGATION AND DEBRIDEMENT OF UPPER EXTREMITY Bilateral 11/15/2022    Procedure: IRRIGATION AND DEBRIDEMENT, UPPER EXTREMITY;  Surgeon: Leighton Cronin DO;  Location: [facility] OR;  Service: Orthopedics;  Laterality: Bilateral;    " IRRIGATION AND DEBRIDEMENT OF UPPER EXTREMITY Bilateral 11/17/2022    Procedure: IRRIGATION AND DEBRIDEMENT, UPPER EXTREMITY;  Surgeon: Leighton Cronin DO;  Location: [facility] OR;  Service: Orthopedics;  Laterality: Bilateral;  Second look irrigation debridement need wound VAC for possible wound VAC placement    OPEN REDUCTION AND INTERNAL FIXATION (ORIF) OF FRACTURE OF FEMUR USING DYNAMIC HIP SCREW Left 7/3/2019    Procedure: ORIF, HIP, USING DYNAMIC HIP SCREW;  Surgeon: Juan Jose Gunn MD;  Location: [facility] OR;  Service: Orthopedics;  Laterality: Left;    REVERSE TOTAL SHOULDER ARTHROPLASTY Right 4/26/2023    Procedure: ARTHROPLASTY, SHOULDER, TOTAL, REVERSE;  Surgeon: Luis Enrique Pimentel II, MD;  Location: [facility] OR;  Service: Orthopedics;  Laterality: Right;     No family history on file.  Social History     Tobacco Use    Smoking status: Every Day     Current packs/day: 1.00     Types: Cigarettes    Smokeless tobacco: Never   Substance Use Topics    Alcohol use: Yes     Comment: rarely    Drug use: No     Review of Systems   Constitutional:  Positive for appetite change and fatigue.   HENT:  Negative for congestion, rhinorrhea and sore throat.    Eyes: Negative.    Respiratory:  Positive for shortness of breath (Chronic). Negative for wheezing and stridor.    Cardiovascular:  Negative for chest pain and palpitations.   Gastrointestinal:  Positive for nausea. Negative for abdominal pain, blood in stool, constipation, diarrhea and vomiting.   Genitourinary: Negative.    Musculoskeletal: Negative.    Skin: Negative.    Neurological:  Positive for weakness (Generalized). Negative for dizziness, light-headedness and numbness.   Psychiatric/Behavioral: Negative.         Physical Exam     Initial Vitals [07/16/24 1010]   BP Pulse Resp Temp SpO2   (!) 161/72 80 16 97.9 °F (36.6 °C) (!) 94 %      MAP       --         Physical Exam    Nursing note and vitals reviewed.  Constitutional: She appears well-developed and well-nourished.  She is not diaphoretic. No distress.   HENT:   Head: Normocephalic and atraumatic.   Nose: Nose normal.   Mouth/Throat: Oropharynx is clear and moist. No oropharyngeal exudate.   Eyes: Conjunctivae and EOM are normal. Pupils are equal, round, and reactive to light. No scleral icterus.   Neck: Neck supple. No JVD present.   Normal range of motion.  Cardiovascular:  Normal rate, regular rhythm, normal heart sounds and intact distal pulses.           No murmur heard.  Pulmonary/Chest: Breath sounds normal. No stridor. No respiratory distress. She has no wheezes. She has no rhonchi. She has no rales. She exhibits no tenderness.   Abdominal: Abdomen is soft. Bowel sounds are normal. She exhibits no distension. There is no abdominal tenderness.   Musculoskeletal:      Cervical back: Normal range of motion and neck supple.     Neurological: She is alert and oriented to person, place, and time. She has normal strength. She displays normal reflexes. No cranial nerve deficit or sensory deficit. GCS score is 15. GCS eye subscore is 4. GCS verbal subscore is 5. GCS motor subscore is 6.   Skin: Skin is warm and dry. Capillary refill takes less than 2 seconds. No rash noted. No erythema.   Psychiatric: She has a normal mood and affect. Her behavior is normal.         ED Course   Procedures  Labs Reviewed   CBC W/ AUTO DIFFERENTIAL - Abnormal; Notable for the following components:       Result Value    WBC 12.79 (*)     RDW 15.2 (*)     Immature Granulocytes 0.7 (*)     Gran # (ANC) 9.5 (*)     Immature Grans (Abs) 0.09 (*)     Mono # 1.2 (*)     Gran % 74.5 (*)     Lymph % 10.2 (*)     All other components within normal limits   COMPREHENSIVE METABOLIC PANEL - Abnormal; Notable for the following components:    Potassium 2.6 (*)     Glucose 173 (*)     Albumin 2.8 (*)     Alkaline Phosphatase 199 (*)     AST 72 (*)     ALT 71 (*)     eGFR 55.9 (*)     All other components within normal limits    Narrative:     Potassium critical  result(s) called and verbal readback obtained from   Kraig Jaramillo RN ED.  by [facility] 07/16/2024 11:28   B-TYPE NATRIURETIC PEPTIDE - Abnormal; Notable for the following components:     (*)     All other components within normal limits   CULTURE, BLOOD   CULTURE, BLOOD   SARS-COV-2 RNA AMPLIFICATION, QUAL   LACTIC ACID, PLASMA   TROPONIN I   URINALYSIS, REFLEX TO URINE CULTURE    Narrative:     Preferred Collection Type->Urine, Clean Catch  Specimen Source->Urine     EKG Readings: (Independently Interpreted)   EKG personally reviewed by me shows normal sinus rhythm, premature atrial complexes, 80 beats per minute, MA interval 152, .  No obvious STEMI       Imaging Results               X-Ray Chest 1 View (Final result)  Result time 07/16/24 10:47:32      Final result by Yousuf Edmondson MD (07/16/24 10:47:32)                   Impression:      1. Moderate right pleural effusion with atelectasis versus infiltrate of the right lower lobe.  Correlate clinically with possible fever and/or elevated white count.  2. Suspected mild underlying congestive heart failure.  Close interval follow-up is recommended.  This report was flagged in Epic as abnormal.      Electronically signed by: Yousuf Edmondson  Date:    07/16/2024  Time:    10:47               Narrative:    EXAMINATION:  XR CHEST 1 VIEW    CLINICAL HISTORY:  shortness of breath;    TECHNIQUE:  Single frontal view of the chest was performed.    COMPARISON:  05/27/2024.    FINDINGS:  There is pulmonary hyperinflation.  There is a moderate right pleural effusion with atelectasis versus infiltrate within the right lower lobe.  Dependent atelectasis of the left lower lobe.  Heart size is mildly enlarged.  Subtle mild diffuse prominence of the pulmonary interstitium suggests mild interstitial edema.  Bony thorax intact.                                    X-Rays:   Independently Interpreted Readings:   Other Readings:  Chest x-ray personally reviewed by me  shows a moderate right pleural effusion with atelectasis versus infiltrate in the right lower lobe.  Some dependent atelectasis of the left lower lobe.  Mildly enlarged cardiac silhouette, possible mild interstitial edema.  Right shoulder replacement hardware in place, otherwise normal skeletal structures    Medications   cefTRIAXone (Rocephin) 1 g in D5W 100 mL IVPB (MB+) (has no administration in time range)   azithromycin (ZITHROMAX) 500 mg in D5W 250 mL IVPB (Vial-Mate) (has no administration in time range)   potassium chloride SA CR tablet 40 mEq (has no administration in time range)     Medical Decision Making  Differential includes COPD exacerbation, pneumonia, viral illness, myocardial ischemia or infarction, etc.    Patient's white blood cell count is mildly elevated, and a chest x-ray shows what appears to be a right lower lobe pneumonia.  Possible mild pulmonary edema as well.  Patient's O2 saturations has been good here, but respiratory rate is slightly increased.  I believe patient nasal be admitted for what appears to be right lower lobe pneumonia.  Patient has been given Rocephin and azithromycin.  Hospitalist contacted and will admit.  Discussed findings with the patient and her family, and all agree to plan of care.    Amount and/or Complexity of Data Reviewed  Labs: ordered.  Radiology: ordered.                                      Clinical Impression:  Final diagnoses:  [R06.02] Shortness of breath                 Mars Vela MD  07/16/24 2091

## 2024-07-16 NOTE — PLAN OF CARE
Patient in no apparent distress. Sat's 93  % on room air. PRN treatment given . Will continue to monitor.

## 2024-07-16 NOTE — PLAN OF CARE
Problem: Gas Exchange Impaired  Goal: Optimal Gas Exchange  Outcome: Progressing     Problem: Adult Inpatient Plan of Care  Goal: Optimal Comfort and Wellbeing  Outcome: Progressing  Goal: Readiness for Transition of Care  Outcome: Progressing     Problem: Wound  Goal: Optimal Wound Healing  Outcome: Progressing     Problem: Skin Injury Risk Increased  Goal: Skin Health and Integrity  Outcome: Progressing     Problem: Fall Injury Risk  Goal: Absence of Fall and Fall-Related Injury  Outcome: Progressing     Problem: Fatigue  Goal: Improved Activity Tolerance  Outcome: Progressing

## 2024-07-16 NOTE — PLAN OF CARE
[place] - Comprehensive Care Unit  Initial Discharge Assessment       Primary Care Provider: Yelena Dykes FNP    Admission Diagnosis: Shortness of breath [R06.02]  Community acquired pneumonia [J18.9]  Chest pain [R07.9]  Pneumonia of right lower lobe due to infectious organism [J18.9]    Admission Date: 7/16/2024  Expected Discharge Date:     Transition of Care Barriers: None    Patient alert & oriented lives at home alone but her children & relatives stay with her often. Her daughter from [facility] is at bedside. She has a rollator, bedside commode & nebulizer at home. She uses Yelena Dykes, NP for PCP. Her pharmacy of choice is [facility]. She doesn't think she wants home health as she has a dog as he can be aggressive. Explained to patient that home health usually calls ahead of time & she could lock up her dog. Skilled nursing rehab is an option but she doesn't think she wants to do that either. Will see how she does with therapy & can discuss discharge needs then.Will continue to follow.      Payor: [employer] MANAGED MEDICARE / Plan: [employer] MEDICARE HMO / Product Type: Capitation /     Extended Emergency Contact Information  Primary Emergency Contact: HendersonMely   [place]  Mobile Phone: [phone]  Relation: Daughter  Secondary Emergency Contact: Ming Bal  Mobile Phone: [phone]  Relation: Daughter   needed? No    Discharge Plan A: Home with family, Home  Discharge Plan B: Home Health, Skilled Nursing Facility      [facility] - [place], [place] - [address]  [address]  [address]  [place] [address]  Phone: [phone] Fax: [phone]    [facility] [address] [place], [place] - [address]  [address]  [place] [address]  Phone: [phone] Fax: [phone]    [facility] - [place], [place] - [address]  [address] [place] [address]  Phone: [phone] Fax:  [phone]      Initial Assessment (most recent)       Adult Discharge Assessment - 07/16/24 1733          Discharge Assessment    Assessment Type Discharge Planning Assessment     Confirmed/corrected address, phone number and insurance Yes     Confirmed Demographics Correct on Facesheet     Source of Information patient     When was your last doctors appointment? --   couple of weeks ago    Does patient/caregiver understand observation status Yes     Communicated PEDRO with patient/caregiver Yes     Reason For Admission shortness of breath & pneumonia     People in Home alone   her children & other relatives stay with her as needed    Do you expect to return to your current living situation? Yes     Do you have help at home or someone to help you manage your care at home? Yes     Who are your caregiver(s) and their phone number(s)? Ming Bal daughter [phone]     Prior to hospitilization cognitive status: Alert/Oriented     Current cognitive status: Alert/Oriented     Walking or Climbing Stairs Difficulty yes     Walking or Climbing Stairs ambulation difficulty, requires equipment     Mobility Management uses a rollator     Dressing/Bathing Difficulty yes     Dressing/Bathing bathing difficulty, requires equipment     Dressing/Bathing Management uses a chair     Do you have any problems with: Errands/Grocery     Home Layout Able to live on 1st floor     Equipment Currently Used at Home bedside commode;nebulizer;rollator     Readmission within 30 days? No     Patient currently being followed by outpatient case management? No     Do you currently have service(s) that help you manage your care at home? No     Do you take prescription medications? Yes     Do you have prescription coverage? Yes     Coverage [employer]     Do you have any problems affording any of your prescribed medications? No     Is the patient taking medications as prescribed? yes     Who is going to help you get home at discharge? Ming Bal  daughter [phone]     How do you get to doctors appointments? family or friend will provide     Are you on dialysis? No     Do you take coumadin? No     Discharge Plan A Home with family;Home     Discharge Plan B Home Health;Skilled Nursing Facility     DME Needed Upon Discharge  none     Discharge Plan discussed with: Patient;Adult children     Transition of Care Barriers None        Physical Activity    On average, how many days per week do you engage in moderate to strenuous exercise (like a brisk walk)? 7 days     On average, how many minutes do you engage in exercise at this level? 30 min        Financial Resource Strain    How hard is it for you to pay for the very basics like food, housing, medical care, and heating? Not hard at all        Housing Stability    In the last 12 months, was there a time when you were not able to pay the mortgage or rent on time? No     At any time in the past 12 months, were you homeless or living in a shelter (including now)? No        Transportation Needs    Has the lack of transportation kept you from medical appointments, meetings, work or from getting things needed for daily living? No        Food Insecurity    Within the past 12 months, you worried that your food would run out before you got the money to buy more. Never true     Within the past 12 months, the food you bought just didn't last and you didn't have money to get more. Never true        Stress    Do you feel stress - tense, restless, nervous, or anxious, or unable to sleep at night because your mind is troubled all the time - these days? Only a little        Social Isolation    How often do you feel lonely or isolated from those around you?  Often        Alcohol Use    Q1: How often do you have a drink containing alcohol? Monthly or less     Q2: How many drinks containing alcohol do you have on a typical day when you are drinking? 1 or 2     Q3: How often do you have six or more drinks on one occasion? Never         Utilities    In the past 12 months has the electric, gas, oil, or water company threatened to shut off services in your home? No        Health Literacy    How often do you need to have someone help you when you read instructions, pamphlets, or other written material from your doctor or pharmacy? Never

## 2024-07-16 NOTE — Clinical Note
Diagnosis: Community acquired pneumonia [100210]   Future Attending Provider: ERIKA BURRELL [[id number]]

## 2024-07-16 NOTE — PLAN OF CARE
07/16/24 1733   FLORES Message   Medicare Outpatient and Observation Notification regarding financial responsibility Given to patient/caregiver;Explained to patient/caregiver;Signed/date by patient/caregiver   Date FLORES was signed 07/16/24   Time FLORES was signed 1731

## 2024-07-16 NOTE — ASSESSMENT & PLAN NOTE
Patient has a diagnosis of pneumonia. The cause of the pneumonia is suspected to be bacterial in etiology but organism is not known. The pneumonia is stable. The patient has the following signs/symptoms of pneumonia: cough and shortness of breath. The patient does not have a current oxygen requirement and the patient does not have a home oxygen requirement. I have reviewed the pertinent imaging. The following cultures have been collected: Blood cultures and Sputum culture The culture results are listed below.     Current antimicrobial regimen consists of the antibiotics listed below. Will monitor patient closely and continue current treatment plan unchanged.    Antibiotics (From admission, onward)      Start     Stop Route Frequency Ordered    07/17/24 0900  cefTRIAXone (Rocephin) 1 g in D5W 100 mL IVPB (MB+)         -- IV Every 24 hours (non-standard times) 07/16/24 1158    07/17/24 0900  azithromycin (ZITHROMAX) 500 mg in D5W 250 mL IVPB (Vial-Mate)         -- IV Every 24 hours (non-standard times) 07/16/24 1158    07/16/24 1200  azithromycin (ZITHROMAX) 500 mg in D5W 250 mL IVPB (Vial-Mate)         07/16/24 2359 IV ED 1 Time 07/16/24 1119    07/16/24 1130  cefTRIAXone (Rocephin) 1 g in D5W 100 mL IVPB (MB+)         07/16/24 2329 IV ED 1 Time 07/16/24 1119            Microbiology Results (last 7 days)       Procedure Component Value Units Date/Time    Culture, Respiratory with Gram Stain [[id number]]     Order Status: No result Specimen: Respiratory     Culture, body fluid - Bactec [[id number]]     Order Status: No result Specimen: Body Fluid from Thoracentesis Fluid     Gram stain [[id number]]     Order Status: No result Specimen: Body Fluid from Pleural Fluid     Blood Culture #2 **CANNOT BE ORDERED STAT** [[id number]] Collected: 07/16/24 1133    Order Status: Sent Specimen: Blood from Peripheral, Wrist, Left     Blood Culture #1 **CANNOT BE ORDERED STAT** [[id number]] Collected: 07/16/24 1117    Order Status:  Sent Specimen: Blood from Peripheral, Antecubital, Right

## 2024-07-16 NOTE — HPI
84 yo w/ hx of COPD, TIA, HTN presenting with weakness and SOB. Symptoms have been going on for about 9 days but worsened over past 2-3 days. Patient states that today her home pulse ox was reading low so her daughter brought her to ED. Has had decreased PO intake. Had some swelling in LE last week and took a diuretic with resolution. No fevers. Endorses chills. Endorses nausea but no vomiting. No diarrhea. Not on home O2. No swelling in LE today. No hx of CHF. Patient able to lay flat with no issue.

## 2024-07-16 NOTE — H&P
"  [facility] Medicine  History & Physical    Patient Name: Geraldine Cooksey  MRN: [id number]  Patient Class: OP- Observation  Admission Date: 7/16/2024  Attending Physician: Jbo Smallwood MD   Primary Care Provider: Yelena Dykes FNP         Patient information was obtained from patient, relative(s), past medical records, and ER records.     Subjective:     Principal Problem:Community acquired pneumonia of right lower lobe of lung    Chief Complaint:   Chief Complaint   Patient presents with    Shortness of Breath     Pt. Denies SOB at this time. Pt. States her home pulse ox would not read well. States, "I don't really feel much different than normal. I have COPD so I'm always a little short of breath.".        HPI: 82 yo w/ hx of COPD, TIA, HTN presenting with weakness and SOB. Symptoms have been going on for about 9 days but worsened over past 2-3 days. Patient states that today her home pulse ox was reading low so her daughter brought her to ED. Has had decreased PO intake. Had some swelling in LE last week and took a diuretic with resolution. No fevers. Endorses chills. Endorses nausea but no vomiting. No diarrhea. Not on home O2. No swelling in LE today. No hx of CHF. Patient able to lay flat with no issue.     Past Medical History:   Diagnosis Date    Bronchitis     COPD (chronic obstructive pulmonary disease)     PONV (postoperative nausea and vomiting)        Past Surgical History:   Procedure Laterality Date    COSMETIC SURGERY  2012    bilateral eye lift    HYSTERECTOMY      IRRIGATION AND DEBRIDEMENT OF UPPER EXTREMITY Bilateral 11/15/2022    Procedure: IRRIGATION AND DEBRIDEMENT, UPPER EXTREMITY;  Surgeon: Leighton Cronin DO;  Location: [facility] OR;  Service: Orthopedics;  Laterality: Bilateral;    IRRIGATION AND DEBRIDEMENT OF UPPER EXTREMITY Bilateral 11/17/2022    Procedure: IRRIGATION AND DEBRIDEMENT, UPPER EXTREMITY;  Surgeon: Leighton Cronin DO;  Location: [facility] OR;  " Service: Orthopedics;  Laterality: Bilateral;  Second look irrigation debridement need wound VAC for possible wound VAC placement    OPEN REDUCTION AND INTERNAL FIXATION (ORIF) OF FRACTURE OF FEMUR USING DYNAMIC HIP SCREW Left 7/3/2019    Procedure: ORIF, HIP, USING DYNAMIC HIP SCREW;  Surgeon: Juan Jose Gunn MD;  Location: [facility] OR;  Service: Orthopedics;  Laterality: Left;    REVERSE TOTAL SHOULDER ARTHROPLASTY Right 4/26/2023    Procedure: ARTHROPLASTY, SHOULDER, TOTAL, REVERSE;  Surgeon: Luis Enrique Pimentel II, MD;  Location: [facility] OR;  Service: Orthopedics;  Laterality: Right;       Review of patient's allergies indicates:   Allergen Reactions    Codeine Nausea Only     Happened 50 years ago        No current facility-administered medications on file prior to encounter.     Current Outpatient Medications on File Prior to Encounter   Medication Sig    albuterol (PROVENTIL/VENTOLIN HFA) 90 mcg/actuation inhaler Inhale 1-2 puffs into the lungs every 6 (six) hours as needed for Wheezing. Rescue    aspirin (ECOTRIN) 81 MG EC tablet Take 1 tablet (81 mg total) by mouth 2 (two) times a day. for 14 days    cetirizine (ZYRTEC) 10 MG tablet Take 10 mg by mouth daily as needed for Allergies.    hydrALAZINE (APRESOLINE) 25 MG tablet Take 1 tablet (25 mg total) by mouth every 8 (eight) hours.    hydrALAZINE (APRESOLINE) 50 MG tablet Take 0.5 tablets (25 mg total) by mouth 3 (three) times daily.    HYDROcodone-acetaminophen (NORCO) 7.5-325 mg per tablet Take 1 tablet by mouth every 6 (six) hours as needed for Pain.    ibuprofen (ADVIL,MOTRIN) 200 MG tablet Take 200 mg by mouth.    levoFLOXacin (LEVAQUIN) 500 MG tablet Take 1 tablet (500 mg total) by mouth once daily.    levoFLOXacin (LEVAQUIN) 500 MG tablet Take 1 tablet (500 mg total) by mouth once daily.    ondansetron (ZOFRAN-ODT) 4 MG TbDL Take 1 tablet (4 mg total) by mouth every 6 (six) hours as needed.    TRELEGY ELLIPTA 100-62.5-25 mcg DsDv Take 1 puff by mouth once daily.      Family History    None       Tobacco Use    Smoking status: Every Day     Current packs/day: 1.00     Types: Cigarettes    Smokeless tobacco: Never   Substance and Sexual Activity    Alcohol use: Yes     Comment: rarely    Drug use: No    Sexual activity: Never     Review of Systems   All other systems reviewed and are negative.    Objective:     Vital Signs (Most Recent):  Temp: 97.9 °F (36.6 °C) (07/16/24 1010)  Pulse: 75 (07/16/24 1201)  Resp: 20 (07/16/24 1201)  BP: (!) 146/64 (07/16/24 1030)  SpO2: (!) 93 % (07/16/24 1201) Vital Signs (24h Range):  Temp:  [97.9 °F (36.6 °C)] 97.9 °F (36.6 °C)  Pulse:  [75-84] 75  Resp:  [16-24] 20  SpO2:  [93 %-95 %] 93 %  BP: (146-161)/(64-72) 146/64     Weight: 63.5 kg (140 lb)  Body mass index is 24.8 kg/m².     Physical Exam        Vitals reviewed  General: NAD, elderly, frail  Head: NC/AT  Eyes: EOMI, RUDY  Cardiovascular: Pulses difficult to palpate distally, Regular Rate and rhythm  Pulmonary: Normal Respiratory Rate, No respiratory distress  Gi: Soft, Non-tender  Extremities: Warm, No edema present  Skin: Warm, dry  Neuro: Alert, Oriented x3, No focal Deficit  Psych: Appropriate mood and affect       Significant Labs: All pertinent labs within the past 24 hours have been reviewed.    Significant Imaging: I have reviewed all pertinent imaging results/findings within the past 24 hours.  Assessment/Plan:     * Community acquired pneumonia of right lower lobe of lung  Patient has a diagnosis of pneumonia. The cause of the pneumonia is suspected to be bacterial in etiology but organism is not known. The pneumonia is stable. The patient has the following signs/symptoms of pneumonia: cough and shortness of breath. The patient does not have a current oxygen requirement and the patient does not have a home oxygen requirement. I have reviewed the pertinent imaging. The following cultures have been collected: Blood cultures and Sputum culture The culture results are listed  below.     Current antimicrobial regimen consists of the antibiotics listed below. Will monitor patient closely and continue current treatment plan unchanged.    Antibiotics (From admission, onward)      Start     Stop Route Frequency Ordered    07/17/24 0900  cefTRIAXone (Rocephin) 1 g in D5W 100 mL IVPB (MB+)         -- IV Every 24 hours (non-standard times) 07/16/24 1158    07/17/24 0900  azithromycin (ZITHROMAX) 500 mg in D5W 250 mL IVPB (Vial-Mate)         -- IV Every 24 hours (non-standard times) 07/16/24 1158    07/16/24 1200  azithromycin (ZITHROMAX) 500 mg in D5W 250 mL IVPB (Vial-Mate)         07/16/24 2359 IV ED 1 Time 07/16/24 1119    07/16/24 1130  cefTRIAXone (Rocephin) 1 g in D5W 100 mL IVPB (MB+)         07/16/24 2329 IV ED 1 Time 07/16/24 1119            Microbiology Results (last 7 days)       Procedure Component Value Units Date/Time    Culture, Respiratory with Gram Stain [[id number]]     Order Status: No result Specimen: Respiratory     Culture, body fluid - Bactec [[id number]]     Order Status: No result Specimen: Body Fluid from Thoracentesis Fluid     Gram stain [[id number]]     Order Status: No result Specimen: Body Fluid from Pleural Fluid     Blood Culture #2 **CANNOT BE ORDERED STAT** [[id number]] Collected: 07/16/24 1133    Order Status: Sent Specimen: Blood from Peripheral, Wrist, Left     Blood Culture #1 **CANNOT BE ORDERED STAT** [[id number]] Collected: 07/16/24 1117    Order Status: Sent Specimen: Blood from Peripheral, Antecubital, Right             Pleural effusion  Patient found to have moderate pleural effusion on imaging. I have personally reviewed and interpreted the following imaging: Xray. A thoracentesis was ordered. Most likely etiology includes Congestive Heart Failure and Pneumonia. Management to include  thoracentesis    F/U fluid studies      Hypokalemia  Patient has hypokalemia which is Acute and currently stable. Most recent potassium levels reviewed-   Lab Results    Component Value Date    K 2.6 (LL) 07/16/2024   . Will continue potassium replacement per protocol and recheck repeat levels after replacement completed.     TIA involving carotid artery  Continue ASA    Tobacco abuse  Currently does not smoke anymore      COPD (chronic obstructive pulmonary disease)  Patient's COPD is controlled currently.  Patient is currently off COPD Pathway.  Symptoms more suggestive of CAP than COPD. Hold steroids for now. No wheezing.      VTE Risk Mitigation (From admission, onward)           Ordered     enoxaparin injection 30 mg  Daily         07/16/24 1152     IP VTE HIGH RISK PATIENT  Once         07/16/24 1152     Place sequential compression device  Until discontinued         07/16/24 1152                       On 07/16/2024, patient should be placed in hospital observation services under my care.             Job Smallwood MD  Department of Hospital Medicine  [place] - Emergency Dept

## 2024-07-16 NOTE — ASSESSMENT & PLAN NOTE
Patient's COPD is controlled currently.  Patient is currently off COPD Pathway.  Symptoms more suggestive of CAP than COPD. Hold steroids for now. No wheezing.

## 2024-07-16 NOTE — ASSESSMENT & PLAN NOTE
Patient has hypokalemia which is Acute and currently stable. Most recent potassium levels reviewed-   Lab Results   Component Value Date    K 2.6 (LL) 07/16/2024   . Will continue potassium replacement per protocol and recheck repeat levels after replacement completed.

## 2024-07-16 NOTE — SUBJECTIVE & OBJECTIVE
Past Medical History:   Diagnosis Date    Bronchitis     COPD (chronic obstructive pulmonary disease)     PONV (postoperative nausea and vomiting)        Past Surgical History:   Procedure Laterality Date    COSMETIC SURGERY  2012    bilateral eye lift    HYSTERECTOMY      IRRIGATION AND DEBRIDEMENT OF UPPER EXTREMITY Bilateral 11/15/2022    Procedure: IRRIGATION AND DEBRIDEMENT, UPPER EXTREMITY;  Surgeon: Leighton Cronin DO;  Location: [facility] OR;  Service: Orthopedics;  Laterality: Bilateral;    IRRIGATION AND DEBRIDEMENT OF UPPER EXTREMITY Bilateral 11/17/2022    Procedure: IRRIGATION AND DEBRIDEMENT, UPPER EXTREMITY;  Surgeon: Leighton Cronin DO;  Location: [facility] OR;  Service: Orthopedics;  Laterality: Bilateral;  Second look irrigation debridement need wound VAC for possible wound VAC placement    OPEN REDUCTION AND INTERNAL FIXATION (ORIF) OF FRACTURE OF FEMUR USING DYNAMIC HIP SCREW Left 7/3/2019    Procedure: ORIF, HIP, USING DYNAMIC HIP SCREW;  Surgeon: Juan Jose Gunn MD;  Location: [facility] OR;  Service: Orthopedics;  Laterality: Left;    REVERSE TOTAL SHOULDER ARTHROPLASTY Right 4/26/2023    Procedure: ARTHROPLASTY, SHOULDER, TOTAL, REVERSE;  Surgeon: Luis Enrique Pimentel II, MD;  Location: [facility] OR;  Service: Orthopedics;  Laterality: Right;       Review of patient's allergies indicates:   Allergen Reactions    Codeine Nausea Only     Happened 50 years ago        No current facility-administered medications on file prior to encounter.     Current Outpatient Medications on File Prior to Encounter   Medication Sig    albuterol (PROVENTIL/VENTOLIN HFA) 90 mcg/actuation inhaler Inhale 1-2 puffs into the lungs every 6 (six) hours as needed for Wheezing. Rescue    aspirin (ECOTRIN) 81 MG EC tablet Take 1 tablet (81 mg total) by mouth 2 (two) times a day. for 14 days    cetirizine (ZYRTEC) 10 MG tablet Take 10 mg by mouth daily as needed for Allergies.    hydrALAZINE (APRESOLINE) 25 MG tablet Take 1 tablet (25 mg total)  by mouth every 8 (eight) hours.    hydrALAZINE (APRESOLINE) 50 MG tablet Take 0.5 tablets (25 mg total) by mouth 3 (three) times daily.    HYDROcodone-acetaminophen (NORCO) 7.5-325 mg per tablet Take 1 tablet by mouth every 6 (six) hours as needed for Pain.    ibuprofen (ADVIL,MOTRIN) 200 MG tablet Take 200 mg by mouth.    levoFLOXacin (LEVAQUIN) 500 MG tablet Take 1 tablet (500 mg total) by mouth once daily.    levoFLOXacin (LEVAQUIN) 500 MG tablet Take 1 tablet (500 mg total) by mouth once daily.    ondansetron (ZOFRAN-ODT) 4 MG TbDL Take 1 tablet (4 mg total) by mouth every 6 (six) hours as needed.    TRELEGY ELLIPTA 100-62.5-25 mcg DsDv Take 1 puff by mouth once daily.     Family History    None       Tobacco Use    Smoking status: Every Day     Current packs/day: 1.00     Types: Cigarettes    Smokeless tobacco: Never   Substance and Sexual Activity    Alcohol use: Yes     Comment: rarely    Drug use: No    Sexual activity: Never     Review of Systems   All other systems reviewed and are negative.    Objective:     Vital Signs (Most Recent):  Temp: 97.9 °F (36.6 °C) (07/16/24 1010)  Pulse: 75 (07/16/24 1201)  Resp: 20 (07/16/24 1201)  BP: (!) 146/64 (07/16/24 1030)  SpO2: (!) 93 % (07/16/24 1201) Vital Signs (24h Range):  Temp:  [97.9 °F (36.6 °C)] 97.9 °F (36.6 °C)  Pulse:  [75-84] 75  Resp:  [16-24] 20  SpO2:  [93 %-95 %] 93 %  BP: (146-161)/(64-72) 146/64     Weight: 63.5 kg (140 lb)  Body mass index is 24.8 kg/m².     Physical Exam        Vitals reviewed  General: NAD, elderly, frail  Head: NC/AT  Eyes: EOMI, RUDY  Cardiovascular: Pulses difficult to palpate distally, Regular Rate and rhythm  Pulmonary: Normal Respiratory Rate, No respiratory distress  Gi: Soft, Non-tender  Extremities: Warm, No edema present  Skin: Warm, dry  Neuro: Alert, Oriented x3, No focal Deficit  Psych: Appropriate mood and affect       Significant Labs: All pertinent labs within the past 24 hours have been reviewed.    Significant  Imaging: I have reviewed all pertinent imaging results/findings within the past 24 hours.

## 2024-07-16 NOTE — ASSESSMENT & PLAN NOTE
Patient found to have moderate pleural effusion on imaging. I have personally reviewed and interpreted the following imaging: Xray. A thoracentesis was ordered. Most likely etiology includes Congestive Heart Failure and Pneumonia. Management to include  thoracentesis    F/U fluid studies

## 2024-07-17 LAB
ALBUMIN SERPL BCP-MCNC: 2.5 G/DL (ref 3.5–5.2)
ALP SERPL-CCNC: 225 U/L (ref 55–135)
ALT SERPL W/O P-5'-P-CCNC: 97 U/L (ref 10–44)
ANION GAP SERPL CALC-SCNC: 10 MMOL/L (ref 8–16)
AST SERPL-CCNC: 98 U/L (ref 10–40)
BASOPHILS # BLD AUTO: 0.14 K/UL (ref 0–0.2)
BASOPHILS NFR BLD: 0.8 % (ref 0–1.9)
BILIRUB SERPL-MCNC: 0.6 MG/DL (ref 0.1–1)
BODY FLUID SOURCE, LDH: NORMAL
BUN SERPL-MCNC: 16 MG/DL (ref 8–23)
CALCIUM SERPL-MCNC: 8.4 MG/DL (ref 8.7–10.5)
CHLORIDE SERPL-SCNC: 104 MMOL/L (ref 95–110)
CO2 SERPL-SCNC: 22 MMOL/L (ref 23–29)
CREAT SERPL-MCNC: 0.8 MG/DL (ref 0.5–1.4)
DIFFERENTIAL METHOD BLD: ABNORMAL
EOSINOPHIL # BLD AUTO: 0.5 K/UL (ref 0–0.5)
EOSINOPHIL NFR BLD: 2.7 % (ref 0–8)
ERYTHROCYTE [DISTWIDTH] IN BLOOD BY AUTOMATED COUNT: 14.8 % (ref 11.5–14.5)
EST. GFR  (NO RACE VARIABLE): >60 ML/MIN/1.73 M^2
GLUCOSE FLD-MCNC: 142 MG/DL
GLUCOSE SERPL-MCNC: 100 MG/DL (ref 70–110)
HCT VFR BLD AUTO: 40.1 % (ref 37–48.5)
HGB BLD-MCNC: 13.2 G/DL (ref 12–16)
IMM GRANULOCYTES # BLD AUTO: 0.14 K/UL (ref 0–0.04)
IMM GRANULOCYTES NFR BLD AUTO: 0.8 % (ref 0–0.5)
LDH FLD L TO P-CCNC: 113 U/L
LYMPHOCYTES # BLD AUTO: 1.3 K/UL (ref 1–4.8)
LYMPHOCYTES NFR BLD: 7.7 % (ref 18–48)
MAGNESIUM SERPL-MCNC: 1.8 MG/DL (ref 1.6–2.6)
MCH RBC QN AUTO: 27.8 PG (ref 27–31)
MCHC RBC AUTO-ENTMCNC: 32.9 G/DL (ref 32–36)
MCV RBC AUTO: 84 FL (ref 82–98)
MONOCYTES # BLD AUTO: 2 K/UL (ref 0.3–1)
MONOCYTES NFR BLD: 11.8 % (ref 4–15)
NEUTROPHILS # BLD AUTO: 13.1 K/UL (ref 1.8–7.7)
NEUTROPHILS NFR BLD: 76.2 % (ref 38–73)
NRBC BLD-RTO: 0 /100 WBC
OHS QRS DURATION: 100 MS
OHS QTC CALCULATION: 387 MS
PHOSPHATE SERPL-MCNC: 2.6 MG/DL (ref 2.7–4.5)
PLATELET # BLD AUTO: 343 K/UL (ref 150–450)
PMV BLD AUTO: 10.4 FL (ref 9.2–12.9)
POTASSIUM SERPL-SCNC: 3.8 MMOL/L (ref 3.5–5.1)
PROT FLD-MCNC: 2.4 G/DL
PROT SERPL-MCNC: 5.4 G/DL (ref 6–8.4)
RBC # BLD AUTO: 4.75 M/UL (ref 4–5.4)
SODIUM SERPL-SCNC: 136 MMOL/L (ref 136–145)
SPECIMEN SOURCE: NORMAL
SPECIMEN SOURCE: NORMAL
WBC # BLD AUTO: 17.18 K/UL (ref 3.9–12.7)

## 2024-07-17 PROCEDURE — 11000001 HC ACUTE MED/SURG PRIVATE ROOM

## 2024-07-17 PROCEDURE — 99900035 HC TECH TIME PER 15 MIN (STAT)

## 2024-07-17 PROCEDURE — 84100 ASSAY OF PHOSPHORUS: CPT | Performed by: STUDENT IN AN ORGANIZED HEALTH CARE EDUCATION/TRAINING PROGRAM

## 2024-07-17 PROCEDURE — 63600175 PHARM REV CODE 636 W HCPCS: Performed by: STUDENT IN AN ORGANIZED HEALTH CARE EDUCATION/TRAINING PROGRAM

## 2024-07-17 PROCEDURE — 85025 COMPLETE CBC W/AUTO DIFF WBC: CPT | Performed by: STUDENT IN AN ORGANIZED HEALTH CARE EDUCATION/TRAINING PROGRAM

## 2024-07-17 PROCEDURE — 97116 GAIT TRAINING THERAPY: CPT

## 2024-07-17 PROCEDURE — 27000221 HC OXYGEN, UP TO 24 HOURS

## 2024-07-17 PROCEDURE — 80053 COMPREHEN METABOLIC PANEL: CPT | Performed by: STUDENT IN AN ORGANIZED HEALTH CARE EDUCATION/TRAINING PROGRAM

## 2024-07-17 PROCEDURE — 97165 OT EVAL LOW COMPLEX 30 MIN: CPT

## 2024-07-17 PROCEDURE — 99233 SBSQ HOSP IP/OBS HIGH 50: CPT | Mod: ,,, | Performed by: STUDENT IN AN ORGANIZED HEALTH CARE EDUCATION/TRAINING PROGRAM

## 2024-07-17 PROCEDURE — 83735 ASSAY OF MAGNESIUM: CPT | Performed by: STUDENT IN AN ORGANIZED HEALTH CARE EDUCATION/TRAINING PROGRAM

## 2024-07-17 PROCEDURE — 94761 N-INVAS EAR/PLS OXIMETRY MLT: CPT

## 2024-07-17 PROCEDURE — 97162 PT EVAL MOD COMPLEX 30 MIN: CPT

## 2024-07-17 PROCEDURE — 25000003 PHARM REV CODE 250: Performed by: STUDENT IN AN ORGANIZED HEALTH CARE EDUCATION/TRAINING PROGRAM

## 2024-07-17 RX ORDER — NYSTATIN 100000 [USP'U]/ML
500000 SUSPENSION ORAL
Status: DISCONTINUED | OUTPATIENT
Start: 2024-07-17 | End: 2024-07-17

## 2024-07-17 RX ORDER — FUROSEMIDE 10 MG/ML
40 INJECTION INTRAMUSCULAR; INTRAVENOUS ONCE
Status: COMPLETED | OUTPATIENT
Start: 2024-07-17 | End: 2024-07-17

## 2024-07-17 RX ORDER — SODIUM,POTASSIUM PHOSPHATES 280-250MG
2 POWDER IN PACKET (EA) ORAL ONCE
Status: COMPLETED | OUTPATIENT
Start: 2024-07-17 | End: 2024-07-17

## 2024-07-17 RX ORDER — NYSTATIN 100000 [USP'U]/ML
500000 SUSPENSION ORAL
Status: DISCONTINUED | OUTPATIENT
Start: 2024-07-17 | End: 2024-07-20 | Stop reason: HOSPADM

## 2024-07-17 RX ADMIN — NYSTATIN 500000 UNITS: 500000 SUSPENSION ORAL at 11:07

## 2024-07-17 RX ADMIN — CEFTRIAXONE SODIUM 1 G: 1 INJECTION, POWDER, FOR SOLUTION INTRAMUSCULAR; INTRAVENOUS at 09:07

## 2024-07-17 RX ADMIN — ASPIRIN 81 MG: 81 TABLET, COATED ORAL at 09:07

## 2024-07-17 RX ADMIN — AZITHROMYCIN MONOHYDRATE 500 MG: 500 INJECTION, POWDER, LYOPHILIZED, FOR SOLUTION INTRAVENOUS at 10:07

## 2024-07-17 RX ADMIN — HYDRALAZINE HYDROCHLORIDE 25 MG: 25 TABLET ORAL at 03:07

## 2024-07-17 RX ADMIN — HYDRALAZINE HYDROCHLORIDE 25 MG: 25 TABLET ORAL at 05:07

## 2024-07-17 RX ADMIN — NYSTATIN 500000 UNITS: 500000 SUSPENSION ORAL at 09:07

## 2024-07-17 RX ADMIN — NYSTATIN 500000 UNITS: 500000 SUSPENSION ORAL at 05:07

## 2024-07-17 RX ADMIN — POTASSIUM & SODIUM PHOSPHATES POWDER PACK 280-160-250 MG 2 PACKET: 280-160-250 PACK at 09:07

## 2024-07-17 RX ADMIN — ENOXAPARIN SODIUM 30 MG: 100 INJECTION SUBCUTANEOUS at 05:07

## 2024-07-17 RX ADMIN — ACETAMINOPHEN 650 MG: 325 TABLET ORAL at 05:07

## 2024-07-17 RX ADMIN — FUROSEMIDE 40 MG: 10 INJECTION, SOLUTION INTRAVENOUS at 09:07

## 2024-07-17 RX ADMIN — HYDRALAZINE HYDROCHLORIDE 25 MG: 25 TABLET ORAL at 09:07

## 2024-07-17 NOTE — ASSESSMENT & PLAN NOTE
Patient has hypokalemia which is Acute and currently stable. Most recent potassium levels reviewed-   Lab Results   Component Value Date    K 3.8 07/17/2024   . Will continue potassium replacement per protocol and recheck repeat levels after replacement completed.

## 2024-07-17 NOTE — PT/OT/SLP EVAL
Physical Therapy Evaluation    Patient Name:  Geraldine Cooksey   MRN:  [id number]    Recommendations:     Discharge Recommendations:  (Home with home health)   Discharge Equipment Recommendations: none   Barriers to discharge: None    Assessment:     Geraldine Cooksey is a 83 y.o. female admitted with a medical diagnosis of Community acquired pneumonia of right lower lobe of lung.  She presents with the following impairments/functional limitations: impaired endurance, impaired balance, gait instability.    Rehab Prognosis: Good; patient would benefit from acute skilled PT services to address these deficits and reach maximum level of function.    Recent Surgery: * No surgery found *      Plan:     During this hospitalization, patient to be seen 5 x/week to address the identified rehab impairments via gait training, therapeutic activities, therapeutic exercises in order to progress towards goals.    Plan of Care Expires:   (Upon hospital discharge)    Subjective     Chief Complaint: The patient reports she had been feeling weak for about a week prior to hospital admission.  She still feels weak today but a little better.  Patient/Family Comments/goals: Return home independent with functional mobility.  Pain/Comfort:  Pain Rating 1: 0/10    Patients cultural, spiritual, Anabaptist conflicts given the current situation: no    Living Environment:  The patient lives alone in a single story residence.  She has good family support.  Two of her daughters are present during today's assessment.  Prior to admission, patients level of function was independent. She has been using a rollator and has needed more help with ADL's this past week.  Equipment used at home: rollator, nebulizer.  DME owned (not currently used): bedside commode.  Upon discharge, patient will have assistance from her daughters.    Objective:     Communicated with nurse prior to session.  Patient found up in chair with IV, telemetry, and PureWick upon PT  entry to room.    General Precautions: Standard, fall  Orthopedic Precautions:N/A   Braces: N/A  Respiratory Status: Upon arrival to the patient's room, she was receiving oxygen through a nasal cannula at 1L/min.  The respiratory therapist was present during the assessment and monitored the patient's pulse ox which remained around 95% while ambulating on room air.  She discontinued to the oxygen.    Exams:  Cognitive Exam:  Patient is oriented to Person, Place, Time, and Situation  Sensation:    -       Intact  RLE ROM: WNL  RLE Strength: Deficits: 4-/5  LLE ROM: WNL  LLE Strength: Deficits: 4-/5    Functional Mobility:  Transfers:     Sit to Stand:  contact guard assistance with no AD  Gait: Ambulates with HHA x 2 approximately 35 feet on room air, She is unsteady, takes small steps, and walks with a slow brynn.  Balance: Sitting - Good, Static standing - Fair, Dynamic standing - Fair minus      AM-PAC 6 CLICK MOBILITY  Total Score:15       Treatment & Education:  The patient was seen for an initial evaluation.  She and her daughters were instructed in the purpose and goals of physical therapy.    Patient left up in chair with all lines intact, call button in reach, nurse notified, and daughters present.    GOALS:   Multidisciplinary Problems       Physical Therapy Goals          Problem: Physical Therapy    Goal Priority Disciplines Outcome Goal Variances Interventions   Physical Therapy Goal     PT, PT/OT      Description: Goals     1.  Patient to be independent with transfers.  2.  Patient to be independent with ambulation > 100 feet.                       History:     Past Medical History:   Diagnosis Date    Bronchitis     COPD (chronic obstructive pulmonary disease)     PONV (postoperative nausea and vomiting)        Past Surgical History:   Procedure Laterality Date    COSMETIC SURGERY  2012    bilateral eye lift    HYSTERECTOMY      IRRIGATION AND DEBRIDEMENT OF UPPER EXTREMITY Bilateral 11/15/2022     Procedure: IRRIGATION AND DEBRIDEMENT, UPPER EXTREMITY;  Surgeon: Leighton Cronin DO;  Location: [facility] OR;  Service: Orthopedics;  Laterality: Bilateral;    IRRIGATION AND DEBRIDEMENT OF UPPER EXTREMITY Bilateral 11/17/2022    Procedure: IRRIGATION AND DEBRIDEMENT, UPPER EXTREMITY;  Surgeon: Leighton Cronin DO;  Location: [facility] OR;  Service: Orthopedics;  Laterality: Bilateral;  Second look irrigation debridement need wound VAC for possible wound VAC placement    OPEN REDUCTION AND INTERNAL FIXATION (ORIF) OF FRACTURE OF FEMUR USING DYNAMIC HIP SCREW Left 7/3/2019    Procedure: ORIF, HIP, USING DYNAMIC HIP SCREW;  Surgeon: Juan Jose Gunn MD;  Location: [facility] OR;  Service: Orthopedics;  Laterality: Left;    REVERSE TOTAL SHOULDER ARTHROPLASTY Right 4/26/2023    Procedure: ARTHROPLASTY, SHOULDER, TOTAL, REVERSE;  Surgeon: Luis Enrique Pimentel II, MD;  Location: [facility] OR;  Service: Orthopedics;  Laterality: Right;       Time Tracking:     PT Received On: 07/17/24  PT Start Time: 0915     PT Stop Time: 0935  PT Total Time (min): 20 min     Billable Minutes: Evaluation 20 07/17/2024

## 2024-07-17 NOTE — PT/OT/SLP EVAL
"Occupational Therapy Evaluation     Name: Geraldine Cooksey  MRN: [id number]  Admitting Diagnosis: Community acquired pneumonia of right lower lobe of lung  Recent Surgery: * No surgery found *      Recommendations:     Discharge Recommendations: Low Intensity Therapy  Level of Assistance Recommended: 24 hours supervision  Discharge Equipment Recommendations: bath bench (pt reports she currently uses a metal chair)  Barriers to discharge: None    Assessment:     Geraldine Cooksey is a 83 y.o. female with a medical diagnosis of Community acquired pneumonia of right lower lobe of lung. She presents with performance deficits affecting function including impaired endurance, decreased safety awareness, impaired functional mobility, weakness, impaired self care skills.     Pt reports prior to the last week or so, she was independent in all self care and home management tasks. She states that within the last week, she has needed help with these tasks, so her daughters came to help her when she needed it. She reports weakness & SOB were both factors causing her to need extra help within the last week.    Rehab Prognosis: Fair; patient would benefit from acute OT services to address these deficits and reach maximum level of function.    Plan:     Patient to be seen   to address the above listed problems via self-care/home management, therapeutic activities, therapeutic exercises  Plan of Care Expires:    Plan of Care Reviewed with: patient, daughter    Subjective     Chief Complaint: Pt & pt daughter shows OT how bed has large divet in the middle of the bed and the patient was unable to move because of this.   Patient Comments/Goals: return home  Pain/Comfort:  Pain Rating 1: 0/10    Patients cultural, spiritual, Protestant conflicts given the current situation: yes    Social History:  Living Environment: Patient lives alone in a single story home with number of outside stair(s): pt reports "a couple" with a handrail on one " "side.   Prior Level of Function: Prior to admission, patient requires assistance with ADLs including bathing & dressing & IADLs such as cooking or cleaning. This need of assistance has all been within the last week because of weakness.   Equipment Used at Home: walker, rolling  DME owned (not currently used): none  Assistance Upon Discharge: family    Objective:     Communicated with patient & daughter prior to session. Patient found up in chair with pulse ox (continuous), peripheral IV, telemetry upon OT entry to room.    General Precautions: Standard,     Orthopedic Precautions:     Braces:      Respiratory Status: Room air    Occupational Performance    Gait belt applied - No    Functional Mobility/Transfers:  Pt refused any out of chair activities because she had just sat back down from being up with RN helping her to bathroom.    Activities of Daily Living:  Lower Body Dressing: supervision    Cognitive/Visual Perceptual:  Visual/Perceptual:    -     Pt reports having "floaters" in her vision. Daughter is present in room and reports they both deal with this. Daughter states it comes & goes but there are spots in the visual field when it occurs. Patient states it has been happening more frequently than usual lately.     Physical Exam:  Upper Extremity Range of Motion:     -       Right Upper Extremity: WFL  -       Left Upper Extremity: WFL  Upper Extremity Strength:    -       Right Upper Extremity: 3/5  -       Left Upper Extremity: 3/5   Strength:    -       Right Upper Extremity: 3/5  -       Left Upper Extremity: 3/5  Fine Motor Coordination:    -       Intact  -       Intact  Left hand, finger to nose and Right hand, finger to nose    AMPAC 6 Click ADL:  AMPAC Total Score: 21    Treatment & Education:  Therapist provided facilitation and instruction of proper body mechanics, energy conservation, and fall prevention strategies during tasks listed above  Patient educated on role of OT, POC, and goals for " therapy      Patient clear to ambulate to/from bathroom with RN/PCT, assist x1 .    Patient left up in chair with all lines intact, call button in reach, and RN present. RN entry upon OT exit.     GOALS:   Pt to increase OOB tolerance to 30 min per day seated in upright chair by bhaskar.  Pt to increase LB dressing IND to set up/ SBA for donning socks and pants by bhaskar  Pt to increase IND with stand pivot transfer in order to [place] [facility] [place].   Pt to complete toileting with SBA for all components (LB drsg, pericare and stand pivot) by d/c.        History:     Past Medical History:   Diagnosis Date    Bronchitis     COPD (chronic obstructive pulmonary disease)     PONV (postoperative nausea and vomiting)          Past Surgical History:   Procedure Laterality Date    COSMETIC SURGERY  2012    bilateral eye lift    HYSTERECTOMY      IRRIGATION AND DEBRIDEMENT OF UPPER EXTREMITY Bilateral 11/15/2022    Procedure: IRRIGATION AND DEBRIDEMENT, UPPER EXTREMITY;  Surgeon: Leighton Cronin DO;  Location: [facility] OR;  Service: Orthopedics;  Laterality: Bilateral;    IRRIGATION AND DEBRIDEMENT OF UPPER EXTREMITY Bilateral 11/17/2022    Procedure: IRRIGATION AND DEBRIDEMENT, UPPER EXTREMITY;  Surgeon: Leighton Cronin DO;  Location: [facility] OR;  Service: Orthopedics;  Laterality: Bilateral;  Second look irrigation debridement need wound VAC for possible wound VAC placement    OPEN REDUCTION AND INTERNAL FIXATION (ORIF) OF FRACTURE OF FEMUR USING DYNAMIC HIP SCREW Left 7/3/2019    Procedure: ORIF, HIP, USING DYNAMIC HIP SCREW;  Surgeon: Juan Jose Gunn MD;  Location: [facility] OR;  Service: Orthopedics;  Laterality: Left;    REVERSE TOTAL SHOULDER ARTHROPLASTY Right 4/26/2023    Procedure: ARTHROPLASTY, SHOULDER, TOTAL, REVERSE;  Surgeon: Luis Enrique Pimentel II, MD;  Location: [facility] OR;  Service: Orthopedics;  Laterality: Right;       Time Tracking:     OT Date of Treatment:    OT Start Time: 0905  OT Stop Time: 0915  OT Total Time (min):  10 min    Billable Minutes: Evaluation 10 minutes    7/17/2024

## 2024-07-17 NOTE — ASSESSMENT & PLAN NOTE
Patient has a diagnosis of pneumonia. The cause of the pneumonia is suspected to be bacterial in etiology but organism is not known. The pneumonia is stable. The patient has the following signs/symptoms of pneumonia: cough and shortness of breath. The patient does not have a current oxygen requirement and the patient does not have a home oxygen requirement. I have reviewed the pertinent imaging. The following cultures have been collected: Blood cultures and Sputum culture The culture results are listed below.     Current antimicrobial regimen consists of the antibiotics listed below. Will monitor patient closely and continue current treatment plan unchanged.    Antibiotics (From admission, onward)    Start     Stop Route Frequency Ordered    07/17/24 0900  cefTRIAXone (Rocephin) 1 g in D5W 100 mL IVPB (MB+)         -- IV Every 24 hours (non-standard times) 07/16/24 1158    07/17/24 0900  azithromycin (ZITHROMAX) 500 mg in D5W 250 mL IVPB (Vial-Mate)         -- IV Every 24 hours (non-standard times) 07/16/24 1158          Microbiology Results (last 7 days)     Procedure Component Value Units Date/Time    Culture, Body Fluid (Aerobic) w/ GS [[id number]] Collected: 07/16/24 1426    Order Status: Completed Specimen: Body Fluid from Thoracentesis Fluid Updated: 07/17/24 0241     Gram Stain Result Rare WBC's      No organisms seen    Blood Culture #1 **CANNOT BE ORDERED STAT** [[id number]] Collected: 07/16/24 1117    Order Status: Completed Specimen: Blood from Peripheral, Antecubital, Right Updated: 07/17/24 0115     Blood Culture, Routine No Growth to date    Blood Culture #2 **CANNOT BE ORDERED STAT** [[id number]] Collected: 07/16/24 1133    Order Status: Completed Specimen: Blood from Peripheral, Wrist, Left Updated: 07/17/24 0115     Blood Culture, Routine No Growth to date    Culture, Respiratory with Gram Stain [[id number]] Collected: 07/16/24 1428    Order Status: Canceled Specimen: Respiratory from Lung, Right      Gram stain [[id number]] Collected: 07/16/24 1427    Order Status: Canceled Specimen: Body Fluid from Pleural Fluid     Culture, body fluid - Bactec [[id number]] Collected: 07/16/24 1426    Order Status: Canceled Specimen: Body Fluid from Thoracentesis Fluid     Gram Stain Screen [[id number]]     Order Status: Canceled

## 2024-07-17 NOTE — PLAN OF CARE
Problem: Gas Exchange Impaired  Goal: Optimal Gas Exchange  Outcome: Progressing     Problem: Adult Inpatient Plan of Care  Goal: Plan of Care Review  Outcome: Progressing  Goal: Patient-Specific Goal (Individualized)  Outcome: Progressing  Goal: Absence of Hospital-Acquired Illness or Injury  Outcome: Progressing  Goal: Optimal Comfort and Wellbeing  Outcome: Progressing  Goal: Readiness for Transition of Care  Outcome: Progressing     Problem: Pneumonia  Goal: Fluid Balance  Outcome: Progressing  Goal: Resolution of Infection Signs and Symptoms  Outcome: Progressing  Goal: Effective Oxygenation and Ventilation  Outcome: Progressing     Problem: Wound  Goal: Optimal Coping  Outcome: Progressing  Goal: Optimal Functional Ability  Outcome: Progressing  Goal: Absence of Infection Signs and Symptoms  Outcome: Progressing  Goal: Improved Oral Intake  Outcome: Progressing  Goal: Optimal Pain Control and Function  Outcome: Progressing  Goal: Skin Health and Integrity  Outcome: Progressing  Goal: Optimal Wound Healing  Outcome: Progressing     Problem: Skin Injury Risk Increased  Goal: Skin Health and Integrity  Outcome: Progressing     Problem: Fall Injury Risk  Goal: Absence of Fall and Fall-Related Injury  Outcome: Progressing     Problem: Fatigue  Goal: Improved Activity Tolerance  Outcome: Progressing

## 2024-07-17 NOTE — PT/OT/SLP PROGRESS
Physical Therapy Treatment    Patient Name:  Geraldine Cooksey   MRN:  [id number]    Recommendations:     Discharge Recommendations:  (Home with home health)  Discharge Equipment Recommendations: none  Barriers to discharge: None    Assessment:     Geraldine Cooksey is a 83 y.o. female admitted with a medical diagnosis of Community acquired pneumonia of right lower lobe of lung.  She presents with the following impairments/functional limitations: impaired endurance, impaired balance, gait instability.    The patient used the rolling walker this afternoon and demonstrated improved gait stability.    Rehab Prognosis: Good; patient would benefit from acute skilled PT services to address these deficits and reach maximum level of function.    Recent Surgery: * No surgery found *      Plan:     During this hospitalization, patient to be seen 5 x/week to address the identified rehab impairments via gait training, therapeutic activities, therapeutic exercises in order to progress towards goals.    Plan of Care Expires:   (Upon hospital discharge)    Subjective     Chief Complaint: The patient complains of feeling cold.  Patient/Family Comments/goals: Return home independent with functional mobility.  Pain/Comfort:  Pain Rating 1: 0/10      Objective:     Communicated with nurse prior to session.  Patient found supine with telemetry, PureWick upon PT entry to room.     General Precautions: Standard, fall  Orthopedic Precautions: N/A  Braces: N/A  Respiratory Status: Room air     Functional Mobility:  Transfers:     Sit to Stand:  contact guard assistance with rolling walker  Gait: Ambulates with a rolling walker approximately 75 feet.      AM-PAC 6 CLICK MOBILITY  Turning over in bed (including adjusting bedclothes, sheets and blankets)?: 3  Sitting down on and standing up from a chair with arms (e.g., wheelchair, bedside commode, etc.): 3  Moving from lying on back to sitting on the side of the bed?: 3  Moving to and from a  bed to a chair (including a wheelchair)?: 3  Need to walk in hospital room?: 2  Climbing 3-5 steps with a railing?: 1  Basic Mobility Total Score: 15       Treatment & Education:  The patient was seen for gait training with the rolling walker and CGA approximately 75 feet.  She demonstrates improved stability this afternoon with the rolling walker.  Pulse ox 93% upon return to her room.    Patient left supine with all lines intact, call button in reach, nurse notified, and daughters present..    GOALS:   Multidisciplinary Problems       Physical Therapy Goals          Problem: Physical Therapy    Goal Priority Disciplines Outcome Goal Variances Interventions   Physical Therapy Goal     PT, PT/OT      Description: Goals     1.  Patient to be independent with transfers.  2.  Patient to be independent with ambulation > 100 feet.                       Time Tracking:     PT Received On: 07/17/24  PT Start Time: 1240     PT Stop Time: 1255  PT Total Time (min): 15 min     Billable Minutes: Gait Training 15    Treatment Type: Treatment  PT/PTA: PT     Number of PTA visits since last PT visit: 0     07/17/2024

## 2024-07-17 NOTE — SUBJECTIVE & OBJECTIVE
Interval History: s/p thora yesterday with radiology. Pleural fluid transudative. IV lasix today. Continue abx.    Review of Systems  Objective:     Vital Signs (Most Recent):  Temp: 99.7 °F (37.6 °C) (07/17/24 1230)  Pulse: 84 (07/17/24 1230)  Resp: (!) 24 (07/17/24 1230)  BP: (!) 152/68 (07/17/24 1230)  SpO2: (!) 92 % (07/17/24 1230) Vital Signs (24h Range):  Temp:  [98.1 °F (36.7 °C)-100.5 °F (38.1 °C)] 99.7 °F (37.6 °C)  Pulse:  [73-87] 84  Resp:  [16-28] 24  SpO2:  [92 %-98 %] 92 %  BP: (100-173)/(50-81) 152/68     Weight: 63.5 kg (140 lb)  Body mass index is 24.8 kg/m².    Intake/Output Summary (Last 24 hours) at 7/17/2024 1316  Last data filed at 7/17/2024 0537  Gross per 24 hour   Intake 225 ml   Output 1550 ml   Net -1325 ml         Physical Exam      Vitals reviewed  General: NAD, elderly, frail  Head: NC/AT  Eyes: EOMI, RUDY  Cardiovascular: Pulses difficult to palpate distally, Regular Rate and rhythm  Pulmonary: Normal Respiratory Rate, No respiratory distress  Gi: Soft, Non-tender  Extremities: Warm, No edema present  Skin: Warm, dry  Neuro: Alert, Oriented x3, No focal Deficit  Psych: Appropriate mood and affect       Significant Labs: All pertinent labs within the past 24 hours have been reviewed.    Significant Imaging: I have reviewed all pertinent imaging results/findings within the past 24 hours.

## 2024-07-17 NOTE — PROGRESS NOTES
[facility] Medicine  Progress Note    Patient Name: Geraldine Cooksey  MRN: [id number]  Patient Class: IP- Inpatient   Admission Date: 7/16/2024  Length of Stay: 0 days  Attending Physician: Job Smallwood MD  Primary Care Provider: Yelena Dykes FNP        Subjective:     Principal Problem:Community acquired pneumonia of right lower lobe of lung        HPI:  82 yo w/ hx of COPD, TIA, HTN presenting with weakness and SOB. Symptoms have been going on for about 9 days but worsened over past 2-3 days. Patient states that today her home pulse ox was reading low so her daughter brought her to ED. Has had decreased PO intake. Had some swelling in LE last week and took a diuretic with resolution. No fevers. Endorses chills. Endorses nausea but no vomiting. No diarrhea. Not on home O2. No swelling in LE today. No hx of CHF. Patient able to lay flat with no issue.     Overview/Hospital Course:  No notes on file    Interval History: s/p thora yesterday with radiology. Pleural fluid transudative. IV lasix today. Continue abx.    Review of Systems  Objective:     Vital Signs (Most Recent):  Temp: 99.7 °F (37.6 °C) (07/17/24 1230)  Pulse: 84 (07/17/24 1230)  Resp: (!) 24 (07/17/24 1230)  BP: (!) 152/68 (07/17/24 1230)  SpO2: (!) 92 % (07/17/24 1230) Vital Signs (24h Range):  Temp:  [98.1 °F (36.7 °C)-100.5 °F (38.1 °C)] 99.7 °F (37.6 °C)  Pulse:  [73-87] 84  Resp:  [16-28] 24  SpO2:  [92 %-98 %] 92 %  BP: (100-173)/(50-81) 152/68     Weight: 63.5 kg (140 lb)  Body mass index is 24.8 kg/m².    Intake/Output Summary (Last 24 hours) at 7/17/2024 1316  Last data filed at 7/17/2024 0537  Gross per 24 hour   Intake 225 ml   Output 1550 ml   Net -1325 ml         Physical Exam      Vitals reviewed  General: NAD, elderly, frail  Head: NC/AT  Eyes: EOMI, RUDY  Cardiovascular: Pulses difficult to palpate distally, Regular Rate and rhythm  Pulmonary: Normal Respiratory Rate, No respiratory distress  Gi:  Soft, Non-tender  Extremities: Warm, No edema present  Skin: Warm, dry  Neuro: Alert, Oriented x3, No focal Deficit  Psych: Appropriate mood and affect       Significant Labs: All pertinent labs within the past 24 hours have been reviewed.    Significant Imaging: I have reviewed all pertinent imaging results/findings within the past 24 hours.    Assessment/Plan:      * Community acquired pneumonia of right lower lobe of lung  Patient has a diagnosis of pneumonia. The cause of the pneumonia is suspected to be bacterial in etiology but organism is not known. The pneumonia is stable. The patient has the following signs/symptoms of pneumonia: cough and shortness of breath. The patient does not have a current oxygen requirement and the patient does not have a home oxygen requirement. I have reviewed the pertinent imaging. The following cultures have been collected: Blood cultures and Sputum culture The culture results are listed below.     Current antimicrobial regimen consists of the antibiotics listed below. Will monitor patient closely and continue current treatment plan unchanged.    Antibiotics (From admission, onward)      Start     Stop Route Frequency Ordered    07/17/24 0900  cefTRIAXone (Rocephin) 1 g in D5W 100 mL IVPB (MB+)         -- IV Every 24 hours (non-standard times) 07/16/24 1158    07/17/24 0900  azithromycin (ZITHROMAX) 500 mg in D5W 250 mL IVPB (Vial-Mate)         -- IV Every 24 hours (non-standard times) 07/16/24 1158            Microbiology Results (last 7 days)       Procedure Component Value Units Date/Time    Culture, Body Fluid (Aerobic) w/ GS [[id number]] Collected: 07/16/24 1426    Order Status: Completed Specimen: Body Fluid from Thoracentesis Fluid Updated: 07/17/24 0241     Gram Stain Result Rare WBC's      No organisms seen    Blood Culture #1 **CANNOT BE ORDERED STAT** [[id number]] Collected: 07/16/24 1117    Order Status: Completed Specimen: Blood from Peripheral, Antecubital, Right  Updated: 07/17/24 0115     Blood Culture, Routine No Growth to date    Blood Culture #2 **CANNOT BE ORDERED STAT** [[id number]] Collected: 07/16/24 1133    Order Status: Completed Specimen: Blood from Peripheral, Wrist, Left Updated: 07/17/24 0115     Blood Culture, Routine No Growth to date    Culture, Respiratory with Gram Stain [[id number]] Collected: 07/16/24 1428    Order Status: Canceled Specimen: Respiratory from Lung, Right     Gram stain [[id number]] Collected: 07/16/24 1427    Order Status: Canceled Specimen: Body Fluid from Pleural Fluid     Culture, body fluid - Bactec [[id number]] Collected: 07/16/24 1426    Order Status: Canceled Specimen: Body Fluid from Thoracentesis Fluid     Gram Stain Screen [[id number]]     Order Status: Canceled             Pleural effusion  Patient found to have moderate pleural effusion on imaging. I have personally reviewed and interpreted the following imaging: Xray. A thoracentesis was ordered. Most likely etiology includes Congestive Heart Failure and Pneumonia. Management to include  thoracentesis    Transudate by lights criteria      Hypokalemia  Patient has hypokalemia which is Acute and currently stable. Most recent potassium levels reviewed-   Lab Results   Component Value Date    K 3.8 07/17/2024   . Will continue potassium replacement per protocol and recheck repeat levels after replacement completed.     TIA involving carotid artery  Continue ASA    Tobacco abuse  Currently does not smoke anymore      COPD (chronic obstructive pulmonary disease)  Patient's COPD is controlled currently.  Patient is currently off COPD Pathway.  Symptoms more suggestive of CAP than COPD. Hold steroids for now. No wheezing.      VTE Risk Mitigation (From admission, onward)           Ordered     enoxaparin injection 30 mg  Daily         07/16/24 1152     IP VTE HIGH RISK PATIENT  Once         07/16/24 1152     Place sequential compression device  Until discontinued         07/16/24 1152                     Discharge Planning   PEDRO: 7/19/2024     Code Status: Full Code   Is the patient medically ready for discharge?:     Reason for patient still in hospital (select all that apply): Treatment  Discharge Plan A: Home with family, Home                  Job Smallwood MD  Department of [facility] Medicine   [facility]

## 2024-07-17 NOTE — ASSESSMENT & PLAN NOTE
Patient found to have moderate pleural effusion on imaging. I have personally reviewed and interpreted the following imaging: Xray. A thoracentesis was ordered. Most likely etiology includes Congestive Heart Failure and Pneumonia. Management to include  thoracentesis    Transudate by lights criteria

## 2024-07-17 NOTE — PLAN OF CARE
07/17/24 0833   Medicare Message   Important Message from Medicare regarding Discharge Appeal Rights Given to patient/caregiver;Explained to patient/caregiver;Signed/date by patient/caregiver   Date IMM was signed 07/17/24   Time IMM was signed 0833

## 2024-07-17 NOTE — PLAN OF CARE
Inpatient Upgrade Note    Geraldine Cooksey has warranted treatment spanning two or more midnights of hospital level care for the management of  Community acquired pneumonia and pleural effusion . She continues to require IV antibiotics, daily labs, supplemental oxygen, and monitoring of vital signs. Her condition is also complicated by the following comorbidities:   84 yo w/ hx of COPD, TIA, HTN presenting with weakness and SOB .

## 2024-07-17 NOTE — PLAN OF CARE
[facility] - [facility] Care Unit  Discharge Reassessment    Primary Care Provider: Yelena Dykes FNP    Expected Discharge Date: 7/19/2024    Patient resting in bed with daughter at bedside. She walked in the [facility] with therapy. At this time she doesn't want home health as she has a dog & states he is friendly. Can ask again once closer to discharge. Denies any other needs at this time.      Reassessment (most recent)       Discharge Reassessment - 07/17/24 1330          Discharge Reassessment    Assessment Type Discharge Planning Reassessment     Did the patient's condition or plan change since previous assessment? No     Discharge Plan discussed with: Patient     Communicated PEDRO with patient/caregiver Yes     Discharge Plan A Home     Discharge Plan B Home with family     DME Needed Upon Discharge  none     Transition of Care Barriers None     Why the patient remains in the hospital Requires continued medical care        Post-Acute Status    Discharge Delays None known at this time

## 2024-07-17 NOTE — CONSULTS
[facility] Care Unit  Wound Care    Patient Name:  Geraldine Cooksey   MRN:  [id number]  Date: 7/17/2024  Diagnosis: Community acquired pneumonia of right lower lobe of lung    History:     Past Medical History:   Diagnosis Date    Bronchitis     COPD (chronic obstructive pulmonary disease)     PONV (postoperative nausea and vomiting)        Social History     Socioeconomic History    Marital status: Single   Tobacco Use    Smoking status: Every Day     Current packs/day: 1.00     Types: Cigarettes    Smokeless tobacco: Never   Substance and Sexual Activity    Alcohol use: Yes     Comment: rarely    Drug use: No    Sexual activity: Never     Social Determinants of Health     Financial Resource Strain: Low Risk  (7/16/2024)    Overall Financial Resource Strain (CARDIA)     Difficulty of Paying Living Expenses: Not hard at all   Recent Concern: Financial Resource Strain - Medium Risk (5/27/2024)    Overall Financial Resource Strain (CARDIA)     Difficulty of Paying Living Expenses: Somewhat hard   Food Insecurity: No Food Insecurity (7/16/2024)    Hunger Vital Sign     Worried About Running Out of Food in the Last Year: Never true     Ran Out of Food in the Last Year: Never true   Transportation Needs: No Transportation Needs (7/16/2024)    TRANSPORTATION NEEDS     Transportation : No   Physical Activity: Sufficiently Active (7/16/2024)    Exercise Vital Sign     Days of Exercise per Week: 7 days     Minutes of Exercise per Session: 30 min   Recent Concern: Physical Activity - Inactive (5/27/2024)    Exercise Vital Sign     Days of Exercise per Week: 0 days     Minutes of Exercise per Session: 0 min   Stress: No Stress Concern Present (7/16/2024)    [employer] - Occupational Stress Questionnaire     Feeling of Stress : Only a little   Housing Stability: Low Risk  (7/16/2024)    Housing Stability Vital Sign     Unable to Pay for Housing in the Last Year: No     Homeless in the  Last Year: No       Precautions:     Allergies as of 07/16/2024 - Reviewed 07/16/2024   Allergen Reaction Noted    Codeine Nausea Only 06/12/2018       WOC Assessment Details/Treatment     Patient presents with redness to sacral area. Patient is able to ambulate. Patient has previously had pressure sores, regardless of being able to ambulate.       07/17/24 1454   WOCN Assessment   WOCN Total Time (mins) 15   Visit Date 07/17/24   Visit Time 1445   Consult Type New   Wound pressure   Teaching on-going   Pressure Injury Prevention    Sacral Foam Dressing Apply        Wound 07/16/24 1528 Pressure Injury midline Sacral spine   Date First Assessed/Time First Assessed: 07/16/24 1528   Present on Original Admission: Yes  Primary Wound Type: Pressure Injury  Orientation: midline  Location: Sacral spine   Pressure Injury Stage 1   Dressing Appearance Dry;Intact;Clean   Drainage Amount None   Appearance Intact   Periwound Area Intact   Dressing Applied;Foam   Dressing Change Due 07/24/24       Recommendations made to primary team: maintain sacral foam dressing, turn patient q2h, keep patient off sacral area, and ensure patient ambulates regularly.     07/17/2024

## 2024-07-17 NOTE — PLAN OF CARE
Problem: Gas Exchange Impaired  Goal: Optimal Gas Exchange  Outcome: Progressing  Intervention: Optimize Oxygenation and Ventilation  Flowsheets (Taken 7/17/2024 1655)  Airway/Ventilation Management: airway patency maintained  Head of Bed (HOB) Positioning: HOB elevated   Patient in no apparent distress. Sat's  97 % on 1 lpm. Placed on room air.  PRN treatments not needed . Will continue to monitor.

## 2024-07-18 PROBLEM — I50.31 ACUTE HEART FAILURE WITH PRESERVED EJECTION FRACTION: Status: ACTIVE | Noted: 2024-07-18

## 2024-07-18 PROBLEM — A41.9 SEVERE SEPSIS: Status: ACTIVE | Noted: 2024-07-18

## 2024-07-18 PROBLEM — R65.20 SEVERE SEPSIS: Status: ACTIVE | Noted: 2024-07-18

## 2024-07-18 PROBLEM — R09.02 HYPOXIA: Status: ACTIVE | Noted: 2024-07-18

## 2024-07-18 LAB
ALBUMIN SERPL BCP-MCNC: 2.5 G/DL (ref 3.5–5.2)
ALP SERPL-CCNC: 257 U/L (ref 55–135)
ALT SERPL W/O P-5'-P-CCNC: 105 U/L (ref 10–44)
ANION GAP SERPL CALC-SCNC: 12 MMOL/L (ref 8–16)
AST SERPL-CCNC: 76 U/L (ref 10–40)
BASOPHILS # BLD AUTO: 0.23 K/UL (ref 0–0.2)
BASOPHILS NFR BLD: 1.3 % (ref 0–1.9)
BILIRUB SERPL-MCNC: 0.8 MG/DL (ref 0.1–1)
BUN SERPL-MCNC: 16 MG/DL (ref 8–23)
CALCIUM SERPL-MCNC: 8.7 MG/DL (ref 8.7–10.5)
CHLORIDE SERPL-SCNC: 100 MMOL/L (ref 95–110)
CO2 SERPL-SCNC: 25 MMOL/L (ref 23–29)
CREAT SERPL-MCNC: 0.8 MG/DL (ref 0.5–1.4)
DIFFERENTIAL METHOD BLD: ABNORMAL
EOSINOPHIL # BLD AUTO: 0.9 K/UL (ref 0–0.5)
EOSINOPHIL NFR BLD: 4.9 % (ref 0–8)
ERYTHROCYTE [DISTWIDTH] IN BLOOD BY AUTOMATED COUNT: 15.1 % (ref 11.5–14.5)
EST. GFR  (NO RACE VARIABLE): >60 ML/MIN/1.73 M^2
GLUCOSE SERPL-MCNC: 85 MG/DL (ref 70–110)
HCT VFR BLD AUTO: 41.4 % (ref 37–48.5)
HGB BLD-MCNC: 14 G/DL (ref 12–16)
IMM GRANULOCYTES # BLD AUTO: 0.15 K/UL (ref 0–0.04)
IMM GRANULOCYTES NFR BLD AUTO: 0.8 % (ref 0–0.5)
LYMPHOCYTES # BLD AUTO: 1.4 K/UL (ref 1–4.8)
LYMPHOCYTES NFR BLD: 7.7 % (ref 18–48)
MAGNESIUM SERPL-MCNC: 1.6 MG/DL (ref 1.6–2.6)
MCH RBC QN AUTO: 28.4 PG (ref 27–31)
MCHC RBC AUTO-ENTMCNC: 33.8 G/DL (ref 32–36)
MCV RBC AUTO: 84 FL (ref 82–98)
MONOCYTES # BLD AUTO: 1.8 K/UL (ref 0.3–1)
MONOCYTES NFR BLD: 10 % (ref 4–15)
NEUTROPHILS # BLD AUTO: 13.8 K/UL (ref 1.8–7.7)
NEUTROPHILS NFR BLD: 75.3 % (ref 38–73)
NRBC BLD-RTO: 0 /100 WBC
PHOSPHATE SERPL-MCNC: 3.5 MG/DL (ref 2.7–4.5)
PLATELET # BLD AUTO: 337 K/UL (ref 150–450)
PMV BLD AUTO: 10.2 FL (ref 9.2–12.9)
POTASSIUM SERPL-SCNC: 3.3 MMOL/L (ref 3.5–5.1)
PROT SERPL-MCNC: 5.5 G/DL (ref 6–8.4)
RBC # BLD AUTO: 4.93 M/UL (ref 4–5.4)
SODIUM SERPL-SCNC: 137 MMOL/L (ref 136–145)
WBC # BLD AUTO: 18.33 K/UL (ref 3.9–12.7)

## 2024-07-18 PROCEDURE — 63600175 PHARM REV CODE 636 W HCPCS: Performed by: STUDENT IN AN ORGANIZED HEALTH CARE EDUCATION/TRAINING PROGRAM

## 2024-07-18 PROCEDURE — 27000221 HC OXYGEN, UP TO 24 HOURS

## 2024-07-18 PROCEDURE — 94640 AIRWAY INHALATION TREATMENT: CPT

## 2024-07-18 PROCEDURE — 85025 COMPLETE CBC W/AUTO DIFF WBC: CPT | Performed by: STUDENT IN AN ORGANIZED HEALTH CARE EDUCATION/TRAINING PROGRAM

## 2024-07-18 PROCEDURE — 99900031 HC PATIENT EDUCATION (STAT)

## 2024-07-18 PROCEDURE — 84100 ASSAY OF PHOSPHORUS: CPT | Performed by: STUDENT IN AN ORGANIZED HEALTH CARE EDUCATION/TRAINING PROGRAM

## 2024-07-18 PROCEDURE — 94761 N-INVAS EAR/PLS OXIMETRY MLT: CPT

## 2024-07-18 PROCEDURE — 11000001 HC ACUTE MED/SURG PRIVATE ROOM

## 2024-07-18 PROCEDURE — 97530 THERAPEUTIC ACTIVITIES: CPT

## 2024-07-18 PROCEDURE — 83735 ASSAY OF MAGNESIUM: CPT | Performed by: STUDENT IN AN ORGANIZED HEALTH CARE EDUCATION/TRAINING PROGRAM

## 2024-07-18 PROCEDURE — 25000003 PHARM REV CODE 250: Performed by: STUDENT IN AN ORGANIZED HEALTH CARE EDUCATION/TRAINING PROGRAM

## 2024-07-18 PROCEDURE — 25000242 PHARM REV CODE 250 ALT 637 W/ HCPCS: Performed by: STUDENT IN AN ORGANIZED HEALTH CARE EDUCATION/TRAINING PROGRAM

## 2024-07-18 PROCEDURE — 97116 GAIT TRAINING THERAPY: CPT

## 2024-07-18 PROCEDURE — 80053 COMPREHEN METABOLIC PANEL: CPT | Performed by: STUDENT IN AN ORGANIZED HEALTH CARE EDUCATION/TRAINING PROGRAM

## 2024-07-18 PROCEDURE — 99900035 HC TECH TIME PER 15 MIN (STAT)

## 2024-07-18 PROCEDURE — 99233 SBSQ HOSP IP/OBS HIGH 50: CPT | Mod: ,,, | Performed by: STUDENT IN AN ORGANIZED HEALTH CARE EDUCATION/TRAINING PROGRAM

## 2024-07-18 RX ORDER — MAGNESIUM SULFATE HEPTAHYDRATE 40 MG/ML
2 INJECTION, SOLUTION INTRAVENOUS ONCE
Status: COMPLETED | OUTPATIENT
Start: 2024-07-18 | End: 2024-07-18

## 2024-07-18 RX ORDER — FUROSEMIDE 10 MG/ML
40 INJECTION INTRAMUSCULAR; INTRAVENOUS ONCE
Status: COMPLETED | OUTPATIENT
Start: 2024-07-18 | End: 2024-07-18

## 2024-07-18 RX ADMIN — AZITHROMYCIN MONOHYDRATE 500 MG: 500 INJECTION, POWDER, LYOPHILIZED, FOR SOLUTION INTRAVENOUS at 09:07

## 2024-07-18 RX ADMIN — IPRATROPIUM BROMIDE AND ALBUTEROL SULFATE 3 ML: .5; 2.5 SOLUTION RESPIRATORY (INHALATION) at 08:07

## 2024-07-18 RX ADMIN — FUROSEMIDE 40 MG: 10 INJECTION, SOLUTION INTRAVENOUS at 12:07

## 2024-07-18 RX ADMIN — NYSTATIN 500000 UNITS: 500000 SUSPENSION ORAL at 12:07

## 2024-07-18 RX ADMIN — POTASSIUM BICARBONATE 25 MEQ: 978 TABLET, EFFERVESCENT ORAL at 09:07

## 2024-07-18 RX ADMIN — ACETAMINOPHEN 650 MG: 325 TABLET ORAL at 12:07

## 2024-07-18 RX ADMIN — MAGNESIUM SULFATE IN WATER 2 G: 40 INJECTION, SOLUTION INTRAVENOUS at 09:07

## 2024-07-18 RX ADMIN — NYSTATIN 500000 UNITS: 500000 SUSPENSION ORAL at 05:07

## 2024-07-18 RX ADMIN — ASPIRIN 81 MG: 81 TABLET, COATED ORAL at 09:07

## 2024-07-18 RX ADMIN — HYDRALAZINE HYDROCHLORIDE 25 MG: 25 TABLET ORAL at 09:07

## 2024-07-18 RX ADMIN — ENOXAPARIN SODIUM 30 MG: 100 INJECTION SUBCUTANEOUS at 05:07

## 2024-07-18 RX ADMIN — HYDRALAZINE HYDROCHLORIDE 25 MG: 25 TABLET ORAL at 05:07

## 2024-07-18 RX ADMIN — CEFTRIAXONE SODIUM 1 G: 1 INJECTION, POWDER, FOR SOLUTION INTRAMUSCULAR; INTRAVENOUS at 12:07

## 2024-07-18 RX ADMIN — HYDRALAZINE HYDROCHLORIDE 25 MG: 25 TABLET ORAL at 02:07

## 2024-07-18 RX ADMIN — NYSTATIN 500000 UNITS: 500000 SUSPENSION ORAL at 09:07

## 2024-07-18 NOTE — PROGRESS NOTES
Problem: Pain  Goal: #Acceptable pain level achieved/maintained at rest using NRS/Faces  Description: This goal is used for patients who can self-report.  Acceptable means the level is at or below the identified comfort/function goal.  Outcome: Outcome Met, Continue evaluating goal progress toward completion      [facility] Medicine  Progress Note    Patient Name: Geraldine Cooksey  MRN: [id number]  Patient Class: IP- Inpatient   Admission Date: 7/16/2024  Length of Stay: 1 days  Attending Physician: Job Smallwood MD  Primary Care Provider: Yelena Dykes FNP        Subjective:     Principal Problem:Community acquired pneumonia of right lower lobe of lung        HPI:  84 yo w/ hx of COPD, TIA, HTN presenting with weakness and SOB. Symptoms have been going on for about 9 days but worsened over past 2-3 days. Patient states that today her home pulse ox was reading low so her daughter brought her to ED. Has had decreased PO intake. Had some swelling in LE last week and took a diuretic with resolution. No fevers. Endorses chills. Endorses nausea but no vomiting. No diarrhea. Not on home O2. No swelling in LE today. No hx of CHF. Patient able to lay flat with no issue.     Overview/Hospital Course:  Patient admitted for acute hypoxic respiratory failure 2/2 CAP vs acute HFpEF exacerbation. Treating with IV abx and IV lasix. S/p thoracentesis with transudative effusion. TTE completed and results reviewed. Home O2 evaluation today. Continuing IV lasix and IV abx today. Plan is to discharge patient tomorrow with home health if medically clear.    Interval History: continue IV lasix and abx today. Home O2 eval.    Review of Systems  Objective:     Vital Signs (Most Recent):  Temp: 97.7 °F (36.5 °C) (07/18/24 0408)  Pulse: 99 (07/18/24 0931)  Resp: (!) 22 (07/18/24 0931)  BP: 133/61 (07/18/24 0800)  SpO2: (!) 91 % (07/18/24 0931) Vital Signs (24h Range):  Temp:  [97.7 °F (36.5 °C)-99.7 °F (37.6 °C)] 97.7 °F (36.5 °C)  Pulse:  [68-99] 99  Resp:  [17-24] 22  SpO2:  [90 %-95 %] 91 %  BP: (121-152)/(58-68) 133/61     Weight: 63.5 kg (140 lb)  Body mass index is 24.8 kg/m².    Intake/Output Summary (Last 24 hours) at 7/18/2024 1101  Last data filed at 7/18/2024 0409  Gross per 24 hour   Intake --    Output 2000 ml   Net -2000 ml         Physical Exam      Vitals reviewed  General: NAD, elderly, frail  Head: NC/AT  Eyes: EOMI, RUDY  Cardiovascular: Pulses difficult to palpate distally, Regular Rate and rhythm  Pulmonary: Normal Respiratory Rate, No respiratory distress  Gi: Soft, Non-tender  Extremities: Warm, No edema present  Skin: Warm, dry  Neuro: Alert, Oriented x3, No focal Deficit  Psych: Appropriate mood and affect       Significant Labs: All pertinent labs within the past 24 hours have been reviewed.    Significant Imaging: I have reviewed all pertinent imaging results/findings within the past 24 hours.    Assessment/Plan:      * Community acquired pneumonia of right lower lobe of lung  Patient has a diagnosis of pneumonia. The cause of the pneumonia is suspected to be bacterial in etiology but organism is not known. The pneumonia is stable. The patient has the following signs/symptoms of pneumonia: cough and shortness of breath. The patient does not have a current oxygen requirement and the patient does not have a home oxygen requirement. I have reviewed the pertinent imaging. The following cultures have been collected: Blood cultures and Sputum culture The culture results are listed below.     Current antimicrobial regimen consists of the antibiotics listed below. Will monitor patient closely and continue current treatment plan unchanged.    Antibiotics (From admission, onward)      Start     Stop Route Frequency Ordered    07/17/24 0900  cefTRIAXone (Rocephin) 1 g in D5W 100 mL IVPB (MB+)         -- IV Every 24 hours (non-standard times) 07/16/24 1158            Microbiology Results (last 7 days)       Procedure Component Value Units Date/Time    Blood Culture #1 **CANNOT BE ORDERED STAT** [[id number]] Collected: 07/16/24 1117    Order Status: Completed Specimen: Blood from Peripheral, Antecubital, Right Updated: 07/17/24 2012     Blood Culture, Routine No Growth to date      No Growth to date     Blood Culture #2 **CANNOT BE ORDERED STAT** [[id number]] Collected: 07/16/24 1133    Order Status: Completed Specimen: Blood from Peripheral, Wrist, Left Updated: 07/17/24 2012     Blood Culture, Routine No Growth to date      No Growth to date    Culture, Body Fluid (Aerobic) w/ GS [[id number]] Collected: 07/16/24 1426    Order Status: Completed Specimen: Body Fluid from Thoracentesis Fluid Updated: 07/17/24 0241     Gram Stain Result Rare WBC's      No organisms seen    Culture, Respiratory with Gram Stain [[id number]] Collected: 07/16/24 1428    Order Status: Canceled Specimen: Respiratory from Lung, Right     Gram stain [[id number]] Collected: 07/16/24 1427    Order Status: Canceled Specimen: Body Fluid from Pleural Fluid     Culture, body fluid - Bactec [[id number]] Collected: 07/16/24 1426    Order Status: Canceled Specimen: Body Fluid from Thoracentesis Fluid     Gram Stain Screen [[id number]]     Order Status: Canceled             Hypoxia  Not on home O2  Not severely dyspneic or with increased RR  Home O2 eval today      Severe sepsis  This patient does have evidence of infective focus  My overall impression is sepsis.  Source: Respiratory  Antibiotics given-   Antibiotics (72h ago, onward)      Start     Stop Route Frequency Ordered    07/17/24 0900  cefTRIAXone (Rocephin) 1 g in D5W 100 mL IVPB (MB+)         -- IV Every 24 hours (non-standard times) 07/16/24 1158          Latest lactate reviewed-  Recent Labs   Lab 07/16/24  1043   LACTATE 1.9     Organ dysfunction indicated by Acute respiratory failure    Fluid challenge Not needed - patient is not hypotensive      Post- resuscitation assessment No - Post resuscitation assessment not needed       Will Not start Pressors- Levophed for MAP of 65  Source control achieved by: IV abx    Acute heart failure with preserved ejection fraction  IV lasix PRN  Strict I's and O's  Keep K>4, Mg>2  Tele  TTE completed- results reviewed  Not on CHF pathway  Cardiology  not consulted      Pleural effusion  Patient found to have moderate pleural effusion on imaging. I have personally reviewed and interpreted the following imaging: Xray. A thoracentesis was ordered. Most likely etiology includes Congestive Heart Failure and Pneumonia. Management to include  thoracentesis    Transudate by lights criteria      Hypokalemia  Patient has hypokalemia which is Acute and currently stable. Most recent potassium levels reviewed-   Lab Results   Component Value Date    K 3.8 07/17/2024   . Will continue potassium replacement per protocol and recheck repeat levels after replacement completed.     TIA involving carotid artery  Continue ASA    Tobacco abuse  Currently does not smoke anymore      COPD (chronic obstructive pulmonary disease)  Patient's COPD is controlled currently.  Patient is currently off COPD Pathway.  Symptoms more suggestive of CAP than COPD. Hold steroids for now. No wheezing.      VTE Risk Mitigation (From admission, onward)           Ordered     enoxaparin injection 30 mg  Daily         07/16/24 1152     IP VTE HIGH RISK PATIENT  Once         07/16/24 1152     Place sequential compression device  Until discontinued         07/16/24 1152                    Discharge Planning   PEDRO: 7/19/2024     Code Status: Full Code   Is the patient medically ready for discharge?:     Reason for patient still in hospital (select all that apply): Treatment  Discharge Plan A: Home   Discharge Delays: None known at this time              Job Smallwood MD  Department of Hospital Medicine   [facility] [[email or web address]]

## 2024-07-18 NOTE — SUBJECTIVE & OBJECTIVE
Interval History: continue IV lasix and abx today. Home O2 eval.    Review of Systems  Objective:     Vital Signs (Most Recent):  Temp: 97.7 °F (36.5 °C) (07/18/24 0408)  Pulse: 99 (07/18/24 0931)  Resp: (!) 22 (07/18/24 0931)  BP: 133/61 (07/18/24 0800)  SpO2: (!) 91 % (07/18/24 0931) Vital Signs (24h Range):  Temp:  [97.7 °F (36.5 °C)-99.7 °F (37.6 °C)] 97.7 °F (36.5 °C)  Pulse:  [68-99] 99  Resp:  [17-24] 22  SpO2:  [90 %-95 %] 91 %  BP: (121-152)/(58-68) 133/61     Weight: 63.5 kg (140 lb)  Body mass index is 24.8 kg/m².    Intake/Output Summary (Last 24 hours) at 7/18/2024 1101  Last data filed at 7/18/2024 0409  Gross per 24 hour   Intake --   Output 2000 ml   Net -2000 ml         Physical Exam      Vitals reviewed  General: NAD, elderly, frail  Head: NC/AT  Eyes: EOMI, RUDY  Cardiovascular: Pulses difficult to palpate distally, Regular Rate and rhythm  Pulmonary: Normal Respiratory Rate, No respiratory distress  Gi: Soft, Non-tender  Extremities: Warm, No edema present  Skin: Warm, dry  Neuro: Alert, Oriented x3, No focal Deficit  Psych: Appropriate mood and affect       Significant Labs: All pertinent labs within the past 24 hours have been reviewed.    Significant Imaging: I have reviewed all pertinent imaging results/findings within the past 24 hours.

## 2024-07-18 NOTE — PLAN OF CARE
Problem: Pneumonia  Goal: Effective Oxygenation and Ventilation  Outcome: Progressing  Intervention: Promote Airway Secretion Clearance  Flowsheets (Taken 7/18/2024 0816)  Cough And Deep Breathing: done independently per patient  Intervention: Optimize Oxygenation and Ventilation  Flowsheets (Taken 7/18/2024 0816)  Airway/Ventilation Management: airway patency maintained  Head of Bed (HOB) Positioning: HOB elevated   Patient in no apparent distress. Sat's 94  % on 1 lpm. PRn treatment given. Shortness of breath with exertion present . Will continue to monitor.

## 2024-07-18 NOTE — ASSESSMENT & PLAN NOTE
Patient has a diagnosis of pneumonia. The cause of the pneumonia is suspected to be bacterial in etiology but organism is not known. The pneumonia is stable. The patient has the following signs/symptoms of pneumonia: cough and shortness of breath. The patient does not have a current oxygen requirement and the patient does not have a home oxygen requirement. I have reviewed the pertinent imaging. The following cultures have been collected: Blood cultures and Sputum culture The culture results are listed below.     Current antimicrobial regimen consists of the antibiotics listed below. Will monitor patient closely and continue current treatment plan unchanged.    Antibiotics (From admission, onward)    Start     Stop Route Frequency Ordered    07/17/24 0900  cefTRIAXone (Rocephin) 1 g in D5W 100 mL IVPB (MB+)         -- IV Every 24 hours (non-standard times) 07/16/24 1158          Microbiology Results (last 7 days)     Procedure Component Value Units Date/Time    Blood Culture #1 **CANNOT BE ORDERED STAT** [[id number]] Collected: 07/16/24 1117    Order Status: Completed Specimen: Blood from Peripheral, Antecubital, Right Updated: 07/17/24 2012     Blood Culture, Routine No Growth to date      No Growth to date    Blood Culture #2 **CANNOT BE ORDERED STAT** [[id number]] Collected: 07/16/24 1133    Order Status: Completed Specimen: Blood from Peripheral, Wrist, Left Updated: 07/17/24 2012     Blood Culture, Routine No Growth to date      No Growth to date    Culture, Body Fluid (Aerobic) w/ GS [[id number]] Collected: 07/16/24 1426    Order Status: Completed Specimen: Body Fluid from Thoracentesis Fluid Updated: 07/17/24 0241     Gram Stain Result Rare WBC's      No organisms seen    Culture, Respiratory with Gram Stain [[id number]] Collected: 07/16/24 1428    Order Status: Canceled Specimen: Respiratory from Lung, Right     Gram stain [[id number]] Collected: 07/16/24 1427    Order Status: Canceled Specimen: Body Fluid  from Pleural Fluid     Culture, body fluid - Bactec [[id number]] Collected: 07/16/24 1426    Order Status: Canceled Specimen: Body Fluid from Thoracentesis Fluid     Gram Stain Screen [[id number]]     Order Status: Canceled

## 2024-07-18 NOTE — NURSING
Plan of care reviewed with pt. RUT throughout shift. PRN pain medications provided as ordered. Cardiac diet. Continue to Monitor Labs. VSS. Room air. Continuous telemetry monitor maintained. Safety maintained. Will continue to monitor. No complaints at this time.

## 2024-07-18 NOTE — HOSPITAL COURSE
Patient admitted for acute hypoxic respiratory failure 2/2 CAP vs acute HFpEF exacerbation. Treating with IV abx and IV lasix. S/p thoracentesis with transudative effusion. TTE completed and results reviewed. Home O2 evaluation today. Continuing IV lasix and IV abx today. Plan is to discharge patient tomorrow with home health if medically clear.

## 2024-07-18 NOTE — ASSESSMENT & PLAN NOTE
IV lasix PRN  Strict I's and O's  Keep K>4, Mg>2  Tele  TTE completed- results reviewed  Not on CHF pathway  Cardiology not consulted

## 2024-07-18 NOTE — ASSESSMENT & PLAN NOTE
This patient does have evidence of infective focus  My overall impression is sepsis.  Source: Respiratory  Antibiotics given-   Antibiotics (72h ago, onward)      Start     Stop Route Frequency Ordered    07/17/24 0900  cefTRIAXone (Rocephin) 1 g in D5W 100 mL IVPB (MB+)         -- IV Every 24 hours (non-standard times) 07/16/24 1158          Latest lactate reviewed-  Recent Labs   Lab 07/16/24  1043   LACTATE 1.9     Organ dysfunction indicated by Acute respiratory failure    Fluid challenge Not needed - patient is not hypotensive      Post- resuscitation assessment No - Post resuscitation assessment not needed       Will Not start Pressors- Levophed for MAP of 65  Source control achieved by: IV abx

## 2024-07-18 NOTE — CARE UPDATE
07/18/24 0931   Home Oxygen Qualification   $ Home O2 Qualification Tech time 15 minutes   Room Air SpO2 At Rest 91 %   Room Air SpO2 During Ambulation 91 %   SpO2 Post Ambulation 92 %   Post Ambulation Heart Rate 103 bpm

## 2024-07-18 NOTE — PT/OT/SLP PROGRESS
Physical Therapy Treatment    Patient Name:  Geraldine Cooksey   MRN:  [id number]    Recommendations:     Discharge Recommendations:  (Home with home health)  Discharge Equipment Recommendations: none  Barriers to discharge: Inaccessible home and Decreased caregiver support    Assessment:     Geraldine Cooksey is a 83 y.o. female admitted with a medical diagnosis of Community acquired pneumonia of right lower lobe of lung.  She presents with the following impairments/functional limitations: impaired endurance, impaired balance, gait instability.    The patient did well on her test to determine if she needs home oxygen upon hospital discharge.  However, the patient presents much differently this morning compared to yesterday afternoon.  She needed more help with transfers, more help with ambulation, and required motivation from the staff and her daughter.  Anxiety?    Rehab Prognosis: Good; patient would benefit from acute skilled PT services to address these deficits and reach maximum level of function.    Recent Surgery: * No surgery found *      Plan:     During this hospitalization, patient to be seen 5 x/week to address the identified rehab impairments via gait training, therapeutic activities, therapeutic exercises in order to progress towards goals.    Plan of Care Expires:   (Upon hospital discharge)    Subjective     Chief Complaint: The patient complains of feeling weak.  Patient/Family Comments/goals: Return home independent with functional mobility.  Pain/Comfort:  Pain Rating 1: 0/10      Objective:     Communicated with nurse and respiratory therapist prior to session.  Patient found supine with telemetry upon PT entry to room.     General Precautions: Standard, fall  Orthopedic Precautions: N/A  Braces: N/A  Respiratory Status: Room air     Functional Mobility:  Bed Mobility:     Supine to Sit: moderate assistance  Sit to Supine: moderate assistance  Transfers:     Sit to Stand:  minimum assistance with  rolling walker  Gait: Ambulates with a HHA x 2 approximately 120 feet.    Treatment & Education:  The patient was seen in coordination with the respiratory therapist in order to monitor oxygen saturation during activity.  The patient received supine to sit transfer training with mod assist followed by sit to stand transfer training with min assist.  She started ambulating with the rolling walker but the oxygen sensor was having difficulty getting a signal due to the position of her hand.  Therefore, she received gait training with HHA x 2 with additional assistance to push the oxygen tank and to follow with a wheelchair for safety.  The patient ambulated approximately 120 feet.  Pulse ox never dropped below 91% while on room air throughout the duration of today's session.    Patient left up in chair with all lines intact, call button in reach, nurse notified, and daughter present..    GOALS:   Multidisciplinary Problems       Physical Therapy Goals          Problem: Physical Therapy    Goal Priority Disciplines Outcome Goal Variances Interventions   Physical Therapy Goal     PT, PT/OT      Description: Goals     1.  Patient to be independent with transfers.  2.  Patient to be independent with ambulation > 100 feet.                       Time Tracking:     PT Received On: 07/18/24  PT Start Time: 0930     PT Stop Time: 0955  PT Total Time (min): 25 min     Billable Minutes: Gait Training 15 and Therapeutic Activity 10    Treatment Type: Treatment  PT/PTA: PT     Number of PTA visits since last PT visit: 0     07/18/2024

## 2024-07-18 NOTE — PT/OT/SLP PROGRESS
Physical Therapy Treatment    Patient Name:  Geraldine Cooksey   MRN:  [id number]    Recommendations:     Discharge Recommendations:  (Home with home health)  Discharge Equipment Recommendations: none  Barriers to discharge: Inaccessible home and Decreased caregiver support    Assessment:     Geraldine Cooksey is a 83 y.o. female admitted with a medical diagnosis of Community acquired pneumonia of right lower lobe of lung.  She presents with the following impairments/functional limitations: impaired endurance, impaired balance, gait instability.    The patient's performance in this afternoon's therapy is much better.  She only required min assist with the transfers and CGA with ambulation using the rolling walker.  Quality of gait was better as well as she demonstrated appropriate step length and gait speed.    Rehab Prognosis: Good; patient would benefit from acute skilled PT services to address these deficits and reach maximum level of function.    Recent Surgery: * No surgery found *      Plan:     During this hospitalization, patient to be seen 5 x/week to address the identified rehab impairments via gait training, therapeutic activities, therapeutic exercises and progress toward the following goals:    Plan of Care Expires:   (Upon hospital discharge)    Subjective     Chief Complaint: The patient states she is not as weak this afternoon.  She has no complaints of pain or shortness of breath.  Patient/Family Comments/goals: Return home independent with functional mobility.  Pain/Comfort:  Pain Rating 1: 0/10      Objective:     Communicated with nurse prior to session.  Patient found supine with telemetry, and PureWick upon PT entry to room.     General Precautions: Standard, fall  Orthopedic Precautions: N/A  Braces: N/A  Respiratory Status: Room air     Functional Mobility:  Supine to sit: min assist  Sit to stand: min assist  Ambulation: 120 feet with a rolling walker and CGA    Treatment & Education:  The  patient transferred supine to sit to stand with min assist.  She received gait training with the rolling walker and CGA approximately 120 feet.    Patient left sitting in the chair with all lines intact, call button in reach, and nurse present..    GOALS:   Multidisciplinary Problems       Physical Therapy Goals          Problem: Physical Therapy    Goal Priority Disciplines Outcome Goal Variances Interventions   Physical Therapy Goal     PT, PT/OT      Description: Goals     1.  Patient to be independent with transfers.  2.  Patient to be independent with ambulation > 100 feet.                       Time Tracking:     PT Received On: 07/18/24  PT Start Time: 1400     PT Stop Time: 1415  PT Total Time (min): 15 min     Billable Minutes: Gait Training 15    Treatment Type: Treatment  PT/PTA: PT     Number of PTA visits since last PT visit: 0     07/18/2024

## 2024-07-18 NOTE — PLAN OF CARE
Problem: Gas Exchange Impaired  Goal: Optimal Gas Exchange  Outcome: Progressing     Problem: Adult Inpatient Plan of Care  Goal: Plan of Care Review  Outcome: Progressing  Goal: Patient-Specific Goal (Individualized)  Outcome: Progressing  Goal: Absence of Hospital-Acquired Illness or Injury  Outcome: Progressing  Goal: Optimal Comfort and Wellbeing  Outcome: Progressing  Goal: Readiness for Transition of Care  Outcome: Progressing     Problem: Pneumonia  Goal: Fluid Balance  Outcome: Progressing  Goal: Resolution of Infection Signs and Symptoms  Outcome: Progressing  Goal: Effective Oxygenation and Ventilation  Outcome: Progressing     Problem: Wound  Goal: Optimal Coping  Outcome: Progressing  Goal: Optimal Functional Ability  Outcome: Progressing  Goal: Absence of Infection Signs and Symptoms  Outcome: Progressing  Goal: Improved Oral Intake  Outcome: Progressing  Goal: Optimal Pain Control and Function  Outcome: Progressing  Goal: Skin Health and Integrity  Outcome: Progressing  Goal: Optimal Wound Healing  Outcome: Progressing     Problem: Skin Injury Risk Increased  Goal: Skin Health and Integrity  Outcome: Progressing     Problem: Fall Injury Risk  Goal: Absence of Fall and Fall-Related Injury  Outcome: Progressing     Problem: Fatigue  Goal: Improved Activity Tolerance  Outcome: Progressing     No acute events overnight. Pt VSS. Hourly rounding complete. All questions answered. Call light within reach. Bed in locked and low position.

## 2024-07-19 PROBLEM — R06.02 SHORTNESS OF BREATH: Status: ACTIVE | Noted: 2024-07-19

## 2024-07-19 LAB
ALBUMIN SERPL BCP-MCNC: 2.4 G/DL (ref 3.5–5.2)
ALP SERPL-CCNC: 231 U/L (ref 55–135)
ALT SERPL W/O P-5'-P-CCNC: 77 U/L (ref 10–44)
ANION GAP SERPL CALC-SCNC: 11 MMOL/L (ref 8–16)
AST SERPL-CCNC: 42 U/L (ref 10–40)
BASOPHILS # BLD AUTO: 0.16 K/UL (ref 0–0.2)
BASOPHILS NFR BLD: 1.1 % (ref 0–1.9)
BILIRUB SERPL-MCNC: 0.5 MG/DL (ref 0.1–1)
BUN SERPL-MCNC: 19 MG/DL (ref 8–23)
CALCIUM SERPL-MCNC: 8.5 MG/DL (ref 8.7–10.5)
CHLORIDE SERPL-SCNC: 99 MMOL/L (ref 95–110)
CO2 SERPL-SCNC: 27 MMOL/L (ref 23–29)
CREAT SERPL-MCNC: 0.8 MG/DL (ref 0.5–1.4)
DIFFERENTIAL METHOD BLD: ABNORMAL
EOSINOPHIL # BLD AUTO: 0.7 K/UL (ref 0–0.5)
EOSINOPHIL NFR BLD: 4.8 % (ref 0–8)
ERYTHROCYTE [DISTWIDTH] IN BLOOD BY AUTOMATED COUNT: 15 % (ref 11.5–14.5)
EST. GFR  (NO RACE VARIABLE): >60 ML/MIN/1.73 M^2
FINAL PATHOLOGIC DIAGNOSIS: NORMAL
GLUCOSE SERPL-MCNC: 95 MG/DL (ref 70–110)
HCT VFR BLD AUTO: 40.4 % (ref 37–48.5)
HGB BLD-MCNC: 13.6 G/DL (ref 12–16)
IMM GRANULOCYTES # BLD AUTO: 0.11 K/UL (ref 0–0.04)
IMM GRANULOCYTES NFR BLD AUTO: 0.7 % (ref 0–0.5)
LYMPHOCYTES # BLD AUTO: 1.5 K/UL (ref 1–4.8)
LYMPHOCYTES NFR BLD: 9.9 % (ref 18–48)
Lab: NORMAL
MAGNESIUM SERPL-MCNC: 1.9 MG/DL (ref 1.6–2.6)
MCH RBC QN AUTO: 28 PG (ref 27–31)
MCHC RBC AUTO-ENTMCNC: 33.7 G/DL (ref 32–36)
MCV RBC AUTO: 83 FL (ref 82–98)
MONOCYTES # BLD AUTO: 1.7 K/UL (ref 0.3–1)
MONOCYTES NFR BLD: 10.9 % (ref 4–15)
NEUTROPHILS # BLD AUTO: 11 K/UL (ref 1.8–7.7)
NEUTROPHILS NFR BLD: 72.6 % (ref 38–73)
NRBC BLD-RTO: 0 /100 WBC
PH FLD: NORMAL [PH]
PHOSPHATE SERPL-MCNC: 3.6 MG/DL (ref 2.7–4.5)
PLATELET # BLD AUTO: 363 K/UL (ref 150–450)
PMV BLD AUTO: 10.2 FL (ref 9.2–12.9)
POTASSIUM SERPL-SCNC: 3.4 MMOL/L (ref 3.5–5.1)
PROT SERPL-MCNC: 5.5 G/DL (ref 6–8.4)
RBC # BLD AUTO: 4.85 M/UL (ref 4–5.4)
SODIUM SERPL-SCNC: 137 MMOL/L (ref 136–145)
SPECIMEN SOURCE: NORMAL
WBC # BLD AUTO: 15.1 K/UL (ref 3.9–12.7)

## 2024-07-19 PROCEDURE — 27000207 HC ISOLATION

## 2024-07-19 PROCEDURE — 80053 COMPREHEN METABOLIC PANEL: CPT | Performed by: STUDENT IN AN ORGANIZED HEALTH CARE EDUCATION/TRAINING PROGRAM

## 2024-07-19 PROCEDURE — 84100 ASSAY OF PHOSPHORUS: CPT | Performed by: STUDENT IN AN ORGANIZED HEALTH CARE EDUCATION/TRAINING PROGRAM

## 2024-07-19 PROCEDURE — 94761 N-INVAS EAR/PLS OXIMETRY MLT: CPT

## 2024-07-19 PROCEDURE — 36415 COLL VENOUS BLD VENIPUNCTURE: CPT | Performed by: STUDENT IN AN ORGANIZED HEALTH CARE EDUCATION/TRAINING PROGRAM

## 2024-07-19 PROCEDURE — 63600175 PHARM REV CODE 636 W HCPCS: Performed by: STUDENT IN AN ORGANIZED HEALTH CARE EDUCATION/TRAINING PROGRAM

## 2024-07-19 PROCEDURE — 86738 MYCOPLASMA ANTIBODY: CPT | Performed by: STUDENT IN AN ORGANIZED HEALTH CARE EDUCATION/TRAINING PROGRAM

## 2024-07-19 PROCEDURE — 11000001 HC ACUTE MED/SURG PRIVATE ROOM

## 2024-07-19 PROCEDURE — 97116 GAIT TRAINING THERAPY: CPT

## 2024-07-19 PROCEDURE — 25000003 PHARM REV CODE 250: Performed by: STUDENT IN AN ORGANIZED HEALTH CARE EDUCATION/TRAINING PROGRAM

## 2024-07-19 PROCEDURE — 85025 COMPLETE CBC W/AUTO DIFF WBC: CPT | Performed by: STUDENT IN AN ORGANIZED HEALTH CARE EDUCATION/TRAINING PROGRAM

## 2024-07-19 PROCEDURE — 83735 ASSAY OF MAGNESIUM: CPT | Performed by: STUDENT IN AN ORGANIZED HEALTH CARE EDUCATION/TRAINING PROGRAM

## 2024-07-19 PROCEDURE — 99900035 HC TECH TIME PER 15 MIN (STAT)

## 2024-07-19 PROCEDURE — 97530 THERAPEUTIC ACTIVITIES: CPT

## 2024-07-19 RX ORDER — POTASSIUM CHLORIDE 20 MEQ/1
40 TABLET, EXTENDED RELEASE ORAL ONCE
Status: COMPLETED | OUTPATIENT
Start: 2024-07-19 | End: 2024-07-19

## 2024-07-19 RX ADMIN — ASPIRIN 81 MG: 81 TABLET, COATED ORAL at 09:07

## 2024-07-19 RX ADMIN — HYDRALAZINE HYDROCHLORIDE 25 MG: 25 TABLET ORAL at 05:07

## 2024-07-19 RX ADMIN — HYDRALAZINE HYDROCHLORIDE 25 MG: 25 TABLET ORAL at 09:07

## 2024-07-19 RX ADMIN — CEFTRIAXONE SODIUM 1 G: 1 INJECTION, POWDER, FOR SOLUTION INTRAMUSCULAR; INTRAVENOUS at 11:07

## 2024-07-19 RX ADMIN — ENOXAPARIN SODIUM 30 MG: 100 INJECTION SUBCUTANEOUS at 06:07

## 2024-07-19 RX ADMIN — NYSTATIN 500000 UNITS: 500000 SUSPENSION ORAL at 11:07

## 2024-07-19 RX ADMIN — NYSTATIN 500000 UNITS: 500000 SUSPENSION ORAL at 06:07

## 2024-07-19 RX ADMIN — ACETAMINOPHEN 650 MG: 325 TABLET ORAL at 05:07

## 2024-07-19 RX ADMIN — POTASSIUM CHLORIDE 40 MEQ: 1500 TABLET, EXTENDED RELEASE ORAL at 09:07

## 2024-07-19 RX ADMIN — ACETAMINOPHEN 650 MG: 325 TABLET ORAL at 11:07

## 2024-07-19 RX ADMIN — HYDRALAZINE HYDROCHLORIDE 25 MG: 25 TABLET ORAL at 03:07

## 2024-07-19 RX ADMIN — NYSTATIN 500000 UNITS: 500000 SUSPENSION ORAL at 09:07

## 2024-07-19 RX ADMIN — NYSTATIN 500000 UNITS: 500000 SUSPENSION ORAL at 07:07

## 2024-07-19 NOTE — PLAN OF CARE
Spoke with Dulce Maria at [facility] in [facility], #[phone]; they have tentatively accepted the pt pending DC for start of care date.   [facility] will need to be notified when pt is discharged.    07/19/24 1526   Post-Acute Status   Post-Acute Authorization Home Health   Home Health Status Pending medical clearance/testing

## 2024-07-19 NOTE — PT/OT/SLP PROGRESS
Physical Therapy Treatment    Patient Name:  Geraldine Cooksey   MRN:  [id number]    Recommendations:     Discharge Recommendations:  (Home with home health)  Discharge Equipment Recommendations: none  Barriers to discharge: None    Assessment:     Geraldine Cooksey is a 83 y.o. female admitted with a medical diagnosis of Community acquired pneumonia of right lower lobe of lung.  She presents with the following impairments/functional limitations: impaired endurance, impaired balance, gait instability.    Rehab Prognosis: Good; patient would benefit from acute skilled PT services to address these deficits and reach maximum level of function.    Recent Surgery: * No surgery found *      Plan:     During this hospitalization, patient to be seen 5 x/week to address the identified rehab impairments via gait training, therapeutic activities, therapeutic exercises in order to progress towards goals.    Plan of Care Expires:   (Upon hospital discharge)    Subjective     Chief Complaint: The patient complains she continues to feel weak.  Patient/Family Comments/goals: Return home independent with functional mobility.  Pain/Comfort:  Pain Rating 1: 0/10      Objective:     Communicated with nurse prior to session.  Patient found supine with telemetry, PureWick upon PT entry to room.     General Precautions: Standard, fall  Orthopedic Precautions: N/A  Braces: N/A  Respiratory Status: Room air     Functional Mobility:  Bed Mobility:     Supine to Sit: stand by assistance  Transfers:     Sit to Stand:  stand by assistance with rolling walker  Gait: Ambulates 120 feet with a rolling walker and CGA.    Treatment & Education:  The patient was seen in coordination with respiratory therapy in order to monitor oxygen saturation during activity.  The patient transferred supine to sit with SBA and then sit to stand from the bed to the rolling walker with SBA.  The patient received gait training with CGA approximately 120 feet.  Upon  return to her room, pulse ox was 93%    Patient left sitting edge of bed with  nurse present..    GOALS:   Multidisciplinary Problems       Physical Therapy Goals          Problem: Physical Therapy    Goal Priority Disciplines Outcome Goal Variances Interventions   Physical Therapy Goal     PT, PT/OT      Description: Goals     1.  Patient to be independent with transfers.  2.  Patient to be independent with ambulation > 100 feet.                       Time Tracking:     PT Received On: 07/19/24  PT Start Time: 1020     PT Stop Time: 1044  PT Total Time (min): 24 min     Billable Minutes: Gait Training 15 and Therapeutic Activity 14    Treatment Type: Treatment  PT/PTA: PT     Number of PTA visits since last PT visit: 0     07/19/2024

## 2024-07-19 NOTE — PLAN OF CARE
Spoke with the pt and her daughter, Carie at bedside regarding DC plan; of the pt's daughters will be bringing her home at discharge. Discharge is anticipated for Sat, 7/20/24.     Hospital FU appt scheduled with PCP and info placed on AVS.     Patient agreeable HH at the time of discharge, requesting [facility]. Patient choice signed and scanned into Epic.     HH referral faxed via [employer] to [facility].    07/19/24 1215   Post-Acute Status   Post-Acute Authorization Home Health   Home Health Status Referrals Sent   Hospital Resources/Appts/Education Provided Appointments scheduled and added to AVS

## 2024-07-19 NOTE — PROGRESS NOTES
"  Hospitalist Progress Note    CC:  Chief Complaint   Patient presents with    Shortness of Breath     Pt. Denies SOB at this time. Pt. States her home pulse ox would not read well. States, "I don't really feel much different than normal. I have COPD so I'm always a little short of breath.".         INTERVAL HISTORY  I have seen and evaluated the patient at bedside today, 07/19/2024.    Geraldine Cooksey is a 83 y.o. female with COPD, TIA, HTN presenting with weakness and SOB. Symptoms have been going on for about 9 days but worsened over past 2-3 days. Patient states that today her home pulse ox was reading low so her daughter brought her to ED. Has had decreased PO intake. Had some swelling in LE last week and took a diuretic with resolution. No fevers. Endorses chills. Endorses nausea but no vomiting. No diarrhea. Not on home O2. No swelling in LE today. No hx of CHF. Patient able to lay flat with no issue.     Reports her breathing is about the same as yesterday.  Still complaining of dyspnea on exertion.  Family has concerns regarding patient's overall physical weakness today stating it is worse since yesterday.      ROS  A 10 point review of systems was carried out and negative unless otherwise stated in interval history.       Current Medications   aspirin  81 mg Oral BID    cefTRIAXone (Rocephin) IV (PEDS and ADULTS)  1 g Intravenous Q24H    enoxparin  30 mg Subcutaneous Daily    hydrALAZINE  25 mg Oral Q8H    nystatin  500,000 Units Oral QID (WM & HS)       PRN Meds:  Current Facility-Administered Medications:     acetaminophen, 650 mg, Oral, Q4H PRN    albuterol-ipratropium, 3 mL, Nebulization, Q6H PRN    aluminum-magnesium hydroxide-simethicone, 30 mL, Oral, QID PRN    dextrose 10%, 12.5 g, Intravenous, PRN    dextrose 10%, 25 g, Intravenous, PRN    glucagon (human recombinant), 1 mg, Intramuscular, PRN    glucose, 16 g, Oral, PRN    glucose, 24 g, Oral, PRN    naloxone, 0.02 mg, Intravenous, PRN    " ondansetron, 4 mg, Intravenous, Q8H PRN    prochlorperazine, 5 mg, Intravenous, Q6H PRN    sodium chloride 0.9%, 10 mL, Intravenous, Q12H PRN    trazodone, 25 mg, Oral, Nightly PRN    Physical Exam   Temp:  [98.2 °F (36.8 °C)-98.6 °F (37 °C)]   Pulse:  [75-83]   Resp:  [15-25]   BP: (123-155)/(58-69)   SpO2:  [90 %-95 %]     General:  Well-developed elderly  female sitting up in bed  Respiratory:  Coarse breath sounds bilaterally, no tachypnea  Cardiovascular:  RRR, no peripheral edema or JVD appreciated   Abdomen:  Soft, NT/ND with BS x4   Musculoskeletal:  Normal ROM in upper and lower extremities, no joint swelling   Neurological:  Awake and alert, CN II-XII grossly intact, no focal deficit      Labs    CBC:  Recent Labs   Lab 07/19/24  0408   WBC 15.10*   HGB 13.6   HCT 40.4        CMP:  Recent Labs   Lab 07/19/24  0408   CALCIUM 8.5*   ALBUMIN 2.4*   PROT 5.5*      K 3.4*   CO2 27   CL 99   BUN 19   CREATININE 0.8   ALKPHOS 231*   ALT 77*   AST 42*   BILITOT 0.5       Microbiology Results (last 7 days)       Procedure Component Value Units Date/Time    Culture, Body Fluid (Aerobic) w/ GS [[id number]] Collected: 07/16/24 1426    Order Status: Completed Specimen: Body Fluid from Thoracentesis Fluid Updated: 07/19/24 1321     AEROBIC CULTURE - FLUID No growth     Gram Stain Result Rare WBC's      No organisms seen    Blood Culture #1 **CANNOT BE ORDERED STAT** [[id number]] Collected: 07/16/24 1117    Order Status: Completed Specimen: Blood from Peripheral, Antecubital, Right Updated: 07/18/24 2012     Blood Culture, Routine No Growth to date      No Growth to date      No Growth to date    Blood Culture #2 **CANNOT BE ORDERED STAT** [[id number]] Collected: 07/16/24 1133    Order Status: Completed Specimen: Blood from Peripheral, Wrist, Left Updated: 07/18/24 2012     Blood Culture, Routine No Growth to date      No Growth to date      No Growth to date    Culture, Respiratory with Gram Stain  [[id number]] Collected: 07/16/24 1428    Order Status: Canceled Specimen: Respiratory from Lung, Right     Gram stain [[id number]] Collected: 07/16/24 1427    Order Status: Canceled Specimen: Body Fluid from Pleural Fluid     Culture, body fluid - Bactec [[id number]] Collected: 07/16/24 1426    Order Status: Canceled Specimen: Body Fluid from Thoracentesis Fluid     Gram Stain Screen [[id number]]     Order Status: Canceled             Imaging & Other Studies    Imaging Results              US Thoracentesis with Imaging, Aspiration Only (Final result)  Result time 07/16/24 15:12:16      Final result by Yousuf Edmondson MD (07/16/24 15:12:16)                   Impression:      Successful ultrasound-guided right thoracentesis without complication.      Electronically signed by: Yousuf Edmondson  Date:    07/16/2024  Time:    15:12               Narrative:    EXAMINATION:  US THORACENTESIS WITH IMAGING, ASPIRATION ONLY    CLINICAL HISTORY:  CHF vs parapneumonic effusion;    TECHNIQUE:  After the risks, benefits and options of the planned procedure were explained to the patient in full detail, the patient expressed complete understanding and gave her signed informed consent. The nurse was present for the examination.  A large pocket of pleuritic fluid was localized within the right hemithorax.  The skin overlying this area was prepped and draped in the usual sterile fashion. A timeout was performed. 1% lidocaine was used as a local anesthetic, and a 5 English thoracentesis catheter was advanced into the right pleural fluid.  600 cc of clear yellow pleuritic fluid was removed. After the fluid stopped flowing, the catheter was removed and a sterile dressing was applied.    FINDINGS:  The patient tolerated the procedure well without complication, departing the ultrasound suite in unchanged condition.    Postprocedural inspiratory and expiratory chest x-ray demonstrates no right pneumothorax.                                        XRAY Chest 2 View Inspiration Expiration (Final result)  Result time 07/16/24 14:48:54      Final result by Yousuf Edmondson MD (07/16/24 14:48:54)                   Impression:      Status post right thoracentesis without plain film evidence to suggest postprocedural pneumothorax.      Electronically signed by: Yousuf Edmondson  Date:    07/16/2024  Time:    14:48               Narrative:    EXAMINATION:  XR CHEST 2 VIEW INSPIRATION EXPIRATION    CLINICAL HISTORY:  THORACENTESIS;    TECHNIQUE:  Inspiratory and expiratory portable views of the chest.    COMPARISON:  Same day.    FINDINGS:  Status post right thoracentesis.  No postprocedural right pneumothorax.  Marked interval improvement in the right pleural effusion with a persistent small perfusion.  Dependent atelectasis at the lung bases.  Heart size normal.                                        X-Ray Chest 1 View (Final result)  Result time 07/16/24 10:47:32      Final result by Yousuf Edmondson MD (07/16/24 10:47:32)                   Impression:      1. Moderate right pleural effusion with atelectasis versus infiltrate of the right lower lobe.  Correlate clinically with possible fever and/or elevated white count.  2. Suspected mild underlying congestive heart failure.  Close interval follow-up is recommended.  This report was flagged in Epic as abnormal.      Electronically signed by: Yousuf Edmondson  Date:    07/16/2024  Time:    10:47               Narrative:    EXAMINATION:  XR CHEST 1 VIEW    CLINICAL HISTORY:  shortness of breath;    TECHNIQUE:  Single frontal view of the chest was performed.    COMPARISON:  05/27/2024.    FINDINGS:  There is pulmonary hyperinflation.  There is a moderate right pleural effusion with atelectasis versus infiltrate within the right lower lobe.  Dependent atelectasis of the left lower lobe.  Heart size is mildly enlarged.  Subtle mild diffuse prominence of the pulmonary interstitium suggests mild interstitial  edema.  Bony thorax intact.                                      Patient Active Problem List   Diagnosis    Closed fracture of left hip    COPD (chronic obstructive pulmonary disease)    Elevated blood pressure reading    Tobacco abuse    Primary osteoarthritis of left knee    Dog bite    Closed 4-part fracture of proximal humerus, right, initial encounter    TIA involving carotid artery    Acute focal neurological deficit    Community acquired pneumonia of right lower lobe of lung    Hypokalemia    Pleural effusion    Acute heart failure with preserved ejection fraction    Severe sepsis    Hypoxia       I have independently reviewed the patient's medical record for this admission including all clinical notes, labs and imaging.  All pertinent findings and independent review of images are listed below:      Assessment and Plan    Community-acquired pneumonia of the right lower lobe.  Received azithromycin x3 days.  Continue ceftriaxone.  Past ambulatory O2 evaluation today.  Continue antibiotics, WBC downtrending.  If her symptoms continue to improve I think we can let her go home tomorrow.    Severe sepsis.  Secondary to the above.  Continue IV antibiotics as above.  Acute heart failure with preserved ejection fraction.  Continue spot IV diuresis.  Currently appears euvolemic.  Pleural effusion.  Presented with moderate pleural effusion.  Underwent thoracentesis which shows transudative effusion.  Likely secondary to the above.  Continue as needed diuresis.  Hypokalemia.  Continue to monitor and supplement as necessary.  TIA.  Continue antiplatelet therapy.    COPD.  We will hold IV steroids as patient is not currently wheezing.    VTE prophylaxis.  SCDs, Lovenox.

## 2024-07-19 NOTE — CARE UPDATE
07/19/24 1043   Home Oxygen Qualification   $ Home O2 Qualification Tech time 15 minutes   Room Air SpO2 At Rest 92 %   Room Air SpO2 During Ambulation 93 %   Post Ambulation Heart Rate 87 bpm

## 2024-07-20 VITALS
BODY MASS INDEX: 24.8 KG/M2 | TEMPERATURE: 98 F | HEIGHT: 63 IN | DIASTOLIC BLOOD PRESSURE: 71 MMHG | OXYGEN SATURATION: 92 % | SYSTOLIC BLOOD PRESSURE: 162 MMHG | HEART RATE: 72 BPM | WEIGHT: 140 LBS | RESPIRATION RATE: 19 BRPM

## 2024-07-20 LAB
ANION GAP SERPL CALC-SCNC: 14 MMOL/L (ref 8–16)
BACTERIA FLD AEROBE CULT: NO GROWTH
BASOPHILS # BLD AUTO: 0.17 K/UL (ref 0–0.2)
BASOPHILS NFR BLD: 1.1 % (ref 0–1.9)
BUN SERPL-MCNC: 17 MG/DL (ref 8–23)
CALCIUM SERPL-MCNC: 9.2 MG/DL (ref 8.7–10.5)
CHLORIDE SERPL-SCNC: 100 MMOL/L (ref 95–110)
CO2 SERPL-SCNC: 26 MMOL/L (ref 23–29)
CREAT SERPL-MCNC: 0.9 MG/DL (ref 0.5–1.4)
DIFFERENTIAL METHOD BLD: ABNORMAL
EOSINOPHIL # BLD AUTO: 0.5 K/UL (ref 0–0.5)
EOSINOPHIL NFR BLD: 3.5 % (ref 0–8)
ERYTHROCYTE [DISTWIDTH] IN BLOOD BY AUTOMATED COUNT: 15.6 % (ref 11.5–14.5)
EST. GFR  (NO RACE VARIABLE): >60 ML/MIN/1.73 M^2
GLUCOSE SERPL-MCNC: 119 MG/DL (ref 70–110)
GRAM STN SPEC: NORMAL
GRAM STN SPEC: NORMAL
HCT VFR BLD AUTO: 45.2 % (ref 37–48.5)
HGB BLD-MCNC: 14.7 G/DL (ref 12–16)
IMM GRANULOCYTES # BLD AUTO: 0.12 K/UL (ref 0–0.04)
IMM GRANULOCYTES NFR BLD AUTO: 0.8 % (ref 0–0.5)
LYMPHOCYTES # BLD AUTO: 2.2 K/UL (ref 1–4.8)
LYMPHOCYTES NFR BLD: 14.9 % (ref 18–48)
MCH RBC QN AUTO: 28.3 PG (ref 27–31)
MCHC RBC AUTO-ENTMCNC: 32.5 G/DL (ref 32–36)
MCV RBC AUTO: 87 FL (ref 82–98)
MONOCYTES # BLD AUTO: 1.1 K/UL (ref 0.3–1)
MONOCYTES NFR BLD: 7.3 % (ref 4–15)
NEUTROPHILS # BLD AUTO: 10.8 K/UL (ref 1.8–7.7)
NEUTROPHILS NFR BLD: 72.4 % (ref 38–73)
NRBC BLD-RTO: 0 /100 WBC
PLATELET # BLD AUTO: 424 K/UL (ref 150–450)
PMV BLD AUTO: 9.7 FL (ref 9.2–12.9)
POTASSIUM SERPL-SCNC: 4.2 MMOL/L (ref 3.5–5.1)
RBC # BLD AUTO: 5.2 M/UL (ref 4–5.4)
SODIUM SERPL-SCNC: 140 MMOL/L (ref 136–145)
WBC # BLD AUTO: 14.97 K/UL (ref 3.9–12.7)

## 2024-07-20 PROCEDURE — 25000003 PHARM REV CODE 250: Performed by: STUDENT IN AN ORGANIZED HEALTH CARE EDUCATION/TRAINING PROGRAM

## 2024-07-20 PROCEDURE — 80048 BASIC METABOLIC PNL TOTAL CA: CPT | Performed by: STUDENT IN AN ORGANIZED HEALTH CARE EDUCATION/TRAINING PROGRAM

## 2024-07-20 PROCEDURE — 94761 N-INVAS EAR/PLS OXIMETRY MLT: CPT

## 2024-07-20 PROCEDURE — 99900035 HC TECH TIME PER 15 MIN (STAT)

## 2024-07-20 PROCEDURE — 36415 COLL VENOUS BLD VENIPUNCTURE: CPT | Performed by: STUDENT IN AN ORGANIZED HEALTH CARE EDUCATION/TRAINING PROGRAM

## 2024-07-20 PROCEDURE — 85025 COMPLETE CBC W/AUTO DIFF WBC: CPT | Performed by: STUDENT IN AN ORGANIZED HEALTH CARE EDUCATION/TRAINING PROGRAM

## 2024-07-20 PROCEDURE — 99900031 HC PATIENT EDUCATION (STAT)

## 2024-07-20 PROCEDURE — 63600175 PHARM REV CODE 636 W HCPCS: Performed by: STUDENT IN AN ORGANIZED HEALTH CARE EDUCATION/TRAINING PROGRAM

## 2024-07-20 RX ORDER — LEVOFLOXACIN 500 MG/1
500 TABLET, FILM COATED ORAL DAILY
Qty: 4 TABLET | Refills: 0 | Status: SHIPPED | OUTPATIENT
Start: 2024-07-20 | End: 2024-07-24

## 2024-07-20 RX ADMIN — ASPIRIN 81 MG: 81 TABLET, COATED ORAL at 09:07

## 2024-07-20 RX ADMIN — CEFTRIAXONE SODIUM 1 G: 1 INJECTION, POWDER, FOR SOLUTION INTRAMUSCULAR; INTRAVENOUS at 11:07

## 2024-07-20 RX ADMIN — NYSTATIN 500000 UNITS: 500000 SUSPENSION ORAL at 07:07

## 2024-07-20 RX ADMIN — NYSTATIN 500000 UNITS: 500000 SUSPENSION ORAL at 11:07

## 2024-07-20 RX ADMIN — HYDRALAZINE HYDROCHLORIDE 25 MG: 25 TABLET ORAL at 06:07

## 2024-07-20 NOTE — CARE UPDATE
07/20/24 1057   Home Oxygen Qualification   $ Home O2 Qualification Tech time 15 minutes   Room Air SpO2 At Rest 96 %   Room Air SpO2 During Ambulation 92 %   Post Ambulation Heart Rate 78 bpm   Home O2 Eval Comments   (Room Air Sat.96%, Hr. 70, Resp Rate 19;Ambulation on Room Air Sat.92%, Hr 89, Resp Rate 22; Upon returning to room & resting on Room Air Sat. 94%, Hr 78, Resp Rate 20)

## 2024-07-20 NOTE — DISCHARGE SUMMARY
Hospitalist Discharge Note    Attending Physician  TISH HEART DO      Admit Date  7/16/2024    Discharge Date  07/20/2024    Discharge Diagnosis  Patient Active Problem List   Diagnosis    Closed fracture of left hip    COPD (chronic obstructive pulmonary disease)    Elevated blood pressure reading    Tobacco abuse    Primary osteoarthritis of left knee    Dog bite    Closed 4-part fracture of proximal humerus, right, initial encounter    TIA involving carotid artery    Acute focal neurological deficit    Community acquired pneumonia of right lower lobe of lung    Hypokalemia    Pleural effusion    Acute heart failure with preserved ejection fraction    Severe sepsis    Hypoxia    Shortness of breath         Consultants  N/A    Pertinent Diagnostic Studies and Procedures    CBC:  Recent Labs   Lab 07/20/24  0804   WBC 14.97*   HGB 14.7   HCT 45.2        CMP:  Recent Labs   Lab 07/20/24  0804   CALCIUM 9.2      K 4.2   CO2 26      BUN 17   CREATININE 0.9         Microbiology Results (last 7 days)       Procedure Component Value Units Date/Time    Blood Culture #1 **CANNOT BE ORDERED STAT** [[id number]] Collected: 07/16/24 1117    Order Status: Completed Specimen: Blood from Peripheral, Antecubital, Right Updated: 07/19/24 2012     Blood Culture, Routine No Growth to date      No Growth to date      No Growth to date      No Growth to date    Blood Culture #2 **CANNOT BE ORDERED STAT** [[id number]] Collected: 07/16/24 1133    Order Status: Completed Specimen: Blood from Peripheral, Wrist, Left Updated: 07/19/24 2012     Blood Culture, Routine No Growth to date      No Growth to date      No Growth to date      No Growth to date    Culture, Body Fluid (Aerobic) w/ GS [[id number]] Collected: 07/16/24 1426    Order Status: Completed Specimen: Body Fluid from Thoracentesis Fluid Updated: 07/19/24 1321     AEROBIC CULTURE - FLUID No growth     Gram Stain Result Rare WBC's      No organisms seen     Culture, Respiratory with Gram Stain [[id number]] Collected: 07/16/24 1428    Order Status: Canceled Specimen: Respiratory from Lung, Right     Gram stain [[id number]] Collected: 07/16/24 1427    Order Status: Canceled Specimen: Body Fluid from Pleural Fluid     Culture, body fluid - Bactec [[id number]] Collected: 07/16/24 1426    Order Status: Canceled Specimen: Body Fluid from Thoracentesis Fluid     Gram Stain Screen [[id number]]     Order Status: Canceled             Imaging Results              US Thoracentesis with Imaging, Aspiration Only (Final result)  Result time 07/16/24 15:12:16      Final result by Yousuf Edmondson MD (07/16/24 15:12:16)                   Impression:      Successful ultrasound-guided right thoracentesis without complication.      Electronically signed by: Yousuf Edmondson  Date:    07/16/2024  Time:    15:12               Narrative:    EXAMINATION:  US THORACENTESIS WITH IMAGING, ASPIRATION ONLY    CLINICAL HISTORY:  CHF vs parapneumonic effusion;    TECHNIQUE:  After the risks, benefits and options of the planned procedure were explained to the patient in full detail, the patient expressed complete understanding and gave her signed informed consent. The nurse was present for the examination.  A large pocket of pleuritic fluid was localized within the right hemithorax.  The skin overlying this area was prepped and draped in the usual sterile fashion. A timeout was performed. 1% lidocaine was used as a local anesthetic, and a 5 [place] thoracentesis catheter was advanced into the right pleural fluid.  600 cc of clear yellow pleuritic fluid was removed. After the fluid stopped flowing, the catheter was removed and a sterile dressing was applied.    FINDINGS:  The patient tolerated the procedure well without complication, departing the ultrasound suite in unchanged condition.    Postprocedural inspiratory and expiratory chest x-ray demonstrates no right pneumothorax.                                        XRAY Chest 2 View Inspiration Expiration (Final result)  Result time 07/16/24 14:48:54      Final result by Yousuf Edmondson MD (07/16/24 14:48:54)                   Impression:      Status post right thoracentesis without plain film evidence to suggest postprocedural pneumothorax.      Electronically signed by: Yousuf Edmondson  Date:    07/16/2024  Time:    14:48               Narrative:    EXAMINATION:  XR CHEST 2 VIEW INSPIRATION EXPIRATION    CLINICAL HISTORY:  THORACENTESIS;    TECHNIQUE:  Inspiratory and expiratory portable views of the chest.    COMPARISON:  Same day.    FINDINGS:  Status post right thoracentesis.  No postprocedural right pneumothorax.  Marked interval improvement in the right pleural effusion with a persistent small perfusion.  Dependent atelectasis at the lung bases.  Heart size normal.                                        X-Ray Chest 1 View (Final result)  Result time 07/16/24 10:47:32      Final result by Yousuf Edmondson MD (07/16/24 10:47:32)                   Impression:      1. Moderate right pleural effusion with atelectasis versus infiltrate of the right lower lobe.  Correlate clinically with possible fever and/or elevated white count.  2. Suspected mild underlying congestive heart failure.  Close interval follow-up is recommended.  This report was flagged in Epic as abnormal.      Electronically signed by: Yousuf Edmondson  Date:    07/16/2024  Time:    10:47               Narrative:    EXAMINATION:  XR CHEST 1 VIEW    CLINICAL HISTORY:  shortness of breath;    TECHNIQUE:  Single frontal view of the chest was performed.    COMPARISON:  05/27/2024.    FINDINGS:  There is pulmonary hyperinflation.  There is a moderate right pleural effusion with atelectasis versus infiltrate within the right lower lobe.  Dependent atelectasis of the left lower lobe.  Heart size is mildly enlarged.  Subtle mild diffuse prominence of the pulmonary interstitium suggests mild  interstitial edema.  Bony thorax intact.                                          Hospital Course    Per admission HPI:   84 yo w/ hx of COPD, TIA, HTN presenting with weakness and SOB. Symptoms have been going on for about 9 days but worsened over past 2-3 days. Patient states that today her home pulse ox was reading low so her daughter brought her to ED. Has had decreased PO intake. Had some swelling in LE last week and took a diuretic with resolution. No fevers. Endorses chills. Endorses nausea but no vomiting. No diarrhea. Not on home O2. No swelling in LE today. No hx of CHF. Patient able to lay flat with no issue.     Ms. Cooksey was admitted to the medicine service where he underwent evaluation and management of sepsis secondary to community-acquired pneumonia as well as transudative pleural effusion.  She underwent thoracentesis on while admitted.  All cultures have been NGTD.  We will provide a referral to pulmonology at discharge.  She will need to follow up with her primary care provider at earliest availability regarding this hospitalization.  She will complete an additional 4 days of levofloxacin at home.  Patient expressed understanding of and agreement with this plan of discharge.  She was instructed to return to the emergency department if experiencing any further shortness of breath, fever, or other signs or symptoms concerning to her or to family.    Discharge Condition:  Stable    Discharge Disposition:  Discharged to home with home health    Discharge Medications:     Medication List        CHANGE how you take these medications      levoFLOXacin 500 MG tablet  Commonly known as: LEVAQUIN  Take 1 tablet (500 mg total) by mouth once daily. for 4 days  What changed: Another medication with the same name was removed. Continue taking this medication, and follow the directions you see here.            CONTINUE taking these medications      albuterol 90 mcg/actuation inhaler  Commonly known as:  PROVENTIL/VENTOLIN HFA  Inhale 1-2 puffs into the lungs every 6 (six) hours as needed for Wheezing. Rescue     aspirin 81 MG EC tablet  Commonly known as: ECOTRIN  Take 1 tablet (81 mg total) by mouth 2 (two) times a day. for 14 days     cetirizine 10 MG tablet  Commonly known as: ZYRTEC     * hydrALAZINE 25 MG tablet  Commonly known as: APRESOLINE  Take 1 tablet (25 mg total) by mouth every 8 (eight) hours.     * hydrALAZINE 50 MG tablet  Commonly known as: APRESOLINE  Take 0.5 tablets (25 mg total) by mouth 3 (three) times daily.     HYDROcodone-acetaminophen 7.5-325 mg per tablet  Commonly known as: NORCO  Take 1 tablet by mouth every 6 (six) hours as needed for Pain.     ibuprofen 200 MG tablet  Commonly known as: ADVIL,MOTRIN     TRELEGY ELLIPTA 100-62.5-25 mcg Dsdv  Generic drug: fluticasone-umeclidin-vilanter  Take 1 puff by mouth once daily.           * This list has 2 medication(s) that are the same as other medications prescribed for you. Read the directions carefully, and ask your doctor or other care provider to review them with you.                   Where to Get Your Medications        These medications were sent to [facility] - [facility], [place] - [address]  [address], [facility] [place] [address]      Phone: [phone]   levoFLOXacin 500 MG tablet             Discharge Instructions    Patient was instructed, no driving while taking any sedating or mind-altering medications including but not limited to opiates, benzos, psych medications, antiemetics, antihistamines, headache medications, and anti-vertigo medications.      Instructed to notify a physician if they begin to experience any chest pain, palpitations, racing heart, shortness of breath, altered mentation, new focal neurologic findings, or any other signs/symptoms that the patient/family find concerning.        Follow Up  Primary care   Referral made to pulmonology      Time Spent on Discharge  It took me 33  minutes to discharge this patient from the hospital.  This time was taken coordinating their care for discharge, counseling them on their discharge instructions, reconciling their medications, and finally discharging them from the hospital.

## 2024-07-20 NOTE — PLAN OF CARE
Problem: Gas Exchange Impaired  Goal: Optimal Gas Exchange  Outcome: Met     Problem: Adult Inpatient Plan of Care  Goal: Plan of Care Review  Outcome: Met  Goal: Patient-Specific Goal (Individualized)  Outcome: Met  Goal: Absence of Hospital-Acquired Illness or Injury  Outcome: Met  Goal: Optimal Comfort and Wellbeing  Outcome: Met  Goal: Readiness for Transition of Care  Outcome: Met     Problem: Pneumonia  Goal: Fluid Balance  Outcome: Met  Goal: Resolution of Infection Signs and Symptoms  Outcome: Met  Goal: Effective Oxygenation and Ventilation  Outcome: Met     Problem: Wound  Goal: Optimal Coping  Outcome: Met  Goal: Optimal Functional Ability  Outcome: Met  Goal: Absence of Infection Signs and Symptoms  Outcome: Met  Goal: Improved Oral Intake  Outcome: Met  Goal: Optimal Pain Control and Function  Outcome: Met  Goal: Skin Health and Integrity  Outcome: Met  Goal: Optimal Wound Healing  Outcome: Met     Problem: Skin Injury Risk Increased  Goal: Skin Health and Integrity  Outcome: Met     Problem: Fall Injury Risk  Goal: Absence of Fall and Fall-Related Injury  Outcome: Met     Problem: Fatigue  Goal: Improved Activity Tolerance  Outcome: Met     Problem: Sepsis/Septic Shock  Goal: Optimal Coping  Outcome: Met  Goal: Absence of Bleeding  Outcome: Met  Goal: Blood Glucose Level Within Targeted Range  Outcome: Met  Goal: Absence of Infection Signs and Symptoms  Outcome: Met  Goal: Optimal Nutrition Intake  Outcome: Met     Problem: Infection  Goal: Absence of Infection Signs and Symptoms  Outcome: Met

## 2024-07-21 LAB
BACTERIA BLD CULT: NORMAL
BACTERIA BLD CULT: NORMAL

## 2024-07-22 NOTE — PLAN OF CARE
[facility]  Discharge Final Note    Primary Care Provider: Yelena Dykes FNP    Expected Discharge Date: 7/20/2024    DC summary & home health request sent to [facility].      Final Discharge Note (most recent)       Final Note - 07/22/24 0758          Final Note    Assessment Type Final Discharge Note     Anticipated Discharge Disposition Home-Health Care [facility]     What phone number can be called within the next 1-3 days to see how you are doing after discharge? [phone]        Post-Acute Status    Post-Acute Authorization Home Health     Home Health Status Set-up Complete/Auth obtained                     Important Message from Medicare  Important Message from Medicare regarding Discharge Appeal Rights: Given to patient/caregiver, Explained to patient/caregiver, Signed/date by patient/caregiver     Date IMM was signed: 07/17/24  Time IMM was signed: 0833    Contact Info       Yelena Dykes FNP   Specialty: Internal Medicine   Relationship: PCP - General    [address] [place] [address] [place] [address]   Phone: [phone]       Next Steps: Schedule an appointment as soon as possible for a visit on 7/23/2024    Instructions: @10:30am    [address] [place] [address]   Phone: [phone]       Next Steps: Follow up

## 2024-07-24 LAB — M PNEUMO IGM SER IA-ACNC: 46 U/ML

## 2024-08-04 ENCOUNTER — HOSPITAL ENCOUNTER (EMERGENCY)
Facility: HOSPITAL | Age: 84
Discharge: HOME | End: 2024-08-04
Attending: EMERGENCY MEDICINE
Payer: MEDICARE

## 2024-08-04 VITALS
HEIGHT: 63 IN | WEIGHT: 139 LBS | OXYGEN SATURATION: 95 % | TEMPERATURE: 98 F | HEART RATE: 63 BPM | SYSTOLIC BLOOD PRESSURE: 162 MMHG | BODY MASS INDEX: 24.63 KG/M2 | RESPIRATION RATE: 21 BRPM | DIASTOLIC BLOOD PRESSURE: 70 MMHG

## 2024-08-04 DIAGNOSIS — S06.5XAA SUBDURAL HEMATOMA: Primary | ICD-10-CM

## 2024-08-04 DIAGNOSIS — R52 PAIN: ICD-10-CM

## 2024-08-04 DIAGNOSIS — Z86.73 HISTORY OF TIA (TRANSIENT ISCHEMIC ATTACK): ICD-10-CM

## 2024-08-04 DIAGNOSIS — S30.0XXA CONTUSION OF SACRUM, INITIAL ENCOUNTER: ICD-10-CM

## 2024-08-04 DIAGNOSIS — S01.01XA LACERATION OF SCALP, INITIAL ENCOUNTER: ICD-10-CM

## 2024-08-04 DIAGNOSIS — I10 HYPERTENSION, UNSPECIFIED TYPE: ICD-10-CM
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
## 2024-08-04 LAB
ALBUMIN SERPL BCP-MCNC: 3.4 G/DL (ref 3.5–5.2)
ALP SERPL-CCNC: 115 U/L (ref 55–135)
ALT SERPL W/O P-5'-P-CCNC: 29 U/L (ref 10–44)
ANION GAP SERPL CALC-SCNC: 12 MMOL/L (ref 8–16)
AST SERPL-CCNC: 35 U/L (ref 10–40)
BASOPHILS # BLD AUTO: 0.24 K/UL (ref 0–0.2)
BASOPHILS NFR BLD: 1.7 % (ref 0–1.9)
BILIRUB SERPL-MCNC: 0.4 MG/DL (ref 0.1–1)
BUN SERPL-MCNC: 22 MG/DL (ref 8–23)
CALCIUM SERPL-MCNC: 9.5 MG/DL (ref 8.7–10.5)
CHLORIDE SERPL-SCNC: 104 MMOL/L (ref 95–110)
CO2 SERPL-SCNC: 25 MMOL/L (ref 23–29)
CREAT SERPL-MCNC: 0.8 MG/DL (ref 0.5–1.4)
DIFFERENTIAL METHOD BLD: ABNORMAL
EOSINOPHIL # BLD AUTO: 0.4 K/UL (ref 0–0.5)
EOSINOPHIL NFR BLD: 2.6 % (ref 0–8)
ERYTHROCYTE [DISTWIDTH] IN BLOOD BY AUTOMATED COUNT: 15.9 % (ref 11.5–14.5)
EST. GFR  (NO RACE VARIABLE): >60 ML/MIN/1.73 M^2
GLUCOSE SERPL-MCNC: 94 MG/DL (ref 70–110)
HCT VFR BLD AUTO: 39.8 % (ref 37–48.5)
HGB BLD-MCNC: 13 G/DL (ref 12–16)
IMM GRANULOCYTES # BLD AUTO: 0.11 K/UL (ref 0–0.04)
IMM GRANULOCYTES NFR BLD AUTO: 0.8 % (ref 0–0.5)
INR PPP: 1.1 (ref 0.8–1.2)
LYMPHOCYTES # BLD AUTO: 2 K/UL (ref 1–4.8)
LYMPHOCYTES NFR BLD: 14.1 % (ref 18–48)
MCH RBC QN AUTO: 28.3 PG (ref 27–31)
MCHC RBC AUTO-ENTMCNC: 32.7 G/DL (ref 32–36)
MCV RBC AUTO: 87 FL (ref 82–98)
MONOCYTES # BLD AUTO: 1.7 K/UL (ref 0.3–1)
MONOCYTES NFR BLD: 12 % (ref 4–15)
NEUTROPHILS # BLD AUTO: 9.6 K/UL (ref 1.8–7.7)
NEUTROPHILS NFR BLD: 68.8 % (ref 38–73)
NRBC BLD-RTO: 0 /100 WBC
PLATELET # BLD AUTO: 422 K/UL (ref 150–450)
PMV BLD AUTO: 10.8 FL (ref 9.2–12.9)
POTASSIUM SERPL-SCNC: 4 MMOL/L (ref 3.5–5.1)
PROT SERPL-MCNC: 7.1 G/DL (ref 6–8.4)
PROTHROMBIN TIME: 11.2 SEC (ref 9–12.5)
RBC # BLD AUTO: 4.59 M/UL (ref 4–5.4)
SODIUM SERPL-SCNC: 141 MMOL/L (ref 136–145)
WBC # BLD AUTO: 14 K/UL (ref 3.9–12.7)

## 2024-08-04 PROCEDURE — 70450 CT HEAD/BRAIN W/O DYE: CPT | Mod: TC

## 2024-08-04 PROCEDURE — 72220 X-RAY EXAM SACRUM TAILBONE: CPT | Mod: 26,,, | Performed by: RADIOLOGY

## 2024-08-04 PROCEDURE — 85025 COMPLETE CBC W/AUTO DIFF WBC: CPT | Performed by: EMERGENCY MEDICINE

## 2024-08-04 PROCEDURE — 85610 PROTHROMBIN TIME: CPT | Performed by: EMERGENCY MEDICINE

## 2024-08-04 PROCEDURE — 12001 RPR S/N/AX/GEN/TRNK 2.5CM/<: CPT

## 2024-08-04 PROCEDURE — 72125 CT NECK SPINE W/O DYE: CPT | Mod: TC

## 2024-08-04 PROCEDURE — 99285 EMERGENCY DEPT VISIT HI MDM: CPT | Mod: 25

## 2024-08-04 PROCEDURE — 72170 X-RAY EXAM OF PELVIS: CPT | Mod: TC

## 2024-08-04 PROCEDURE — 70450 CT HEAD/BRAIN W/O DYE: CPT | Mod: 26,,, | Performed by: RADIOLOGY

## 2024-08-04 PROCEDURE — 80053 COMPREHEN METABOLIC PANEL: CPT | Performed by: EMERGENCY MEDICINE

## 2024-08-04 PROCEDURE — 72220 X-RAY EXAM SACRUM TAILBONE: CPT | Mod: TC

## 2024-08-04 PROCEDURE — 25000003 PHARM REV CODE 250: Performed by: EMERGENCY MEDICINE

## 2024-08-04 PROCEDURE — 72170 X-RAY EXAM OF PELVIS: CPT | Mod: 26,,, | Performed by: RADIOLOGY

## 2024-08-04 PROCEDURE — 72125 CT NECK SPINE W/O DYE: CPT | Mod: 26,,, | Performed by: RADIOLOGY

## 2024-08-04 RX ORDER — LABETALOL HYDROCHLORIDE 5 MG/ML
10 INJECTION, SOLUTION INTRAVENOUS EVERY 4 HOURS PRN
Status: DISCONTINUED | OUTPATIENT
Start: 2024-08-04 | End: 2024-08-05 | Stop reason: HOSPADM

## 2024-08-04 RX ORDER — ACETAMINOPHEN 500 MG
1000 TABLET ORAL
Status: COMPLETED | OUTPATIENT
Start: 2024-08-04 | End: 2024-08-04

## 2024-08-04 RX ORDER — LIDOCAINE HYDROCHLORIDE 10 MG/ML
5 INJECTION, SOLUTION EPIDURAL; INFILTRATION; INTRACAUDAL; PERINEURAL
Status: COMPLETED | OUTPATIENT
Start: 2024-08-04 | End: 2024-08-04

## 2024-08-04 RX ORDER — LOSARTAN POTASSIUM 25 MG/1
25 TABLET ORAL DAILY
COMMUNITY

## 2024-08-04 RX ORDER — HYDRALAZINE HYDROCHLORIDE 20 MG/ML
10 INJECTION INTRAMUSCULAR; INTRAVENOUS EVERY 6 HOURS PRN
Status: DISCONTINUED | OUTPATIENT
Start: 2024-08-04 | End: 2024-08-04

## 2024-08-04 RX ADMIN — LIDOCAINE HYDROCHLORIDE 50 MG: 10 INJECTION, SOLUTION EPIDURAL; INFILTRATION; INTRACAUDAL; PERINEURAL at 07:08

## 2024-08-04 RX ADMIN — ACETAMINOPHEN 1000 MG: 500 TABLET ORAL at 07:08

## 2024-08-04 NOTE — ED PROVIDER NOTES
Encounter Date: 8/4/2024       History     Chief Complaint   Patient presents with    Fall     PT stated she got out of recliner and lost her balance, states she does not know what she hit back of her head on possibly her piano. Denies LOC denies blood thinners.     83-year-old female, here from home via EMS after she fell after getting up from a chair, and struck her head.  She denies any LOC.  She does have a small laceration on the posterior scalp.  She states that she does not know why she fell.  She thinks she just lost her balance.  Denies chest pain or palpitations.  She denies any back pain.  States her neck is somewhat sore.  She arrived without a cervical collar.  She denies any pain in the extremities.  No rib pain.  No abdominal pain.        Review of patient's allergies indicates:   Allergen Reactions    Codeine Nausea Only     Happened 50 years ago      Past Medical History:   Diagnosis Date    Bronchitis     COPD (chronic obstructive pulmonary disease)     PONV (postoperative nausea and vomiting)      Past Surgical History:   Procedure Laterality Date    COSMETIC SURGERY  2012    bilateral eye lift    HYSTERECTOMY      IRRIGATION AND DEBRIDEMENT OF UPPER EXTREMITY Bilateral 11/15/2022    Procedure: IRRIGATION AND DEBRIDEMENT, UPPER EXTREMITY;  Surgeon: Leighton Cronin DO;  Location: [facility] OR;  Service: Orthopedics;  Laterality: Bilateral;    IRRIGATION AND DEBRIDEMENT OF UPPER EXTREMITY Bilateral 11/17/2022    Procedure: IRRIGATION AND DEBRIDEMENT, UPPER EXTREMITY;  Surgeon: Leighton Cronin DO;  Location: [facility] OR;  Service: Orthopedics;  Laterality: Bilateral;  Second look irrigation debridement need wound VAC for possible wound VAC placement    OPEN REDUCTION AND INTERNAL FIXATION (ORIF) OF FRACTURE OF FEMUR USING DYNAMIC HIP SCREW Left 7/3/2019    Procedure: ORIF, HIP, USING DYNAMIC HIP SCREW;  Surgeon: Juan Jose Gunn MD;  Location: [facility] OR;  Service: Orthopedics;  Laterality: Left;    REVERSE  TOTAL SHOULDER ARTHROPLASTY Right 4/26/2023    Procedure: ARTHROPLASTY, SHOULDER, TOTAL, REVERSE;  Surgeon: Luis Enrique Pimentel II, MD;  Location: [facility];  Service: Orthopedics;  Laterality: Right;     No family history on file.  Social History     Tobacco Use    Smoking status: Every Day     Current packs/day: 1.00     Types: Cigarettes    Smokeless tobacco: Never   Substance Use Topics    Alcohol use: Yes     Comment: rarely    Drug use: No     Review of Systems   Constitutional: Negative.    HENT: Negative.     Eyes: Negative.    Respiratory: Negative.     Gastrointestinal: Negative.    Genitourinary: Negative.    Musculoskeletal:  Positive for neck pain.   Skin:  Positive for wound.   Neurological: Negative.    Psychiatric/Behavioral: Negative.         Physical Exam     Initial Vitals [08/04/24 1718]   BP Pulse Resp Temp SpO2   (!) 233/98 75 18 97.6 °F (36.4 °C) 95 %      MAP       --         Physical Exam    Nursing note and vitals reviewed.  Constitutional: She appears well-developed and well-nourished. She is not diaphoretic. No distress.   HENT:   Right Ear: External ear normal.   Left Ear: External ear normal.   Nose: Nose normal.   Mouth/Throat: Oropharynx is clear and moist.   2 cm laceration, posterior parietal region, bleeding well controlled.  No step-off or depression   Eyes: EOM are normal. Pupils are equal, round, and reactive to light. No scleral icterus.   Neck: Neck supple. No JVD present.   Normal range of motion.  Cardiovascular:  Normal rate, regular rhythm, normal heart sounds and intact distal pulses.           No murmur heard.  Pulmonary/Chest: Breath sounds normal. No stridor. No respiratory distress. She has no wheezes. She has no rales.   Abdominal: Abdomen is soft. Bowel sounds are normal. She exhibits no distension. There is no abdominal tenderness. There is no rebound and no guarding.   Musculoskeletal:         General: Tenderness present. No edema. Normal range of motion.      Cervical  back: Normal range of motion and neck supple.      Comments: Moves all extremities well.  No crepitus, no joint pain, no swelling, no obvious injuries to the skin.    Palpation of the cervical spine does not reveal any step-offs or other bony deformity, but she does state that palpation over the cervical spine is mildly tender.  C-collar will be placed.     Neurological: She is alert and oriented to person, place, and time. She has normal strength. No cranial nerve deficit or sensory deficit. GCS score is 15. GCS eye subscore is 4. GCS verbal subscore is 5. GCS motor subscore is 6.   Skin: Skin is warm and dry. Capillary refill takes less than 2 seconds. No rash noted. No erythema.   Psychiatric: She has a normal mood and affect. Her behavior is normal.         ED Course   Lac Repair    Date/Time: 8/4/2024 8:20 PM    Performed by: Tristan Lockwood MD  Authorized by: Mars Vela MD    Consent:     Consent obtained:  Verbal    Consent given by:  Patient    Risks, benefits, and alternatives were discussed: yes      Risks discussed:  Pain, poor wound healing and infection  Anesthesia:     Anesthesia method:  Local infiltration    Local anesthetic:  Lidocaine 1% w/o epi  Laceration details:     Location:  Scalp    Scalp location:  Occipital    Length (cm):  1  Pre-procedure details:     Preparation:  Patient was prepped and draped in usual sterile fashion and imaging obtained to evaluate for foreign bodies  Exploration:     Wound exploration: wound explored through full range of motion    Treatment:     Area cleansed with:  Povidone-iodine and saline    Amount of cleaning:  Standard    Irrigation solution:  Sterile saline    Irrigation volume:  50ml    Irrigation method:  Syringe  Skin repair:     Repair method:  Staples    Number of staples:  3  Approximation:     Approximation:  Close  Repair type:     Repair type:  Simple  Post-procedure details:     Dressing:  Open (no dressing)    Procedure completion:   Tolerated well, no immediate complications    Labs Reviewed   CBC W/ AUTO DIFFERENTIAL - Abnormal       Result Value    WBC 14.00 (*)     RBC 4.59      Hemoglobin 13.0      Hematocrit 39.8      MCV 87      MCH 28.3      MCHC 32.7      RDW 15.9 (*)     Platelets 422      MPV 10.8      Immature Granulocytes 0.8 (*)     Gran # (ANC) 9.6 (*)     Immature Grans (Abs) 0.11 (*)     Lymph # 2.0      Mono # 1.7 (*)     Eos # 0.4      Baso # 0.24 (*)     nRBC 0      Gran % 68.8      Lymph % 14.1 (*)     Mono % 12.0      Eosinophil % 2.6      Basophil % 1.7      Differential Method Automated     COMPREHENSIVE METABOLIC PANEL - Abnormal    Sodium 141      Potassium 4.0      Chloride 104      CO2 25      Glucose 94      BUN 22      Creatinine 0.8      Calcium 9.5      Total Protein 7.1      Albumin 3.4 (*)     Total Bilirubin 0.4      Alkaline Phosphatase 115      AST 35      ALT 29      eGFR >60.0      Anion Gap 12     PROTIME-INR    Prothrombin Time 11.2      INR 1.1            Imaging Results              CT Head Without Contrast (Final result)  Result time 08/04/24 23:02:48      Final result by Janet Box MD (08/04/24 23:02:48)                   Impression:      Acute anterior interhemispheric subdural hematoma with maximum thickness of 4 mm.  No adverse change from prior.    Moderate periventricular and deep white matter changes of chronic microvascular ischemia. Gliosis/encephalomalacia in the left cerebellum representing sequela of prior ischemic process or hemorrhage.    8 mm left MCA aneurysm, unchanged from prior.      Electronically signed by: Janet Box  Date:    08/04/2024  Time:    23:02               Narrative:    EXAMINATION:  CT HEAD WITHOUT CONTRAST    CLINICAL HISTORY:  Subdural hemorrhage;follow-up, nsgy recommended at 4 hr;    TECHNIQUE:  Low dose axial images were obtained through the head.  Coronal and sagittal reformations were also performed. Contrast was not  administered.    COMPARISON:  Same day prior    FINDINGS:  Acute anterior interhemispheric subdural hematoma with maximum thickness of 4 mm.    Moderate periventricular and deep white matter changes of chronic microvascular ischemia. Gliosis/encephalomalacia in the left cerebellum representing sequela of prior ischemic process or hemorrhage.    8 mm left MCA aneurysm, unchanged from prior.    No hydrocephalus.    Small high left parietal scalp contusion.  Interval placement of closure staples.  The calvarium is intact. Paranasal sinuses are essentially clear.                                       X-Ray Sacrum And Coccyx (Final result)  Result time 08/04/24 19:54:16      Final result by Janet Box MD (08/04/24 19:54:16)                   Impression:      As above.      Electronically signed by: Janet Box  Date:    08/04/2024  Time:    19:54               Narrative:    EXAMINATION:  XR SACRUM AND COCCYX    CLINICAL HISTORY:  Pain, unspecified    TECHNIQUE:  Two or three views of the sacrum and coccyx were performed.    COMPARISON:  None    FINDINGS:  Sacrum is obscured by overlying bowel gas.  No obvious displaced fracture.                                       X-Ray Pelvis Routine AP (Final result)  Result time 08/04/24 19:52:49      Final result by Janet Box MD (08/04/24 19:52:49)                   Impression:      No acute findings.      Electronically signed by: Janet Box  Date:    08/04/2024  Time:    19:52               Narrative:    EXAMINATION:  XR PELVIS ROUTINE AP    CLINICAL HISTORY:  Pain, unspecified    TECHNIQUE:  AP view of the pelvis was performed.    COMPARISON:  None.    FINDINGS:  Postsurgical changes of left dynamic hip screw placement.  Hardware is intact.  Moderate to advanced bilateral hip osteoarthritis.  No detectable fracture.                                       CT Head Without Contrast (Final result)  Result time 08/04/24 18:58:45      Final  result by Janet Box MD (08/04/24 18:58:45)                   Impression:      Acute anterior interhemispheric subdural hematoma with maximum thickness of 4 mm.    Moderate periventricular and deep white matter changes of chronic microvascular ischemia.  Gliosis/encephalomalacia in the left cerebellum representing sequela of prior ischemic process or hemorrhage.    8 mm left MCA aneurysm, unchanged from prior.      Electronically signed by: Janet Box  Date:    08/04/2024  Time:    18:58               Narrative:    EXAMINATION:  CT HEAD WITHOUT CONTRAST    CLINICAL HISTORY:  Head trauma, minor (Age >= 65y);    TECHNIQUE:  Low dose axial images were obtained through the head.  Coronal and sagittal reformations were also performed. Contrast was not administered.    COMPARISON:  CTA 05/25/2024    FINDINGS:  Acute anterior interhemispheric subdural hematoma with maximum thickness of 4 mm.    Moderate periventricular and deep white matter changes of chronic microvascular ischemia.  Gliosis/encephalomalacia in the left cerebellum representing sequela of prior ischemic process or hemorrhage.    8 mm left MCA aneurysm, unchanged from prior.    No hydrocephalus.    Small high left parietal scalp contusion.  The calvarium is intact.  Paranasal sinuses are essentially clear.                                       CT Cervical Spine Without Contrast (Final result)  Result time 08/04/24 19:31:33      Final result by Bimal Dow MD (08/04/24 19:31:33)                   Impression:      1. Allowing for motion artifact, no convincing acute displaced fracture or dislocation of the cervical spine.  2. Please see above for several additional findings.      Electronically signed by: Bimal Dow MD  Date:    08/04/2024  Time:    19:31               Narrative:    EXAMINATION:  CT CERVICAL SPINE WITHOUT CONTRAST    CLINICAL HISTORY:  Polytrauma, blunt;    TECHNIQUE:  Low dose axial images, sagittal and  coronal reformations were performed though the cervical spine.  Contrast was not administered.    COMPARISON:  CTA head and neck 05/25/2024    FINDINGS:  Motion artifact limits evaluation.  There is osteopenia.    Images of the lung apices are remarkable for biapical pulmonary emphysematous change.  There is biapical atelectasis or scarring.  There are scattered low attenuating lesions throughout the thyroid gland, further evaluation with nonemergent ultrasound recommended if not previously performed.  The parotid glands and remaining visualized salivary glands are grossly unremarkable.  No tonsillar enlargement.  No significant cervical lymphadenopathy.  There is carotid vascular calcification.  The posterior paraspinous and hypopharyngeal soft tissues are unremarkable.    Sagittal reformatted imaging demonstrates minimal retrolisthesis of C5 on C6.  No significant vertebral body height loss.  There is multilevel disc space height loss and disc degenerative disease.  There is multilevel facet arthropathy.  No convincing acute displaced fracture or dislocation of the cervical spine.  There is prominent degenerative change about the odontoid.  Motion artifact limits evaluation for spondylitic changes.  There is multilevel mild to moderate spinal canal stenosis and neuroforaminal narrowing.  The airway is patent.                                       Medications   labetaloL injection 10 mg (has no administration in time range)   acetaminophen tablet 1,000 mg (1,000 mg Oral Given 8/4/24 1916)   LIDOcaine (PF) 10 mg/ml (1%) injection 50 mg (50 mg Infiltration Given 8/4/24 1919)     Medical Decision Making  Patient examined, plan to CT scan her head and neck.  She has a scalp laceration which will need closure.  She states her tetanus is up-to-date.  Differential for this patient includes intracranial hemorrhage, skull fracture, concussion, cervical spine strain, sprains, or fracture.  At shift change, patient's care be  transferred to Dr. Lockwood who will await CT results and care for the patient until appropriate disposition.    Problems Addressed:  Contusion of sacrum, initial encounter: complicated acute illness or injury  Fall: complicated acute illness or injury that poses a threat to life or bodily functions  History of TIA (transient ischemic attack): chronic illness or injury with exacerbation, progression, or side effects of treatment that poses a threat to life or bodily functions  Hypertension, unspecified type: chronic illness or injury with exacerbation, progression, or side effects of treatment that poses a threat to life or bodily functions  Laceration of scalp, initial encounter: complicated acute illness or injury  Pain: complicated acute illness or injury  Subdural hematoma: complicated acute illness or injury that poses a threat to life or bodily functions    Amount and/or Complexity of Data Reviewed  Independent Historian:      Details: Family  External Data Reviewed: labs and ECG.  Labs: ordered. Decision-making details documented in ED Course.  Radiology: ordered and independent interpretation performed. Decision-making details documented in ED Course.  ECG/medicine tests: ordered and independent interpretation performed.     Details: EKG interpreted by me: Sinus rhythm, rate 77, normal IN QTC interval, no STEMI, patient appears to be in bigeminy   Similar QRS morphology compared to previous  PACs are not consistent.  This EKGs happened we had to capture the time patient seemed to be in bigeminy otherwise patient seems to be in much more normal sinus rhythm without all the PACs    Risk  OTC drugs.  Prescription drug management.               ED Course as of 08/04/24 2330   Sun Aug 04, 2024   1820 BP(!): 233/98 [LA]   1820 Temp: 97.6 °F (36.4 °C) [LA]   1820 Pulse: 75 [LA]   1820 Resp: 18 [LA]   1820 SpO2: 95 % [LA]   1820  PT stated she got out of recliner and lost her balance, states she does not know what  she hit back of her head on possibly her piano. Denies LOC denies blood thinners. [LA]   1838 Patient is signed out to me at shift change by Dr. Vela to follow up CT scans, repair laceration [LA]   1900 83-year-old female coming after follow up sedating height.  Says she lost her balance fell when she was standing about a recliner.  Does not cough feeling dizzy, unilateral weakness or numbness.  She is not concerned about why she fell but more of the injury sustained.  She does complain of pain in her pelvic region tailbone although able to stand.  Denies headache at this time.  Has a 1 cm laceration in the occipital scalp [LA]   1900 CT interpreted by me: No acute findings   Wait for radiology report hopefully clear collar pending C-spine report we will need to irrigate and staple wound [LA]   1923 Patient is hypertensive here.  Family says it patient's blood pressure was 130 systolic prior to this today.  They said her blood pressure only elevated because she was here right now.  She denies a headache.  She denies chest pain or shortness of breath.  Discussed with him keeping a close eye on this but we will not address specifically tonight despite the profound elevation. [LA]   1929 CTH  Impression:     Acute anterior interhemispheric subdural hematoma with maximum thickness of 4 mm.     Moderate periventricular and deep white matter changes of chronic microvascular ischemia.  Gliosis/encephalomalacia in the left cerebellum representing sequela of prior ischemic process or hemorrhage.     8 mm left MCA aneurysm, unchanged from prior.   [LA]   1936 CT C spine  Impression:     1. Allowing for motion artifact, no convincing acute displaced fracture or dislocation of the cervical spine.  2. Please see above for several additional findings.   [LA]   1947 Discussed with Dr. Lozoya [facility].  Given patient is asymptomatic with the interhemispheric subdural hematoma, likely no type of intervention necessary.   He said he would recommend holding her baby aspirin for a couple of weeks.  When he repeat CT scan in 4 hours has been no interval change, the patient will be discharged home.  She can follow up with the primary care provider.  She could have a neurosurgery referral if she wants [LA]   1947 If change in mental status or size we will need to re-engage [LA]   2018 Staple head. [LA]   2028 XR  FINDINGS:  Sacrum is obscured by overlying bowel gas.  No obvious displaced fracture.   [LA]   2028 Xr pelvis  Impression:     No acute findings.   [LA]   2042 INR: 1.1 [LA]   2105 WBC(!): 14.00 [LA]   2105 Hemoglobin: 13.0 [LA]   2105 Hematocrit: 39.8 [LA]   2105 Platelet Count: 422 [LA]   2105 BP(!): 180/73 [LA]   2105 Pulse: 69 [LA]   2105 Resp(!): 22 [LA]   2302 CT head interpreted by me: No interval change [LA]   2311 CTH  Impression:     Acute anterior interhemispheric subdural hematoma with maximum thickness of 4 mm.  No adverse change from prior.   [LA]   2313 BP: 136/63 [LA]   2313 Pulse: 64 [LA]   2313 Resp: 20 [LA]   2313 SpO2: 95 % [LA]   2317 Patient discharged with the return precautions.  Given neurosurgery phone number in the event she wants to follow up in their clinic.  They will return if her condition worsened in any way.  She will get a donut for her sacral contusion. [LA]      ED Course User Index  [LA] Tristan Lockwood MD                           Clinical Impression:  Final diagnoses:  [R52] Pain  [W19.XXXA] Fall  [S06.5XAA] Subdural hematoma (Primary)  [S01.01XA] Laceration of scalp, initial encounter  [Z86.73] History of TIA (transient ischemic attack)  [I10] Hypertension, unspecified type  [S30.0XXA] Contusion of sacrum, initial encounter          ED Disposition Condition    Discharge Fair          ED Prescriptions    None       Follow-up Information       Follow up With Specialties Details Why Contact Info    [facility] Emergency Dept Emergency Medicine  If symptoms worsen [address]  [facility] [id number] [phone]    Yelena Dykes FNP Internal Medicine Schedule an appointment as soon as possible for a visit   [address] [facility] [address]  [phone]      Marlo Renee MD Neurosurgery Schedule an appointment as soon as possible for a visit  As [facility] [address] [facility] [address]  [phone]               Tristan Lockwood MD  08/04/24 [id number]

## 2024-08-05 NOTE — ED NOTES
Discussion with patient per myself and . All head injury instructions provided including when to return and what to watch for possible worsening of condition. Follow up explained. Verbalized to withhold aspirin for 2 weeks and to return in 10-14 days for staple removal. All questions answered. Preparing patient for d/c. Remains asymptomatic at this time. Discussed donut/pillow for chair due to tail bone pain. Voices understanding.

## 2024-08-05 NOTE — ED NOTES
No neuro changes present. GCS 15. No deficits noted per exam. Awaiting until 2230 for repeat CT scan. Denies any needs at this time.

## 2024-08-05 NOTE — ED NOTES
Final instructions provided to patient and son. Preparing for discharge once patient assisted with clothing.

## 2024-08-05 NOTE — ED NOTES
Remains alert. Family at bedside. No neurological changes or complaints noted. Vitally stable. Blood pressure trending downward without medication.

## 2024-08-05 NOTE — ED NOTES
Patient returned from 4 hr repeat CT head. Awaiting results for further. Family and patient updated on status and will make decision on d/c vs transfer once repeat CT resulted. Remains vitally stable. No distress noted.

## 2024-08-05 NOTE — ED NOTES
External urinary catheter placed on patient with briefs for securement set to suction. Patient placed on left side with pillow behind back for comfort for tail bone pain.

## 2024-08-15 ENCOUNTER — LAB VISIT (OUTPATIENT)
Dept: LAB | Facility: HOSPITAL | Age: 84
End: 2024-08-15
Attending: NURSE PRACTITIONER
Payer: MEDICARE

## 2024-08-15 ENCOUNTER — HOSPITAL ENCOUNTER (EMERGENCY)
Facility: HOSPITAL | Age: 84
Discharge: HOME | End: 2024-08-15
Attending: EMERGENCY MEDICINE
Payer: MEDICARE

## 2024-08-15 VITALS
HEART RATE: 60 BPM | RESPIRATION RATE: 16 BRPM | DIASTOLIC BLOOD PRESSURE: 85 MMHG | TEMPERATURE: 99 F | SYSTOLIC BLOOD PRESSURE: 172 MMHG | WEIGHT: 139 LBS | OXYGEN SATURATION: 97 % | HEIGHT: 63 IN | BODY MASS INDEX: 24.63 KG/M2

## 2024-08-15 DIAGNOSIS — Z48.02 REMOVAL OF STAPLE: Primary | ICD-10-CM

## 2024-08-15 DIAGNOSIS — I10 ESSENTIAL HYPERTENSION, MALIGNANT: Primary | ICD-10-CM

## 2024-08-15 LAB
ANION GAP SERPL CALC-SCNC: 10 MMOL/L (ref 8–16)
BUN SERPL-MCNC: 25 MG/DL (ref 8–23)
CALCIUM SERPL-MCNC: 9.6 MG/DL (ref 8.7–10.5)
CHLORIDE SERPL-SCNC: 108 MMOL/L (ref 95–110)
CO2 SERPL-SCNC: 23 MMOL/L (ref 23–29)
CREAT SERPL-MCNC: 0.8 MG/DL (ref 0.5–1.4)
EST. GFR  (NO RACE VARIABLE): >60 ML/MIN/1.73 M^2
GLUCOSE SERPL-MCNC: 80 MG/DL (ref 70–110)
POTASSIUM SERPL-SCNC: 4 MMOL/L (ref 3.5–5.1)
SODIUM SERPL-SCNC: 141 MMOL/L (ref 136–145)

## 2024-08-15 PROCEDURE — 36415 COLL VENOUS BLD VENIPUNCTURE: CPT | Performed by: NURSE PRACTITIONER

## 2024-08-15 PROCEDURE — 80048 BASIC METABOLIC PNL TOTAL CA: CPT | Performed by: NURSE PRACTITIONER

## 2024-08-15 PROCEDURE — 99281 EMR DPT VST MAYX REQ PHY/QHP: CPT

## 2024-08-15 NOTE — ED PROVIDER NOTES
CHIEF COMPLAINT  Chief Complaint   Patient presents with    Suture / Staple Removal     Pt. States she had a lac. Repair to the back of her head 14 days ago using staples        HISTORY OF PRESENT ILLNESS  Geraldine Cooksey is a 83 y.o. female with PMH as below who presents to ER for evaluation of 3 staples removal on scalp.  They were placed fourteen days ago.  No other specific aggravating or relieving factors otherwise.      PAST MEDICAL HISTORY  Past Medical History:   Diagnosis Date    Bronchitis     COPD (chronic obstructive pulmonary disease)     PONV (postoperative nausea and vomiting)        CURRENT MEDICATIONS    No current facility-administered medications for this encounter.    Current Outpatient Medications:     albuterol (PROVENTIL/VENTOLIN HFA) 90 mcg/actuation inhaler, Inhale 1-2 puffs into the lungs every 6 (six) hours as needed for Wheezing. Rescue, Disp: 1 Inhaler, Rfl: 0    aspirin (ECOTRIN) 81 MG EC tablet, Take 1 tablet (81 mg total) by mouth 2 (two) times a day. for 14 days, Disp: 28 tablet, Rfl: 0    cetirizine (ZYRTEC) 10 MG tablet, Take 10 mg by mouth daily as needed for Allergies., Disp: , Rfl:     hydrALAZINE (APRESOLINE) 25 MG tablet, Take 1 tablet (25 mg total) by mouth every 8 (eight) hours., Disp: 90 tablet, Rfl: 11    hydrALAZINE (APRESOLINE) 50 MG tablet, Take 0.5 tablets (25 mg total) by mouth 3 (three) times daily., Disp: 45 tablet, Rfl: 11    HYDROcodone-acetaminophen (NORCO) 7.5-325 mg per tablet, Take 1 tablet by mouth every 6 (six) hours as needed for Pain., Disp: 28 tablet, Rfl: 0    ibuprofen (ADVIL,MOTRIN) 200 MG tablet, Take 200 mg by mouth., Disp: , Rfl:     losartan (COZAAR) 25 MG tablet, Take 25 mg by mouth once daily., Disp: , Rfl:     TRELEGY ELLIPTA 100-62.5-25 mcg DsDv, Take 1 puff by mouth once daily., Disp: 1 each, Rfl: 0    ALLERGIES    Review of patient's allergies indicates:   Allergen Reactions    Codeine Nausea Only     Happened 50 years ago        SURGICAL  HISTORY    Past Surgical History:   Procedure Laterality Date    COSMETIC SURGERY  2012    bilateral eye lift    HYSTERECTOMY      IRRIGATION AND DEBRIDEMENT OF UPPER EXTREMITY Bilateral 11/15/2022    Procedure: IRRIGATION AND DEBRIDEMENT, UPPER EXTREMITY;  Surgeon: Leighton Cronin DO;  Location: [facility] OR;  Service: Orthopedics;  Laterality: Bilateral;    IRRIGATION AND DEBRIDEMENT OF UPPER EXTREMITY Bilateral 11/17/2022    Procedure: IRRIGATION AND DEBRIDEMENT, UPPER EXTREMITY;  Surgeon: Leighton Cronin DO;  Location: [facility] OR;  Service: Orthopedics;  Laterality: Bilateral;  Second look irrigation debridement need wound VAC for possible wound VAC placement    OPEN REDUCTION AND INTERNAL FIXATION (ORIF) OF FRACTURE OF FEMUR USING DYNAMIC HIP SCREW Left 7/3/2019    Procedure: ORIF, HIP, USING DYNAMIC HIP SCREW;  Surgeon: Juan Jose Gunn MD;  Location: [facility] OR;  Service: Orthopedics;  Laterality: Left;    REVERSE TOTAL SHOULDER ARTHROPLASTY Right 4/26/2023    Procedure: ARTHROPLASTY, SHOULDER, TOTAL, REVERSE;  Surgeon: Luis Enrique Pimentel II, MD;  Location: [facility] OR;  Service: Orthopedics;  Laterality: Right;       SOCIAL HISTORY    Social History     Socioeconomic History    Marital status: Single   Tobacco Use    Smoking status: Every Day     Current packs/day: 1.00     Types: Cigarettes    Smokeless tobacco: Never   Substance and Sexual Activity    Alcohol use: Yes     Comment: rarely    Drug use: No    Sexual activity: Never     Social Determinants of Health     Financial Resource Strain: Low Risk  (8/12/2024)    Received from [facility]    Overall Financial Resource Strain (CARDIA)     Difficulty of Paying Living Expenses: Not hard at all   Recent Concern: Financial Resource Strain - Medium Risk (5/27/2024)    Overall Financial Resource Strain (CARDIA)     Difficulty of Paying Living Expenses: Somewhat hard   Food Insecurity: No Food Insecurity (8/12/2024)    Received from [facility]    Hunger Vital Sign      "Worried About Running Out of Food in the Last Year: Never true     Ran Out of Food in the Last Year: Never true   Transportation Needs: No Transportation Needs (8/12/2024)    Received from [facility]    PRAPARE - Transportation     Lack of Transportation (Medical): No     Lack of Transportation (Non-Medical): No   Physical Activity: Inactive (8/12/2024)    Received from [facility]    Exercise Vital Sign     Days of Exercise per Week: 0 days     Minutes of Exercise per Session: 0 min   Stress: Patient Declined (8/12/2024)    Received from [facility]    [place] [employer] - Occupational Stress Questionnaire     Feeling of Stress : Patient declined   Housing Stability: Low Risk  (7/18/2024)    Housing Stability Vital Sign     Unable to Pay for Housing in the Last Year: No     Homeless in the Last Year: No       FAMILY HISTORY    No family history on file.    REVIEW OF SYSTEMS    Review of Systems   Skin:         Staples     All other systems reviewed and are negative    VITAL SIGNS:   BP (!) 172/85   Pulse 60   Temp 98.6 °F (37 °C) (Oral)   Resp 16   Ht 5' 3" (1.6 m)   Wt 63 kg (139 lb)   SpO2 97%   Breastfeeding No   BMI 24.62 kg/m²      Physical Exam  Constitutional:       Appearance: Normal appearance.   Cardiovascular:      Rate and Rhythm: Normal rate.   Pulmonary:      Effort: Pulmonary effort is normal.   Skin:     General: Skin is warm.          Neurological:      Mental Status: She is alert. Mental status is at baseline.   Psychiatric:         Mood and Affect: Mood normal.       Vitals and nursing note reviewed.     LABS    Labs Reviewed - No data to display      EKG          RADIOLOGY    No orders to display         PROCEDURES    Suture Removal    Date/Time: 8/15/2024 11:44 AM  Location procedure was performed: [facility] EMERGENCY DEPARTMENT    Performed by: Koby Bundy NP  Authorized by: Armando Amador MD  Body area: head/neck  Location details: scalp  Wound Appearance: clean " and well healed  Staples Removed: 3  Sutures placed in this facility: Staples placed in this facility.  Complications: No  Patient tolerance: Patient tolerated the procedure well with no immediate complications          Medications - No data to display             Medical Decision Making  Geraldine Cooksey is a 83 y.o. female with PMH as below who presents to ER for evaluation of 3 staples removal on scalp.  They were placed fourteen days ago.  No other specific aggravating or relieving factors otherwise.    DDX :Skin laceration without foreign body, skin trauma,wound dehiscence    3 staples removed without any complication    Problems Addressed:  Removal of staple: acute illness or injury           Discharge Medication List as of 8/15/2024 11:27 AM          Discharge Medication List as of 8/15/2024 11:27 AM              DISPOSITION  Patient discharged to home in stable condition.        FINAL IMPRESSION    1. Removal of staple         Koby Bundy NP  08/15/24 1580

## 2024-08-20 ENCOUNTER — EXTERNAL HOME HEALTH (OUTPATIENT)
Dept: HOME HEALTH SERVICES | Facility: HOSPITAL | Age: 84
End: 2024-08-20
Payer: MEDICARE

## 2024-08-22 ENCOUNTER — OFFICE VISIT (OUTPATIENT)
Dept: PULMONOLOGY | Facility: CLINIC | Age: 84
End: 2024-08-22
Payer: MEDICARE

## 2024-08-22 ENCOUNTER — TELEPHONE (OUTPATIENT)
Dept: PULMONOLOGY | Facility: CLINIC | Age: 84
End: 2024-08-22

## 2024-08-22 VITALS
BODY MASS INDEX: 23.53 KG/M2 | HEART RATE: 64 BPM | OXYGEN SATURATION: 96 % | SYSTOLIC BLOOD PRESSURE: 160 MMHG | DIASTOLIC BLOOD PRESSURE: 70 MMHG | WEIGHT: 132.81 LBS

## 2024-08-22 DIAGNOSIS — J18.9 PNEUMONIA OF RIGHT LOWER LOBE DUE TO INFECTIOUS ORGANISM: ICD-10-CM

## 2024-08-22 DIAGNOSIS — J18.9 PNEUMONIA OF RIGHT LOWER LOBE DUE TO INFECTIOUS ORGANISM: Primary | ICD-10-CM

## 2024-08-22 PROCEDURE — 1126F AMNT PAIN NOTED NONE PRSNT: CPT | Mod: CPTII,S$GLB,, | Performed by: STUDENT IN AN ORGANIZED HEALTH CARE EDUCATION/TRAINING PROGRAM

## 2024-08-22 PROCEDURE — 1159F MED LIST DOCD IN RCRD: CPT | Mod: CPTII,S$GLB,, | Performed by: STUDENT IN AN ORGANIZED HEALTH CARE EDUCATION/TRAINING PROGRAM

## 2024-08-22 PROCEDURE — 3288F FALL RISK ASSESSMENT DOCD: CPT | Mod: CPTII,S$GLB,, | Performed by: STUDENT IN AN ORGANIZED HEALTH CARE EDUCATION/TRAINING PROGRAM

## 2024-08-22 PROCEDURE — 3077F SYST BP >= 140 MM HG: CPT | Mod: CPTII,S$GLB,, | Performed by: STUDENT IN AN ORGANIZED HEALTH CARE EDUCATION/TRAINING PROGRAM

## 2024-08-22 PROCEDURE — 3078F DIAST BP <80 MM HG: CPT | Mod: CPTII,S$GLB,, | Performed by: STUDENT IN AN ORGANIZED HEALTH CARE EDUCATION/TRAINING PROGRAM

## 2024-08-22 PROCEDURE — 99204 OFFICE O/P NEW MOD 45 MIN: CPT | Mod: S$GLB,,, | Performed by: STUDENT IN AN ORGANIZED HEALTH CARE EDUCATION/TRAINING PROGRAM

## 2024-08-22 PROCEDURE — 99999 PR PBB SHADOW E&M-EST. PATIENT-LVL III: CPT | Mod: PBBFAC,,, | Performed by: STUDENT IN AN ORGANIZED HEALTH CARE EDUCATION/TRAINING PROGRAM

## 2024-08-22 PROCEDURE — 1101F PT FALLS ASSESS-DOCD LE1/YR: CPT | Mod: CPTII,S$GLB,, | Performed by: STUDENT IN AN ORGANIZED HEALTH CARE EDUCATION/TRAINING PROGRAM

## 2024-08-22 RX ORDER — ACETAMINOPHEN 500 MG
500 TABLET ORAL EVERY 6 HOURS PRN
COMMUNITY

## 2024-08-22 NOTE — PROGRESS NOTES
8/22/2024    Geraldine Cooksey  New Patient History and Physical    Chief Complaint   Patient presents with    Follow-up     Hospital f/u       HPI:Ms Cooksey is an 84 year old woman former smoker who presents with hospital follow up after a recent pneumonia.     First pneumonia ever in July , no hx of other pneumonias. She does get bronchitis- aroudn once year- ssually gets steroids and antibioitcs.     Start smoking at age 18 years old, quit at age 84- most she was smoking 1 pack per day. She quit after the recent hospitalization.     She is not up to date with pneumonia vaccine.     She had pleural effusion drained by thoracentesis on the right chest on 7/16/24, neg for malignancy, TP 2.4 ,  /269     Treateed with antibiotics completed 4 days since her discharge.       The chief complaint problem is New to me    PFSH:  Past Medical History:   Diagnosis Date    Bronchitis     COPD (chronic obstructive pulmonary disease)     PONV (postoperative nausea and vomiting)          Past Surgical History:   Procedure Laterality Date    COSMETIC SURGERY  2012    bilateral eye lift    HYSTERECTOMY      IRRIGATION AND DEBRIDEMENT OF UPPER EXTREMITY Bilateral 11/15/2022    Procedure: IRRIGATION AND DEBRIDEMENT, UPPER EXTREMITY;  Surgeon: Leighton Cronin DO;  Location: [facility] OR;  Service: Orthopedics;  Laterality: Bilateral;    IRRIGATION AND DEBRIDEMENT OF UPPER EXTREMITY Bilateral 11/17/2022    Procedure: IRRIGATION AND DEBRIDEMENT, UPPER EXTREMITY;  Surgeon: Leighton Cronin DO;  Location: [facility] OR;  Service: Orthopedics;  Laterality: Bilateral;  Second look irrigation debridement need wound VAC for possible wound VAC placement    OPEN REDUCTION AND INTERNAL FIXATION (ORIF) OF FRACTURE OF FEMUR USING DYNAMIC HIP SCREW Left 7/3/2019    Procedure: ORIF, HIP, USING DYNAMIC HIP SCREW;  Surgeon: Juan Jose Gunn MD;  Location: [facility] OR;  Service: Orthopedics;  Laterality: Left;    REVERSE TOTAL SHOULDER ARTHROPLASTY Right  4/26/2023    Procedure: ARTHROPLASTY, SHOULDER, TOTAL, REVERSE;  Surgeon: Luis Enrique Pimentel II, MD;  Location: [facility];  Service: Orthopedics;  Laterality: Right;     Social History     Tobacco Use    Smoking status: Every Day     Current packs/day: 1.00     Types: Cigarettes    Smokeless tobacco: Never   Substance Use Topics    Alcohol use: Yes     Comment: rarely    Drug use: No     No family history on file.  Review of patient's allergies indicates:   Allergen Reactions    Codeine Nausea Only     Happened 50 years ago        Performance Status:The patient's activity level is functions out of house.  Can walk around to [employer]. Likes to go [employer]. Uses walker     Review of Systems:   Review of Systems   Constitutional:  Negative for chills, fever, malaise/fatigue and weight loss.   HENT:  Negative for congestion, sinus pain and sore throat.    Eyes:  Negative for blurred vision and pain.   Respiratory:  Negative for cough, shortness of breath and wheezing.    Cardiovascular:  Negative for chest pain, palpitations, orthopnea, claudication and leg swelling.   Gastrointestinal:  Negative for abdominal pain, constipation, diarrhea, heartburn, melena, nausea and vomiting.   Genitourinary:  Negative for dysuria, frequency, hematuria and urgency.   Skin:  Negative for itching and rash.   Neurological:  Negative for dizziness, seizures, loss of consciousness and headaches.   Endo/Heme/Allergies:  Negative for environmental allergies and polydipsia. Does not bruise/bleed easily.   Psychiatric/Behavioral:  Negative for depression. The patient is not nervous/anxious.         Exam:  Physical Exam  Vitals reviewed.   Constitutional:       General: She is not in acute distress.     Appearance: She is well-developed. She is not diaphoretic.   HENT:      Head: Normocephalic and atraumatic.      Mouth/Throat:      Pharynx: No oropharyngeal exudate or posterior oropharyngeal erythema.   Eyes:      General: No scleral icterus.      Pupils: Pupils are equal, round, and reactive to light.   Neck:      Vascular: No JVD.   Cardiovascular:      Rate and Rhythm: Normal rate and regular rhythm.      Heart sounds: Normal heart sounds. No murmur heard.  Pulmonary:      Effort: Pulmonary effort is normal. No respiratory distress.      Breath sounds: Normal breath sounds. No wheezing.   Abdominal:      General: Bowel sounds are normal. There is no distension.      Palpations: Abdomen is soft.      Tenderness: There is no abdominal tenderness.   Musculoskeletal:         General: No swelling.      Cervical back: Normal range of motion and neck supple. No rigidity.   Skin:     General: Skin is warm and dry.      Capillary Refill: Capillary refill takes less than 2 seconds.      Coloration: Skin is not pale.      Findings: No rash.   Neurological:      General: No focal deficit present.      Mental Status: She is alert and oriented to person, place, and time.      Cranial Nerves: No cranial nerve deficit.      Motor: No weakness or abnormal muscle tone.          Radiographs (ct chest and cxr) reviewed: results reviewed     Labs reviewed     Lab Results   Component Value Date    WBC 14.00 (H) 08/04/2024    HGB 13.0 08/04/2024    HCT 39.8 08/04/2024    MCV 87 08/04/2024     08/04/2024       CMP  Sodium   Date Value Ref Range Status   08/15/2024 141 136 - 145 mmol/L Final     Potassium   Date Value Ref Range Status   08/15/2024 4.0 3.5 - 5.1 mmol/L Final     Chloride   Date Value Ref Range Status   08/15/2024 108 95 - 110 mmol/L Final     CO2   Date Value Ref Range Status   08/15/2024 23 23 - 29 mmol/L Final     Glucose   Date Value Ref Range Status   08/15/2024 80 70 - 110 mg/dL Final     BUN   Date Value Ref Range Status   08/15/2024 25 (H) 8 - 23 mg/dL Final     Creatinine   Date Value Ref Range Status   08/15/2024 0.8 0.5 - 1.4 mg/dL Final     Calcium   Date Value Ref Range Status   08/15/2024 9.6 8.7 - 10.5 mg/dL Final     Total Protein   Date Value  Ref Range Status   08/04/2024 7.1 6.0 - 8.4 g/dL Final     Albumin   Date Value Ref Range Status   08/04/2024 3.4 (L) 3.5 - 5.2 g/dL Final     Total Bilirubin   Date Value Ref Range Status   08/04/2024 0.4 0.1 - 1.0 mg/dL Final     Comment:     For infants and newborns, interpretation of results should be based  on gestational age, weight and in agreement with clinical  observations.    Premature Infant recommended reference ranges:  Up to 24 hours.............<8.0 mg/dL  Up to 48 hours............<12.0 mg/dL  3-5 days..................<15.0 mg/dL  6-29 days.................<15.0 mg/dL       Alkaline Phosphatase   Date Value Ref Range Status   08/04/2024 115 55 - 135 U/L Final     AST   Date Value Ref Range Status   08/04/2024 35 10 - 40 U/L Final     ALT   Date Value Ref Range Status   08/04/2024 29 10 - 44 U/L Final     Anion Gap   Date Value Ref Range Status   08/15/2024 10 8 - 16 mmol/L Final     eGFR   Date Value Ref Range Status   08/15/2024 >60.0 >60 mL/min/1.73 m^2 Final         PFT results reviewed        Plan:  Clinical impression is apparently straight forward and impression with management as below.    Ms Cooksey is an 84 year old woman who has recently stopped smoking, but smoked since 18 years old, who has emphsyema and likely COPD, presents after having low oxygen levels at home. But now she is no longer on oxygen.     - Advise Pneumococcal vaccine   - Will get PFTs  - Will hold off on inhalers  - She has already quit smoking  - Takes prn albuterol only used twice in last 3 weeks.   - We discussed pulm rehab- she is going to try walking every day for 10-15 minutes   - We did discuss indication for lung cancer screening she is reaching age we would stop monitoring to begin with- I did notice a RLL nodule on a prior CT cervical spine- however- we have agreed that right now that imaging may not be neccsary- as due to her age and comor she would be unlike to puruse aggressive treatments or diagnostics  such has lung biopsy and chemotherapy and radiation. We will discuss this further on next visit- should she decide that she does want imaging  to better evaluate her known RLL nodule.     Follow up with me 3 months       Problem List Items Addressed This Visit    None  Visit Diagnoses       Pneumonia of right lower lobe due to infectious organism                No follow-ups on file.    Discussed with patient above for education the following:      There are no Patient Instructions on file for this visit.

## 2024-08-24 ENCOUNTER — DOCUMENT SCAN (OUTPATIENT)
Dept: HOME HEALTH SERVICES | Facility: HOSPITAL | Age: 84
End: 2024-08-24
Payer: MEDICARE

## 2024-08-26 ENCOUNTER — DOCUMENT SCAN (OUTPATIENT)
Dept: HOME HEALTH SERVICES | Facility: HOSPITAL | Age: 84
End: 2024-08-26
Payer: MEDICARE

## 2024-08-28 ENCOUNTER — DOCUMENT SCAN (OUTPATIENT)
Dept: HOME HEALTH SERVICES | Facility: HOSPITAL | Age: 84
End: 2024-08-28
Payer: MEDICARE

## 2024-11-05 NOTE — PLAN OF CARE
Problem: Gas Exchange Impaired  Goal: Optimal Gas Exchange  Outcome: Progressing     Problem: Adult Inpatient Plan of Care  Goal: Plan of Care Review  Outcome: Progressing  Goal: Patient-Specific Goal (Individualized)  Outcome: Progressing  Goal: Absence of Hospital-Acquired Illness or Injury  Outcome: Progressing  Goal: Optimal Comfort and Wellbeing  Outcome: Progressing  Goal: Readiness for Transition of Care  Outcome: Progressing     Problem: Pneumonia  Goal: Fluid Balance  Outcome: Progressing  Goal: Resolution of Infection Signs and Symptoms  Outcome: Progressing  Goal: Effective Oxygenation and Ventilation  Outcome: Progressing     Problem: Wound  Goal: Optimal Coping  Outcome: Progressing  Goal: Optimal Functional Ability  Outcome: Progressing  Goal: Absence of Infection Signs and Symptoms  Outcome: Progressing  Goal: Improved Oral Intake  Outcome: Progressing  Goal: Optimal Pain Control and Function  Outcome: Progressing  Goal: Skin Health and Integrity  Outcome: Progressing  Goal: Optimal Wound Healing  Outcome: Progressing     Problem: Skin Injury Risk Increased  Goal: Skin Health and Integrity  Outcome: Progressing     Problem: Fall Injury Risk  Goal: Absence of Fall and Fall-Related Injury  Outcome: Progressing     Problem: Fatigue  Goal: Improved Activity Tolerance  Outcome: Progressing     Problem: Sepsis/Septic Shock  Goal: Optimal Coping  Outcome: Progressing  Goal: Absence of Bleeding  Outcome: Progressing  Goal: Blood Glucose Level Within Targeted Range  Outcome: Progressing  Goal: Absence of Infection Signs and Symptoms  Outcome: Progressing  Goal: Optimal Nutrition Intake  Outcome: Progressing   No acute events overnight. Pt VSS. Hourly rounding complete. All questions answered. Call light within reach. Bed in locked and low position.    No

## (undated) DEVICE — TRAY SKIN SCRUB WET PREMIUM

## (undated) DEVICE — DRAPE STERI INSTRUMENT 1018

## (undated) DEVICE — SUT MONOCRYL 3-0 PS-1

## (undated) DEVICE — GLOVE SURG ULTRA TOUCH 7

## (undated) DEVICE — UNDERGLOVE BIOGEL PI SZ 6.5 LF

## (undated) DEVICE — ELECTRODE REM PLYHSV RETURN 9

## (undated) DEVICE — TUBE SUCTION YANKAUER HI CAP

## (undated) DEVICE — SEE MEDLINE ITEM 157117

## (undated) DEVICE — CLOSURE SKIN STERI STRIP 1/2X4

## (undated) DEVICE — PADDING CAST SPECIALIST 6X4YD

## (undated) DEVICE — SUT STRATAFIX SPIRAL VIOLET

## (undated) DEVICE — SYS CLSR DERMABOND PRINEO 22CM

## (undated) DEVICE — DRESSING N ADH OIL EMUL 3X3

## (undated) DEVICE — SOL IRR NACL .9% 3000ML

## (undated) DEVICE — SUT 3-0 VICRYL / SH (J416)

## (undated) DEVICE — SYR LUER-LOCK STERILE 3ML

## (undated) DEVICE — SPLINT PLASTER EXT FAST 4X15

## (undated) DEVICE — DRAPE U STD FILM LG 60X84IN

## (undated) DEVICE — PACK CUSTOM UNIV BASIN SLI

## (undated) DEVICE — SUT CTD VICRYL 1 UND BR

## (undated) DEVICE — GOWN POLY REINF BRTH SLV LG

## (undated) DEVICE — SLEEVE SCD EXPRESS CALF MEDIUM

## (undated) DEVICE — SUT BONE WAX 2.5 GRMS 12/BX

## (undated) DEVICE — ADHESIVE MASTISOL VIAL 48/BX

## (undated) DEVICE — TOGA FLYTE PEEL AWAY XLARGE

## (undated) DEVICE — KIT TRIMANO

## (undated) DEVICE — GLOVE SURG ULTRA TOUCH 8

## (undated) DEVICE — NDL SAFETY 25G X 1.5 ECLIPSE

## (undated) DEVICE — TAPE SILK 3IN

## (undated) DEVICE — SUT ETHIBOND XTRA 5 V-37 GR

## (undated) DEVICE — BANDAGE MATRIX HK LOOP 4IN 5YD

## (undated) DEVICE — UNDERGLOVES BIOGEL PI SIZE 8.5

## (undated) DEVICE — CUBE COLD THERAPY POLAR CARE

## (undated) DEVICE — GLOVE SURGEONS ULTRA TOUCH 6.5

## (undated) DEVICE — DRAPE STERI-DRAPE 83X125 IOBAN

## (undated) DEVICE — GLOVE SURG ULTRA TOUCH 8.5

## (undated) DEVICE — DRAPE SHOULDER 160X102IN 10/CA

## (undated) DEVICE — DRAPE HAND STERILE

## (undated) DEVICE — INTERPULSE SET

## (undated) DEVICE — DRAPE STERI U-SHAPED 47X51IN

## (undated) DEVICE — GOWN TOGA SYS PEELWY ZIP 2 XL

## (undated) DEVICE — DRESSING XEROFORM FOIL 4X4

## (undated) DEVICE — SLING SHLDR IMMOBILIZER MED

## (undated) DEVICE — DRAPE STERI-DRAPE 1000 17X11IN

## (undated) DEVICE — SPONGE GAUZE 16PLY 4X4

## (undated) DEVICE — ALCOHOL 70% ISOP RUBBING 4OZ

## (undated) DEVICE — SLEEVE SCD EXPRESS KNEE MEDIUM

## (undated) DEVICE — BLADE SURG CARBON STEEL #10

## (undated) DEVICE — SEE MEDLINE ITEM 146292

## (undated) DEVICE — DRAPE C ARM 42 X 120 10/BX

## (undated) DEVICE — Device

## (undated) DEVICE — SEE MEDLINE ITEM 146271

## (undated) DEVICE — COVER LIGHT HANDLE 80/CA

## (undated) DEVICE — SUT CTD VICRYL CT-1 27

## (undated) DEVICE — APPLICATOR CHLORAPREP ORN 26ML

## (undated) DEVICE — GLOVE GAMMEX SURG LF PI SZ 8

## (undated) DEVICE — DRAPE THREE-QUARTER 53X77IN

## (undated) DEVICE — DRAIN PENRS SIL STRL .25X18IN

## (undated) DEVICE — SUT MONO 3-0 PS-2 18 PLST

## (undated) DEVICE — PAD ABD 8X10 STERILE

## (undated) DEVICE — TOWEL OR DISP STRL BLUE 4/PK

## (undated) DEVICE — SUT 2-0 VICRYL / CT-1

## (undated) DEVICE — PAD CAST SPECIALIST STRL 4

## (undated) DEVICE — TOURNIQUET SB QC SP 18X4IN

## (undated) DEVICE — SUT ETHILON 4-0 BLK MONO

## (undated) DEVICE — SUT STRATAFIX PDS PLS 45CM

## (undated) DEVICE — GLOVE SURG ULTRA TOUCH 9

## (undated) DEVICE — GOWN POLY REINF X-LONG XL

## (undated) DEVICE — SOL 9P NACL IRR PIC IL

## (undated) DEVICE — MANIFOLD 4 PORT

## (undated) DEVICE — YANKAUER OPEN TIP W/O VENT

## (undated) DEVICE — DRAPE U SPLIT SHEET 54X76IN

## (undated) DEVICE — PADDING CAST 4IN SPECIALIST

## (undated) DEVICE — NDL MAYO 2

## (undated) DEVICE — DRESSING TRANS 6X8 TEGADERM

## (undated) DEVICE — SPONGE LAP 18X18 PREWASHED

## (undated) DEVICE — PACK SIRUS BASIC V SURG STRL

## (undated) DEVICE — DRAPE INCISE IOBAN 2 23X17IN

## (undated) DEVICE — DRESSING AQUACEL AG 3.5X10IN

## (undated) DEVICE — SUT 0 VICRYL / CT-1

## (undated) DEVICE — COVER TABLE 44X90 STERILE

## (undated) DEVICE — BLADE SURG #15 CARBON STEEL

## (undated) DEVICE — PAD SUREFIT GRND ELECTRD 10FT

## (undated) DEVICE — DRAPE THREE-QTR REINF 53X77IN

## (undated) DEVICE — GAUZE SPONGE 4X4 12PLY

## (undated) DEVICE — DRESSING N ADH OIL EMUL 3X8 3S

## (undated) DEVICE — SUT ETHILON 3-0 FS-1 30

## (undated) DEVICE — TUBING SUCTION SCALLOP 3/16X10

## (undated) DEVICE — PRESSURIZER HI VAC HIP KIT

## (undated) DEVICE — NDL HYPODERMIC BLUNT 18G 1.5IN

## (undated) DEVICE — PILLOW FACE ADLT FOAM W/VELCRO

## (undated) DEVICE — BIT DRILL 3.2

## (undated) DEVICE — STRAP OR TABLE 5IN X 72IN

## (undated) DEVICE — KIT TOTAL HIP BN PREPARATION

## (undated) DEVICE — SPONGE SUPER KERLIX 6X6.75IN

## (undated) DEVICE — SEE MEDLINE ITEM 152530

## (undated) DEVICE — ELECTRODE BLD EXT 6.50 ST DISP

## (undated) DEVICE — BANDAGE ESMARK ELASTIC ST 4X9